# Patient Record
Sex: FEMALE | Race: WHITE | Employment: UNEMPLOYED | URBAN - METROPOLITAN AREA
[De-identification: names, ages, dates, MRNs, and addresses within clinical notes are randomized per-mention and may not be internally consistent; named-entity substitution may affect disease eponyms.]

---

## 2017-01-02 ENCOUNTER — OFFICE VISIT (OUTPATIENT)
Dept: INTERNAL MEDICINE | Facility: CLINIC | Age: 62
End: 2017-01-02

## 2017-01-02 VITALS
BODY MASS INDEX: 27.86 KG/M2 | HEART RATE: 81 BPM | DIASTOLIC BLOOD PRESSURE: 77 MMHG | SYSTOLIC BLOOD PRESSURE: 115 MMHG | WEIGHT: 167.4 LBS

## 2017-01-02 DIAGNOSIS — J42 CHRONIC BRONCHITIS, UNSPECIFIED CHRONIC BRONCHITIS TYPE (H): Primary | ICD-10-CM

## 2017-01-02 ASSESSMENT — PAIN SCALES - GENERAL: PAINLEVEL: MILD PAIN (3)

## 2017-01-02 NOTE — NURSING NOTE
Chief Complaint   Patient presents with     Hypertension     pt here for check on high blood pressure     Results     pt here for test results     Medication Request     pt would like to discuss medications     Gisella Nugent CMA at 10:08 AM on 1/2/2017.
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
## 2017-01-02 NOTE — PATIENT INSTRUCTIONS
[facility] Medication Refill Request Information:  * Please contact your pharmacy regarding ANY request for medication refills.  ** [facility] Prescription Fax = [phone]  * Please allow 3 business days for routine medication refills.  * Please allow 5 business days for controlled substance medication refills.     [facility] Test Result notification information:  *You will be notified with in 7-10 days of your appointment day regarding the results of your test.  If you are on MyChart you will be notified as soon as the provider has reviewed the results and signed off on them.      [facility] [phone] (4th Floor [facility])

## 2017-01-02 NOTE — MR AVS SNAPSHOT
After Visit Summary   1/2/2017    Sarah Sanford    MRN: [id number]           Patient Information     Date Of Birth          1955        Visit Information        Provider Department      1/2/2017 10:20 AM Vj Centeno MD [facility]        Today's Diagnoses     Chronic bronchitis, unspecified chronic bronchitis type (H)    -  1       Care Instructions    [facility] Medication Refill Request Information:  * Please contact your pharmacy regarding ANY request for medication refills.  ** [facility] Prescription Fax = [phone]  * Please allow 3 business days for routine medication refills.  * Please allow 5 business days for controlled substance medication refills.     [facility] Test Result notification information:  *You will be notified with in 7-10 days of your appointment day regarding the results of your test.  If you are on MyChart you will be notified as soon as the provider has reviewed the results and signed off on them.      [facility] [phone] (4th Floor [facility])           Follow-ups after your visit        Follow-up notes from your care team     Return in about 2 months (around 3/2/2017).      Your next 10 appointments already scheduled     Jan 12, 2017 12:45 PM   RETURN RETINA with Solange Currie MD   Eye Clinic ([facility])    [address]  [address]  [address] [address]  [place] [address] [phone]            Jan 31, 2017  6:00 PM   (Arrive by 5:45 PM)   Procedure with Julio Cesar Yeager MD   [facility] ([facility] [place] [facility])    [address]  [address] [place] [address]   [phone]            Mar 06, 2017  3:00 PM   (Arrive by 2:45 PM)   Return Visit with Vj Centeno MD   [facility] ([facility] [place])    [address]  [address] [place] [id number] [phone]               Who to contact     Please call your clinic at [phone] to:    Ask questions about your health    Make or cancel appointments    Discuss your medicines    Learn about your test results    Speak to your doctor   If you have compliments or concerns about an experience at your clinic, or if you wish to file a complaint, please contact [facility] Patient Relations at [phone] or email us at [email or web address]         Additional Information About Your Visit        [employer] Information     [employer] gives you secure access to your electronic health record. If you see a primary care provider, you can also send messages to your care team and make appointments. If you have questions, please call your primary care clinic.  If you do not have a primary care provider, please call [phone] and they will assist you.      [employer] is an electronic gateway that provides easy, online access to your medical records. With [employer], you can request a clinic appointment, read your test results, renew a prescription or communicate with your care team.     To access your existing account, please contact your [facility] Clinic or call [phone] for assistance.        Your Vitals Were     Pulse                   81            Blood Pressure from Last 3 Encounters:   01/02/17 115/77   12/05/16 136/91   11/28/16 134/89    Weight from Last 3 Encounters:   01/02/17 75.932 kg (167 lb 6.4 oz)   12/21/16 78.291 kg (172 lb 9.6 oz)   12/05/16 78.336 kg (172 lb 11.2 oz)              Today, you had the following     No orders found for display         Today's Medication Changes          These changes are accurate as of: 1/2/17 10:45 AM.  If you have any questions, ask your nurse or doctor.               Start taking these medicines.        Dose/Directions    order for DME   Used for:  Chronic bronchitis, unspecified chronic bronchitis type (H)   Started by:  Vj Centeno  MD Tin        Dose:  1 Device   1 Device by Device route daily as needed Air filtration system   Quantity:  1 Device   Refills:  0         Stop taking these medicines if you haven't already. Please contact your care team if you have questions.     montelukast 10 MG tablet   Commonly known as:  SINGULAIR   Stopped by:  Vj Centeno MD                Where to get your medicines      Some of these will need a paper prescription and others can be bought over the counter.  Ask your nurse if you have questions.     Bring a paper prescription for each of these medications    - order for DME             Primary Care Provider Office Phone # Fax #    Vj Centeno [phone]        [facility] [address]  [facility] [address]        Thank you!     Thank you for choosing [facility]  for your care. Our goal is always to provide you with excellent care. Hearing back from our patients is one way we can continue to improve our services. Please take a few minutes to complete the written survey that you may receive in the mail after your visit with us. Thank you!             Your Updated Medication List - Protect others around you: Learn how to safely use, store and throw away your medicines at www.disposemymeds.org.          This list is accurate as of: 1/2/17 10:45 AM.  Always use your most recent med list.                   Brand Name Dispense Instructions for use    ATIVAN 2 MG tablet   Generic drug:  LORazepam      Take 2 mg by mouth At Bedtime 2 tablets at night       cetirizine 10 MG tablet    zyrTEC     Take 10 mg by mouth daily.       fluticasone-salmeterol 100-50 MCG/DOSE diskus inhaler    ADVAIR    60 Inhaler    Inhale 1 puff into the lungs every 12 hours       Gel Heel Cushions Womens Pads     1 Device    1 Device daily       hydrochlorothiazide 25 MG tablet    HYDRODIURIL    100 tablet    Take 1 tablet (25 mg) by mouth daily       lisinopril  30 MG tablet    PRINIVIL,ZESTRIL    100 tablet    Take 1 tablet (30 mg) by mouth At Bedtime       Lysine 1000 MG Tabs          order for DME     1 Device    1 Device by Device route daily as needed Air filtration system       PREMARIN VA      Place 2 g vaginally daily       traMADol 50 MG tablet    ULTRAM    360 tablet    Take 1-2 tablets ( mg) by mouth every 6 hours as needed       traZODone 50 MG tablet    DESYREL     Take 1 mg by mouth At Bedtime

## 2017-01-07 ENCOUNTER — TELEPHONE (OUTPATIENT)
Dept: NURSING | Facility: CLINIC | Age: 62
End: 2017-01-07

## 2017-01-08 ENCOUNTER — OFFICE VISIT (OUTPATIENT)
Dept: URGENT CARE | Facility: URGENT CARE | Age: 62
End: 2017-01-08
Payer: MEDICARE

## 2017-01-08 VITALS
OXYGEN SATURATION: 98 % | BODY MASS INDEX: 27.49 KG/M2 | HEIGHT: 65 IN | DIASTOLIC BLOOD PRESSURE: 86 MMHG | SYSTOLIC BLOOD PRESSURE: 126 MMHG | TEMPERATURE: 98.6 F | WEIGHT: 165 LBS | HEART RATE: 64 BPM

## 2017-01-08 DIAGNOSIS — B00.89 HERPES SIMPLEX VIRUS (HSV) INFECTION OF BUTTOCK: Primary | ICD-10-CM

## 2017-01-08 PROCEDURE — 86696 HERPES SIMPLEX TYPE 2 TEST: CPT | Performed by: FAMILY MEDICINE

## 2017-01-08 PROCEDURE — 36415 COLL VENOUS BLD VENIPUNCTURE: CPT | Performed by: FAMILY MEDICINE

## 2017-01-08 PROCEDURE — 99000 SPECIMEN HANDLING OFFICE-LAB: CPT | Performed by: FAMILY MEDICINE

## 2017-01-08 PROCEDURE — 86695 HERPES SIMPLEX TYPE 1 TEST: CPT | Performed by: FAMILY MEDICINE

## 2017-01-08 PROCEDURE — 99213 OFFICE O/P EST LOW 20 MIN: CPT | Performed by: FAMILY MEDICINE

## 2017-01-08 NOTE — TELEPHONE ENCOUNTER
Call Type: Triage Call    Presenting Problem: Patient calling with c/o that she thinks she may  have some herpetic lesions on her buttocks that are mostly dried up,  but she is contemplating a sexual encounter this evening and is  concerned that a condom is adequate protection. I explained that if  the lesions are dry that should be adequate.  Triage Note:  Guideline Title: Genital Herpes, Diagnosed or Suspected Exposure  Recommended Disposition: Provide Home/Self Care  Original Inclination: Wanted to speak with a nurse  Override Disposition:  Intended Action: Follow Selfcare / Homecare  Physician Contacted: No  All other situations ?  YES  Requesting prescription refill AND no current outbreak ? NO  Known or suspected exposure to genital herpes AND immunocompromised ? NO  Diagnosed with genital herpes AND painful intercourse (dyspareunia) ? NO  Known or suspected exposure to genital herpes ? NO  Diagnosed with genital herpes AND possible or known pregnancy ? NO  Evaluated by provider AND symptoms worsening when following recommended treatment  plan ? NO  Pregnant with active first episode of genital herpes lesions AND signs/symptoms of  labor ? NO  Current outbreak AND requesting prescription or refill ? NO  Diagnosed with genital herpes AND requesting information ? NO  Painful genital lesions, blisters or sores ? NO  Current outbreak AND lesions at new site or new swollen lymph nodes in groin ? NO  Diagnosed with genital herpes AND symptoms same as previous occurrence ? NO  Urinary tract symptoms AND any flank or low back pain ? NO  Symptoms of first episode of genital herpes AND immunocompromised ? NO  Unbearable pelvic pain ? NO  Pregnant AND first episode of tingling, burning, or painful blisters on genital  area ? NO  Previously diagnosed with genital herpes AND having frequent, recurrent outbreaks  that have not been evaluated ? NO  No urination for 12 or more hours ? NO  New onset of multiple lesions or  worsening swelling in perineal area with  significant discomfort (difficulty sitting/walking) ? NO  Any new OR worsening signs and symptoms of soft tissue infection ? NO  Physician Instructions:  Care Advice: The virus can be transmitted to the baby during vaginal  delivery.  It is important that any pregnant woman tell her provider that  she has genital herpes, active or inactive.  Call provider if symptoms continue, worsen, or new symptoms develop.  CAUTIONS  HEALTH PROMOTION / MAINTENANCE  Avoid having sex with a partner who has genital lesions in the perineal  area (penis, scrotum, vulva, or anus), or is having unusual discharge from  vagina or penis.  HERPES MAINTENANCE ADVICE:  * Reduce stress and maintain good health to  decrease recurrences (illness and stress are major factors in causing  recurrent outbreaks).   * Support groups are available and may be helpful  in dealing with feelings of anger, guilt, or shame common with this  problem.     * Women should have annual pelvic examinations and Pap smears  because of the association of HSV with cervical cancer.  SEXUAL BEHAVIOR:   *Refrain from intercourse, douching, tampon use or  intravaginal medication until evaluated by provider and/or symptoms are  gone. * Use condoms between outbreaks, even when no symptoms present (the  virus may continue to be shed for years after lesions disappear).

## 2017-01-08 NOTE — PATIENT INSTRUCTIONS
Living with Herpes  To speed healing, take care of open herpes sores. To reduce outbreaks, take care of your health. And to keep from infecting others, learn how to avoid spreading the virus.     To ease symptoms    Start episodic treatment at the first sign of symptoms, such as itching or tingling.    Take ibuprofen or acetaminophen to limit any pain.    Sit in a warm or cool bath or use a moist compress to lessen the itching of sores. For some women, genital outbreaks cause burning during urination. In such cases, urinating in a tub of warm water helps reduce burning.    Wear white cotton underwear and loose clothing during outbreaks. Don t wear nylon underwear or tight clothes. They can prevent sores from healing.     To speed healing    Wash sores with mild soap and water. Pat the sores completely dry.    Always wash your hands after touching a sore.    Don t bandage sores. Air helps them heal.    Avoid using any ointment unless it is prescribed. Applying the wrong jelly or cream may hold in moisture and slow healing.    Don t pick at the sores. This can slow healing, and might cause a sore to become infected.    If you wear contacts, wash your hands well before putting them in.     To reduce outbreaks    Eat a balanced diet. Your health care provider may suggest taking supplements. These help ensure that you get all the nutrients you need.    Get plenty of sleep. This helps your immune system work its best.    Limit stress and tension. Both can weaken the body s defenses.    Limit exposure to sun, wind, and extreme heat or cold. Wear sunscreen and lip balm to help prevent outbreaks.     To protect others    Tell your current sex partner and any future partners that you have herpes. If you don t know what to say, ask your health care provider for help.    Use a latex condom that covers the affected areas each time you have sex. This reduces the risk of passing herpes to your partner.    Avoid kissing when you  have an oral sore.    Do not have intercourse when genital sores are present. Also keep in mind, herpes can be passed during oral sex and with anal contact.    Don t share towels, toothbrushes, lip balm, or lipstick when you have a sore.    Some evidence supports taking daily antiviral medications to help reduce the likelihood of transmission to your partner.    2315-0457 [employer]. [address] [place] [address], [place] [id number]. All rights reserved. This information is not intended as a substitute for professional medical care. Always follow your healthcare professional's instructions.

## 2017-01-08 NOTE — NURSING NOTE
"Chief Complaint   Patient presents with     Urgent Care     Pt in clinic to have eval for rash on right gluteus.     Derm Problem       Initial /86 mmHg  Pulse 64  Temp(Src) 98.6  F (37  C) (Tympanic)  Ht 5' 5\" (1.651 m)  Wt 165 lb (74.844 kg)  BMI 27.46 kg/m2  SpO2 98% Estimated body mass index is 27.46 kg/(m^2) as calculated from the following:    Height as of this encounter: 5' 5\" (1.651 m).    Weight as of this encounter: 165 lb (74.844 kg).  BP completed using cuff size: large  Crystal Rj/ MA    "

## 2017-01-08 NOTE — PROGRESS NOTES
SUBJECTIVE:   Sarah Sanford is a 61 year old female who has left buttock area rash that she has had previously and suspects is secondary to herpes. Has had this rash in the past starting 14 years ago. Gets the rash every 9 months.  No vaginal lesions.  Also, she gets lip cold sores. PMH: generally healthy. She wants to get checked today as she is sexually active with a new partner and does not want to give the herpes to him.     OBJECTIVE:  Vital signs are normal, she appears well. Left sacral region with 3 scabbed over grouped lesions, no vesicles.  Not tender.      ASSESSMENT:  Herpes simplex virus infection left buttock    PLAN:  The nature of herpes explained carefully. The lesions have crusted over so wound HS DNA testing is not indicated.  Will collect herpes antibody evaluation today.  Declines other STD testing today.  Declines antiviral treatment for future outbreaks.  Informed not to be sexually active when has active lesions.  She understands that sometimes, people may have asymptomatic shedding of the virus.  The patient understands these issues, and will call as needed for further care.  Linda Owen MD

## 2017-01-08 NOTE — MR AVS SNAPSHOT
After Visit Summary   1/8/2017    Sarah Sanford    MRN: [id number]           Patient Information     Date Of Birth          1955        Visit Information        Provider Department      1/8/2017 12:45 PM Linda Jang MD [facility]        Today's Diagnoses     Herpes simplex virus (HSV) infection of buttock    -  1       Care Instructions      Living with Herpes  To speed healing, take care of open herpes sores. To reduce outbreaks, take care of your health. And to keep from infecting others, learn how to avoid spreading the virus.     To ease symptoms    Start episodic treatment at the first sign of symptoms, such as itching or tingling.    Take ibuprofen or acetaminophen to limit any pain.    Sit in a warm or cool bath or use a moist compress to lessen the itching of sores. For some women, genital outbreaks cause burning during urination. In such cases, urinating in a tub of warm water helps reduce burning.    Wear white cotton underwear and loose clothing during outbreaks. Don t wear nylon underwear or tight clothes. They can prevent sores from healing.     To speed healing    Wash sores with mild soap and water. Pat the sores completely dry.    Always wash your hands after touching a sore.    Don t bandage sores. Air helps them heal.    Avoid using any ointment unless it is prescribed. Applying the wrong jelly or cream may hold in moisture and slow healing.    Don t pick at the sores. This can slow healing, and might cause a sore to become infected.    If you wear contacts, wash your hands well before putting them in.     To reduce outbreaks    Eat a balanced diet. Your health care provider may suggest taking supplements. These help ensure that you get all the nutrients you need.    Get plenty of sleep. This helps your immune system work its best.    Limit stress and tension. Both can weaken the body s defenses.    Limit exposure to sun, wind, and  extreme heat or cold. Wear sunscreen and lip balm to help prevent outbreaks.     To protect others    Tell your current sex partner and any future partners that you have herpes. If you don t know what to say, ask your health care provider for help.    Use a latex condom that covers the affected areas each time you have sex. This reduces the risk of passing herpes to your partner.    Avoid kissing when you have an oral sore.    Do not have intercourse when genital sores are present. Also keep in mind, herpes can be passed during oral sex and with anal contact.    Don t share towels, toothbrushes, lip balm, or lipstick when you have a sore.    Some evidence supports taking daily antiviral medications to help reduce the likelihood of transmission to your partner.    1467-8489 [employer]. [address] [place] [address]. All rights reserved. This information is not intended as a substitute for professional medical care. Always follow your healthcare professional's instructions.              Follow-ups after your visit        Your next 10 appointments already scheduled     Jan 12, 2017 12:45 PM   RETURN RETINA with Solange Currie MD   Eye Clinic ([facility])    [address]  [address]  [address] [address]  [facility] [address]   [phone]            Jan 31, 2017  6:00 PM   (Arrive by 5:45 PM)   Procedure with Julio Cesar Yeager MD   [facility] ([facility])    [address]  3rd Floor  [place] [address] [phone]            Mar 06, 2017  3:00 PM   (Arrive by 2:45 PM)   Return Visit with Vj Centeno MD   [facility] ([facility])    [address]  [address] [facility] [address] [phone]              Who to contact     If you have questions or need follow up information about today's clinic visit or your schedule please contact  "[facility] directly at [phone].  Normal or non-critical lab and imaging results will be communicated to you by MyChart, letter or phone within 4 business days after the clinic has received the results. If you do not hear from us within 7 days, please contact the clinic through [employer] or phone. If you have a critical or abnormal lab result, we will notify you by phone as soon as possible.  Submit refill requests through [employer] or call your pharmacy and they will forward the refill request to us. Please allow 3 business days for your refill to be completed.          Additional Information About Your Visit        [employer] Information     [employer] gives you secure access to your electronic health record. If you see a primary care provider, you can also send messages to your care team and make appointments. If you have questions, please call your primary care clinic.  If you do not have a primary care provider, please call [phone] and they will assist you.        Care EveryWhere ID     This is your Care EveryWhere ID. This could be used by other organizations to access your [place] medical records  [id number]        Your Vitals Were     Pulse Temperature Height BMI (Body Mass Index) Pulse Oximetry       64 98.6  F (37  C) (Tympanic) 5' 5\" (1.651 m) 27.46 kg/m2 98%        Blood Pressure from Last 3 Encounters:   01/08/17 126/86   01/02/17 115/77   12/05/16 136/91    Weight from Last 3 Encounters:   01/08/17 165 lb (74.844 kg)   01/02/17 167 lb 6.4 oz (75.932 kg)   12/21/16 172 lb 9.6 oz (78.291 kg)              We Performed the Following     Herpes Simplex Virus 1 and 2 IgG        Primary Care Provider Office Phone # Fax #    Vj Centeno [phone]        [facility] [address] [address]        Thank you!     Thank you for choosing [facility]  for your care. Our goal is always to provide you with excellent " care. Hearing back from our patients is one way we can continue to improve our services. Please take a few minutes to complete the written survey that you may receive in the mail after your visit with us. Thank you!             Your Updated Medication List - Protect others around you: Learn how to safely use, store and throw away your medicines at www.disposemymeds.org.          This list is accurate as of: 1/8/17  2:37 PM.  Always use your most recent med list.                   Brand Name Dispense Instructions for use    ATIVAN 2 MG tablet   Generic drug:  LORazepam      Take 2 mg by mouth At Bedtime 2 tablets at night       cetirizine 10 MG tablet    zyrTEC     Take 10 mg by mouth daily.       fluticasone-salmeterol 100-50 MCG/DOSE diskus inhaler    ADVAIR    60 Inhaler    Inhale 1 puff into the lungs every 12 hours       Gel Heel Cushions Womens Pads     1 Device    1 Device daily       hydrochlorothiazide 25 MG tablet    HYDRODIURIL    100 tablet    Take 1 tablet (25 mg) by mouth daily       lisinopril 30 MG tablet    PRINIVIL,ZESTRIL    100 tablet    Take 1 tablet (30 mg) by mouth At Bedtime       Lysine 1000 MG Tabs          order for DME     1 Device    1 Device by Device route daily as needed Air filtration system       PREMARIN VA      Place 2 g vaginally daily       traMADol 50 MG tablet    ULTRAM    360 tablet    Take 1-2 tablets ( mg) by mouth every 6 hours as needed       traZODone 50 MG tablet    DESYREL     Take 1 mg by mouth At Bedtime

## 2017-01-09 ENCOUNTER — MYC MEDICAL ADVICE (OUTPATIENT)
Dept: INTERNAL MEDICINE | Facility: CLINIC | Age: 62
End: 2017-01-09

## 2017-01-10 ENCOUNTER — OFFICE VISIT (OUTPATIENT)
Dept: OBGYN | Facility: CLINIC | Age: 62
End: 2017-01-10
Attending: NURSE PRACTITIONER
Payer: MEDICARE

## 2017-01-10 VITALS
DIASTOLIC BLOOD PRESSURE: 76 MMHG | SYSTOLIC BLOOD PRESSURE: 139 MMHG | WEIGHT: 170.2 LBS | HEART RATE: 71 BPM | BODY MASS INDEX: 28.32 KG/M2

## 2017-01-10 DIAGNOSIS — B37.31 VAGINAL CANDIDA: ICD-10-CM

## 2017-01-10 DIAGNOSIS — B00.9 HSV (HERPES SIMPLEX VIRUS) INFECTION: ICD-10-CM

## 2017-01-10 DIAGNOSIS — R10.2 VAGINAL PAIN: Primary | ICD-10-CM

## 2017-01-10 LAB
CLUE CELLS: NORMAL
HSV1 IGG SERPL QL IA: ABNORMAL AI (ref 0–0.8)
HSV2 IGG SERPL QL IA: ABNORMAL AI (ref 0–0.8)
TRICHOMONAS (WET PREP): NORMAL
YEAST (WET PREP): NORMAL

## 2017-01-10 PROCEDURE — 99211 OFF/OP EST MAY X REQ PHY/QHP: CPT | Mod: ZF

## 2017-01-10 PROCEDURE — 87210 SMEAR WET MOUNT SALINE/INK: CPT | Mod: ZF | Performed by: NURSE PRACTITIONER

## 2017-01-10 RX ORDER — FLUCONAZOLE 150 MG/1
150 TABLET ORAL ONCE
Qty: 1 TABLET | Refills: 0 | Status: SHIPPED | OUTPATIENT
Start: 2017-01-10 | End: 2017-01-10

## 2017-01-10 ASSESSMENT — PAIN SCALES - GENERAL: PAINLEVEL: MODERATE PAIN (4)

## 2017-01-10 NOTE — NURSING NOTE
Chief Complaint   Patient presents with     Consult   every 6 weeks  Symptoms come back  Vaginal pain 0 feels like she is being violated   Sharp stabbing pain   No discharge.  Herpes outbreak on buttock  Every once in a while   On Sunday was in urgent care. Results are in there.

## 2017-01-10 NOTE — PROGRESS NOTES
"SUBJECTIVE:   61 year old female, , who complains of vaginal pain x 2 days.  Describes it as \"stabbing pain,\" like  \"sand paper is rubbing on sand paper\" and sore.  She experiences this vaginal pain approximately every 6 weeks and it typically lasts for 1 week.  The pain starts ~1 inch inside of the introitus and moves up toward the vaginal vault as the week goes on.  Today she also reports vaginal itching x 2 days.  Denies change in vaginal discharge, pelvic pain, abdominal pain, vaginal malodor, vaginal bleeding, and urinary dysuria, urgency, and frequency.  Last used her Premarin vaginal cream 2 nights ago.      HPI: Sarah had a hysterectomy at age 31 for cervical cancer and experienced menopause at age 42.  She has had ongoing vaginal dryness and pain since menopause.  She has been on long-term every other day Premarin cream (2g).  She recalls that she has been using it for 10+ years.  Despite this, Jes reports continued vaginal dryness.  She was evaluated for this same vaginal pain on  and , with no evidence of an underlying infectious etiology for her symptoms.  She had negative gonorrhea & chlamydia testing on 16.  She was last sexually active  and declines STD testing today.  She was seen at urgent care 2 days ago for a herpes outbreak on her buttocks.  She denies ever having vaginal lesions.  At previous office visits Sarah had been instructed to increase her Premarin cream to daily when she experiences the vaginal pain.  She has not consistently done this and is unable to recall if increasing the Premarin cream has improved or worsened her symptoms.        Sarah has a new boyfriend and is desiring to be sexually active with him.  Expressed fear about having pain with intercourse.      Last pap smear: 2016 NL, HPV negative    OBJECTIVE:   She appears well, afebrile.  Abdomen: benign, soft, nontender, no masses.  Vulva: no external lesions, normal hair distribution, no " lymphadenopathy.  No Q-tip tenderness along the hymenal ring  Vagina: well supported, moist, pink, without rugae, scant amount of white discharge, no lesions; Q-tip touched to vaginal walls bilaterally during speculum exam without any tenderness  Cervix: surgically absent  Uterus: surgically absent  Buttocks: three scabbed herpes lesions present, healing     Wet prep exam: positive for hyphae; negative for clue cells and trichomonads; normal epithelial cells; pH= 4.5; negative whiff test    ASSESSMENT:      Vaginal candida  Vaginal pain    PLAN:   Treatment Vaginal Candida: Diflucan 150 mg x 1 dose  Discussed that this vaginal yeast infection may or may not be related to her episodic vaginal pain.  Scant discharge on exam today.  Discussed recommendations for vaginal hygiene.    Discussed alternative vaginal estrogen options.  Patient desires not to change from the Premarin at this time.    Recommended use of lubricant to decrease discomfort with intercourse and condom use for STD prevention.   Sarah expressed concern about the potential of spreading the herpes virus to her new partner.  Discussed the nature of herpes and discussed that viral shedding can occur at anytime, but is most likely when there is an active outbreak.  Offered a prescription for episodic or suppression antiviral therapy, but Sarah declined.        Return to clinic if symptoms do not resolve or worsen.    30 minutes was spent in direct contact with the patient and > 50% of the time in patient education and coordination of care.    Sharona Mcguire, VALENTE, APRN, WHNP

## 2017-01-10 NOTE — Clinical Note
"1/10/2017       RE: Sarah Sanford  [address]  [place] [address]     Dear Colleague,    Thank you for referring your patient, Sarah Sanford, to the [facility] at [facility]. Please see a copy of my visit note below.    SUBJECTIVE:   61 year old female, , who complains of vaginal pain x 2 days.  Describes it as \"stabbing pain,\" like  \"sand paper is rubbing on sand paper\" and sore.  She experiences this vaginal pain approximately every 6 weeks and it typically lasts for 1 week.  The pain starts ~1 inch inside of the introitus and moves up toward the vaginal vault as the week goes on.  Today she also reports vaginal itching x 2 days.  Denies change in vaginal discharge, pelvic pain, abdominal pain, vaginal malodor, vaginal bleeding, and urinary dysuria, urgency, and frequency.  Last used her Premarin vaginal cream 2 nights ago.      HPI: Sarah had a hysterectomy at age 31 for cervical cancer and experienced menopause at age 42.  She has had ongoing vaginal dryness and pain since menopause.  She has been on long-term every other day Premarin cream (2g).  She recalls that she has been using it for 10+ years.  Despite this, Jes reports continued vaginal dryness.  She was evaluated for this same vaginal pain on  and , with no evidence of an underlying infectious etiology for her symptoms.  She had negative gonorrhea & chlamydia testing on 16.  She was last sexually active  and declines STD testing today.  She was seen at urgent care 2 days ago for a herpes outbreak on her buttocks.  She denies ever having vaginal lesions.  At previous office visits Sarah had been instructed to increase her Premarin cream to daily when she experiences the vaginal pain.  She has not consistently done this and is unable to recall if increasing the Premarin cream has improved or worsened her symptoms.        Sarah has a new boyfriend and is desiring " to be sexually active with him.  Expressed fear about having pain with intercourse.      Last pap smear: 7/9/2016 NL, HPV negative    OBJECTIVE:   She appears well, afebrile.  Abdomen: benign, soft, nontender, no masses.  Vulva: no external lesions, normal hair distribution, no lymphadenopathy.  No Q-tip tenderness along the hymenal ring  Vagina: well supported, moist, pink, without rugae, scant amount of white discharge, no lesions; Q-tip touched to vaginal walls bilaterally during speculum exam without any tenderness  Cervix: surgically absent  Uterus: surgically absent  Buttocks: three scabbed herpes lesions present, healing     Wet prep exam: positive for hyphae; negative for clue cells and trichomonads; normal epithelial cells; pH= 4.5; negative whiff test    ASSESSMENT:      Vaginal candida  Vaginal pain    PLAN:   Treatment Vaginal Candida: Diflucan 150 mg x 1 dose  Discussed that this vaginal yeast infection may or may not be related to her episodic vaginal pain.  Scant discharge on exam today.  Discussed recommendations for vaginal hygiene.    Discussed alternative vaginal estrogen options.  Patient desires not to change from the Premarin at this time.    Recommended use of lubricant to decrease discomfort with intercourse and condom use for STD prevention.   Sarah expressed concern about the potential of spreading the herpes virus to her new partner.  Discussed the nature of herpes and discussed that viral shedding can occur at anytime, but is most likely when there is an active outbreak.  Offered a prescription for episodic or suppression antiviral therapy, but Sarah declined.        Return to clinic if symptoms do not resolve or worsen.    30 minutes was spent in direct contact with the patient and > 50% of the time in patient education and coordination of care.    Sharona Mcguire, DNP, APRN, WHNP

## 2017-01-12 ENCOUNTER — OFFICE VISIT (OUTPATIENT)
Dept: OPHTHALMOLOGY | Facility: CLINIC | Age: 62
End: 2017-01-12
Attending: OPHTHALMOLOGY
Payer: MEDICARE

## 2017-01-12 DIAGNOSIS — H43.11 VITREOUS HEMORRHAGE, RIGHT (H): Primary | ICD-10-CM

## 2017-01-12 PROCEDURE — 99213 OFFICE O/P EST LOW 20 MIN: CPT | Mod: ZF

## 2017-01-12 ASSESSMENT — CONF VISUAL FIELD
OD_NORMAL: 1
OS_NORMAL: 1
METHOD: COUNTING FINGERS

## 2017-01-12 ASSESSMENT — REFRACTION_WEARINGRX
SPECS_TYPE: BIFOCAL
OS_CYLINDER: +0.25
OS_AXIS: 085
OS_SPHERE: +0.25
OD_CYLINDER: +0.50
OD_ADD: +2.50
OD_SPHERE: PLANO
OS_ADD: +2.50
OD_AXIS: 133

## 2017-01-12 ASSESSMENT — TONOMETRY
OS_IOP_MMHG: 11
IOP_METHOD: TONOPEN
OD_IOP_MMHG: 11

## 2017-01-12 ASSESSMENT — CUP TO DISC RATIO
OS_RATIO: 0.2
OD_RATIO: 0.2

## 2017-01-12 ASSESSMENT — SLIT LAMP EXAM - LIDS
COMMENTS: NORMAL
COMMENTS: NORMAL

## 2017-01-12 ASSESSMENT — VISUAL ACUITY
OD_CC: 20/40
OS_CC: 20/20-3
METHOD: SNELLEN - LINEAR

## 2017-01-12 ASSESSMENT — EXTERNAL EXAM - RIGHT EYE: OD_EXAM: NORMAL

## 2017-01-12 ASSESSMENT — EXTERNAL EXAM - LEFT EYE: OS_EXAM: NORMAL

## 2017-01-12 NOTE — PROGRESS NOTES
"CC: new Floaters Right Eye     HPI: Sarah Sanford is a 61 year old female here for follow up on recent vitreous hemorrhage in the right eye.  She reports having worsening vision and floaters around [place].  These symptoms improved over a week and then worsened after a fall on New Years [place].  Reports vision as presently being \"messy.\"     Past ocular history:  Previous history of retinal atrophy, floaters, and MA in the right eye     Review of prior Imaging     fluorescein angiography 11.9.15 showed capillary non perfusion and telangiectatic vessels along the inferior temporal vein. Possible old branch retinal vein occlusion. 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 CMT unchanged     Assessment/plan:  1. Vitreous hemorrhage, Right Eye   - now recurrent several times   - not diabetic   - hypertensive retinopathy, history of likely BRVO right eye on 2015 imaging   -on BP medications, reports good control until recently within the past few weeks; 150/91 in clinic today   -vitreous hemorrhage possibly resulting from ruptured MA or from neovascularization of old branch retinal vein occlusion.     - R/B/A to PPV likely endolaser discussed with patient - wishes to proceed after mid-February (after having chest cyst removed on 1/31) - plan for general anesthesia given patient anxiety   - plan for f/u in 1 month with [place] for re-check (1 week prior to surgery)    2. Possible old Branch retinal vein occlusion right eye  based on prior fluorescein angiography   Possible neovascularization elsewhere? No obvious on exam because of  Vitreous hemorrhage     3.  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 Hypertensive retinopathy both eyes   With AV crossing both eyes  Recommend good " intraocular pressure control    5. History of Posterior vitreous detachment (PVD, both eyes  Retinal detachment/retinal tear precautions discussed with patient  Contact clinic immediately for new floaters/flashers or shadows in vision     RTC four weeks with Dr. Abdulaziz Chapin MD  Retina Fellow    Attestation:  I have seen and examined the patient with Dr. Chapin and agree with the findings in this note.     Solange Zimmerman MD PhD.  Professor & Chair

## 2017-01-12 NOTE — NURSING NOTE
Chief Complaints and History of Present Illnesses   Patient presents with     Vitreous Hemorrhage Follow Up     HPI    Affected eye(s):  Right   Symptoms:     Blurred vision   Decreased vision   Floaters   No flashes         Do you have eye pain now?:  No      Comments:  Around Xmas VA seemed to get better then around New Years VA got much worse, more floaters.     Joslyn FRIEDMAN January 12, 2017 12:43 PM

## 2017-01-12 NOTE — MR AVS SNAPSHOT
After Visit Summary   1/12/2017    Sarah Sanford    MRN: [id number]           Patient Information     Date Of Birth          1955        Visit Information        Provider Department      1/12/2017 12:45 PM Solange Currie MD Eye Clinic        Today's Diagnoses     Vitreous hemorrhage, right (H)    -  1        Follow-ups after your visit        Follow-up notes from your care team     Return in about 4 weeks (around 2/9/2017) for VH re-check prior to PPV OD.      Your next 10 appointments already scheduled     Jan 31, 2017  6:00 PM   (Arrive by 5:45 PM)   Procedure with Julio Cesar Yeager MD   [facility] ([facility])    [address]  3rd Floor  [facility] [address] [phone]            Feb 08, 2017  1:45 PM   RETURN RETINA with Steph Cintron MD   Eye Clinic ([facility])    [address]  [address]  [address] [address]  [facility] [address] [phone]            Mar 06, 2017  3:00 PM   (Arrive by 2:45 PM)   Return Visit with Vj Centeno MD   [facility] ([facility] [place])    [address]  4th Floor  [facility] [address] [phone]              Who to contact     Please call your clinic at [phone] to:    Ask questions about your health    Make or cancel appointments    Discuss your medicines    Learn about your test results    Speak to your doctor   If you have compliments or concerns about an experience at your clinic, or if you wish to file a complaint, please contact [facility] Patient Relations at [phone] or email us at [email or web address]         Additional Information About Your Visit        MyChart Information     [employer] gives you secure access to your electronic health record. If you see a primary care provider, you can also send messages to your care team and make  appointments. If you have questions, please call your primary care clinic.  If you do not have a primary care provider, please call [phone] and they will assist you.      [employer] is an electronic gateway that provides easy, online access to your medical records. With [employer], you can request a clinic appointment, read your test results, renew a prescription or communicate with your care team.     To access your existing account, please contact your [facility] or call [phone] for assistance.        Care EveryWhere ID     This is your Care EveryWhere ID. This could be used by other organizations to access your [place] medical records  [id number]         Blood Pressure from Last 3 Encounters:   01/10/17 139/76   01/08/17 126/86   01/02/17 115/77    Weight from Last 3 Encounters:   01/10/17 77.202 kg (170 lb 3.2 oz)   01/08/17 74.844 kg (165 lb)   01/02/17 75.932 kg (167 lb 6.4 oz)              Today, you had the following     No orders found for display       Primary Care Provider Office Phone # Fax #    Vj Centeno [phone]        [facility] [address]  [facility] [address]        Thank you!     Thank you for choosing EYE CLINIC  for your care. Our goal is always to provide you with excellent care. Hearing back from our patients is one way we can continue to improve our services. Please take a few minutes to complete the written survey that you may receive in the mail after your visit with us. Thank you!             Your Updated Medication List - Protect others around you: Learn how to safely use, store and throw away your medicines at www.disposemymeds.org.          This list is accurate as of: 1/12/17  2:05 PM.  Always use your most recent med list.                   Brand Name Dispense Instructions for use    ATIVAN 2 MG tablet   Generic drug:  LORazepam      Take 2 mg by mouth At Bedtime 2 tablets at night       cetirizine  10 MG tablet    zyrTEC     Take 10 mg by mouth daily.       fluticasone-salmeterol 100-50 MCG/DOSE diskus inhaler    ADVAIR    60 Inhaler    Inhale 1 puff into the lungs every 12 hours       Gel Heel Cushions Womens Pads     1 Device    1 Device daily       hydrochlorothiazide 25 MG tablet    HYDRODIURIL    100 tablet    Take 1 tablet (25 mg) by mouth daily       lisinopril 30 MG tablet    PRINIVIL,ZESTRIL    100 tablet    Take 1 tablet (30 mg) by mouth At Bedtime       Lysine 1000 MG Tabs          order for DME     1 Device    1 Device by Device route daily as needed Air filtration system       PREMARIN VA      Place 2 g vaginally daily       traMADol 50 MG tablet    ULTRAM    360 tablet    Take 1-2 tablets ( mg) by mouth every 6 hours as needed       traZODone 50 MG tablet    DESYREL     Take 1 mg by mouth At Bedtime

## 2017-01-27 ENCOUNTER — TELEPHONE (OUTPATIENT)
Dept: DERMATOLOGY | Facility: CLINIC | Age: 62
End: 2017-01-27

## 2017-01-27 NOTE — TELEPHONE ENCOUNTER
"Patient states she is having a procedure to remove a cyst on 1/31/17 and would like to know if Dr Yeager will be prescribing abx at that visit to prevent infection and if so she would like prescription before appt so she can pick it up before appt to be able to \"go home\" right after procedure. If abx are being prescribed patient notes allergies to several but does not specify and abx. Also states she would need to be prescribe diflucan for vaginal yeast infections she gets when taking abx. No mention of need for abx in visit note on 12/21/16.  "

## 2017-01-27 NOTE — TELEPHONE ENCOUNTER
Antibiotics not indicated for simple cutaneous excisions, as in this case. The surgical preparations and technique are our protection against infections.

## 2017-01-28 ENCOUNTER — OFFICE VISIT (OUTPATIENT)
Dept: OPHTHALMOLOGY | Facility: CLINIC | Age: 62
End: 2017-01-28

## 2017-01-28 ENCOUNTER — HOSPITAL ENCOUNTER (OUTPATIENT)
Facility: CLINIC | Age: 62
End: 2017-01-28
Payer: MEDICAID

## 2017-01-28 DIAGNOSIS — H43.11 VITREOUS HEMORRHAGE, RIGHT (H): Primary | ICD-10-CM

## 2017-01-28 PROCEDURE — 76510 OPH US DX B-SCAN&QUAN A-SCAN: CPT | Mod: ZF | Performed by: OPHTHALMOLOGY

## 2017-01-28 ASSESSMENT — TONOMETRY
IOP_METHOD: TONOPEN
OD_IOP_MMHG: 13
OS_IOP_MMHG: 14

## 2017-01-28 ASSESSMENT — SLIT LAMP EXAM - LIDS
COMMENTS: NORMAL
COMMENTS: NORMAL

## 2017-01-28 ASSESSMENT — VISUAL ACUITY
METHOD: SNELLEN - LINEAR
OD_CC: CF 3'
OS_CC: 20/40
OS_CC+: -2

## 2017-01-28 ASSESSMENT — EXTERNAL EXAM - LEFT EYE: OS_EXAM: NORMAL

## 2017-01-28 ASSESSMENT — CONF VISUAL FIELD: OD_NORMAL: 1

## 2017-01-28 ASSESSMENT — EXTERNAL EXAM - RIGHT EYE: OD_EXAM: NORMAL

## 2017-01-28 ASSESSMENT — CUP TO DISC RATIO: OS_RATIO: 0.2

## 2017-01-28 NOTE — PROGRESS NOTES
HPI: Sarah Sanford is a 61 year old female here for recurrent vitreous hemorrhage, right eye. She states that vision was great on Thursday evening. When she woke on Friday morning she had extensive floaters and horrible vision in her right eye. Denies flashes, pain. She called the on call resident who urged the patient to come for an evaluation, but she went to bed. This morning, the on call resident urged her to come to clinic. She states the vision is stable with continued poor vision in her right eye.    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 Vitreous hemorrhage, Right Eye              - now recurrent several times              - not diabetic              - hypertensive retinopathy, history of likely BRVO right eye on 2015 imaging              -on BP medications, reports good control until recently within the past few weeks; 150/91 in clinic today              -vitreous hemorrhage possibly resulting from ruptured MA or from neovascularization of old branch retinal vein occlusion.                 -PPV likely endolaser- wishes to proceed after mid-February (after having chest cyst removed on 1/31) - plan for general anesthesia given patient anxiety              - plan for f/u 2/8/16 following cyst removal surgery   - RD precautions discussed    RTC  [place] 2/8 as scheduled    Avelino Venegas MD  PGY2, Dept of Ophthalmology  Pager [phone]    ATTESTATION    I have confirmed the CC, PMH, HPI, history, ROS, and exam findings by the technician or others, and modified by me where needed. I have confirmed and edited as necessary the relevant ophthalmic history, ROS, eye exam findings, and the neuro exam findings as obtained by others. I have seen and examined this patient. I was present for critical portions of the procedure carried  out by the resident/fellow and immediately available for the entire procedure and I personally viewed the image(s) and I agree with the interpretation as documented by the resident/fellow/or others and edited by me.  Patrick Chapin MD  Retina Fellow

## 2017-01-28 NOTE — TELEPHONE ENCOUNTER
Pt called stating that she had new floaters and decreased vision in her right eye. I repeatedly encouraged the patient to come to the emergency department to be evaluated. She stated that she did not have a ride. I stated that she may need urgent ocular care. Patient stated an understanding sheing but did not confirm whether or not she would be coming in for an evaluation

## 2017-01-31 ENCOUNTER — OFFICE VISIT (OUTPATIENT)
Dept: DERMATOLOGY | Facility: CLINIC | Age: 62
End: 2017-01-31

## 2017-01-31 VITALS — SYSTOLIC BLOOD PRESSURE: 123 MMHG | HEART RATE: 66 BPM | DIASTOLIC BLOOD PRESSURE: 82 MMHG

## 2017-01-31 DIAGNOSIS — L72.0 EIC (EPIDERMAL INCLUSION CYST): Primary | ICD-10-CM

## 2017-01-31 PROCEDURE — 88305 TISSUE EXAM BY PATHOLOGIST: CPT | Performed by: DERMATOLOGY

## 2017-01-31 RX ORDER — LIDOCAINE HYDROCHLORIDE AND EPINEPHRINE 10; 10 MG/ML; UG/ML
3 INJECTION, SOLUTION INFILTRATION; PERINEURAL ONCE
Qty: 3 ML | Refills: 0 | OUTPATIENT
Start: 2017-01-31 | End: 2017-01-31

## 2017-01-31 ASSESSMENT — PAIN SCALES - GENERAL
PAINLEVEL: NO PAIN (0)
PAINLEVEL: NO PAIN (0)

## 2017-01-31 NOTE — Clinical Note
1/31/2017       RE: Sarah Sanford  [address]  [place] [address]     Dear Colleague,    Thank you for referring your patient, Sarah Sanford, to the [facility] DERMATOLOGY at [facility]. Please see a copy of my visit note below.    DERMATOLOGIC SURGERY REPORT    NAME OF PROCEDURE:  EXCISION AND INTERMEDIATE CLOSURE    Surgeon:  Julio Cesar Yeager    PREOPERATIVE DIAGNOSIS: EIC s/p multiple drainage procedures of the R medial chest  POSTOPERATIVE DIAGNOSIS: Same  FINAL EXCISION SIZE: 14mm lesion with 0mm margins  TOTAL EXCISED DIAMETER: 14mm  FINAL REPAIR LENGTH:  28mm    INDICATIONS:  This patient presented with a 14mm x 14mm EIC s/p multiple drainage procedures with superficial scarring on the R medial chest. Excision was indicated due to growth, foul smelling drainage, tenderness. We discussed the principles of treatment and most likely complications including bleeding, infection, wound dehiscence, pain, nerve damage, and scarring. Informed consent was obtained and the patient underwent the procedure as follows.    PROCEDURE:  The patient was taken to the operative suite. The treatment area was anesthetized with 1% lidocaine with 1:015664 epinephrine buffered with bicarbonate. The area was washed with Hibiclens, rinsed with saline and draped with sterile towels. The lesion was delineated and excised down to subcutaneous fat. Hemostasis was obtained by electrocoagulation. With a margin of 0mm, the final excision size was 14mm x 14mm.      REPAIR:  In order to repair this defect while maintaining the normal anatomic relations and function, we elected to utilize an intermediate linear closure. Closure was oriented so that the wound was in the patient's natural skin tension lines. Deeper layers of the subcutaneous tissue were undermined first. Deep dermal and subcutaneous layer closure was performed using 4-0 Vicryl deep, intradermal and subcutaneous sutures. Two redundant  columns were removed by triangulation. Final cutaneous approximation was achieved with 4-0 Prolene simple running sutures.     The final wound length was 28mm.  A total of 6mL of anesthesia was administered for all surgical sites. Estimated blood loss was less than 10mL for all surgical sites. A sterile pressure dressing was applied and wound care instructions, with a written handout, were given. The patient was discharged from the [facility] alert and ambulatory.    Julio Cesar Yeager MD  Dermatology Staff Physician  , Department of Dermatology

## 2017-01-31 NOTE — MR AVS SNAPSHOT
After Visit Summary   1/31/2017    Sarah Sanford    MRN: [id number]           Patient Information     Date Of Birth          1955        Visit Information        Provider Department      1/31/2017 6:00 PM Julio Cesar Yeager MD [facility]        Today's Diagnoses     EIC (epidermal inclusion cyst)    -  1       Care Instructions    Excision Wound Care Instructions  After your surgery, a pressure bandage will be placed over the area that has sutures. This will help prevent bleeding. Please follow these instructions until you come back to clinic for suture removal on 14, as they will help you to prevent complications as your wound heals.  For the First 48 hours After Surgery:  1. Leave the pressure bandage on and keep it dry. If it should come loose, you may retape it, but do not take it off.  2. Relax and take it easy. Do not do any vigorous exercise, heavy lifting, or bending forward. This could cause the wound to bleed.  3. Post-operative pain is usually mild. You may take plain or extra strength Tylenol every 4 hours as needed (do not take more than 4,000mg in one day). Do not take any medicine that contains aspirin, ibuprofen or motrin unless you have been recommended these by a doctor.  Avoid alcohol and vitamin E as these may increase your tendency to bleed.  4. You may put an ice pack around the bandaged area for 20 minutes every 2-3 hours. This may help reduce swelling, bruising, and pain. Make sure the ice pack is waterproof so that the pressure bandage does not get wet.   5. You may see a small amount of drainage or blood on your pressure bandage. This is normal. However, if drainage or bleeding continues or saturates the bandage, you will need to apply firm pressure over the bandage with a washcloth for 15 minutes. If bleeding continues after applying pressure for 15 minutes then go to the nearest emergency room.  48 Hours After Surgery  Carefully remove the bandage and start  daily wound care and dressing changes. You may also now shower and get the wound wet. Wash wound with a mild soap and water.  Use caution when washing the wound. Be gentle and do not let the forceful shower stream hit the wound directly.  PAT dry.  Daily Wound Care:  1. Wash wound with a mild soap and water.  Use caution when washing the wound, be gentle and do not let the forceful shower stream hit the wound directly.  2. PAT DRY.  3. Apply Vaseline (from a new container or tube) over the suture line with a Q-tip. It is very important to keep the wound continuously moist, as wounds heal best in a moist environment.  4.  Keep the site covered until sutures are removed, you can cover it with a Telfa (non-stick) dressing and tape or a band-aid.    5. If you are unable to keep wound covered, you must apply Vaseline every 2 - 3 hours (while awake) to ensure it is being kept moist for optimal healing. A dressing overnight is recommended to keep the area moist.   Call Us If:  1. You have pain that is not controlled with Tylenol.  2. You have signs or symptoms of an infection, such as: fever over 100 degrees F, redness, warmth, or foul-smelling or yellow/creamy drainage from the wound.  Who should I call with questions?    [facility]: [phone]     [facility]: [phone]    For urgent needs outside of business hours call the [facility] at [phone] and ask for the dermatology resident on call          Follow-ups after your visit        Your next 10 appointments already scheduled     Feb 08, 2017  1:45 PM   RETURN RETINA with Steph Cintron MD   Eye Clinic ([facility])    [address]  [address]  [address] [address]  [facility] [address]   [phone]            Feb 13, 2017  1:00 PM   Nurse Visit with  Dermatology Nurse   [facility] ([facility])    [address]  [place] [address] [phone]            Mar 06, 2017  3:00 PM   (Arrive by 2:45 PM)   Return Visit with Vj Centeno MD   [facility] ([facility] [place])    [address] [address] [facility]  [address] [place] [address] [phone]              Who to contact     Please call your clinic at [phone] to:    Ask questions about your health    Make or cancel appointments    Discuss your medicines    Learn about your test results    Speak to your doctor   If you have compliments or concerns about an experience at your clinic, or if you wish to file a complaint, please contact [facility] Patient Relations at [phone] or email us at [email or web address]         Additional Information About Your Visit        MyChart Information     [employer] gives you secure access to your electronic health record. If you see a primary care provider, you can also send messages to your care team and make appointments. If you have questions, please call your primary care clinic.  If you do not have a primary care provider, please call [phone] and they will assist you.      [employer] is an electronic gateway that provides easy, online access to your medical records. With [employer], you can request a clinic appointment, read your test results, renew a prescription or communicate with your care team.     To access your existing account, please contact your [facility] or call [phone] for assistance.        Care EveryWhere ID     This is your Care EveryWhere ID. This could be used by other organizations to access your [facility] medical records  [id number]        Your Vitals Were     Pulse                   66            Blood Pressure from Last 3 Encounters:   01/31/17 123/82   01/10/17 139/76   01/08/17 126/86    Weight from Last 3 Encounters:   01/10/17 77.202 kg (170 lb 3.2 oz)   01/08/17 74.844 kg  (165 lb)   01/02/17 75.932 kg (167 lb 6.4 oz)              We Performed the Following     EXC BENIGN SKIN LESION TRUNK/ARM/LEG 1.1-2.0 CM     Surgical pathology exam          Today's Medication Changes          These changes are accurate as of: 1/31/17  7:05 PM.  If you have any questions, ask your nurse or doctor.               Start taking these medicines.        Dose/Directions    lidocaine 1% with EPINEPHrine 1:100,000 1 %-1:992644 injection   Used for:  EIC (epidermal inclusion cyst)   Started by:  Julio Cesar Yeager MD        Dose:  3 mL   Inject 3 mLs into the skin once for 1 dose   Quantity:  3 mL   Refills:  0         Stop taking these medicines if you haven't already. Please contact your care team if you have questions.     fluticasone-salmeterol 100-50 MCG/DOSE diskus inhaler   Commonly known as:  ADVAIR   Stopped by:  Julio Cesar Yeager MD                Where to get your medicines      Some of these will need a paper prescription and others can be bought over the counter.  Ask your nurse if you have questions.     You don't need a prescription for these medications    - lidocaine 1% with EPINEPHrine 1:100,000 1 %-1:797453 injection             Primary Care Provider Office Phone # Fax #    Vj Centeno [phone]        [facility] [address] [facility] [address]  [facility] [address]        Thank you!     Thank you for choosing [facility] DERMATOLOGY  for your care. Our goal is always to provide you with excellent care. Hearing back from our patients is one way we can continue to improve our services. Please take a few minutes to complete the written survey that you may receive in the mail after your visit with us. Thank you!             Your Updated Medication List - Protect others around you: Learn how to safely use, store and throw away your medicines at www.disposemymeds.org.          This list is accurate as of: 1/31/17  7:05 PM.  Always use your most recent med list.                    Brand Name Dispense Instructions for use    ATIVAN 2 MG tablet   Generic drug:  LORazepam      Take 2 mg by mouth At Bedtime 2 tablets at night       cetirizine 10 MG tablet    zyrTEC     Take 10 mg by mouth daily.       Gel Heel Cushions Womens Pads     1 Device    1 Device daily       hydrochlorothiazide 25 MG tablet    HYDRODIURIL    100 tablet    Take 1 tablet (25 mg) by mouth daily       lidocaine 1% with EPINEPHrine 1:100,000 1 %-1:628220 injection     3 mL    Inject 3 mLs into the skin once for 1 dose       lisinopril 30 MG tablet    PRINIVIL,ZESTRIL    100 tablet    Take 1 tablet (30 mg) by mouth At Bedtime       Lysine 1000 MG Tabs          mometasone-formoterol 100-5 MCG/ACT oral inhaler    DULERA     Inhale 2 puffs into the lungs 2 times daily       order for DME     1 Device    1 Device by Device route daily as needed Air filtration system       PREMARIN VA      Place 2 g vaginally daily       traMADol 50 MG tablet    ULTRAM    360 tablet    Take 1-2 tablets ( mg) by mouth every 6 hours as needed       traZODone 50 MG tablet    DESYREL     Take 1 mg by mouth At Bedtime

## 2017-02-01 DIAGNOSIS — H43.11 VITREOUS HEMORRHAGE, RIGHT (H): Primary | ICD-10-CM

## 2017-02-01 NOTE — PATIENT INSTRUCTIONS
Excision Wound Care Instructions  After your surgery, a pressure bandage will be placed over the area that has sutures. This will help prevent bleeding. Please follow these instructions until you come back to clinic for suture removal on 14, as they will help you to prevent complications as your wound heals.  For the First 48 hours After Surgery:  1. Leave the pressure bandage on and keep it dry. If it should come loose, you may retape it, but do not take it off.  2. Relax and take it easy. Do not do any vigorous exercise, heavy lifting, or bending forward. This could cause the wound to bleed.  3. Post-operative pain is usually mild. You may take plain or extra strength Tylenol every 4 hours as needed (do not take more than 4,000mg in one day). Do not take any medicine that contains aspirin, ibuprofen or motrin unless you have been recommended these by a doctor.  Avoid alcohol and vitamin E as these may increase your tendency to bleed.  4. You may put an ice pack around the bandaged area for 20 minutes every 2-3 hours. This may help reduce swelling, bruising, and pain. Make sure the ice pack is waterproof so that the pressure bandage does not get wet.   5. You may see a small amount of drainage or blood on your pressure bandage. This is normal. However, if drainage or bleeding continues or saturates the bandage, you will need to apply firm pressure over the bandage with a washcloth for 15 minutes. If bleeding continues after applying pressure for 15 minutes then go to the nearest emergency room.  48 Hours After Surgery  Carefully remove the bandage and start daily wound care and dressing changes. You may also now shower and get the wound wet. Wash wound with a mild soap and water.  Use caution when washing the wound. Be gentle and do not let the forceful shower stream hit the wound directly.  PAT dry.  Daily Wound Care:  1. Wash wound with a mild soap and water.  Use caution when washing the wound, be gentle and do  not let the forceful shower stream hit the wound directly.  2. PAT DRY.  3. Apply Vaseline (from a new container or tube) over the suture line with a Q-tip. It is very important to keep the wound continuously moist, as wounds heal best in a moist environment.  4.  Keep the site covered until sutures are removed, you can cover it with a Telfa (non-stick) dressing and tape or a band-aid.    5. If you are unable to keep wound covered, you must apply Vaseline every 2 - 3 hours (while awake) to ensure it is being kept moist for optimal healing. A dressing overnight is recommended to keep the area moist.   Call Us If:  1. You have pain that is not controlled with Tylenol.  2. You have signs or symptoms of an infection, such as: fever over 100 degrees F, redness, warmth, or foul-smelling or yellow/creamy drainage from the wound.  Who should I call with questions?    [facility]: [phone]     [facility]: [phone]    For urgent needs outside of business hours call the [facility] at [phone] and ask for the dermatology resident on call

## 2017-02-01 NOTE — PROGRESS NOTES
DERMATOLOGIC SURGERY REPORT    NAME OF PROCEDURE:  EXCISION AND INTERMEDIATE CLOSURE    Surgeon:  Julio Cesar Yeager    PREOPERATIVE DIAGNOSIS: EIC s/p multiple drainage procedures of the R medial chest  POSTOPERATIVE DIAGNOSIS: Same  FINAL EXCISION SIZE: 14mm lesion with 0mm margins  TOTAL EXCISED DIAMETER: 14mm  FINAL REPAIR LENGTH:  28mm    INDICATIONS:  This patient presented with a 14mm x 14mm EIC s/p multiple drainage procedures with superficial scarring on the R medial chest. Excision was indicated due to growth, foul smelling drainage, tenderness. We discussed the principles of treatment and most likely complications including bleeding, infection, wound dehiscence, pain, nerve damage, and scarring. Informed consent was obtained and the patient underwent the procedure as follows.    PROCEDURE:  The patient was taken to the operative suite. The treatment area was anesthetized with 1% lidocaine with 1:565842 epinephrine buffered with bicarbonate. The area was washed with Hibiclens, rinsed with saline and draped with sterile towels. The lesion was delineated and excised down to subcutaneous fat. Hemostasis was obtained by electrocoagulation. With a margin of 0mm, the final excision size was 14mm x 14mm.      REPAIR:  In order to repair this defect while maintaining the normal anatomic relations and function, we elected to utilize an intermediate linear closure. Closure was oriented so that the wound was in the patient's natural skin tension lines. Deeper layers of the subcutaneous tissue were undermined first. Deep dermal and subcutaneous layer closure was performed using 4-0 Vicryl deep, intradermal and subcutaneous sutures. Two redundant columns were removed by triangulation. Final cutaneous approximation was achieved with 4-0 Prolene simple running sutures.     The final wound length was 28mm.  A total of 6mL of anesthesia was administered for all surgical sites. Estimated blood loss was less than 10mL for all  surgical sites. A sterile pressure dressing was applied and wound care instructions, with a written handout, were given. The patient was discharged from the [facility] alert and ambulatory.    Julio Cesar Yeager MD  Dermatology Staff Physician  , Department of Dermatology

## 2017-02-06 ENCOUNTER — OFFICE VISIT (OUTPATIENT)
Dept: INTERNAL MEDICINE | Facility: CLINIC | Age: 62
End: 2017-02-06

## 2017-02-06 VITALS
HEART RATE: 59 BPM | DIASTOLIC BLOOD PRESSURE: 84 MMHG | BODY MASS INDEX: 28.19 KG/M2 | SYSTOLIC BLOOD PRESSURE: 135 MMHG | WEIGHT: 169.4 LBS

## 2017-02-06 DIAGNOSIS — H43.812 PVD (POSTERIOR VITREOUS DETACHMENT), LEFT EYE: ICD-10-CM

## 2017-02-06 DIAGNOSIS — H35.00 VALSALVA RETINOPATHY: ICD-10-CM

## 2017-02-06 DIAGNOSIS — H43.811 PVD (POSTERIOR VITREOUS DETACHMENT), RIGHT: ICD-10-CM

## 2017-02-06 DIAGNOSIS — Z01.818 PREOP GENERAL PHYSICAL EXAM: ICD-10-CM

## 2017-02-06 DIAGNOSIS — Z01.818 PREOP GENERAL PHYSICAL EXAM: Primary | ICD-10-CM

## 2017-02-06 LAB
ANION GAP SERPL CALCULATED.3IONS-SCNC: 5 MMOL/L (ref 3–14)
BUN SERPL-MCNC: 10 MG/DL (ref 7–30)
CALCIUM SERPL-MCNC: 8.8 MG/DL (ref 8.5–10.1)
CHLORIDE SERPL-SCNC: 103 MMOL/L (ref 94–109)
CO2 SERPL-SCNC: 33 MMOL/L (ref 20–32)
COPATH REPORT: NORMAL
CREAT SERPL-MCNC: 0.6 MG/DL (ref 0.52–1.04)
GFR SERPL CREATININE-BSD FRML MDRD: ABNORMAL ML/MIN/1.7M2
GLUCOSE SERPL-MCNC: 93 MG/DL (ref 70–99)
HGB BLD-MCNC: 14.4 G/DL (ref 11.7–15.7)
INTERPRETATION ECG - MUSE: NORMAL
POTASSIUM SERPL-SCNC: 4 MMOL/L (ref 3.4–5.3)
SODIUM SERPL-SCNC: 141 MMOL/L (ref 133–144)

## 2017-02-06 NOTE — PROGRESS NOTES
Surgeon (please enter first and last name):  Dr. Cintron/ Dr. Chapin  Fax number for Preop Evaluation:  [phone]  Location of Surgery: [facility]  Date of Surgery:  2/14/17  Procedure:  Eye Surgery  History of reaction to anesthesia?  No

## 2017-02-06 NOTE — NURSING NOTE
Chief Complaint   Patient presents with     Pre Op Exam     Patient here for pre op exam     Marisol Garcia LPN at 12:05 PM on 2/6/2017.

## 2017-02-06 NOTE — MR AVS SNAPSHOT
After Visit Summary   2/6/2017    Sarah Sanford    MRN: [id number]           Patient Information     Date Of Birth          1955        Visit Information        Provider Department      2/6/2017 12:30 PM Dori Oviedo APRN [facility]        Today's Diagnoses     Preop general physical exam    -  1       Care Instructions    Primary Care Center Medication Refill Request Information:  * Please contact your pharmacy regarding ANY request for medication refills.  ** [facility] Prescription Fax = [phone]  * Please allow 3 business days for routine medication refills.  * Please allow 5 business days for controlled substance medication refills.     Primary Care Center Test Result notification information:  *You will be notified with in 7-10 days of your appointment day regarding the results of your test.  If you are on MyChart you will be notified as soon as the provider has reviewed the results and signed off on them.    Hold your lisinopril 24 hours before surgery.    Do not take aspirin or NSAIDs (Advil/Ibuprofen) for 10 days before surgery.            Follow-ups after your visit        Your next 10 appointments already scheduled     Feb 06, 2017  1:15 PM   LAB with [facility] ([facility])    [address] [place] [id number] [phone]           Patient must bring picture ID.  Patient should be prepared to give a urine specimen  Please do not eat 10-12 hours before your appointment if you are coming in fasting for labs on lipids, cholesterol, or glucose (sugar).  Pregnant women should follow their Care Team instructions. Water with medications is okay. Do not drink coffee or other fluids.   If you have concerns about taking  your medications, please ask at office or if scheduling via [employer], send a message by clicking on Secure Messaging, Message Your Care Team.            Feb 08, 2017  1:45 PM   RETURN  RETINA with Steph Cintron MD   Eye Clinic ([facility])    [address]  [address]  [address]  [facility] [address]   [phone]            Feb 13, 2017  1:00 PM   Nurse Visit with  Dermatology Nurse   [facility] ([facility])    [address]  3rd Floor  [facility] [facility] [id number] [phone]            Mar 06, 2017  3:00 PM   (Arrive by 2:45 PM)   Return Visit with Vj Centeno MD   [facility] ([facility])    [address]  4th Floor  [facility] [facility] [id number] [phone]              Future tests that were ordered for you today     Open Future Orders        Priority Expected Expires Ordered    Basic metabolic panel Routine  2/6/2018 2/6/2017    Hemoglobin Routine  2/6/2018 2/6/2017            Who to contact     Please call your clinic at [phone] to:    Ask questions about your health    Make or cancel appointments    Discuss your medicines    Learn about your test results    Speak to your doctor   If you have compliments or concerns about an experience at your clinic, or if you wish to file a complaint, please contact [facility] Patient Relations at [phone] or email us at [email or web address]         Additional Information About Your Visit        MyChart Information     [employer] gives you secure access to your electronic health record. If you see a primary care provider, you can also send messages to your care team and make appointments. If you have questions, please call your primary care clinic.  If you do not have a primary care provider, please call [phone] and they will assist you.      [employer] is an electronic gateway that provides easy, online access to your medical records. With [employer], you can request a clinic appointment, read your test results, renew a prescription or communicate with your care  team.     To access your existing account, please contact your [facility] or call [phone] for assistance.        Care EveryWhere ID     This is your Care EveryWhere ID. This could be used by other organizations to access your [facility] medical records  [id number]        Your Vitals Were     Pulse Breastfeeding?                59 No           Blood Pressure from Last 3 Encounters:   02/06/17 135/84   01/31/17 123/82   01/10/17 139/76    Weight from Last 3 Encounters:   02/06/17 76.839 kg (169 lb 6.4 oz)   01/10/17 77.202 kg (170 lb 3.2 oz)   01/08/17 74.844 kg (165 lb)              We Performed the Following     EKG 12-lead, tracing only        Primary Care Provider Office Phone # Fax #    Vj Centeno [phone]        [facility] [address]  [facility] [address]        Thank you!     Thank you for choosing [facility]  for your care. Our goal is always to provide you with excellent care. Hearing back from our patients is one way we can continue to improve our services. Please take a few minutes to complete the written survey that you may receive in the mail after your visit with us. Thank you!             Your Updated Medication List - Protect others around you: Learn how to safely use, store and throw away your medicines at www.disposemymeds.org.          This list is accurate as of: 2/6/17 12:56 PM.  Always use your most recent med list.                   Brand Name Dispense Instructions for use    ATIVAN 2 MG tablet   Generic drug:  LORazepam      Take 2 mg by mouth At Bedtime 2 tablets at night       cetirizine 10 MG tablet    zyrTEC     Take 10 mg by mouth daily.       Gel Heel Cushions Womens Pads     1 Device    1 Device daily       hydrochlorothiazide 25 MG tablet    HYDRODIURIL    100 tablet    Take 1 tablet (25 mg) by mouth daily       lisinopril 30 MG tablet    PRINIVIL,ZESTRIL    100 tablet    Take 1 tablet  (30 mg) by mouth At Bedtime       Lysine 1000 MG Tabs          mometasone-formoterol 100-5 MCG/ACT oral inhaler    DULERA     Inhale 2 puffs into the lungs 2 times daily       order for DME     1 Device    1 Device by Device route daily as needed Air filtration system       PREMARIN VA      Place 2 g vaginally daily       traMADol 50 MG tablet    ULTRAM    360 tablet    Take 1-2 tablets ( mg) by mouth every 6 hours as needed       traZODone 50 MG tablet    DESYREL     Take 1 mg by mouth At Bedtime

## 2017-02-06 NOTE — PROGRESS NOTES
Ms. Sanford is a 61 year old female here  at the request of Dr. Cintron and Dr. Chapin for cardiovascular, pulmonary, and perioperative risk assessment prior to surgery.The intended surgical procedure is R vitreal hemorrhage repair--R/B/A to pars plana vitrectomy, likely endolaser, at [facility] on 2/14/17. A copy of this note will be sent to the surgeon.    History of Present Illness:    Reason for surgery: (must document 4+HPI factors for completion): eye hemorrhages since Fall 2016, started going to eye clinic; thought it was healing but then learned that she needs surgery. Not able to see out of R eye at all x2 weeks--seen in ER, Dr. Chapin recommended surgery for this. Has consult scheduled on 2/8/17, tentatively scheduled for surgery 2/14/17.     Cardiovascular Risk:  This patient does not have chest pain with ambulation or walking up a flight of stairs.  There is not any chest pain with exercise.  There is not a history of heart disease or valve problems.     Pulmonary Risk:  The patient does not have a history of lung problems.     Perioperative Complications:  The patient does not have a history of bleeding or clotting problems in the past. The patient has not had complications from past surgeries.  The patient does not have a family history of any anesthesia or surgical complications.    1. Do you have a history of heart attack, stroke, stent, bypass or surgery on an artery in the head, neck, heart or legs? NO  2. Do you ever have any pain or discomfort in your chest? NO  3. Have you ever had a severe pain across the front of your chest lasting for half an hour or more? NO  4. Do you have a history of Congestive Heart Failure? NO  5. Are you troubled by shortness of breath when: walking on the level/ up a slight hill/ at night? NO  6. Does your chest ever sound wheezy or whistling? NO  7. Do you currently have a cold, bronchitis or other respiratory infection? NO  8. Have you had a cold, bronchitis  or other respiratory infection within the last 2 weeks? NO  9. Do you usually have a cough? NO  10. Do you sometimes get pains in the calves of your legs when you walk? NO  11. Do you or anyone in your family have previous history of blood clots? NO  12. Do you or does anyone in your family have a serious bleeding problem such as prolonged bleeding following surgeries or cuts? NO  13. Have you ever had problems with anemia or been told to take iron pills? NO  14. Have you had any abnormal blood loss such as black, tarry or bloody stools, or abnormal vaginal bleeding? NO  15. Have you ever had a blood transfusion? NO  16. Have you or any of your relatives ever had problems with anesthesia? NO  17. Do you have sleep apnea, excessive snoring or daytime drowsiness? NO  18. Do you have any prosthetic heart valves? NO  19. Do you have prosthetic joints? NO  20. Is there any chance that you may be pregnant? NO    ROS:  Constitutional: no fevers, night sweats or unintentional weight change   Eyes: see HPI  Ears, Nose, Mouth, Throat: no tinnitus or hearing change, no epistaxis or nasal discharge, no oral lesions, throat clear   Cardiovascular: no chest pain, palpitations, or pain with walking, no orthopnea or PND   Respiratory: no dyspnea, cough, shortness of breath or wheezing   GI: no nausea, vomiting, diarrhea or constipation, no abdominal pain   : no change in urine, no dysuria or hematuria  Musculoskeletal: no joint or muscle pain or swelling   Integumentary: no concerning moles; recurrent cyst on R medial breast, recently excised, drained and closed by Dr. Yeager (see note 1/31/17)  Neuro: no loss of strength or sensation, no numbness or tingling, no tremor, no dizziness, no headache   Endo: no polyuria or polydipsia, no temperature intolerance   Heme/Lymph: no concerning bumps, no bleeding problems   Allergy: no environmental allergies   Psych: being treated at [facility] ([facility] Gaut[facility]) and [facility], [facility]  (psych--prescribes Trazodone and Lorazapam) for depression with PTSD, anxiety, borderline personality       Past Medical History:  Past Medical History   Diagnosis Date     Arthritis      L knee     Hypertension      treated nonpharmacologiacally     Hepatitis C      genotype 1, treatment naive, with Stage 1 fibrosis, acquired via IVDU     Cervical cancer (H)      Depression      Anxiety      Borderline personality disorder      Nephrolithiasis      Obesity      Chronic pain      related to hepatitis C     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       Past Surgical History:  Past Surgical History   Procedure Laterality Date     Cholecystectomy       Hysterectomy       age 31 with cervical removal     Extracorporeal shock wave lithotripsy (eswl)       Biopsy of skin lesion         Active Meds:  Current Outpatient Prescriptions   Medication     mometasone-formoterol (DULERA) 100-5 MCG/ACT oral inhaler     order for DME     Estrogens, Conjugated (PREMARIN VA)     hydrochlorothiazide (HYDRODIURIL) 25 MG tablet     traMADol (ULTRAM) 50 MG tablet     Lysine 1000 MG TABS     lisinopril (PRINIVIL,ZESTRIL) 30 MG tablet     Foot Care Products (GEL HEEL CUSHIONS WOMENS) PADS     LORazepam (ATIVAN) 2 MG tablet     traZODone (DESYREL) 50 MG tablet     cetirizine (ZYRTEC) 10 MG tablet     No current facility-administered medications for this visit.        Allergies:  No clinical screening - see comments; Erythromycin; Keflex; Penicillins; Sulfa drugs; and Tetracycline    Immunizations:  Immunization History   Administered Date(s) Administered     Influenza (IIV3) 10/08/2013, 09/23/2014, 09/22/2015, 09/27/2016     TD (ADULT, 7+) 01/01/2007     TDAP (BOOSTRIX AGES 10-64) 03/17/2016       Family History:  family history includes Alcohol/Drug in her maternal grandfather and mother; Asthma in her daughter; CANCER in her maternal grandmother; Hypertension in her mother; Lipids in her mother; Psychotic Disorder in her  mother. There is no history of Glaucoma or Macular Degeneration.    Social History:  Social History   Substance Use Topics     Smoking status: Never Smoker      Smokeless tobacco: Never Used     Alcohol Use: No      Comment: previous EtOH use 5 yrs ago       Physical Exam:  Vitals: /84 mmHg  Pulse 59  Wt 76.839 kg (169 lb 6.4 oz)  Breastfeeding? No   Wt Readings from Last 1 Encounters:   02/06/17 76.839 kg (169 lb 6.4 oz)     Constitutional: Alert, oriented, pleasant, no acute distress  Head: Normocephalic, atraumatic  Eyes: Extra-ocular movements intact, no scleral icterus  ENT: Oropharynx clear, moist mucus membranes, good dentition  Neck: Supple, no lymphadenopathy, no thyromegaly, no JVD  Cardiovascular: PMI nondisplaced, regular rate and rhythm, no murmurs, rubs or gallops, peripheral pulses full/symmetric  Respiratory: Good air movement bilaterally, lungs clear, no wheezes/rales/rhonchi  GI: Abdomen soft, bowel sounds present, nondistended, nontender, no organomegaly or masses, no rebound/guarding  Musculoskeletal: No edema, normal muscle tone, normal gait  Neurologic: Alert and oriented, cranial nerves 2-12 intact, strength 5/5 throughout, sensation to light touch intact  Skin: No rashes, ecchymosis over R medial breast, dressing c/d/i over cyst incision site--sutures intact, mild erythema over well-approximated incision; site cleansed and re-dressed  Psychiatric: normal mentation, affect and mood    Recent Labs:  NA      138   12/21/2016   POTASSIUM      4.5   12/21/2016  CHLORIDE      101   12/21/2016  DARIEL      9.4   12/21/2016  CO2       31   12/21/2016  BUN        7   12/21/2016  CR     0.66   12/21/2016  GLC       95   12/21/2016  AST       15   11/9/2016  ALT       17   11/9/2016  BILICONJ      0.0   6/14/2013   BILITOTAL      0.6   11/9/2016  ALBUMIN      4.0   11/9/2016  PROTTOTAL      7.7   11/9/2016   ALKPHOS       69   11/9/2016    WBC      5.1   11/9/2016  HGB     15.4   11/9/2016  HCT      48.3   11/9/2016  MCV       92   11/9/2016  PLT      180   11/9/2016    EKG:  EKG was indicated based on risk assessment.  no acute changes and normal sinus rhythm    Assessment and Plan:    61 year old year old female with a history of HTN, bilateral posterior vitreous detachment, mixed cryoglobulinemia, borderline personality, anxiety, depression, and valsalva retinopathy    presents prior to surgery for assessment of perioperative risk. The patient is at LOW risk for cardiovascular complications and at LOW risk for pulmonary complications of this LOW risk surgery.      Laboratory studies:  BMP and Hb    --Approval given to proceed with proposed procedure, without further diagnostic evaluation  --Patient is taking an Ace inhibitor or Angiotensin Receptor Blocker (ARB) and should HOLD this medication for the 24 hours prior to surgery.    The patient has been told to not take any aspirin or NSAIDs for 10 days prior to surgery.  The patient has been instructed as to what to do with medications prior to surgery.    --The patient has been instructed to notify Dr. Yeager' clinic if new drainage, worsening pain or redness from the incision site prior to her eye surgery. No signs of infection today. F/u scheduled for 2/13/17.    Please contact our office if there are any further questions or information required about this patient.      MEDINA Le CNP

## 2017-02-06 NOTE — PATIENT INSTRUCTIONS
Primary Care Center Medication Refill Request Information:  * Please contact your pharmacy regarding ANY request for medication refills.  ** [facility] Prescription Fax = [phone]  * Please allow 3 business days for routine medication refills.  * Please allow 5 business days for controlled substance medication refills.     Primary Care Center Test Result notification information:  *You will be notified with in 7-10 days of your appointment day regarding the results of your test.  If you are on MyChart you will be notified as soon as the provider has reviewed the results and signed off on them.    Hold your lisinopril 24 hours before surgery.    Do not take aspirin or NSAIDs (Advil/Ibuprofen) for 10 days before surgery.

## 2017-02-08 ENCOUNTER — OFFICE VISIT (OUTPATIENT)
Dept: OPHTHALMOLOGY | Facility: CLINIC | Age: 62
End: 2017-02-08
Attending: OPHTHALMOLOGY
Payer: MEDICARE

## 2017-02-08 DIAGNOSIS — H43.11 VITREOUS HEMORRHAGE, RIGHT (H): Primary | ICD-10-CM

## 2017-02-08 PROCEDURE — 99213 OFFICE O/P EST LOW 20 MIN: CPT | Mod: ZF

## 2017-02-08 ASSESSMENT — REFRACTION_WEARINGRX
OD_SPHERE: PLANO
OS_SPHERE: +0.25
OD_AXIS: 133
OS_CYLINDER: +0.25
OD_ADD: +2.50
SPECS_TYPE: BIFOCAL
OS_AXIS: 085
OS_ADD: +2.50
OD_CYLINDER: +0.50

## 2017-02-08 ASSESSMENT — EXTERNAL EXAM - RIGHT EYE: OD_EXAM: NORMAL

## 2017-02-08 ASSESSMENT — VISUAL ACUITY
OS_CC+: -3
OS_CC: 20/20
METHOD: SNELLEN - LINEAR
OD_CC+: -1
OD_CC: 20/60

## 2017-02-08 ASSESSMENT — CONF VISUAL FIELD
OD_SUPERIOR_NASAL_RESTRICTION: 3
OD_INFERIOR_NASAL_RESTRICTION: 3
OS_NORMAL: 1

## 2017-02-08 ASSESSMENT — TONOMETRY
OD_IOP_MMHG: 14
OS_IOP_MMHG: 14
IOP_METHOD: TONOPEN

## 2017-02-08 ASSESSMENT — SLIT LAMP EXAM - LIDS
COMMENTS: NORMAL
COMMENTS: NORMAL

## 2017-02-08 ASSESSMENT — CUP TO DISC RATIO: OS_RATIO: 0.2

## 2017-02-08 ASSESSMENT — EXTERNAL EXAM - LEFT EYE: OS_EXAM: NORMAL

## 2017-02-08 NOTE — PROGRESS NOTES
HPI: Sarah Sanford is a 61 year old female here for recurrent vitreous hemorrhage, right eye. She states that vision was great on Thursday evening. When she woke on Friday morning she had extensive floaters and horrible vision in her right eye. Denies flashes, pain. She called the on call resident who urged the patient to come for an evaluation, but she went to bed. This morning, the on call resident urged her to come to clinic. She states the vision is stable with continued poor vision in her right eye.    Past ocular history:  Previous history of retinal atrophy, floaters, and MA in the right eye     Assessment/plan:  1. Vitreous hemorrhage, Right Eye              - now recurrent several times              - not diabetic              - hypertensive retinopathy, history of likely BRVO right eye on 2015 imaging              - on BP medications, reports good control until recently within the past few weeks; 150/91 in clinic today              - vitreous hemorrhage possibly resulting from ruptured MA or from neovascularization of old branch retinal vein occlusion.                 -PPV likely endolaser- wishes to proceed with surgery on 2/14 - given multiple recent recurrences and difficulty with vision/depth perception    - R/B/A to surgery d/w patient at length including risk of bleed/infection/gas bubble/positioning/detachment   General anesthesia              - RD precautions discussed    RTC: post-op    Patrick Chapin MD  Retina Fellow      ~~~~~~~~~~~~~~~~~~~~~~~~~~~~~~~~~~~~~~~~~~~~~~~~~~~~~~~~~~~~~~~~~~~~~~~~~~~~~~~~~~  Attending Physician Attestation:  Complete documentation of historical and exam elements from today's encounter can be found in the full encounter summary report (not reduplicated in this progress note).  I personally obtained the chief complaint(s) and history of present illness.  I confirmed and edited as necessary the review of systems, past medical/surgical history, family history, social  history, and examination findings as documented by others; and I examined the patient myself.  I personally reviewed the relevant tests, images, and reports as documented above and I agree with the interpretation as documented by the resident/fellow and edited by me.  I formulated and edited as necessary the assessment and plan and discussed the findings and management plan with the patient and family . In addition, when a procedure was performed, I was present for critical portions of the procedure and was immediately available for the entire procedure. - Steph Cintron M.D.

## 2017-02-08 NOTE — NURSING NOTE
Chief Complaints and History of Present Illnesses   Patient presents with     Vitreous Hemorrhage Follow Up     Patient want to have surgery as soon as possible. PPV likely endolaser.     HPI    Affected eye(s):  Right   Symptoms:     Blurred vision (Comment: Can't see RE, LE is getting tired because it is working to hard.)   Floaters (Comment: Has blood floating RE)   No flashes   No redness   No tearing   No itching         Do you have eye pain now?:  No      Comments:  Patient want to have surgery as soon as possible. PPV likely endolaser.    Willard FRIEDMAN  1:37 PM02/08/2017

## 2017-02-08 NOTE — MR AVS SNAPSHOT
After Visit Summary   2/8/2017    Sarah Sanford    MRN: [id number]           Patient Information     Date Of Birth          1955        Visit Information        Provider Department      2/8/2017 1:45 PM Steph Cintron MD [facility]         Follow-ups after your visit        Your next 10 appointments already scheduled     Feb 13, 2017  1:00 PM   Nurse Visit with  Dermatology Nurse   [facility] Dermatology ([facility])    [address]  3rd Floor  [facility] [id number] [phone]            Feb 15, 2017  3:00 PM   Post-Op with Steph Cintron MD   [facility] Clinic ([facility])    [address]   [address]  [facility] [address]   [phone]            Feb 22, 2017  1:15 PM   Post-Op with Steph Cintron MD   Eye Clinic ([facility])    [address]  [address]   [address]a  [facility] [address]   [phone]            Mar 06, 2017  3:00 PM   (Arrive by 2:45 PM)   Return Visit with Vj Centeno MD   [facility] Primary Care Clinic ([facility])    [address]  4th Floor  [facility] [address] [phone]              Who to contact     Please call your clinic at [phone] to:    Ask questions about your health    Make or cancel appointments    Discuss your medicines    Learn about your test results    Speak to your doctor   If you have compliments or concerns about an experience at your clinic, or if you wish to file a complaint, please contact [facility] Patient Relations at [phone] or email us at [email or web address]         Additional Information About Your Visit        MyChart Information     MyChart gives you secure access to your electronic health record. If you see a primary care provider, you can also send messages to your care team and make appointments. If you  have questions, please call your primary care clinic.  If you do not have a primary care provider, please call [phone] and they will assist you.      [employer] is an electronic gateway that provides easy, online access to your medical records. With [employer], you can request a clinic appointment, read your test results, renew a prescription or communicate with your care team.     To access your existing account, please contact your [facility] or call [phone] for assistance.        Care EveryWhere ID     This is your Care EveryWhere ID. This could be used by other organizations to access your [place] medical records  [id number]         Blood Pressure from Last 3 Encounters:   02/06/17 135/84   01/31/17 123/82   01/10/17 139/76    Weight from Last 3 Encounters:   02/06/17 76.839 kg (169 lb 6.4 oz)   01/10/17 77.202 kg (170 lb 3.2 oz)   01/08/17 74.844 kg (165 lb)              Today, you had the following     No orders found for display       Primary Care Provider Office Phone # Fax #    Vj Centeno [phone]        PH[facility] [address]  [facility] [address]        Thank you!     Thank you for choosing [facility]  for your care. Our goal is always to provide you with excellent care. Hearing back from our patients is one way we can continue to improve our services. Please take a few minutes to complete the written survey that you may receive in the mail after your visit with us. Thank you!             Your Updated Medication List - Protect others around you: Learn how to safely use, store and throw away your medicines at www.disposemymeds.org.          This list is accurate as of: 2/8/17  3:36 PM.  Always use your most recent med list.                   Brand Name Dispense Instructions for use    ATIVAN 2 MG tablet   Generic drug:  LORazepam      Take 2 mg by mouth At Bedtime 2 tablets at night       cetirizine 10 MG tablet    zyrTEC      Take 10 mg by mouth daily.       Gel Heel Cushions Womens Pads     1 Device    1 Device daily       hydrochlorothiazide 25 MG tablet    HYDRODIURIL    100 tablet    Take 1 tablet (25 mg) by mouth daily       lisinopril 30 MG tablet    PRINIVIL,ZESTRIL    100 tablet    Take 1 tablet (30 mg) by mouth At Bedtime       Lysine 1000 MG Tabs          mometasone-formoterol 100-5 MCG/ACT oral inhaler    DULERA     Inhale 2 puffs into the lungs 2 times daily       order for DME     1 Device    1 Device by Device route daily as needed Air filtration system       PREMARIN VA      Place 2 g vaginally daily       traMADol 50 MG tablet    ULTRAM    360 tablet    Take 1-2 tablets ( mg) by mouth every 6 hours as needed       traZODone 50 MG tablet    DESYREL     Take 1 mg by mouth At Bedtime

## 2017-02-13 ENCOUNTER — ALLIED HEALTH/NURSE VISIT (OUTPATIENT)
Dept: DERMATOLOGY | Facility: CLINIC | Age: 62
End: 2017-02-13

## 2017-02-13 DIAGNOSIS — Z48.02 VISIT FOR SUTURE REMOVAL: Primary | ICD-10-CM

## 2017-02-13 NOTE — NURSING NOTE
Removed  sutures, from chest without complication.  Patient tolerated procedure well, and understood all followup instructions.    KOKO Barnhart

## 2017-02-13 NOTE — PATIENT INSTRUCTIONS
"  Suture or Staple Removal  You were seen today for a suture or staple removal. Your wound is healing as expected. The wound has healed well enough that the sutures or staples can be removed. The wound will continue to heal for the next few months.  At this time there is no sign of infection.   Home care    If you have pain, take pain medicine as advised by your healthcare provider.     Keep your wound clean and protected by covering it with a bandage for the next week or so.     Wash your hands with soap and warm water before and after caring for your wound. This helps prevent infection.    Clean the wound gently with soap and warm water daily or as directed by your child s health care provider. Do not use iodine, alcohol, or other cleansers on the wound.  Gently pat it dry. Put on a new bandage, if needed. Do not reuse bandages.    If the area gets wet, gently pat it dry with a clean cloth. Replace the wet bandage with a dry one.    Check the wound daily for signs of infection. (These are listed under \"When to seek medical advice\" below.)    You may shower and bathe as usual. Swimming is now permitted.  Follow-up care  Follow up with your healthcare provider as advised.  When to seek medical advice   Call your healthcare provider if any of the following occur:    Wound reopens or bleeds    Signs of an infection, such as:    Increasing redness or swelling around the wound    Increased warmth from the wound    Worsening pain    Red streaking lines away from the wound    Fluid draining from the wound    Fever of 100.4 F (38 C) or higher, or as directed by your child's healthcare provider    6427-4524 [employer]. [address] [place] [address], [place] [address]. All rights reserved. This information is not intended as a substitute for professional medical care. Always follow your healthcare professional's instructions.        "

## 2017-02-13 NOTE — MR AVS SNAPSHOT
"              After Visit Summary   2/13/2017    Sarah Sanford    MRN: [id number]           Patient Information     Date Of Birth          1955        Visit Information        Provider Department      2/13/2017 1:00 PM Nurse,  Dermatology [facility] Dermatology        Today's Diagnoses     Visit for suture removal    -  1      Care Instructions      Suture or Staple Removal  You were seen today for a suture or staple removal. Your wound is healing as expected. The wound has healed well enough that the sutures or staples can be removed. The wound will continue to heal for the next few months.  At this time there is no sign of infection.   Home care    If you have pain, take pain medicine as advised by your healthcare provider.     Keep your wound clean and protected by covering it with a bandage for the next week or so.     Wash your hands with soap and warm water before and after caring for your wound. This helps prevent infection.    Clean the wound gently with soap and warm water daily or as directed by your child s health care provider. Do not use iodine, alcohol, or other cleansers on the wound.  Gently pat it dry. Put on a new bandage, if needed. Do not reuse bandages.    If the area gets wet, gently pat it dry with a clean cloth. Replace the wet bandage with a dry one.    Check the wound daily for signs of infection. (These are listed under \"When to seek medical advice\" below.)    You may shower and bathe as usual. Swimming is now permitted.  Follow-up care  Follow up with your healthcare provider as advised.  When to seek medical advice   Call your healthcare provider if any of the following occur:    Wound reopens or bleeds    Signs of an infection, such as:    Increasing redness or swelling around the wound    Increased warmth from the wound    Worsening pain    Red streaking lines away from the wound    Fluid draining from the wound    Fever of 100.4 F (38 C) or higher, or as directed by your child's " healthcare provider    8578-8791 [employer]. [address] [place] [address], [place] [address]. All rights reserved. This information is not intended as a substitute for professional medical care. Always follow your healthcare professional's instructions.              Follow-ups after your visit        Your next 10 appointments already scheduled     Feb 13, 2017  1:00 PM CST   Nurse Visit with  Dermatology Nurse   [facility] Dermatology ([facility])    [address]  3rd Floor  [facility] [place] [id number] [phone]            Feb 14, 2017   Procedure with Steph Cintron MD   [facility] PeriOP Services (--)    [address]  [facility] [address] [phone]            Feb 15, 2017  3:00 PM CST   Post-Op with Steph Cintron MD   Eye Clinic ([facility])    [address]  [address]  [address]  [facility] [place] [address]   [phone]            Feb 22, 2017  1:15 PM CST   Post-Op with Steph Cintron MD   [facility] Clinic ([facility])    [address]  [address]  [address] [address] [facility] [address]  [facility] [place] [address]   [phone]            Mar 06, 2017  3:00 PM CST   (Arrive by 2:45 PM)   Return Visit with Vj Centeno MD   [facility] Primary Care Clinic ([facility])    [address]  4th Floor  [facility] [place] [id number] [phone]              Who to contact     Please call your clinic at [phone] to:    Ask questions about your health    Make or cancel appointments    Discuss your medicines    Learn about your test results    Speak to your doctor   If you have compliments or concerns about an experience at your clinic, or if you wish to file a complaint, please contact [facility] Patient Relations at [phone] or email us at [email or web address]         Additional Information About Your Visit         [employer] Information     [employer] gives you secure access to your electronic health record. If you see a primary care provider, you can also send messages to your care team and make appointments. If you have questions, please call your primary care clinic.  If you do not have a primary care provider, please call [phone] and they will assist you.      [employer] is an electronic gateway that provides easy, online access to your medical records. With [employer], you can request a clinic appointment, read your test results, renew a prescription or communicate with your care team.     To access your existing account, please contact your [facility] or call [phone] for assistance.        Care EveryWhere ID     This is your Care EveryWhere ID. This could be used by other organizations to access your [place] medical records  [id number]         Blood Pressure from Last 3 Encounters:   02/06/17 135/84   01/31/17 123/82   01/10/17 139/76    Weight from Last 3 Encounters:   02/06/17 76.8 kg (169 lb 6.4 oz)   01/10/17 77.2 kg (170 lb 3.2 oz)   01/08/17 74.8 kg (165 lb)              We Performed the Following     Sample procedure completed        Primary Care Provider Office Phone # Fax #    Vj Centeno [phone]        PH[facility]ICIANS [address]  [facility] [address]        Thank you!     Thank you for choosing [facility]  for your care. Our goal is always to provide you with excellent care. Hearing back from our patients is one way we can continue to improve our services. Please take a few minutes to complete the written survey that you may receive in the mail after your visit with us. Thank you!             Your Updated Medication List - Protect others around you: Learn how to safely use, store and throw away your medicines at www.disposemymeds.org.          This list is accurate as of: 2/13/17 12:42 PM.  Always use your most recent med  list.                   Brand Name Dispense Instructions for use    ATIVAN 2 MG tablet   Generic drug:  LORazepam      Take 2 mg by mouth At Bedtime 2 tablets at night       cetirizine 10 MG tablet    zyrTEC     Take 10 mg by mouth daily.       Gel Heel Cushions Womens Pads     1 Device    1 Device daily       hydrochlorothiazide 25 MG tablet    HYDRODIURIL    100 tablet    Take 1 tablet (25 mg) by mouth daily       lisinopril 30 MG tablet    PRINIVIL,ZESTRIL    100 tablet    Take 1 tablet (30 mg) by mouth At Bedtime       Lysine 1000 MG Tabs          mometasone-formoterol 100-5 MCG/ACT oral inhaler    DULERA     Inhale 2 puffs into the lungs 2 times daily       order for DME     1 Device    1 Device by Device route daily as needed Air filtration system       PREMARIN VA      Place 2 g vaginally daily       traMADol 50 MG tablet    ULTRAM    360 tablet    Take 1-2 tablets ( mg) by mouth every 6 hours as needed       traZODone 50 MG tablet    DESYREL     Take 1 mg by mouth At Bedtime

## 2017-02-14 ENCOUNTER — ANESTHESIA EVENT (OUTPATIENT)
Dept: SURGERY | Facility: CLINIC | Age: 62
End: 2017-02-14
Payer: MEDICARE

## 2017-02-14 ENCOUNTER — HOSPITAL ENCOUNTER (OUTPATIENT)
Facility: CLINIC | Age: 62
Discharge: HOME | End: 2017-02-14
Attending: OPHTHALMOLOGY | Admitting: OPHTHALMOLOGY
Payer: MEDICARE

## 2017-02-14 ENCOUNTER — ANESTHESIA (OUTPATIENT)
Dept: SURGERY | Facility: CLINIC | Age: 62
End: 2017-02-14
Payer: MEDICARE

## 2017-02-14 VITALS
OXYGEN SATURATION: 95 % | SYSTOLIC BLOOD PRESSURE: 128 MMHG | HEIGHT: 65 IN | TEMPERATURE: 97.3 F | DIASTOLIC BLOOD PRESSURE: 85 MMHG | RESPIRATION RATE: 16 BRPM

## 2017-02-14 DIAGNOSIS — Z48.810 SURGICAL AFTERCARE, SENSE ORGANS: Primary | ICD-10-CM

## 2017-02-14 PROCEDURE — 40000170 ZZH STATISTIC PRE-PROCEDURE ASSESSMENT II: Performed by: OPHTHALMOLOGY

## 2017-02-14 PROCEDURE — 27210995 ZZH RX 272: Performed by: OPHTHALMOLOGY

## 2017-02-14 PROCEDURE — 25800025 ZZH RX 258: Performed by: OPHTHALMOLOGY

## 2017-02-14 PROCEDURE — 25000566 ZZH SEVOFLURANE, EA 15 MIN: Performed by: OPHTHALMOLOGY

## 2017-02-14 PROCEDURE — 25000125 ZZHC RX 250: Performed by: OPHTHALMOLOGY

## 2017-02-14 PROCEDURE — 71000004 ZZH RECOVERY EYE PHASE 1 LEVEL 1 FIRST HR: Performed by: OPHTHALMOLOGY

## 2017-02-14 PROCEDURE — 25000128 H RX IP 250 OP 636: Performed by: NURSE ANESTHETIST, CERTIFIED REGISTERED

## 2017-02-14 PROCEDURE — 71000028 ZZH EYE RECOVERY PHASE 2 EACH 15 MINS: Performed by: OPHTHALMOLOGY

## 2017-02-14 PROCEDURE — 27210794 ZZH OR GENERAL SUPPLY STERILE: Performed by: OPHTHALMOLOGY

## 2017-02-14 PROCEDURE — 25000128 H RX IP 250 OP 636: Performed by: OPHTHALMOLOGY

## 2017-02-14 PROCEDURE — 37000009 ZZH ANESTHESIA TECHNICAL FEE, EACH ADDTL 15 MIN: Performed by: OPHTHALMOLOGY

## 2017-02-14 PROCEDURE — 36000105 ZZH EYE SURGERY LEVEL 4 EA 15 ADDTL MIN: Performed by: OPHTHALMOLOGY

## 2017-02-14 PROCEDURE — 36000104 ZZH EYE SURGERY LEVEL 4 1ST 30 MIN: Performed by: OPHTHALMOLOGY

## 2017-02-14 PROCEDURE — 25000132 ZZH RX MED GY IP 250 OP 250 PS 637: Mod: GY | Performed by: OPHTHALMOLOGY

## 2017-02-14 PROCEDURE — 37000008 ZZH ANESTHESIA TECHNICAL FEE, 1ST 30 MIN: Performed by: OPHTHALMOLOGY

## 2017-02-14 PROCEDURE — 25000125 ZZHC RX 250: Performed by: NURSE ANESTHETIST, CERTIFIED REGISTERED

## 2017-02-14 RX ORDER — LIDOCAINE 40 MG/G
CREAM TOPICAL
Status: DISCONTINUED | OUTPATIENT
Start: 2017-02-14 | End: 2017-02-14 | Stop reason: HOSPADM

## 2017-02-14 RX ORDER — ONDANSETRON 2 MG/ML
4 INJECTION INTRAMUSCULAR; INTRAVENOUS EVERY 30 MIN PRN
Status: DISCONTINUED | OUTPATIENT
Start: 2017-02-14 | End: 2017-02-14 | Stop reason: HOSPADM

## 2017-02-14 RX ORDER — SODIUM CHLORIDE, SODIUM LACTATE, POTASSIUM CHLORIDE, CALCIUM CHLORIDE 600; 310; 30; 20 MG/100ML; MG/100ML; MG/100ML; MG/100ML
INJECTION, SOLUTION INTRAVENOUS CONTINUOUS
Status: DISCONTINUED | OUTPATIENT
Start: 2017-02-14 | End: 2017-02-14 | Stop reason: HOSPADM

## 2017-02-14 RX ORDER — EPHEDRINE SULFATE 50 MG/ML
INJECTION, SOLUTION INTRAMUSCULAR; INTRAVENOUS; SUBCUTANEOUS PRN
Status: DISCONTINUED | OUTPATIENT
Start: 2017-02-14 | End: 2017-02-14

## 2017-02-14 RX ORDER — CYCLOPENTOLATE HYDROCHLORIDE 10 MG/ML
1 SOLUTION/ DROPS OPHTHALMIC
Status: COMPLETED | OUTPATIENT
Start: 2017-02-14 | End: 2017-02-14

## 2017-02-14 RX ORDER — KETOROLAC TROMETHAMINE 30 MG/ML
INJECTION, SOLUTION INTRAMUSCULAR; INTRAVENOUS PRN
Status: DISCONTINUED | OUTPATIENT
Start: 2017-02-14 | End: 2017-02-14

## 2017-02-14 RX ORDER — TROPICAMIDE 10 MG/ML
1 SOLUTION/ DROPS OPHTHALMIC SEE ADMIN INSTRUCTIONS
Status: COMPLETED | OUTPATIENT
Start: 2017-02-14 | End: 2017-02-14

## 2017-02-14 RX ORDER — BALANCED SALT SOLUTION 6.4; .75; .48; .3; 3.9; 1.7 MG/ML; MG/ML; MG/ML; MG/ML; MG/ML; MG/ML
SOLUTION OPHTHALMIC PRN
Status: DISCONTINUED | OUTPATIENT
Start: 2017-02-14 | End: 2017-02-14 | Stop reason: HOSPADM

## 2017-02-14 RX ORDER — SODIUM CHLORIDE, SODIUM LACTATE, POTASSIUM CHLORIDE, CALCIUM CHLORIDE 600; 310; 30; 20 MG/100ML; MG/100ML; MG/100ML; MG/100ML
1000 INJECTION, SOLUTION INTRAVENOUS CONTINUOUS
Status: DISCONTINUED | OUTPATIENT
Start: 2017-02-14 | End: 2017-02-14 | Stop reason: HOSPADM

## 2017-02-14 RX ORDER — ATROPINE SULFATE 10 MG/ML
SOLUTION/ DROPS OPHTHALMIC PRN
Status: DISCONTINUED | OUTPATIENT
Start: 2017-02-14 | End: 2017-02-14 | Stop reason: HOSPADM

## 2017-02-14 RX ORDER — NALOXONE HYDROCHLORIDE 0.4 MG/ML
.1-.4 INJECTION, SOLUTION INTRAMUSCULAR; INTRAVENOUS; SUBCUTANEOUS
Status: DISCONTINUED | OUTPATIENT
Start: 2017-02-14 | End: 2017-02-14 | Stop reason: HOSPADM

## 2017-02-14 RX ORDER — NEOMYCIN SULFATE, POLYMYXIN B SULFATE, AND DEXAMETHASONE 3.5; 10000; 1 MG/G; [USP'U]/G; MG/G
OINTMENT OPHTHALMIC PRN
Status: DISCONTINUED | OUTPATIENT
Start: 2017-02-14 | End: 2017-02-14 | Stop reason: HOSPADM

## 2017-02-14 RX ORDER — FENTANYL CITRATE 50 UG/ML
25-50 INJECTION, SOLUTION INTRAMUSCULAR; INTRAVENOUS
Status: DISCONTINUED | OUTPATIENT
Start: 2017-02-14 | End: 2017-02-14 | Stop reason: HOSPADM

## 2017-02-14 RX ORDER — PROPOFOL 10 MG/ML
INJECTION, EMULSION INTRAVENOUS PRN
Status: DISCONTINUED | OUTPATIENT
Start: 2017-02-14 | End: 2017-02-14

## 2017-02-14 RX ORDER — MEPERIDINE HYDROCHLORIDE 25 MG/ML
12.5 INJECTION INTRAMUSCULAR; INTRAVENOUS; SUBCUTANEOUS
Status: DISCONTINUED | OUTPATIENT
Start: 2017-02-14 | End: 2017-02-14 | Stop reason: HOSPADM

## 2017-02-14 RX ORDER — ONDANSETRON 2 MG/ML
INJECTION INTRAMUSCULAR; INTRAVENOUS PRN
Status: DISCONTINUED | OUTPATIENT
Start: 2017-02-14 | End: 2017-02-14

## 2017-02-14 RX ORDER — PHENYLEPHRINE HYDROCHLORIDE 25 MG/ML
1 SOLUTION/ DROPS OPHTHALMIC
Status: COMPLETED | OUTPATIENT
Start: 2017-02-14 | End: 2017-02-14

## 2017-02-14 RX ORDER — LIDOCAINE HYDROCHLORIDE 20 MG/ML
INJECTION, SOLUTION INFILTRATION; PERINEURAL PRN
Status: DISCONTINUED | OUTPATIENT
Start: 2017-02-14 | End: 2017-02-14

## 2017-02-14 RX ORDER — DEXAMETHASONE SODIUM PHOSPHATE 4 MG/ML
INJECTION, SOLUTION INTRA-ARTICULAR; INTRALESIONAL; INTRAMUSCULAR; INTRAVENOUS; SOFT TISSUE PRN
Status: DISCONTINUED | OUTPATIENT
Start: 2017-02-14 | End: 2017-02-14

## 2017-02-14 RX ORDER — ONDANSETRON 4 MG/1
4 TABLET, ORALLY DISINTEGRATING ORAL EVERY 30 MIN PRN
Status: DISCONTINUED | OUTPATIENT
Start: 2017-02-14 | End: 2017-02-14 | Stop reason: HOSPADM

## 2017-02-14 RX ORDER — FENTANYL CITRATE 50 UG/ML
INJECTION, SOLUTION INTRAMUSCULAR; INTRAVENOUS PRN
Status: DISCONTINUED | OUTPATIENT
Start: 2017-02-14 | End: 2017-02-14

## 2017-02-14 RX ORDER — DEXAMETHASONE SODIUM PHOSPHATE 4 MG/ML
INJECTION, SOLUTION INTRA-ARTICULAR; INTRALESIONAL; INTRAMUSCULAR; INTRAVENOUS; SOFT TISSUE PRN
Status: DISCONTINUED | OUTPATIENT
Start: 2017-02-14 | End: 2017-02-14 | Stop reason: HOSPADM

## 2017-02-14 RX ORDER — NEOMYCIN POLYMYXIN B SULFATES AND DEXAMETHASONE 3.5; 10000; 1 MG/ML; [USP'U]/ML; MG/ML
1 SUSPENSION/ DROPS OPHTHALMIC 4 TIMES DAILY
Qty: 5 ML | Refills: 0 | Status: SHIPPED | OUTPATIENT
Start: 2017-02-14 | End: 2017-02-22

## 2017-02-14 RX ORDER — PROPARACAINE HYDROCHLORIDE 5 MG/ML
1 SOLUTION/ DROPS OPHTHALMIC ONCE
Status: COMPLETED | OUTPATIENT
Start: 2017-02-14 | End: 2017-02-14

## 2017-02-14 RX ADMIN — PROPARACAINE HYDROCHLORIDE 1 DROP: 5 SOLUTION/ DROPS OPHTHALMIC at 06:46

## 2017-02-14 RX ADMIN — Medication 5 MG: at 08:43

## 2017-02-14 RX ADMIN — Medication 5 MG: at 08:37

## 2017-02-14 RX ADMIN — TROPICAMIDE 1 DROP: 10 SOLUTION/ DROPS OPHTHALMIC at 07:08

## 2017-02-14 RX ADMIN — PHENYLEPHRINE HYDROCHLORIDE 1 DROP: 2.5 SOLUTION/ DROPS OPHTHALMIC at 06:56

## 2017-02-14 RX ADMIN — CYCLOPENTOLATE HYDROCHLORIDE 1 DROP: 10 SOLUTION/ DROPS OPHTHALMIC at 07:08

## 2017-02-14 RX ADMIN — DEXAMETHASONE SODIUM PHOSPHATE 4 MG: 4 INJECTION, SOLUTION INTRAMUSCULAR; INTRAVENOUS at 08:20

## 2017-02-14 RX ADMIN — Medication 5 MG: at 08:46

## 2017-02-14 RX ADMIN — TROPICAMIDE 1 DROP: 10 SOLUTION/ DROPS OPHTHALMIC at 06:46

## 2017-02-14 RX ADMIN — MIDAZOLAM HYDROCHLORIDE 2 MG: 1 INJECTION, SOLUTION INTRAMUSCULAR; INTRAVENOUS at 08:16

## 2017-02-14 RX ADMIN — PROPOFOL 200 MG: 10 INJECTION, EMULSION INTRAVENOUS at 08:16

## 2017-02-14 RX ADMIN — SODIUM CHLORIDE, POTASSIUM CHLORIDE, SODIUM LACTATE AND CALCIUM CHLORIDE 1000 ML: 600; 310; 30; 20 INJECTION, SOLUTION INTRAVENOUS at 07:22

## 2017-02-14 RX ADMIN — FENTANYL CITRATE 50 MCG: 50 INJECTION, SOLUTION INTRAMUSCULAR; INTRAVENOUS at 08:16

## 2017-02-14 RX ADMIN — PHENYLEPHRINE HYDROCHLORIDE 1 DROP: 2.5 SOLUTION/ DROPS OPHTHALMIC at 07:08

## 2017-02-14 RX ADMIN — ONDANSETRON 4 MG: 2 INJECTION INTRAMUSCULAR; INTRAVENOUS at 08:20

## 2017-02-14 RX ADMIN — CYCLOPENTOLATE HYDROCHLORIDE 1 DROP: 10 SOLUTION/ DROPS OPHTHALMIC at 06:46

## 2017-02-14 RX ADMIN — PHENYLEPHRINE HYDROCHLORIDE 1 DROP: 2.5 SOLUTION/ DROPS OPHTHALMIC at 06:46

## 2017-02-14 RX ADMIN — TROPICAMIDE 1 DROP: 10 SOLUTION/ DROPS OPHTHALMIC at 06:56

## 2017-02-14 RX ADMIN — CYCLOPENTOLATE HYDROCHLORIDE 1 DROP: 10 SOLUTION/ DROPS OPHTHALMIC at 06:56

## 2017-02-14 RX ADMIN — LIDOCAINE HYDROCHLORIDE 100 MG: 20 INJECTION, SOLUTION INFILTRATION; PERINEURAL at 08:16

## 2017-02-14 RX ADMIN — KETOROLAC TROMETHAMINE 15 MG: 30 INJECTION, SOLUTION INTRAMUSCULAR at 08:51

## 2017-02-14 ASSESSMENT — LIFESTYLE VARIABLES: TOBACCO_USE: 0

## 2017-02-14 NOTE — ANESTHESIA POSTPROCEDURE EVALUATION
Patient: Sarah Sanford    Procedure(s):  RIGHT EYE 25 GAUGE PARSPLANA VITRECTOMY, ENDOLASER,  AIR-FLUID EXCHANGE - Wound Class: I-Clean    Diagnosis:RIGHT EYE VITREOUS HEMORRHAGE  Diagnosis Additional Information: No value filed.    Anesthesia Type:  General, LMA    Note:  Anesthesia Post Evaluation    Patient location during evaluation: PACU  Patient participation: Able to fully participate in evaluation  Level of consciousness: awake  Pain management: adequate  Airway patency: patent  Cardiovascular status: acceptable  Respiratory status: acceptable  Hydration status: acceptable  PONV: none     Anesthetic complications: None          Last vitals:  Vitals:    02/14/17 0930 02/14/17 0945 02/14/17 0959   BP:   128/85   Resp: 18  16   Temp:      SpO2: 93% 94% 95%         Electronically Signed By: Link Carlton MD  February 14, 2017  1:32 PM

## 2017-02-14 NOTE — ANESTHESIA PREPROCEDURE EVALUATION
Anesthesia Evaluation     . Pt has had prior anesthetic. Type: General    No history of anesthetic complications     ROS/MED HX    ENT/Pulmonary:     (+)Intermittent asthma , . .   (-) tobacco use and sleep apnea   Neurologic:       Cardiovascular:     (+) hypertension----. : . . . :. .      (-) CAD and dyslipidemia   METS/Exercise Tolerance:  >4 METS   Hematologic:         Musculoskeletal:         GI/Hepatic: Comment: Hep C in complete remission    (+) hepatitis type C,      (-) GERD and liver disease   Renal/Genitourinary:     (+) chronic renal disease, type: CRI,       Endo:     (+) Obesity, .      Psychiatric:     (+) psychiatric history other (comment) (borderline personality)      Infectious Disease:         Malignancy:         Other:    (+) H/O Chronic Pain,             Physical Exam  Normal systems: dental    Airway   Mallampati: II  TM distance: >3 FB  Neck ROM: full    Dental     Cardiovascular   Rhythm and rate: regular and normal      Pulmonary    breath sounds clear to auscultation                    Anesthesia Plan      History & Physical Review  History and physical reviewed and following examination; no interval change.    ASA Status:  2 .    NPO Status:  > 8 hours    Plan for General and LMA with Intravenous and Propofol induction. Maintenance will be Balanced.    PONV prophylaxis:  Ondansetron (or other 5HT-3), Dexamethasone or Solumedrol and Other (See comment) (propofol infusion)       Postoperative Care  Postoperative pain management:  IV analgesics and Oral pain medications.      Consents  Anesthetic plan, risks, benefits and alternatives discussed with:  Patient..                          .

## 2017-02-14 NOTE — IP AVS SNAPSHOT
[facility]    [address]    [place] [address]    Phone:  [phone]    Fax:  [phone]                                       After Visit Summary   2/14/2017    Sarah Sanford    MRN: [id number]           After Visit Summary Signature Page     I have received my discharge instructions, and my questions have been answered. I have discussed any challenges I see with this plan with the nurse or doctor.    ..........................................................................................................................................  Patient/Patient Representative Signature      ..........................................................................................................................................  Patient Representative Print Name and Relationship to Patient    ..................................................               ................................................  Date                                            Time    ..........................................................................................................................................  Reviewed by Signature/Title    ...................................................              ..............................................  Date                                                            Time

## 2017-02-14 NOTE — DISCHARGE INSTRUCTIONS
[facility]esthesia [facility] Discharge  Instructions  Anesthesia ([facility])   Adult Discharge Instructions (General)    For 24 hours after surgery    1. Get plenty of rest.  Make arrangements to have a responsible adult stay with you for at least 24 hours.  2. Do not drive or use heavy equipment for 24 hours.    3. Do not drink alcohol for 24 hours.  4. Do not sign legal documents or make important decisions for 24 hours.  5. Avoid strenuous or risky activities. You may feel lightheaded.  If so, sit for a few minutes before standing.  Have someone help you get up.   6. General anesthesia patients should drink only clear liquids (apple juice, ginger ale, broth or 7-Up). Be sure to drink enough fluids.  Move to a regular diet as you feel able.  7. Any questions of medical nature, call your physician.    [facility]  Discharge Instruction    EyeSurgery [facility]    MD Steph Perknis MD Joseph Terry, MD  Will I have pain?  Some discomfort is normal and expected following surgery. The first few days after surgery you may need to use prescription pain pills. Taking Tylenol (acetaminophen) regularly may help prevent pain.  Discomfort should gradually decrease and Tylenol should be sufficient to relieve pain. A foreign body sensation in the cornea of the eye is very common and caused by sutures placed at surgery. These sutures will go away in one to two weeks. If the pain worsens, you should call the doctor.  Do I need to wear an eye patch?  You do not need to wear an eye patch at home after the doctor has removed the patch on your first day after surgery. However, you may be more comfortable wearing a patch outside in the sun, when sleeping or napping, or in a murali, windy environment.  How much drainage should I have?  You may expect a moderate amount of drainage for a week. Gradually the drainage should decrease. The lids can be cleaned with a  clean washcloth and gentle soap or diluted baby shampoo. Wipe the eyelids gently from the nose outward. Some blood in the tears is a normal finding.  Will there be swelling?  Some swelling is normal for about a week or two after which it will gradually decrease. Applying a cool compress, using a clean washcloth for 5 - 10 minutes several times a day may reduce the swelling and make you more comfortable. People may have some swelling of both eyes, especially if face down positioning is required. The white part of the eye may appear very red or bloody for a week or two. This may get worse a few days after surgery. Though the bright red appearance can look frightening, it is a normal finding early after surgery and will resolve in a few weeks.  Will I need to use eye drops?  You will be using several different kinds of eye drops or ointment (salve) when you leave the hospital. The directions will be on each bottle or tube. The medication with the red top will keep your eye dilated and may make your eye more sensitive to light. Wearing sunglasses may help. The other medication is a combination antibiotic/steroid to prevent infection and promote healing.  Occasionally a third drop is used to control the pressure in your eye. A new bottle of artificial tears or lubricant ointment may be used along with your prescription eye drops after surgery. You will be using drops from four to eight weeks. Bring all eye medications (drops. ointments, or pills) with you  to each visit.   Always wash your hands before putting in the eye drops. You may wish to have someone else help you. Pull down on the lower lid and squeeze one drop from the bottle being careful not to touch the dropper to your eye or eyelid. One drop is sufficient, but another may be used if the first did not go into the eye. It is often easier to put in the drops if you are reclining or lying down. Wait five (5) minutes after the first drop before using the second  drop to allow the medications to absorb into the eye.  How long will it take for my vision to improve?  Your vision should gradually improve, but it may take up to six months to regain your best vision. Frequently, air or gas bubbles are injected into the eye at the time of surgery. This will blur your vision significantly at first. As the bubble becomes smaller it will cause a black line in your vision that moves as you move your head. As the bubble becomes smaller you may notice that it looks more like a bubble or that it will break up into several smaller bubbles. It will take from a few days to a few weeks for the bubble to dissolve and be replaced by clear fluid.  You may notice floaters or double vision after your surgery. These symptoms usually will decrease with time. If the double vision is bothersome patching the eye may help.  If you notice a sudden worsening in your vision call your doctor.  Are there any physical restrictions after surgery?  If an air or gas bubble was placed in the eye during surgery, you will be asked to spend most of your time (both awake and during the night) with your head in a specific position, frequently face down. As the eye heals and the bubble dissolves there will be less of a need for you to stay in that specific position. You should avoid sleeping on your back until the bubble has totally dissolved and you have been given permission from your surgeon. You should not fly in an airplane or go to high altitudes in the mountains while there is a bubble in your eye. If you should require any other surgery, under general anesthesia, while you still have an air bubble in your eye, have your surgeon or anesthetist contact us prior to your surgery. Some anesthetic agents can make the bubble expand and seriously damage your eye.  Patients that have a gas/air bubble placed in their eye will have a green medical alert band on their wrist.  This band should not be removed until the  doctor removes it for you or gives you permission to remove it.     Heavy lifting (greater than 50 pounds), swimming and contact sports should be avoided for about 3 to 4 weeks after surgery.  You may resume your usual sexual activities about one week after surgery.  When may I return to work or my normal activities?  Depending on the type or work, you may return to work within a few days. If your work involves physical activity or driving, you will need to restrict your activities and remain home longer.  You may watch television, look at magazines, or work puzzles. Reading may be uncomfortable for several days, but using the eyes will not cause any damage.  You may go outside as usual. If conditions are windy or murali, wear an eye pad to avoid getting dust or dirt in the eye.  Can I travel?  You cannot fly in an airplane or drive into the mountains as long as the air or gas bubble remains in your eye.    Are there any driving restrictions?  Someone will need to drive you home from the hospital. Generally driving can be resumed in several days if you have good vision in your other eye. If you do not feel comfortable driving, do not drive! Your depth perception will be decreased so you will want to try driving during the day in light traffic until you feel comfortable driving. You should restrict your driving while you are taking prescription pain pills as they also can affect your judgment.  When can I shower and wash my hair?  You may shower or bathe when you get home, but avoid getting water in your eye. You may want someone to help you shampoo your hair at first.  You may shave, brush your teeth, or comb your hair. Do not use make-up, mascara, or creams/lotions around your eye for several weeks.  When will I see the doctor again?  Generally, you will be seen the first day after surgery and again 1-2 weeks later. If you have not received a return appointment before leaving the hospital, you should call our office  during the business hours to arrange an appointment. If you will be seeing your local doctor instead of us, you will need to call that office to set up an appointment.  How do I reach a doctor if I have concerns?  One of our doctors is available by calling [facility] [phone]. Please try to call for routine questions and prescription refills during business hours.  You should call your doctor if:  You notice a sudden decrease in your vision.  Have severe pain or pain increases rather than subsiding.    You notice a new black curtain over your eye that is not the gas bubble. If you have any of these symptoms, you may need to be examined.        2/14/14

## 2017-02-14 NOTE — BRIEF OP NOTE
[facility] Brief Operative Note    Pre-operative diagnosis: RIGHT EYE VITREOUS HEMORRHAGE   Post-operative diagnosis Same, branch retinal vein occlusion right eye   Procedure: Procedure(s):  RIGHT EYE 25 GAUGE PARSPLANA VITRECTOMY, ENDOLASER,  AIR-FLUID EXCHANGE - Wound Class: I-Clean   Surgeon: Steph Cintron MD   Assistants(s): Patrick Chapin MD   Estimated blood loss: Minimal    Specimens: None   Findings: Retina attached, good laser to inferotemporal area of branch vein occlusion, sutured wounds, physiologic intraocular pressure.

## 2017-02-14 NOTE — OP NOTE
SURGEON: LONG AVELAR MD  Assistant: Partick Chapin MD  PREOPERATIVE DIAGNOSIS:  Vitreous hemorrhage, right eye.   POSTOPERATIVE DIAGNOSIS: Vitreous hemorrhage, branch retinal vein occlusion, right eye   SURGERY    25 gauge pars plana vitectomy, endolaserl, air-fluid exchange   Complications:    none   Anesthesia:    General Anesthesia and retrobulbar block  Blood loss:    Scant  INDICATIONS;   Sarah Sanford is a 61 year old patient with a diagnosis of a recurrent vitreous hemorrhage complaining of poor vision.  She is here for surgical repair.  DESCRIPTION OF THE PROCEDURE:  The patient was taken to the operating room where General Anesthesia was administered by the anesthesia department and a retrobulbar block consisting of a 1:1 mixture of 2%lidocaine and 0.75% marcaine with epinephrine and wydase, was administered to the operative eye with adequate anesthesia and akinesia.      The eye was prepped and draped in the usual sterile surgical fashion for ophthalmic surgery, including the installation of one drop of 5% Povidone Iodine.  A sterile drape was placed over the face and body and a lid speculum was inserted.      A 25 gauge infusion cannula was placed 3.5 mm posterior to the limbus inferotemporally. The infusion cannula was connected and its intravitreal location was verified by direct visualization. The infusion was turned on.  Two other 25 gauge trocars were placed 3.5 mm posterior to the limbus at 10:00 o'clock and 2:00 o'clock position. The vitrectomy handpiece and endoilluminator were placed in the eye.  Upon entering the eye, it was noted the pt had diffuse vitreous hemorrhage (a mix of old and new blood) with ghost vessels inferotemporally consistent with a branch retinal vein occlusion.. A vitrectomy was performed. Careful peripheral vitrectomy was performed with scleral depression 360 degrees. Next endolaser was applied sectorally to the inferotemporal quadrant in the area corresponding to the  ghost vessels.    Next, an air fluid exchange was performed. Next, the trocars and the infusion line were removed. The sclerotomies and conjunctiva were closed with 6-0 plain gut sutures. Subconjunctival injections of vancomybin and dexamethasone were administed The lid speculum was removed. The IOP was low teens at the end of the case. The eye was cleaned with wet and dry gauze. Maxitrol ointment and Atropine drops were placed on the eye.  A patch and Thornton shield were placed over the eye.   The patient tolerated well the procedure and was transferred to the PACU in stable condition.    Dr. Chapin was the surgical assistant as the complexity of the case required a high skill assistant surgeon and/or there was not resident available

## 2017-02-14 NOTE — OR NURSING
Report called to Alicja TAYLOR in the [facility].  Patient remains on side.  Transferred bck to Eye Center by cart.  Patient instructed to take pt's B/P med as usual today.

## 2017-02-14 NOTE — ANESTHESIA CARE TRANSFER NOTE
Patient: Sarah Sanford    Procedure(s):  RIGHT EYE 25 GAUGE PARSPLANA VITRECTOMY, ENDOLASER,  AIR-FLUID EXCHANGE - Wound Class: I-Clean    Diagnosis: RIGHT EYE VITREOUS HEMORRHAGE  Diagnosis Additional Information: No value filed.    Anesthesia Type:   General, LMA     Note:  Airway :Face Mask  Patient transferred to:PACU  Comments: Uneventful transport to PACU   Report to RN Delfina Lopez  Exchanging well  Pt responds appropriately to command  IV patent  Lips/teeth/dentition as preop status  Questions answered  /77  HR 73 nsr  TAT 97.3  RR 16  Sat 98%      Vitals: (Last set prior to Anesthesia Care Transfer)    CRNA VITALS  2/14/2017 0840 - 2/14/2017 0910      2/14/2017             Resp Rate (set): 10                Electronically Signed By: MEDINA BUTLER CRNA  February 14, 2017  9:10 AM

## 2017-02-14 NOTE — IP AVS SNAPSHOT
MRN:[id number]                      After Visit Summary   2/14/2017    Sarah Sanford    MRN: [id number]           Thank you!     Thank you for choosing [facility] for your care. Our goal is always to provide you with excellent care. Hearing back from our patients is one way we can continue to improve our services. Please take a few minutes to complete the written survey that you may receive in the mail after you visit with us. Thank you!        Patient Information     Date Of Birth          1955        About your hospital stay     You were admitted on:  February 14, 2017 You last received care in the:  [facility]    You were discharged on:  February 14, 2017       Who to Call     For medical emergencies, please call 911.  For non-urgent questions about your medical care, please call your primary care provider or clinic, [phone]  For questions related to your surgery, please call your surgery clinic        Attending Provider     Provider Specialty    Steph Cintron MD Ophthalmology       Primary Care Provider Office Phone # Fax #    Vj Centeno [phone]       [address]  [facility] [address]        After Care Instructions     Activity       Avoid strenuous activities the next several days.            Discharge Instructions - Wear eye patch and eye shield        Will be removed by technician at post-op visit on 2/15.            Position       NOT ON BACK.                  Follow-up Appointments     Follow Up       To see surgeon tomorrow.  Bring all eye medications to follow up visit.  Appointment at [facility]                  Your next 10 appointments already scheduled     Feb 15, 2017  3:00 PM CST   Post-Op with Steph Cintron MD   Eye Clinic ([facility])    [address]  [address]  [address] [address]  [facility] [address]   [phone]             Feb 22, 2017  1:15 PM CST   Post-Op with Steph Cintron MD   Eye Clinic ([facility])    [address]  [address]  [address]  [facility] [address] [phone]            Mar 06, 2017  3:00 PM CST   (Arrive by 2:45 PM)   Return Visit with Vj Centeno MD   [facility] ([facility])    [address]  4th Floor  [facility] [id number] [phone]              Further instructions from your care team       [facility] Discharge  Instructions  Anesthesia (Eye Care Center)   Adult Discharge Instructions (General)    For 24 hours after surgery    1. Get plenty of rest.  Make arrangements to have a responsible adult stay with you for at least 24 hours.  2. Do not drive or use heavy equipment for 24 hours.    3. Do not drink alcohol for 24 hours.  4. Do not sign legal documents or make important decisions for 24 hours.  5. Avoid strenuous or risky activities. You may feel lightheaded.  If so, sit for a few minutes before standing.  Have someone help you get up.   6. General anesthesia patients should drink only clear liquids (apple juice, ginger ale, broth or 7-Up). Be sure to drink enough fluids.  Move to a regular diet as you feel able.  7. Any questions of medical nature, call your physician.    [facility]  Discharge Instruction    EyeSurgery [facility]    MD Steph Perkins MD Joseph Terry, MD  Will I have pain?  Some discomfort is normal and expected following surgery. The first few days after surgery you may need to use prescription pain pills. Taking Tylenol (acetaminophen) regularly may help prevent pain.  Discomfort should gradually decrease and Tylenol should be sufficient to relieve pain. A foreign body sensation in the cornea of the eye is very common and caused by sutures placed at surgery. These sutures will go  away in one to two weeks. If the pain worsens, you should call the doctor.  Do I need to wear an eye patch?  You do not need to wear an eye patch at home after the doctor has removed the patch on your first day after surgery. However, you may be more comfortable wearing a patch outside in the sun, when sleeping or napping, or in a murali, windy environment.  How much drainage should I have?  You may expect a moderate amount of drainage for a week. Gradually the drainage should decrease. The lids can be cleaned with a clean washcloth and gentle soap or diluted baby shampoo. Wipe the eyelids gently from the nose outward. Some blood in the tears is a normal finding.  Will there be swelling?  Some swelling is normal for about a week or two after which it will gradually decrease. Applying a cool compress, using a clean washcloth for 5 - 10 minutes several times a day may reduce the swelling and make you more comfortable. People may have some swelling of both eyes, especially if face down positioning is required. The white part of the eye may appear very red or bloody for a week or two. This may get worse a few days after surgery. Though the bright red appearance can look frightening, it is a normal finding early after surgery and will resolve in a few weeks.  Will I need to use eye drops?  You will be using several different kinds of eye drops or ointment (salve) when you leave the hospital. The directions will be on each bottle or tube. The medication with the red top will keep your eye dilated and may make your eye more sensitive to light. Wearing sunglasses may help. The other medication is a combination antibiotic/steroid to prevent infection and promote healing.  Occasionally a third drop is used to control the pressure in your eye. A new bottle of artificial tears or lubricant ointment may be used along with your prescription eye drops after surgery. You will be using drops from four to eight weeks. Bring all eye  medications (drops. ointments, or pills) with you  to each visit.   Always wash your hands before putting in the eye drops. You may wish to have someone else help you. Pull down on the lower lid and squeeze one drop from the bottle being careful not to touch the dropper to your eye or eyelid. One drop is sufficient, but another may be used if the first did not go into the eye. It is often easier to put in the drops if you are reclining or lying down. Wait five (5) minutes after the first drop before using the second drop to allow the medications to absorb into the eye.  How long will it take for my vision to improve?  Your vision should gradually improve, but it may take up to six months to regain your best vision. Frequently, air or gas bubbles are injected into the eye at the time of surgery. This will blur your vision significantly at first. As the bubble becomes smaller it will cause a black line in your vision that moves as you move your head. As the bubble becomes smaller you may notice that it looks more like a bubble or that it will break up into several smaller bubbles. It will take from a few days to a few weeks for the bubble to dissolve and be replaced by clear fluid.  You may notice floaters or double vision after your surgery. These symptoms usually will decrease with time. If the double vision is bothersome patching the eye may help.  If you notice a sudden worsening in your vision call your doctor.  Are there any physical restrictions after surgery?  If an air or gas bubble was placed in the eye during surgery, you will be asked to spend most of your time (both awake and during the night) with your head in a specific position, frequently face down. As the eye heals and the bubble dissolves there will be less of a need for you to stay in that specific position. You should avoid sleeping on your back until the bubble has totally dissolved and you have been given permission from your surgeon. You should  not fly in an airplane or go to high altitudes in the mountains while there is a bubble in your eye. If you should require any other surgery, under general anesthesia, while you still have an air bubble in your eye, have your surgeon or anesthetist contact us prior to your surgery. Some anesthetic agents can make the bubble expand and seriously damage your eye.  Patients that have a gas/air bubble placed in their eye will have a green medical alert band on their wrist.  This band should not be removed until the doctor removes it for you or gives you permission to remove it.     Heavy lifting (greater than 50 pounds), swimming and contact sports should be avoided for about 3 to 4 weeks after surgery.  You may resume your usual sexual activities about one week after surgery.  When may I return to work or my normal activities?  Depending on the type or work, you may return to work within a few days. If your work involves physical activity or driving, you will need to restrict your activities and remain home longer.  You may watch television, look at magazines, or work puzzles. Reading may be uncomfortable for several days, but using the eyes will not cause any damage.  You may go outside as usual. If conditions are windy or murali, wear an eye pad to avoid getting dust or dirt in the eye.  Can I travel?  You cannot fly in an airplane or drive into the mountains as long as the air or gas bubble remains in your eye.    Are there any driving restrictions?  Someone will need to drive you home from the hospital. Generally driving can be resumed in several days if you have good vision in your other eye. If you do not feel comfortable driving, do not drive! Your depth perception will be decreased so you will want to try driving during the day in light traffic until you feel comfortable driving. You should restrict your driving while you are taking prescription pain pills as they also can affect your judgment.  When can I  "shower and wash my hair?  You may shower or bathe when you get home, but avoid getting water in your eye. You may want someone to help you shampoo your hair at first.  You may shave, brush your teeth, or comb your hair. Do not use make-up, mascara, or creams/lotions around your eye for several weeks.  When will I see the doctor again?  Generally, you will be seen the first day after surgery and again 1-2 weeks later. If you have not received a return appointment before leaving the hospital, you should call our office during the business hours to arrange an appointment. If you will be seeing your local doctor instead of us, you will need to call that office to set up an appointment.  How do I reach a doctor if I have concerns?  One of our doctors is available by calling [facility] [phone]. Please try to call for routine questions and prescription refills during business hours.  You should call your doctor if:  You notice a sudden decrease in your vision.  Have severe pain or pain increases rather than subsiding.    You notice a new black curtain over your eye that is not the gas bubble. If you have any of these symptoms, you may need to be examined.        2/14/14    Pending Results     No orders found from 2/12/2017 to 2/15/2017.            Admission Information     Date & Time Provider Department Dept. Phone    2/14/2017 Steph Cintron MD [facility] [place] [phone]      Your Vitals Were     Blood Pressure Temperature Respirations Height Pulse Oximetry       130/85 97.3  F (36.3  C) (Temporal) 18 1.651 m (5' 5\") 94%       MyChart Information     [employer] gives you secure access to your electronic health record. If you see a primary care provider, you can also send messages to your care team and make appointments. If you have questions, please call your primary care clinic.  If you do not have a primary care provider, please call [phone] and they will assist " you.        Care EveryWhere ID     This is your Care EveryWhere ID. This could be used by other organizations to access your [place] medical records  [id number]           Review of your medicines      START taking        Dose / Directions    neomycin-polymixin-dexamethasone ophthalmic suspension   Commonly known as:  MAXITROL   Used for:  Surgical aftercare, sense organs        Dose:  1 drop   Apply 1 drop to eye 4 times daily Instill into operative eye(s) per physician instructions.   Quantity:  5 mL   Refills:  0         CONTINUE these medicines which have NOT CHANGED        Dose / Directions    ATIVAN 2 MG tablet   Used for:  Other chronic pain   Generic drug:  LORazepam        Dose:  2 mg   Take 2 mg by mouth At Bedtime 2 tablets at night   Refills:  0       cetirizine 10 MG tablet   Commonly known as:  zyrTEC        Dose:  10 mg   Take 10 mg by mouth daily.   Refills:  0       Gel Heel Cushions Womens Pads   Used for:  Plantar fasciitis        Dose:  1 Device   1 Device daily   Quantity:  1 Device   Refills:  0       hydrochlorothiazide 25 MG tablet   Commonly known as:  HYDRODIURIL   Used for:  Essential hypertension        Dose:  25 mg   Take 1 tablet (25 mg) by mouth daily   Quantity:  100 tablet   Refills:  3       lisinopril 30 MG tablet   Commonly known as:  PRINIVIL,ZESTRIL   Used for:  Secondary hypertension, hypertension with unspecified goal        Dose:  30 mg   Take 1 tablet (30 mg) by mouth At Bedtime   Quantity:  100 tablet   Refills:  3       Lysine 1000 MG Tabs   Used for:  Other chronic pain, Mixed cryoglobulinemia (H), History of hepatitis C, Secondary hypertension, hypertension with unspecified goal        Refills:  0       mometasone-formoterol 100-5 MCG/ACT oral inhaler   Commonly known as:  DULERA   Used for:  EIC (epidermal inclusion cyst)        Dose:  2 puff   Inhale 2 puffs into the lungs 2 times daily   Refills:  0       order for DME   Used for:  Chronic bronchitis, unspecified  chronic bronchitis type (H)        Dose:  1 Device   1 Device by Device route daily as needed Air filtration system   Quantity:  1 Device   Refills:  0       PREMARIN VA   Used for:  Essential hypertension        Dose:  2 g   Place 2 g vaginally daily   Refills:  0       traMADol 50 MG tablet   Commonly known as:  ULTRAM   Used for:  Other chronic pain, Mixed cryoglobulinemia (H), History of hepatitis C        Dose:   mg   Take 1-2 tablets ( mg) by mouth every 6 hours as needed   Quantity:  360 tablet   Refills:  0       traZODone 50 MG tablet   Commonly known as:  DESYREL        Dose:  1 mg   Take 1 mg by mouth At Bedtime   Refills:  0            Where to get your medicines      These medications were sent to [facility] - [address]  [address], [place] [address]     Phone:  [phone]     neomycin-polymixin-dexamethasone ophthalmic suspension                Protect others around you: Learn how to safely use, store and throw away your medicines at www.disposemymeds.org.             Medication List: This is a list of all your medications and when to take them. Check marks below indicate your daily home schedule. Keep this list as a reference.      Medications           Morning Afternoon Evening Bedtime As Needed    ATIVAN 2 MG tablet   Take 2 mg by mouth At Bedtime 2 tablets at night   Generic drug:  LORazepam                                cetirizine 10 MG tablet   Commonly known as:  zyrTEC   Take 10 mg by mouth daily.                                Gel Heel Cushions Womens Pads   1 Device daily                                hydrochlorothiazide 25 MG tablet   Commonly known as:  HYDRODIURIL   Take 1 tablet (25 mg) by mouth daily                                lisinopril 30 MG tablet   Commonly known as:  PRINIVIL,ZESTRIL   Take 1 tablet (30 mg) by mouth At Bedtime                                Lysine 1000 MG Tabs                                 mometasone-formoterol 100-5 MCG/ACT oral inhaler   Commonly known as:  DULERA   Inhale 2 puffs into the lungs 2 times daily                                neomycin-polymixin-dexamethasone ophthalmic suspension   Commonly known as:  MAXITROL   Apply 1 drop to eye 4 times daily Instill into operative eye(s) per physician instructions.                                order for DME   1 Device by Device route daily as needed Air filtration system                                PREMARIN VA   Place 2 g vaginally daily                                traMADol 50 MG tablet   Commonly known as:  ULTRAM   Take 1-2 tablets ( mg) by mouth every 6 hours as needed                                traZODone 50 MG tablet   Commonly known as:  DESYREL   Take 1 mg by mouth At Bedtime

## 2017-02-15 ENCOUNTER — OFFICE VISIT (OUTPATIENT)
Dept: OPHTHALMOLOGY | Facility: CLINIC | Age: 62
End: 2017-02-15
Attending: OPHTHALMOLOGY
Payer: MEDICARE

## 2017-02-15 DIAGNOSIS — Z48.810 SURGICAL AFTERCARE, SENSE ORGANS: Primary | ICD-10-CM

## 2017-02-15 PROCEDURE — 99212 OFFICE O/P EST SF 10 MIN: CPT | Mod: ZF

## 2017-02-15 RX ORDER — OXYCODONE AND ACETAMINOPHEN 5; 325 MG/1; MG/1
1-2 TABLET ORAL EVERY 6 HOURS PRN
Qty: 10 TABLET | Refills: 0 | Status: SHIPPED | OUTPATIENT
Start: 2017-02-15 | End: 2017-03-06

## 2017-02-15 ASSESSMENT — TONOMETRY
OS_IOP_MMHG: X
IOP_METHOD: TONOPEN
OD_IOP_MMHG: 08

## 2017-02-15 ASSESSMENT — EXTERNAL EXAM - RIGHT EYE: OD_EXAM: NORMAL

## 2017-02-15 ASSESSMENT — SLIT LAMP EXAM - LIDS: COMMENTS: NORMAL

## 2017-02-15 ASSESSMENT — VISUAL ACUITY
OS_CC: 20/25
OD_CC: HM
CORRECTION_TYPE: GLASSES
METHOD: SNELLEN - LINEAR

## 2017-02-15 NOTE — MR AVS SNAPSHOT
After Visit Summary   2/15/2017    Sarah Sanford    MRN: [id number]           Patient Information     Date Of Birth          1955        Visit Information        Provider Department      2/15/2017 3:00 PM Steph Cintron MD Eye Clinic        Today's Diagnoses     Surgical aftercare, sense organs    -  1       Follow-ups after your visit        Follow-up notes from your care team     Return in about 1 week (around 2/22/2017).      Your next 10 appointments already scheduled     Feb 22, 2017  1:15 PM CST   Post-Op with Steph Cintron MD   Eye Clinic ([facility])    [address]  [address]  [address] Clin [address]  [facility] [address] [phone]            Mar 06, 2017  3:00 PM CST   (Arrive by 2:45 PM)   Return Visit with Vj Centeno MD   [facility] ([facility])    [address]  4th Floor  [facility] [address] [phone]              Who to contact     Please call your clinic at [phone] to:    Ask questions about your health    Make or cancel appointments    Discuss your medicines    Learn about your test results    Speak to your doctor   If you have compliments or concerns about an experience at your clinic, or if you wish to file a complaint, please contact [facility] Patient Relations at [phone] or email us at [email or web address]         Additional Information About Your Visit        MyChart Information     [employer] gives you secure access to your electronic health record. If you see a primary care provider, you can also send messages to your care team and make appointments. If you have questions, please call your primary care clinic.  If you do not have a primary care provider, please call [phone] and they will assist you.      [employer] is an electronic gateway that provides easy, online access to your medical records. With  [employer], you can request a clinic appointment, read your test results, renew a prescription or communicate with your care team.     To access your existing account, please contact your [facility] or call [phone] for assistance.        Care EveryWhere ID     This is your Care EveryWhere ID. This could be used by other organizations to access your [place] medical records  [id number]         Blood Pressure from Last 3 Encounters:   02/14/17 128/85   02/06/17 135/84   01/31/17 123/82    Weight from Last 3 Encounters:   02/06/17 76.8 kg (169 lb 6.4 oz)   01/10/17 77.2 kg (170 lb 3.2 oz)   01/08/17 74.8 kg (165 lb)              Today, you had the following     No orders found for display         Today's Medication Changes          These changes are accurate as of: 2/15/17 10:35 PM.  If you have any questions, ask your nurse or doctor.               Start taking these medicines.        Dose/Directions    oxyCODONE-acetaminophen 5-325 MG per tablet   Commonly known as:  PERCOCET   Used for:  Surgical aftercare, sense organs   Started by:  Steph Cintron MD        Dose:  1-2 tablet   Take 1-2 tablets by mouth every 6 hours as needed for moderate to severe pain   Quantity:  10 tablet   Refills:  0            Where to get your medicines      Some of these will need a paper prescription and others can be bought over the counter.  Ask your nurse if you have questions.     Bring a paper prescription for each of these medications     oxyCODONE-acetaminophen 5-325 MG per tablet                Primary Care Provider Office Phone # Fax #    Vj Centeno [phone]        [facility] [address]  [facility] [address]        Thank you!     Thank you for choosing EYE CLINIC  for your care. Our goal is always to provide you with excellent care. Hearing back from our patients is one way we can continue to improve our services. Please take a few  minutes to complete the written survey that you may receive in the mail after your visit with us. Thank you!             Your Updated Medication List - Protect others around you: Learn how to safely use, store and throw away your medicines at www.disposemymeds.org.          This list is accurate as of: 2/15/17 10:35 PM.  Always use your most recent med list.                   Brand Name Dispense Instructions for use    ATIVAN 2 MG tablet   Generic drug:  LORazepam      Take 2 mg by mouth At Bedtime 2 tablets at night       cetirizine 10 MG tablet    zyrTEC     Take 10 mg by mouth daily.       Gel Heel Cushions Womens Pads     1 Device    1 Device daily       hydrochlorothiazide 25 MG tablet    HYDRODIURIL    100 tablet    Take 1 tablet (25 mg) by mouth daily       lisinopril 30 MG tablet    PRINIVIL,ZESTRIL    100 tablet    Take 1 tablet (30 mg) by mouth At Bedtime       Lysine 1000 MG Tabs          mometasone-formoterol 100-5 MCG/ACT oral inhaler    DULERA     Inhale 2 puffs into the lungs 2 times daily       neomycin-polymixin-dexamethasone ophthalmic suspension    MAXITROL    5 mL    Apply 1 drop to eye 4 times daily Instill into operative eye(s) per physician instructions.       order for DME     1 Device    1 Device by Device route daily as needed Air filtration system       oxyCODONE-acetaminophen 5-325 MG per tablet    PERCOCET    10 tablet    Take 1-2 tablets by mouth every 6 hours as needed for moderate to severe pain       PREMARIN VA      Place 2 g vaginally daily       traMADol 50 MG tablet    ULTRAM    360 tablet    Take 1-2 tablets ( mg) by mouth every 6 hours as needed       traZODone 50 MG tablet    DESYREL     Take 1 mg by mouth At Bedtime

## 2017-02-15 NOTE — NURSING NOTE
Chief Complaints and History of Present Illnesses   Patient presents with     Post Op (Ophthalmology) Right Eye     RIGHT EYE 25 GAUGE PARSPLANA VITRECTOMY, ENDOLASER,  AIR-FLUID EXCHANGE     HPI    Symptoms:           Do you have eye pain now?:  Yes   Location:  OD   Pain Level:  Severe Pain (7)   Pain Frequency:  Constant   Pain Characteristics:  Aching      Comments:  Pain last night at a 12. Today better bu still painful. Pain meds not helping.     Joslyn FRIEDMAN February 15, 2017 3:11 PM

## 2017-02-15 NOTE — PROGRESS NOTES
Postoperative day 1 status post PPV/EL/FAX OD (2/14/17).    Slept well  Retina attached  Doing well    Plan:  Position: not on back  No aviation  No heavy lifting   Thornton shield at all times  Retina detachment and endophthalmitis precautions were discussed with the patient and was asked to return if any of the those occur    Medications to operative eye  Maxitrol four times a day    Maxitrol oint at bedtime  Atropine once a day   Percocet 5-235 1-2 tabs q6 hours for pain (10 tabs)    Follow up in one week    Patrick Chapin MD  Retina Fellow    ~~~~~~~~~~~~~~~~~~~~~~~~~~~~~~~~~~~~~~~~~~~~~~~~~~~~~~~~~~~~~~~~~~~~~~~~~~~~~~~~~~  I personally viewed the patient's HPI and review of systems entered by techs.  I have discussed the case and the management of this patient's care with the Resident/Fellow, if applicable. I also have reviewed and agree with the assessment and plan as stated above and agree with all of its relevant components. I personally viewed the patient images and I agree with the interpretation as documented by the resident/fellow and edited by me. I was present for critical portions of the procedure and was immediately available for the entire procedure.  Steph Cintron M.D.

## 2017-02-22 ENCOUNTER — OFFICE VISIT (OUTPATIENT)
Dept: OPHTHALMOLOGY | Facility: CLINIC | Age: 62
End: 2017-02-22
Attending: OPHTHALMOLOGY
Payer: MEDICARE

## 2017-02-22 DIAGNOSIS — Z48.810 SURGICAL AFTERCARE, SENSE ORGANS: ICD-10-CM

## 2017-02-22 DIAGNOSIS — Z48.810 AFTERCARE FOLLOWING SURGERY OF A SENSORY ORGAN: Primary | ICD-10-CM

## 2017-02-22 PROCEDURE — 99213 OFFICE O/P EST LOW 20 MIN: CPT | Mod: ZF

## 2017-02-22 RX ORDER — NEOMYCIN SULFATE, POLYMYXIN B SULFATE, AND DEXAMETHASONE 3.5; 10000; 1 MG/G; [USP'U]/G; MG/G
1 OINTMENT OPHTHALMIC AT BEDTIME
COMMUNITY
End: 2017-05-01

## 2017-02-22 RX ORDER — ATROPINE SULFATE 10 MG/G
OINTMENT OPHTHALMIC DAILY
COMMUNITY
End: 2017-05-01

## 2017-02-22 RX ORDER — NEOMYCIN POLYMYXIN B SULFATES AND DEXAMETHASONE 3.5; 10000; 1 MG/ML; [USP'U]/ML; MG/ML
1 SUSPENSION/ DROPS OPHTHALMIC 3 TIMES DAILY
Qty: 5 ML | Refills: 0 | Status: SHIPPED | OUTPATIENT
Start: 2017-02-22 | End: 2017-05-01

## 2017-02-22 ASSESSMENT — REFRACTION_WEARINGRX
OD_ADD: +2.50
OD_SPHERE: PLANO
OS_ADD: +2.50
OD_AXIS: 133
OS_SPHERE: +0.25
SPECS_TYPE: BIFOCAL
OS_CYLINDER: +0.25
OS_AXIS: 085
OD_CYLINDER: +0.50

## 2017-02-22 ASSESSMENT — TONOMETRY
OD_IOP_MMHG: 09
IOP_METHOD: ICARE
OS_IOP_MMHG: 09

## 2017-02-22 ASSESSMENT — VISUAL ACUITY
OD_CC+: -2
OD_CC: 20/30
OS_CC+: -1
METHOD: SNELLEN - LINEAR
OS_CC: 20/25
CORRECTION_TYPE: GLASSES

## 2017-02-22 ASSESSMENT — CONF VISUAL FIELD
OS_NORMAL: 1
METHOD: COUNTING FINGERS
OD_NORMAL: 1

## 2017-02-22 ASSESSMENT — CUP TO DISC RATIO
OD_RATIO: 0.2
OS_RATIO: 0.2

## 2017-02-22 ASSESSMENT — SLIT LAMP EXAM - LIDS
COMMENTS: NORMAL
COMMENTS: NORMAL

## 2017-02-22 ASSESSMENT — EXTERNAL EXAM - RIGHT EYE: OD_EXAM: NORMAL

## 2017-02-22 NOTE — NURSING NOTE
Chief Complaints and History of Present Illnesses   Patient presents with     Post Op (Ophthalmology) Right Eye     8 day post op PPV/EL/FAX OD (2/14/17).     HPI    Affected eye(s):  Right   Symptoms:     (Comment: Vision is improved in RE, Fine LE.)   Floaters (Comment: Floaters in RE, Bubble seems to be breaking up going down from top.)   No flashes   Redness (Comment: Little RE)   Dryness (Comment: RE)   Itching (Comment: Itching like Crazy RE.)         Do you have eye pain now?:  Yes   Location:  OD   Pain Level:  Mild Pain (2)   Pain Duration:  8 days   Pain Frequency:  Constant   Other:  Sore      Comments:  8 day post op PPV/EL/FAX OD (2/14/17).    Willard Waite COA  1:29 PM02/22/2017
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
## 2017-03-06 ENCOUNTER — OFFICE VISIT (OUTPATIENT)
Dept: INTERNAL MEDICINE | Facility: CLINIC | Age: 62
End: 2017-03-06

## 2017-03-06 VITALS
BODY MASS INDEX: 27.64 KG/M2 | WEIGHT: 166.1 LBS | HEART RATE: 71 BPM | RESPIRATION RATE: 16 BRPM | SYSTOLIC BLOOD PRESSURE: 127 MMHG | DIASTOLIC BLOOD PRESSURE: 85 MMHG

## 2017-03-06 DIAGNOSIS — G89.29 OTHER CHRONIC PAIN: ICD-10-CM

## 2017-03-06 DIAGNOSIS — Z86.19 HISTORY OF HEPATITIS C: ICD-10-CM

## 2017-03-06 DIAGNOSIS — D89.1 MIXED CRYOGLOBULINEMIA (H): ICD-10-CM

## 2017-03-06 RX ORDER — TRAMADOL HYDROCHLORIDE 50 MG/1
50-100 TABLET ORAL EVERY 6 HOURS PRN
Qty: 360 TABLET | Refills: 0 | Status: SHIPPED | OUTPATIENT
Start: 2017-03-06 | End: 2017-06-07

## 2017-03-06 ASSESSMENT — PAIN SCALES - GENERAL: PAINLEVEL: NO PAIN (0)
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
## 2017-03-06 NOTE — NURSING NOTE
Chief Complaint   Patient presents with     Medication Request     patient states she needs a refill of her Tramadol     JN NATION at 3:12 PM on 3/6/2017.

## 2017-03-06 NOTE — PATIENT INSTRUCTIONS
[facility] Medication Refill Request Information:  * Please contact your pharmacy regarding ANY request for medication refills.  ** [facility] Prescription Fax = [phone]  * Please allow 3 business days for routine medication refills.  * Please allow 5 business days for controlled substance medication refills.     [facility] Test Result notification information:  *You will be notified with in 7-10 days of your appointment day regarding the results of your test.  If you are on MyChart you will be notified as soon as the provider has reviewed the results and signed off on them.

## 2017-03-06 NOTE — MR AVS SNAPSHOT
After Visit Summary   3/6/2017    Sarah Sanford    MRN: [id number]           Patient Information     Date Of Birth          1955        Visit Information        Provider Department      3/6/2017 3:00 PM Vj Centeno MD [facility]        Today's Diagnoses     Other chronic pain        Mixed cryoglobulinemia (H)        History of hepatitis C          Care Instructions    [facility] Medication Refill Request Information:  * Please contact your pharmacy regarding ANY request for medication refills.  ** [facility] Prescription Fax = [phone]  * Please allow 3 business days for routine medication refills.  * Please allow 5 business days for controlled substance medication refills.     [facility] Test Result notification information:  *You will be notified with in 7-10 days of your appointment day regarding the results of your test.  If you are on MyChart you will be notified as soon as the provider has reviewed the results and signed off on them.          Follow-ups after your visit        Follow-up notes from your care team     Return in about 3 months (around 6/6/2017).      Your next 10 appointments already scheduled     Mar 15, 2017  1:00 PM CDT   Post-Op with Steph Cintron MD   Eye Clinic ([facility])    [address]  [address]  [address] [address] [address]  [place] [id number] [phone]            Jun 07, 2017  3:45 PM CDT   (Arrive by 3:30 PM)   Return Visit with Vj Centeno MD   [facility] ([facility])    [address]  4th Floor  [place] [id number] [phone]              Who to contact     Please call your clinic at [phone] to:    Ask questions about your health    Make or cancel appointments    Discuss your medicines    Learn about your test results    Speak to your doctor   If you have compliments or concerns about an experience at your  clinic, or if you wish to file a complaint, please contact [facility] Patient Relations at [phone] or email us at [email or web address]         Additional Information About Your Visit        Unb[employer]hart Information     [employer] gives you secure access to your electronic health record. If you see a primary care provider, you can also send messages to your care team and make appointments. If you have questions, please call your primary care clinic.  If you do not have a primary care provider, please call [phone] and they will assist you.      [email or web address] is an electronic gateway that provides easy, online access to your medical records. With [email or web address], you can request a clinic appointment, read your test results, renew a prescription or communicate with your care team.     To access your existing account, please contact your [facility] or call [phone] for assistance.        Care EveryWhere ID     This is your Care EveryWhere ID. This could be used by other organizations to access your [place] medical records  [id number]        Your Vitals Were     Pulse Respirations BMI (Body Mass Index)             71 16 27.64 kg/m2          Blood Pressure from Last 3 Encounters:   03/06/17 127/85   02/14/17 128/85   02/06/17 135/84    Weight from Last 3 Encounters:   03/06/17 75.3 kg (166 lb 1.6 oz)   02/06/17 76.8 kg (169 lb 6.4 oz)   01/10/17 77.2 kg (170 lb 3.2 oz)              Today, you had the following     No orders found for display         Today's Medication Changes          These changes are accurate as of: 3/6/17  3:40 PM.  If you have any questions, ask your nurse or doctor.               Stop taking these medicines if you haven't already. Please contact your care team if you have questions.     oxyCODONE-acetaminophen 5-325 MG per tablet   Commonly known as:  PERCOCET   Stopped by:  Vj Centeno MD                Where to get your  medicines      Some of these will need a paper prescription and others can be bought over the counter.  Ask your nurse if you have questions.     Bring a paper prescription for each of these medications     traMADol 50 MG tablet                Primary Care Provider Office Phone # Fax #    Vj Centeno [phone]        [facility] [address]  [place] [address]        Thank you!     Thank you for choosing [facility]  for your care. Our goal is always to provide you with excellent care. Hearing back from our patients is one way we can continue to improve our services. Please take a few minutes to complete the written survey that you may receive in the mail after your visit with us. Thank you!             Your Updated Medication List - Protect others around you: Learn how to safely use, store and throw away your medicines at www.disposemymeds.org.          This list is accurate as of: 3/6/17  3:40 PM.  Always use your most recent med list.                   Brand Name Dispense Instructions for use    ATIVAN 2 MG tablet   Generic drug:  LORazepam      Take 2 mg by mouth At Bedtime 2 tablets at night       atropine 1 % ophthalmic ointment      Place into the right eye daily       cetirizine 10 MG tablet    zyrTEC     Take 10 mg by mouth daily.       Gel Heel Cushions Womens Pads     1 Device    1 Device daily       hydrochlorothiazide 25 MG tablet    HYDRODIURIL    100 tablet    Take 1 tablet (25 mg) by mouth daily       lisinopril 30 MG tablet    PRINIVIL,ZESTRIL    100 tablet    Take 1 tablet (30 mg) by mouth At Bedtime       Lysine 1000 MG Tabs          mometasone-formoterol 100-5 MCG/ACT oral inhaler    DULERA     Inhale 2 puffs into the lungs 2 times daily       * neomycin-polymyxin-dexamethasone 3.5-62097-7.1 Oint ophthalmic ointment    MAXITROL     Place 1 Application into the right eye At Bedtime       * neomycin-polymixin-dexamethasone ophthalmic  suspension    MAXITROL    5 mL    Apply 1 drop to eye 3 times daily Instill into operative eye(s) per physician instructions.       order for DME     1 Device    1 Device by Device route daily as needed Air filtration system       PREMARIN VA      Place 2 g vaginally daily       traMADol 50 MG tablet    ULTRAM    360 tablet    Take 1-2 tablets ( mg) by mouth every 6 hours as needed       traZODone 50 MG tablet    DESYREL     Take 1 mg by mouth At Bedtime       * Notice:  This list has 2 medication(s) that are the same as other medications prescribed for you. Read the directions carefully, and ask your doctor or other care provider to review them with you.

## 2017-03-07 ENCOUNTER — TELEPHONE (OUTPATIENT)
Dept: OPHTHALMOLOGY | Facility: CLINIC | Age: 62
End: 2017-03-07

## 2017-03-07 DIAGNOSIS — Z48.810 AFTERCARE FOLLOWING SURGERY OF A SENSE ORGAN: Primary | ICD-10-CM

## 2017-03-07 NOTE — TELEPHONE ENCOUNTER
S/p vitrectomy 2-14-17  Starting Sunday new redness on lower lid (inside) and redness in corners on white part of eye  Foreign body sensation/hurts   Using some artificial tears  Offered appt tomorrow with dr. de la rosa vs resident today  Pt pleased with appt tomorrow   No vision changes   Recommended use of artificial tears waiting 5 minutes between drops  If symptoms worsen today to call for acute appt with resident on call  Pt verbally demonstrated understanding    Note to retina resident for review and amendment if applicable  Bo Good RN 11:43 AM 03/07/17

## 2017-03-07 NOTE — TELEPHONE ENCOUNTER
----- Message from Camillaradha Lopes sent at 3/7/2017 10:53 AM CST -----  Regarding: symptomatic pt- [place]  Contact: [phone]  Pt stated she thinks she has a possible infection in her eye. Pt had surgery on 2/14/2017. Pt is experiencing redness and pain since Sunday.     Pt can be reached at number above.    Thanks!- lel    Please DO NOT send this message and/or reply back to sender.  Call Center Representatives DO NOT respond to messages.

## 2017-03-08 ENCOUNTER — OFFICE VISIT (OUTPATIENT)
Dept: OPHTHALMOLOGY | Facility: CLINIC | Age: 62
End: 2017-03-08
Attending: OPHTHALMOLOGY
Payer: MEDICARE

## 2017-03-08 DIAGNOSIS — H04.123 DRY EYE SYNDROME, BILATERAL: Primary | ICD-10-CM

## 2017-03-08 DIAGNOSIS — Z48.810 AFTERCARE FOLLOWING SURGERY OF A SENSE ORGAN: ICD-10-CM

## 2017-03-08 DIAGNOSIS — Z48.810 AFTERCARE FOLLOWING SURGERY OF A SENSORY ORGAN: ICD-10-CM

## 2017-03-08 PROCEDURE — 99212 OFFICE O/P EST SF 10 MIN: CPT | Mod: ZF

## 2017-03-08 ASSESSMENT — REFRACTION_WEARINGRX
OD_CYLINDER: +0.50
SPECS_TYPE: BIFOCAL
OS_SPHERE: +0.25
OD_ADD: +2.50
OD_AXIS: 133
OS_ADD: +2.50
OS_AXIS: 085
OD_SPHERE: PLANO
OS_CYLINDER: +0.25

## 2017-03-08 ASSESSMENT — CONF VISUAL FIELD
OD_NORMAL: 1
OS_NORMAL: 1
METHOD: COUNTING FINGERS

## 2017-03-08 ASSESSMENT — TONOMETRY
IOP_METHOD: TONOPEN
OD_IOP_MMHG: 18
OS_IOP_MMHG: 18

## 2017-03-08 ASSESSMENT — EXTERNAL EXAM - RIGHT EYE: OD_EXAM: NORMAL

## 2017-03-08 ASSESSMENT — VISUAL ACUITY
OD_CC: 20/30
METHOD: SNELLEN - LINEAR
CORRECTION_TYPE: GLASSES
OS_CC: 20/25

## 2017-03-08 ASSESSMENT — SLIT LAMP EXAM - LIDS
COMMENTS: NORMAL
COMMENTS: NORMAL

## 2017-03-08 ASSESSMENT — CUP TO DISC RATIO: OD_RATIO: 0.2

## 2017-03-08 NOTE — PROGRESS NOTES
"Postoperative week 1 status post PPV/EL/FAX OD (2/14/17) for vitreous hemorrhage possibly secondary to microaneurysm after historic BRVO.    Patient noted redness and irritation starting Sunday in her operative eye.  She had increasing discomfort Monday. Starting Tuesday she increased her maxitrol drops from 2 to 3x daily, which provided some relief.  She notes that her liquid tears have been causing a \"burning sensation\" in the right eye, but not the left.  Vision stable/better today.    OCT (3/8/17)  RE: inferotemporal thinning (stable), otherwise normal contour  LE: normal    Plan:  Position: as desired  No heavy lifting   Thornton shield at bedtime   Retina detachment and endophthalmitis precautions were discussed with the patient and was asked to return if any of the those occur    Medications to operative eye  Maxitrol: finish taper - 1x daily for one week then stop  maxitrol ointment: at night for 1 week, then stop  Artificial tears: preservative free to right eye 4x daily    Follow up in 1 month     Patrick Chapin MD  Retina Fellow    ~~~~~~~~~~~~~~~~~~~~~~~~~~~~~~~~~~  Complete documentation of historical and exam elements from today's encounter can be found in the full encounter summary report (not redupilcated in this progress note).  I personally obtained the chief complaint(s) and hisotry of present illness., I have confirmed and edited as necessary the Past medical history/Past Surgical History, Social history, Family medical history,  Review of systems, and exam/neuro findings as obtained by the technician or others. I have examined this patient myself. , I personally viewed the relevant tests, image(s), reports, and studies listed above and the documentation reflects my findings and interpretation. and I formulated and edited as necessary the assessment and plan and discussed the findings and management plan with the patient and family.    Steph Cintron MD  .  Retina Service "   Department of Ophthalmology and Visual Neurosciences   [facility]  Phone: [phone]   Fax: [phone]

## 2017-03-08 NOTE — MR AVS SNAPSHOT
After Visit Summary   3/8/2017    Sarah Sanford    MRN: [id number]           Patient Information     Date Of Birth          1955        Visit Information        Provider Department      3/8/2017 3:00 PM Steph Cintron MD Eye Clinic        Today's Diagnoses     Dry eye syndrome, bilateral    -  1    Aftercare following surgery of a sense organ           Follow-ups after your visit        Follow-up notes from your care team     Return in about 4 weeks (around 4/5/2017) for Re-check with MRx OU, DFE/OCT OD only.      Your next 10 appointments already scheduled     Apr 05, 2017  1:00 PM CDT   RETURN RETINA with Steph Cintron MD   Eye Clinic ([facility])    [address]  [address] [facility]  [address] [address] [phone]            Jun 07, 2017  3:45 PM CDT   (Arrive by 3:30 PM)   Return Visit with Vj Centeno MD   [facility] ([facility])    [address] [facility]  4th [facility] [address] [phone]              Who to contact     Please call your clinic at [phone] to:    Ask questions about your health    Make or cancel appointments    Discuss your medicines    Learn about your test results    Speak to your doctor   If you have compliments or concerns about an experience at your clinic, or if you wish to file a complaint, please contact [facility] Patient Relations at [phone] or email us at [email or web address]         Additional Information About Your Visit        MyChart Information     Robint gives you secure access to your electronic health record. If you see a primary care provider, you can also send messages to your care team and make appointments. If you have questions, please call your primary care clinic.  If you do not have a primary care provider, please call [phone] and they will assist you.      Nimcohart  is an electronic gateway that provides easy, online access to your medical records. With Beyond the Box, you can request a clinic appointment, read your test results, renew a prescription or communicate with your care team.     To access your existing account, please contact your [facility] or call [phone] for assistance.        Care EveryWhere ID     This is your Care EveryWhere ID. This could be used by other organizations to access your [place] medical records  [id number]         Blood Pressure from Last 3 Encounters:   03/06/17 127/85   02/14/17 128/85   02/06/17 135/84    Weight from Last 3 Encounters:   03/06/17 75.3 kg (166 lb 1.6 oz)   02/06/17 76.8 kg (169 lb 6.4 oz)   01/10/17 77.2 kg (170 lb 3.2 oz)              We Performed the Following     OCT Retina Spectralis OU (both eyes)        Primary Care Provider Office Phone # Fax #    Vj Centeno [phone]        PHYSICIANS [address] [place] [address] [address]  [facility] [address]        Thank you!     Thank you for choosing EYE CLINIC  for your care. Our goal is always to provide you with excellent care. Hearing back from our patients is one way we can continue to improve our services. Please take a few minutes to complete the written survey that you may receive in the mail after your visit with us. Thank you!             Your Updated Medication List - Protect others around you: Learn how to safely use, store and throw away your medicines at www.disposemymeds.org.          This list is accurate as of: 3/8/17  4:01 PM.  Always use your most recent med list.                   Brand Name Dispense Instructions for use    ATIVAN 2 MG tablet   Generic drug:  LORazepam      Take 2 mg by mouth At Bedtime 2 tablets at night       atropine 1 % ophthalmic ointment      Place into the right eye daily       cetirizine 10 MG tablet    zyrTEC     Take 10 mg by mouth daily.       Gel Heel Cushions Womens Pads     1  Device    1 Device daily       hydrochlorothiazide 25 MG tablet    HYDRODIURIL    100 tablet    Take 1 tablet (25 mg) by mouth daily       lisinopril 30 MG tablet    PRINIVIL,ZESTRIL    100 tablet    Take 1 tablet (30 mg) by mouth At Bedtime       Lysine 1000 MG Tabs          mometasone-formoterol 100-5 MCG/ACT oral inhaler    DULERA     Inhale 2 puffs into the lungs 2 times daily       * neomycin-polymyxin-dexamethasone 3.5-62654-6.1 Oint ophthalmic ointment    MAXITROL     Place 1 Application into the right eye At Bedtime       * neomycin-polymixin-dexamethasone ophthalmic suspension    MAXITROL    5 mL    Apply 1 drop to eye 3 times daily Instill into operative eye(s) per physician instructions.       order for DME     1 Device    1 Device by Device route daily as needed Air filtration system       PREMARIN VA      Place 2 g vaginally daily       traMADol 50 MG tablet    ULTRAM    360 tablet    Take 1-2 tablets ( mg) by mouth every 6 hours as needed       traZODone 50 MG tablet    DESYREL     Take 1 mg by mouth At Bedtime       * Notice:  This list has 2 medication(s) that are the same as other medications prescribed for you. Read the directions carefully, and ask your doctor or other care provider to review them with you.

## 2017-03-08 NOTE — NURSING NOTE
Chief Complaints and History of Present Illnesses   Patient presents with     Follow Up For     new redness     HPI    Affected eye(s):  Both   Symptoms:        Frequency:  Constant       Do you have eye pain now?:  No      Comments:  States that the on Sunday and Monday had a FBS with redness but it has gotten better  She increased the antibiotics   States va is better  No F&F  Danielle Tolentino COT 3:08 PM March 8, 2017

## 2017-04-05 ENCOUNTER — OFFICE VISIT (OUTPATIENT)
Dept: OPHTHALMOLOGY | Facility: CLINIC | Age: 62
End: 2017-04-05
Attending: OPHTHALMOLOGY
Payer: MEDICARE

## 2017-04-05 DIAGNOSIS — Z48.810 AFTERCARE FOLLOWING SURGERY OF A SENSE ORGAN: Primary | ICD-10-CM

## 2017-04-05 PROCEDURE — 92134 CPTRZ OPH DX IMG PST SGM RTA: CPT | Mod: ZF | Performed by: OPHTHALMOLOGY

## 2017-04-05 PROCEDURE — 99212 OFFICE O/P EST SF 10 MIN: CPT | Mod: ZF

## 2017-04-05 ASSESSMENT — VISUAL ACUITY
METHOD: SNELLEN - LINEAR
OD_SC: 20/50
OS_CC: 20/25
OD_PH_SC: 20/30
CORRECTION_TYPE: GLASSES

## 2017-04-05 ASSESSMENT — REFRACTION_MANIFEST
OD_ADD: +2.50
OD_CYLINDER: SPHERE
OD_SPHERE: -0.25

## 2017-04-05 ASSESSMENT — TONOMETRY
OS_IOP_MMHG: 12
OD_IOP_MMHG: 10
IOP_METHOD: TONOPEN

## 2017-04-05 ASSESSMENT — REFRACTION_WEARINGRX
SPECS_TYPE: BIFOCAL
OD_SPHERE: PLANO
OD_CYLINDER: +0.50
OS_AXIS: 085
OS_ADD: +2.50
OD_AXIS: 133
OS_CYLINDER: +0.25
OD_ADD: +2.50
OS_SPHERE: +0.25

## 2017-04-05 ASSESSMENT — SLIT LAMP EXAM - LIDS
COMMENTS: NORMAL
COMMENTS: NORMAL

## 2017-04-05 ASSESSMENT — CONF VISUAL FIELD
OD_NORMAL: 1
OS_NORMAL: 1
METHOD: COUNTING FINGERS

## 2017-04-05 ASSESSMENT — EXTERNAL EXAM - RIGHT EYE: OD_EXAM: NORMAL

## 2017-04-05 ASSESSMENT — CUP TO DISC RATIO: OD_RATIO: 0.2

## 2017-04-05 NOTE — MR AVS SNAPSHOT
After Visit Summary   4/5/2017    Sarah Sanford    MRN: [id number]           Patient Information     Date Of Birth          1955        Visit Information        Provider Department      4/5/2017 1:00 PM Steph Cintron MD Eye Clinic        Today's Diagnoses     Aftercare following surgery of a sense organ    -  1       Follow-ups after your visit        Your next 10 appointments already scheduled     May 05, 2017  3:30 PM CDT   (Arrive by 3:15 PM)   Return Visit with Julio Cesar Yeager MD   [facility] ([facility])    [address] [place] [address]  3rd Floor  [facility] [address] [phone]            Jun 07, 2017  3:45 PM CDT   (Arrive by 3:30 PM)   Return Visit with Vj Centeno MD   [facility] ([facility])    [address] [place] [address]  4th [facility] [address] [phone]            Aug 02, 2017  1:00 PM CDT   RETURN RETINA with Steph Cintron MD   Eye Clinic ([facility])    [address]  [address] [facility]  [address] 9a  [facility] [address] [phone]              Who to contact     Please call your clinic at [phone] to:    Ask questions about your health    Make or cancel appointments    Discuss your medicines    Learn about your test results    Speak to your doctor   If you have compliments or concerns about an experience at your clinic, or if you wish to file a complaint, please contact [facility] Patient Relations at [phone] or email us at [email or web address]         Additional Information About Your Visit        MyChart Information     Code42hart gives you secure access to your electronic health record. If you see a primary care provider, you can also send messages to your care team and make appointments. If you have questions, please call your primary care clinic.  If you do not have a primary care  provider, please call [phone] and they will assist you.      shopa is an electronic gateway that provides easy, online access to your medical records. With shopa, you can request a clinic appointment, read your test results, renew a prescription or communicate with your care team.     To access your existing account, please contact your [facility] or call [phone] for assistance.        Care EveryWhere ID     This is your Care EveryWhere ID. This could be used by other organizations to access your [place] medical records  [id number]         Blood Pressure from Last 3 Encounters:   03/06/17 127/85   02/14/17 128/85   02/06/17 135/84    Weight from Last 3 Encounters:   03/06/17 75.3 kg (166 lb 1.6 oz)   02/06/17 76.8 kg (169 lb 6.4 oz)   01/10/17 77.2 kg (170 lb 3.2 oz)              We Performed the Following     OCT Retina Spectralis OU (both eyes)        Primary Care Provider Office Phone # Fax #    Vj Centeno [phone]        PHYSICIANS [address] [facility] [address]  [facility] [address]        Thank you!     Thank you for choosing EYE CLINIC  for your care. Our goal is always to provide you with excellent care. Hearing back from our patients is one way we can continue to improve our services. Please take a few minutes to complete the written survey that you may receive in the mail after your visit with us. Thank you!             Your Updated Medication List - Protect others around you: Learn how to safely use, store and throw away your medicines at www.disposemymeds.org.          This list is accurate as of: 4/5/17  3:09 PM.  Always use your most recent med list.                   Brand Name Dispense Instructions for use    ATIVAN 2 MG tablet   Generic drug:  LORazepam      Take 2 mg by mouth At Bedtime 2 tablets at night       atropine 1 % ophthalmic ointment      Place into the right eye daily       cetirizine 10 MG tablet    zyrTEC      Take 10 mg by mouth daily.       Gel Heel Cushions Womens Pads     1 Device    1 Device daily       hydrochlorothiazide 25 MG tablet    HYDRODIURIL    100 tablet    Take 1 tablet (25 mg) by mouth daily       lisinopril 30 MG tablet    PRINIVIL,ZESTRIL    100 tablet    Take 1 tablet (30 mg) by mouth At Bedtime       Lysine 1000 MG Tabs          mometasone-formoterol 100-5 MCG/ACT oral inhaler    DULERA     Inhale 2 puffs into the lungs 2 times daily       * neomycin-polymyxin-dexamethasone 3.5-24636-1.1 Oint ophthalmic ointment    MAXITROL     Place 1 Application into the right eye At Bedtime       * neomycin-polymixin-dexamethasone ophthalmic suspension    MAXITROL    5 mL    Apply 1 drop to eye 3 times daily Instill into operative eye(s) per physician instructions.       order for DME     1 Device    1 Device by Device route daily as needed Air filtration system       PREMARIN VA      Place 2 g vaginally daily       traMADol 50 MG tablet    ULTRAM    360 tablet    Take 1-2 tablets ( mg) by mouth every 6 hours as needed       traZODone 50 MG tablet    DESYREL     Take 1 mg by mouth At Bedtime       * Notice:  This list has 2 medication(s) that are the same as other medications prescribed for you. Read the directions carefully, and ask your doctor or other care provider to review them with you.

## 2017-04-05 NOTE — NURSING NOTE
Chief Complaints and History of Present Illnesses   Patient presents with     Post Op (Ophthalmology) Right Eye     PPV/EL/FAX OD (2/14/17)      HPI    Affected eye(s):  Right   Symptoms:        Frequency:  Constant       Do you have eye pain now?:  No      Comments:  States that the RE gets red and dry but forgets to put gtts in  States va is the same since last visit  Danielle Tolentino COT 1:16 PM April 5, 2017

## 2017-04-05 NOTE — PROGRESS NOTES
CC: status post PPV/EL/FAX OD (2/14/17) for vitreous hemorrhage possibly secondary to microaneurysm after BRVO.    Karin  States VA is improving, no eye pain , no flashes and floaters     OCT 5-5-17  RE: inferotemporal thinning (stable), otherwise normal contour  LE: normal    Assessment and plan:  1. status post PPV/EL/FAX OD (2/14/17) for vitreous hemorrhage possibly secondary to history of  BRVO.  - retina attached, patient doing well  2. Cataract right eye   Becoming visually significant   Refracts to 20/40 right eye  Will give prescription and will consider cataract evaluation in the future    Plan:  - Retina detachment and endophthalmitis precautions were discussed with the patient and was asked to return if any of the those occur  - Artificial tears: preservative free to right eye 4x daily  - Would like two different RXs one for distance and one for new. Will write a letter of medical necessity given failure to adjust to bifocals.     Follow up in 4 months, sooner as needed     Mark Ely MD   PGY-3    ~~~~~~~~~~~~~~~~~~~~~~~~~~~~~~~~~~  Complete documentation of historical and exam elements from today's encounter can be found in the full encounter summary report (not redupilcated in this progress note).  I personally obtained the chief complaint(s) and hisotry of present illness., I have confirmed and edited as necessary the Past medical history/Past Surgical History, Social history, Family medical history,  Review of systems, and exam/neuro findings as obtained by the technician or others. I have examined this patient myself. , I personally viewed the relevant tests, image(s), reports, and studies listed above and the documentation reflects my findings and interpretation. and I formulated and edited as necessary the assessment and plan and discussed the findings and management plan with the patient and family.    Steph Cintron MD  .  Retina Service   Department of Ophthalmology  and Visual Neurosciences   [facility]  Phone: [phone]   Fax: [phone]

## 2017-04-21 ENCOUNTER — OFFICE VISIT (OUTPATIENT)
Dept: INTERNAL MEDICINE | Facility: CLINIC | Age: 62
End: 2017-04-21

## 2017-04-21 VITALS
SYSTOLIC BLOOD PRESSURE: 124 MMHG | RESPIRATION RATE: 18 BRPM | BODY MASS INDEX: 27.72 KG/M2 | WEIGHT: 166.6 LBS | TEMPERATURE: 98.3 F | OXYGEN SATURATION: 98 % | DIASTOLIC BLOOD PRESSURE: 81 MMHG | HEART RATE: 66 BPM

## 2017-04-21 DIAGNOSIS — J06.9 VIRAL UPPER RESPIRATORY TRACT INFECTION: ICD-10-CM

## 2017-04-21 DIAGNOSIS — J20.9 ACUTE BRONCHITIS, UNSPECIFIED ORGANISM: Primary | ICD-10-CM

## 2017-04-21 ASSESSMENT — PAIN SCALES - GENERAL: PAINLEVEL: NO PAIN (0)

## 2017-04-21 NOTE — NURSING NOTE
Chief Complaint   Patient presents with     URI     Here for cold 11 days now     Social Work Services     Also here for  medication uncovered     Joseph Shen CMA at 11:59 AM on 4/21/2017

## 2017-04-21 NOTE — Clinical Note
"Rochelle Lin Junie would like an air purifier for her apartment but this was denied by her insurance. She was told to follow-up with you regarding a \"community sharing program\". Can you call her when you get a chance? Thanks! -Dori"

## 2017-04-21 NOTE — PATIENT INSTRUCTIONS
"[facility] Medication Refill Request Information:  * Please contact your pharmacy regarding ANY request for medication refills.  ** [facility] Prescription Fax = [phone]  * Please allow 3 business days for routine medication refills.  * Please allow 5 business days for controlled substance medication refills.     [facility] Test Result notification information:  *You will be notified with in 7-10 days of your appointment day regarding the results of your test.  If you are on MyChart you will be notified as soon as the provider has reviewed the results and signed off on them.    [facility] [phone]     Take Guaifenesin (\"Mucus Relief\") 1 tablet every 6-8 hours. Start using Dulera inhaler twice a day.    Drink lots of fluid. Continue with the daily Zyrtec.     Rochelle Bronson, our clinic  will call you regarding the community sharing program to see if we can get access to an air purifier for you.      "

## 2017-04-21 NOTE — MR AVS SNAPSHOT
"              After Visit Summary   4/21/2017    Sarah Sanford    MRN: [id number]           Patient Information     Date Of Birth          1955        Visit Information        Provider Department      4/21/2017 12:00 PM Dori Oviedo APRN [facility]        Care Instructions    [facility] Medication Refill Request Information:  * Please contact your pharmacy regarding ANY request for medication refills.  ** [facility] Prescription Fax = [phone]  * Please allow 3 business days for routine medication refills.  * Please allow 5 business days for controlled substance medication refills.     [facility] Test Result notification information:  *You will be notified with in 7-10 days of your appointment day regarding the results of your test.  If you are on MyChart you will be notified as soon as the provider has reviewed the results and signed off on them.    Primary Care Center [phone]     Take Guaifenesin (\"Mucus Relief\") 1 tablet every 6-8 hours. Start using Dulera inhaler twice a day.    Drink lots of fluid. Continue with the daily Zyrtec.     Rochelle Bronson, our clinic  will call you regarding the community sharing program to see if we can get access to an air purifier for you.            Follow-ups after your visit        Your next 10 appointments already scheduled     May 05, 2017  3:30 PM CDT   (Arrive by 3:15 PM)   Return Visit with Julio Cesar Yeager MD   [facility] ([facility])    [address] [place] [address]  3rd [facility] [address] [phone]            Jun 07, 2017  3:45 PM CDT   (Arrive by 3:30 PM)   Return Visit with Vj Centeno MD   [facility] ([facility])    [address] [place] [address]  4th [facility] [address] [phone]            Aug 02, 2017  1:00 PM CDT   RETURN RETINA with Steph Cintron MD   Eye Clinic ([facility])    [address] [facility]  [address] [facility]  9th Fl Clin 9a  [facility] [address] [phone]              Who to contact     Please call your clinic at [phone] to:    Ask questions about your health    Make or cancel appointments    Discuss your medicines    Learn about your test results    Speak to your doctor   If you have compliments or concerns about an experience at your clinic, or if you wish to file a complaint, please contact [facility] Patient Relations at [phone] or email us at [email or web address]         Additional Information About Your Visit        Desk Information     Desk gives you secure access to your electronic health record. If you see a primary care provider, you can also send messages to your care team and make appointments. If you have questions, please call your primary care clinic.  If you do not have a primary care provider, please call [phone] and they will assist you.      Desk is an electronic gateway that provides easy, online access to your medical records. With Desk, you can request a clinic appointment, read your test results, renew a prescription or communicate with your care team.     To access your existing account, please contact your [facility] or call [phone] for assistance.        Care EveryWhere ID     This is your Care EveryWhere ID. This could be used by other organizations to access your [place] medical records  [id number]        Your Vitals Were     Pulse Temperature Respirations Pulse Oximetry Breastfeeding? BMI (Body Mass Index)    66 98.3  F (36.8  C) (Oral) 18 98% No 27.72 kg/m2       Blood Pressure from Last 3 Encounters:   04/21/17 124/81   03/06/17 127/85   02/14/17 128/85    Weight from Last 3 Encounters:   04/21/17 75.6 kg (166 lb 9.6 oz)   03/06/17 75.3 kg (166 lb 1.6 oz)   02/06/17 76.8 kg (169 lb 6.4 oz)              Today, you had the  following     No orders found for display       Primary Care Provider Office Phone # Fax #    Vj Centeno [phone]        PHYSICIANS [address] [facility] [address]  [facility] [address]        Thank you!     Thank you for choosing [facility]IMARY [facility]ARE CLINIC  for your care. Our goal is always to provide you with excellent care. Hearing back from our patients is one way we can continue to improve our services. Please take a few minutes to complete the written survey that you may receive in the mail after your visit with us. Thank you!             Your Updated Medication List - Protect others around you: Learn how to safely use, store and throw away your medicines at www.disposemymeds.org.          This list is accurate as of: 4/21/17 12:27 PM.  Always use your most recent med list.                   Brand Name Dispense Instructions for use    ATIVAN 2 MG tablet   Generic drug:  LORazepam      Take 2 mg by mouth At Bedtime 2 tablets at night       atropine 1 % ophthalmic ointment      Place into the right eye daily       cetirizine 10 MG tablet    zyrTEC     Take 10 mg by mouth daily.       Gel Heel Cushions Womens Pads     1 Device    1 Device daily       hydrochlorothiazide 25 MG tablet    HYDRODIURIL    100 tablet    Take 1 tablet (25 mg) by mouth daily       lisinopril 30 MG tablet    PRINIVIL,ZESTRIL    100 tablet    Take 1 tablet (30 mg) by mouth At Bedtime       Lysine 1000 MG Tabs          mometasone-formoterol 100-5 MCG/ACT oral inhaler    DULERA     Inhale 2 puffs into the lungs 2 times daily       * neomycin-polymyxin-dexamethasone 3.5-50005-5.1 Oint ophthalmic ointment    MAXITROL     Place 1 Application into the right eye At Bedtime       * neomycin-polymixin-dexamethasone ophthalmic suspension    MAXITROL    5 mL    Apply 1 drop to eye 3 times daily Instill into operative eye(s) per physician instructions.       order for DME     1 Device    1 Device by Device route daily as  needed Air filtration system       PREMARIN VA      Place 2 g vaginally daily       traMADol 50 MG tablet    ULTRAM    360 tablet    Take 1-2 tablets ( mg) by mouth every 6 hours as needed       traZODone 50 MG tablet    DESYREL     Take 1 mg by mouth At Bedtime       * Notice:  This list has 2 medication(s) that are the same as other medications prescribed for you. Read the directions carefully, and ask your doctor or other care provider to review them with you.
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
## 2017-04-25 ENCOUNTER — TELEPHONE (OUTPATIENT)
Dept: INTERNAL MEDICINE | Facility: CLINIC | Age: 62
End: 2017-04-25

## 2017-04-27 ENCOUNTER — TELEPHONE (OUTPATIENT)
Dept: INTERNAL MEDICINE | Facility: CLINIC | Age: 62
End: 2017-04-27

## 2017-04-27 NOTE — TELEPHONE ENCOUNTER
Contacted pt to provide resources for assistance with paying for medical equipment not covered by insurance.  Pt had received a prescription for an air filtration system in January, but insurance coverage was denied.  Pt unable to access resources for paying for equipment.    Provided pt with the following information for financial assistance to apply for a debbie to cover cost of equipment.      [employer] [phone]  Can provide financial assistance and mental health case management, behavioral health home program    [facility], [phone]  Emergency financial assistance    Pt appreciative of resources; reported plan to contact agencies for support.  No other needs identified.

## 2017-05-01 ENCOUNTER — OFFICE VISIT (OUTPATIENT)
Dept: INTERNAL MEDICINE | Facility: CLINIC | Age: 62
End: 2017-05-01

## 2017-05-01 VITALS
DIASTOLIC BLOOD PRESSURE: 83 MMHG | WEIGHT: 161 LBS | BODY MASS INDEX: 26.79 KG/M2 | SYSTOLIC BLOOD PRESSURE: 157 MMHG | OXYGEN SATURATION: 97 % | HEART RATE: 70 BPM

## 2017-05-01 DIAGNOSIS — J45.30 REACTIVE AIRWAY DISEASE, MILD PERSISTENT, UNCOMPLICATED: Primary | ICD-10-CM

## 2017-05-01 RX ORDER — GUAIFENESIN 400 MG/1
200 TABLET ORAL 2 TIMES DAILY
COMMUNITY
End: 2017-05-22

## 2017-05-01 RX ORDER — MONTELUKAST SODIUM 10 MG/1
10 TABLET ORAL AT BEDTIME
COMMUNITY
End: 2017-05-01

## 2017-05-01 RX ORDER — ALBUTEROL SULFATE 90 UG/1
2 AEROSOL, METERED RESPIRATORY (INHALATION) EVERY 6 HOURS
Qty: 1 INHALER | Refills: 1 | Status: SHIPPED | OUTPATIENT
Start: 2017-05-01 | End: 2018-08-22

## 2017-05-01 RX ORDER — BUDESONIDE AND FORMOTEROL FUMARATE DIHYDRATE 80; 4.5 UG/1; UG/1
2 AEROSOL RESPIRATORY (INHALATION) 2 TIMES DAILY
Qty: 1 INHALER | Refills: 1 | Status: SHIPPED | OUTPATIENT
Start: 2017-05-01 | End: 2018-08-22

## 2017-05-01 RX ORDER — MONTELUKAST SODIUM 10 MG/1
10 TABLET ORAL AT BEDTIME
Qty: 90 TABLET | Refills: 3 | Status: SHIPPED | OUTPATIENT
Start: 2017-05-01 | End: 2017-05-22

## 2017-05-01 ASSESSMENT — PAIN SCALES - GENERAL: PAINLEVEL: MILD PAIN (2)

## 2017-05-01 NOTE — PATIENT INSTRUCTIONS
"[facility] Medication Refill Request Information:  * Please contact your pharmacy regarding ANY request for medication refills.  ** [facility] Prescription Fax = [phone]  * Please allow 3 business days for routine medication refills.  * Please allow 5 business days for controlled substance medication refills.     [facility] Test Result notification information:  *You will be notified with in 7-10 days of your appointment day regarding the results of your test.  If you are on MyChart you will be notified as soon as the provider has reviewed the results and signed off on them.    [facility] [phone]     Use the albuterol inhaler for a \"rescue inhaler\" for coughing and shortness of breath.         "

## 2017-05-01 NOTE — NURSING NOTE
Chief Complaint   Patient presents with     Cough     pt here for cough     Gisella Nugent CMA at 10:13 AM on 5/1/2017.

## 2017-05-01 NOTE — MR AVS SNAPSHOT
"              After Visit Summary   5/1/2017    Sarah Sanford    MRN: [id number]           Patient Information     Date Of Birth          1955        Visit Information        Provider Department      5/1/2017 10:00 AM Dori Oviedo APRN CNP [facility]        Today's Diagnoses     Reactive airway disease, mild persistent, uncomplicated    -  1      Care Instructions    [facility] Medication Refill Request Information:  * Please contact your pharmacy regarding ANY request for medication refills.  ** [facility] Prescription Fax = [phone]  * Please allow 3 business days for routine medication refills.  * Please allow 5 business days for controlled substance medication refills.     [facility] Test Result notification information:  *You will be notified with in 7-10 days of your appointment day regarding the results of your test.  If you are on MyChart you will be notified as soon as the provider has reviewed the results and signed off on them.    [facility] [phone]     Use the albuterol inhaler for a \"rescue inhaler\" for coughing and shortness of breath.               Follow-ups after your visit        Your next 10 appointments already scheduled     May 05, 2017  3:30 PM CDT   (Arrive by 3:15 PM)   Return Visit with Julio Cesar Yeager MD   [facility] ([facility])    [address] [place] [id number] [phone]            May 22, 2017  6:40 PM CDT   (Arrive by 6:25 PM)   ACUTE/CHRONIC SINGLE CONDITION with Vj Centeno MD   [facility] ([facility])    [address] [place] [id number] [phone]            Jun 07, 2017  3:45 PM CDT   (Arrive by 3:30 PM)   Return Visit with Vj Centeno MD   [facility] ([facility])    [address] [place] [address] " [place] [address]   [phone]            Aug 02, 2017  1:00 PM CDT   RETURN RETINA with Steph Cintron MD   [facility] Clinic ([facility])    [address]  [address] [facility]  [address]  [facility] [address]   [phone]              Who to contact     Please call your clinic at [phone] to:    Ask questions about your health    Make or cancel appointments    Discuss your medicines    Learn about your test results    Speak to your doctor   If you have compliments or concerns about an experience at your clinic, or if you wish to file a complaint, please contact [facility] Patient Relations at [phone] or email us at [email or web address]         Additional Information About Your Visit        [employer] Information     [employer] gives you secure access to your electronic health record. If you see a primary care provider, you can also send messages to your care team and make appointments. If you have questions, please call your primary care clinic.  If you do not have a primary care provider, please call [phone] and they will assist you.      [employer] is an electronic gateway that provides easy, online access to your medical records. With [employer], you can request a clinic appointment, read your test results, renew a prescription or communicate with your care team.     To access your existing account, please contact your [facility] Clinic or call [phone] for assistance.        Care EveryWhere ID     This is your Care EveryWhere ID. This could be used by other organizations to access your [facility] medical records  [id number]        Your Vitals Were     Pulse Pulse Oximetry BMI (Body Mass Index)             70 97% 26.79 kg/m2          Blood Pressure from Last 3 Encounters:   05/01/17 157/83   04/21/17 124/81   03/06/17 127/85    Weight from Last 3 Encounters:   05/01/17 73 kg (161 lb)   04/21/17 75.6 kg (166 lb 9.6 oz)    03/06/17 75.3 kg (166 lb 1.6 oz)              Today, you had the following     No orders found for display         Today's Medication Changes          These changes are accurate as of: 5/1/17 10:59 AM.  If you have any questions, ask your nurse or doctor.               Start taking these medicines.        Dose/Directions    albuterol 108 (90 BASE) MCG/ACT Inhaler   Commonly known as:  PROAIR HFA/PROVENTIL HFA/VENTOLIN HFA   Used for:  Reactive airway disease, mild persistent, uncomplicated        Dose:  2 puff   Inhale 2 puffs into the lungs every 6 hours   Quantity:  1 Inhaler   Refills:  1       budesonide-formoterol 80-4.5 MCG/ACT Inhaler   Commonly known as:  SYMBICORT   Used for:  Reactive airway disease, mild persistent, uncomplicated        Dose:  2 puff   Inhale 2 puffs into the lungs 2 times daily   Quantity:  1 Inhaler   Refills:  1         Stop taking these medicines if you haven't already. Please contact your care team if you have questions.     atropine 1 % ophthalmic ointment           mometasone-formoterol 100-5 MCG/ACT oral inhaler   Commonly known as:  DULERA           neomycin-polymixin-dexamethasone ophthalmic suspension   Commonly known as:  MAXITROL           neomycin-polymyxin-dexamethasone 3.5-48940-5.1 Oint ophthalmic ointment   Commonly known as:  MAXITROL                Where to get your medicines      These medications were sent to [place], [place] - [address] [facility] [address]  [address] [address]    Hours:  TRANSPLANT PHONE NUMBER [phone] Phone:  [phone]     albuterol 108 (90 BASE) MCG/ACT Inhaler    budesonide-formoterol 80-4.5 MCG/ACT Inhaler    montelukast 10 MG tablet                Primary Care Provider Office Phone # Fax #    Vj Centeno [phone]        [facility] [address] [facility] [address]  [facility] [address]        Thank you!     Thank you for choosing [facility]IC  for your care. Our goal is always to provide you with excellent care. Hearing back from our patients is one way we can continue to improve our services. Please take a few minutes to complete the written survey that you may receive in the mail after your visit with us. Thank you!             Your Updated Medication List - Protect others around you: Learn how to safely use, store and throw away your medicines at www.disposemymeds.org.          This list is accurate as of: 5/1/17 10:59 AM.  Always use your most recent med list.                   Brand Name Dispense Instructions for use    albuterol 108 (90 BASE) MCG/ACT Inhaler    PROAIR HFA/PROVENTIL HFA/VENTOLIN HFA    1 Inhaler    Inhale 2 puffs into the lungs every 6 hours       ATIVAN 2 MG tablet   Generic drug:  LORazepam      Take 2 mg by mouth At Bedtime 2 tablets at night       budesonide-formoterol 80-4.5 MCG/ACT Inhaler    SYMBICORT    1 Inhaler    Inhale 2 puffs into the lungs 2 times daily       cetirizine 10 MG tablet    zyrTEC     Take 10 mg by mouth daily.       Gel Heel Cushions Womens Pads     1 Device    1 Device daily       guaiFENesin 400 MG Tabs      Take 200 mg by mouth 2 times daily       hydrochlorothiazide 25 MG tablet    HYDRODIURIL    100 tablet    Take 1 tablet (25 mg) by mouth daily       lisinopril 30 MG tablet    PRINIVIL,ZESTRIL    100 tablet    Take 1 tablet (30 mg) by mouth At Bedtime       Lysine 1000 MG Tabs          montelukast 10 MG tablet    SINGULAIR    90 tablet    Take 1 tablet (10 mg) by mouth At Bedtime       order for DME     1 Device    1 Device by Device route daily as needed Air filtration system       PREMARIN VA      Place 2 g vaginally daily       traMADol 50 MG tablet    ULTRAM    360 tablet    Take 1-2 tablets ( mg) by mouth every 6 hours as needed       traZODone 50 MG tablet    DESYREL     Take 1 mg by mouth At Bedtime
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
## 2017-05-05 ENCOUNTER — OFFICE VISIT (OUTPATIENT)
Dept: DERMATOLOGY | Facility: CLINIC | Age: 62
End: 2017-05-05

## 2017-05-05 ENCOUNTER — OFFICE VISIT (OUTPATIENT)
Dept: ORTHOPEDICS | Facility: CLINIC | Age: 62
End: 2017-05-05

## 2017-05-05 ENCOUNTER — OFFICE VISIT (OUTPATIENT)
Dept: INTERNAL MEDICINE | Facility: CLINIC | Age: 62
End: 2017-05-05

## 2017-05-05 VITALS
SYSTOLIC BLOOD PRESSURE: 140 MMHG | OXYGEN SATURATION: 98 % | HEART RATE: 62 BPM | RESPIRATION RATE: 16 BRPM | DIASTOLIC BLOOD PRESSURE: 86 MMHG

## 2017-05-05 VITALS — HEIGHT: 65 IN | WEIGHT: 161 LBS | BODY MASS INDEX: 26.82 KG/M2

## 2017-05-05 DIAGNOSIS — L82.0 INFLAMED SEBORRHEIC KERATOSIS: Primary | ICD-10-CM

## 2017-05-05 DIAGNOSIS — J20.9 ACUTE BRONCHITIS, UNSPECIFIED ORGANISM: Primary | ICD-10-CM

## 2017-05-05 DIAGNOSIS — M23.92 INTERNAL DERANGEMENT OF LEFT KNEE: Primary | ICD-10-CM

## 2017-05-05 ASSESSMENT — PAIN SCALES - GENERAL
PAINLEVEL: NO PAIN (0)
PAINLEVEL: NO PAIN (0)
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
## 2017-05-05 NOTE — PROGRESS NOTES
"[facility] Dermatology Note      Dermatology Problem List:  1. Cherry angioma - right medial calf  -s/p Electrocautery 9/21/16  2. EIC - right breast  -s/p excision 1/31/17  3. Seborrheic keratosis  -s/p cryotherapy 5/5/17      Encounter Date: May 5, 2017    CC:  Chief Complaint   Patient presents with     Derm Problem     Sarah is here today for concerns with a growth on her left shoulder. States it looked like it was growing, but accidently scratched part of it off Wednesday night, and the rest of it came off when she was cleaning it. States It looked \"like a mushroom cloud.\"           History of Present Illness:  Ms. Sarah Sanford is a 61 year old female who presents for evaluation of a concerning lesions on her left shoulder. She noticed a couple of weeks ago that a flat brown macule on her shouler slowly developed an oily, brain-like outgrowth at which point she called for an appointment. Patient states she accidentally scratched off a piece of it two days ago and then washed it right off while cleaning off some blood. The spot has not bled since and does not cause her any other discomfort. Otherwise, the patient reports no painful, bleeding, nonhealing, or pruritic lesions, and denies new or changing moles.      Past Medical History:   Patient Active Problem List   Diagnosis     Other chronic pain     Borderline personality disorder     Meralgia paresthetica of left side     Internal derangement of left knee     High blood pressure     Mixed cryoglobulinemia (H)     Anxiety     Depression     History of hepatitis C     Valsalva retinopathy - Right Eye     PVD (posterior vitreous detachment), right     PVD (posterior vitreous detachment), left eye     Senile nuclear sclerosis, bilateral     HSV (herpes simplex virus) infection     Past Medical History:   Diagnosis Date     Anemia     as a cjhild     Anxiety      Arthritis     L knee     Borderline personality disorder      Cervical cancer " (H)      Chronic pain     related to hepatitis C     Depression      Hepatitis C     genotype 1, treatment naive, with Stage 1 fibrosis, acquired via IVDU     Hypertension     treated nonpharmacologiacally     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     Nephrolithiasis     Hx of stones     Obesity      Uncomplicated asthma     from second smoke in building     Past Surgical History:   Procedure Laterality Date     BIOPSY OF SKIN LESION      liver biopsy in 2011     CHOLECYSTECTOMY       EXTRACORPOREAL SHOCK WAVE LITHOTRIPSY (ESWL)       GENITOURINARY SURGERY      litho     GYN SURGERY      hyst     HYSTERECTOMY      age 31 with cervical removal     VITRECTOMY PARSPLANA WITH 25 GAUGE SYSTEM Right 2/14/2017    Procedure: VITRECTOMY PARSPLANA WITH 25 GAUGE SYSTEM;  Surgeon: Steph Cintron MD;  Location: [facility]       Social History:  The patient is disabled.    Family History:  There is no family history of skin cancer.    Medications:  Current Outpatient Prescriptions   Medication Sig Dispense Refill     montelukast (SINGULAIR) 10 MG tablet Take 1 tablet (10 mg) by mouth At Bedtime 90 tablet 3     albuterol (PROAIR HFA/PROVENTIL HFA/VENTOLIN HFA) 108 (90 BASE) MCG/ACT Inhaler Inhale 2 puffs into the lungs every 6 hours 1 Inhaler 1     budesonide-formoterol (SYMBICORT) 80-4.5 MCG/ACT Inhaler Inhale 2 puffs into the lungs 2 times daily 1 Inhaler 1     traMADol (ULTRAM) 50 MG tablet Take 1-2 tablets ( mg) by mouth every 6 hours as needed 360 tablet 0     order for DME 1 Device by Device route daily as needed Air filtration system 1 Device 0     Estrogens, Conjugated (PREMARIN VA) Place 2 g vaginally daily       hydrochlorothiazide (HYDRODIURIL) 25 MG tablet Take 1 tablet (25 mg) by mouth daily 100 tablet 3     Lysine 1000 MG TABS        lisinopril (PRINIVIL,ZESTRIL) 30 MG tablet Take 1 tablet (30 mg) by mouth At Bedtime 100 tablet 3     Foot Care Products (GEL HEEL CUSHIONS WOMENS) PADS 1  Device daily 1 Device 0     LORazepam (ATIVAN) 2 MG tablet Take 2 mg by mouth At Bedtime 2 tablets at night       traZODone (DESYREL) 50 MG tablet Take 1 mg by mouth At Bedtime        cetirizine (ZYRTEC) 10 MG tablet Take 10 mg by mouth daily.       guaiFENesin 400 MG TABS Take 200 mg by mouth 2 times daily Reported on 5/5/2017       Allergies   Allergen Reactions     No Clinical Screening - See Comments      Pt states she is allergic to all antibiotics which cause a raised red rash that is hot to touch, Pt states can take Levaquin and cefaclor.      Erythromycin Rash     Keflex [Cephalexin Hcl] Rash     Penicillins Rash     Sulfa Drugs Rash     Tetracycline Rash         Review of Systems:  -Constitutional: The patient denies fatigue, fevers, chills, unintended weight loss, and night sweats.  -Skin: As above in HPI. No additional skin concerns.    Physical exam:  Vitals: There were no vitals taken for this visit.  GEN: This is a well developed, well-nourished female in no acute distress, in a pleasant mood.    SKIN: Focused examination of the left shoulder and upper extremity was performed.  - lateral left shoulder with stuck on appearing papule with overlying scale and punctate hemorrhagic crust  - anterior left shoulder with waxy brown stuck-on papule  - no other lesions of concern on areas examined.     Impression/Plan:  1. Seborrheic keratosis, inflammed: discussed benign finding of disease; given lesion is symptomatic, will plan for cryotherapy.    Cryotherapy procedure note (performed by faculty): After verbal consent and discussion of risks and benefits including but no limited to dyspigmentation/scar, blister, infection, recurrence, lesion on left lateral shoulder was(were) treated with 1-2mm freeze border for 2 cycles with liquid nitrogen. Post cryotherapy instructions were provided.     Follow-up PRN for new or changing lesions.     Staff Involved:  Scribed by Kavon Velasquez, MS3 for Dr. Yeager.      Staff  attestation:  The documentation recorded by the scribe accurately reflects the services I personally performed and the decisions I personally made.    Julio Cesar Yeager MD  Staff Dermatologist    Department of Dermatology

## 2017-05-05 NOTE — MR AVS SNAPSHOT
After Visit Summary   5/5/2017    Sarah Sanford    MRN: [id number]           Patient Information     Date Of Birth          1955        Visit Information        Provider Department      5/5/2017 5:00 PM Vj Centeno MD [facility]        Today's Diagnoses     Acute bronchitis, unspecified organism    -  1      Care Instructions    [facility] Medication Refill Request Information:  * Please contact your pharmacy regarding ANY request for medication refills.  ** [facility] Prescription Fax = [phone]  * Please allow 3 business days for routine medication refills.  * Please allow 5 business days for controlled substance medication refills.     [facility] Test Result notification information:  *You will be notified within 7-10 days of your appointment day regarding the results of your test.  If you are on MyChart you will be notified as soon as the provider has reviewed the results and signed off on them.          Follow-ups after your visit        Your next 10 appointments already scheduled     May 22, 2017  6:40 PM CDT   (Arrive by 6:25 PM)   ACUTE/CHRONIC SINGLE CONDITION with Vj Centeno MD   [facility] ([facility])    [address] [place] [id number] [phone]            Jun 07, 2017  3:45 PM CDT   (Arrive by 3:30 PM)   Return Visit with Vj Centeno MD   [facility] ([facility])    [address] [place] [id number] [phone]            Aug 02, 2017  1:00 PM CDT   RETURN RETINA with Steph Cintron MD   [facility] ([facility])    Henri Zamudio [address]  [address] [address]  [facility] [id number] [phone]              Who to contact     Please call your clinic at [phone] to:    Ask questions about your health    Make or cancel  appointments    Discuss your medicines    Learn about your test results    Speak to your doctor   If you have compliments or concerns about an experience at your clinic, or if you wish to file a complaint, please contact [facility] Patient Relations at [phone] or email us at [email or web address]         Additional Information About Your Visit        MyChart Information     [employer] gives you secure access to your electronic health record. If you see a primary care provider, you can also send messages to your care team and make appointments. If you have questions, please call your primary care clinic.  If you do not have a primary care provider, please call [phone] and they will assist you.      [employer] is an electronic gateway that provides easy, online access to your medical records. With [employer], you can request a clinic appointment, read your test results, renew a prescription or communicate with your care team.     To access your existing account, please contact your [facility] or call [phone] for assistance.        Care EveryWhere ID     This is your Care EveryWhere ID. This could be used by other organizations to access your [facility] medical records  [id number]        Your Vitals Were     Pulse Respirations Pulse Oximetry             62 16 98%          Blood Pressure from Last 3 Encounters:   05/05/17 140/86   05/01/17 157/83   04/21/17 124/81    Weight from Last 3 Encounters:   05/05/17 73 kg (161 lb)   05/01/17 73 kg (161 lb)   04/21/17 75.6 kg (166 lb 9.6 oz)              Today, you had the following     No orders found for display       Primary Care Provider Office Phone # Fax #    Vj Centeno [phone]        [facility] [address] [facility] [address]  [facility] [address]        Thank you!     Thank you for choosing [facility]  for your care. Our goal is always to provide you  with excellent care. Hearing back from our patients is one way we can continue to improve our services. Please take a few minutes to complete the written survey that you may receive in the mail after your visit with us. Thank you!             Your Updated Medication List - Protect others around you: Learn how to safely use, store and throw away your medicines at www.disposemymeds.org.          This list is accurate as of: 5/5/17 11:59 PM.  Always use your most recent med list.                   Brand Name Dispense Instructions for use    albuterol 108 (90 BASE) MCG/ACT Inhaler    PROAIR HFA/PROVENTIL HFA/VENTOLIN HFA    1 Inhaler    Inhale 2 puffs into the lungs every 6 hours       ATIVAN 2 MG tablet   Generic drug:  LORazepam      Take 2 mg by mouth At Bedtime 2 tablets at night       budesonide-formoterol 80-4.5 MCG/ACT Inhaler    SYMBICORT    1 Inhaler    Inhale 2 puffs into the lungs 2 times daily       cetirizine 10 MG tablet    zyrTEC     Take 10 mg by mouth daily.       Gel Heel Cushions Womens Pads     1 Device    1 Device daily       guaiFENesin 400 MG Tabs      Take 200 mg by mouth 2 times daily Reported on 5/5/2017       hydrochlorothiazide 25 MG tablet    HYDRODIURIL    100 tablet    Take 1 tablet (25 mg) by mouth daily       lisinopril 30 MG tablet    PRINIVIL,ZESTRIL    100 tablet    Take 1 tablet (30 mg) by mouth At Bedtime       Lysine 1000 MG Tabs          montelukast 10 MG tablet    SINGULAIR    90 tablet    Take 1 tablet (10 mg) by mouth At Bedtime       order for DME     1 Device    1 Device by Device route daily as needed Air filtration system       PREMARIN VA      Place 2 g vaginally daily       traMADol 50 MG tablet    ULTRAM    360 tablet    Take 1-2 tablets ( mg) by mouth every 6 hours as needed       traZODone 50 MG tablet    DESYREL     Take 1 mg by mouth At Bedtime

## 2017-05-05 NOTE — NURSING NOTE
Chief Complaint   Patient presents with     Breathing Problem     Pt states she has been having issues breathing and wanted to see her PCP regarding this.      Candis Nails LPN May 5, 2017 4:41 PM

## 2017-05-05 NOTE — NURSING NOTE
"Dermatology Rooming Note    Sarah Sanford's goals for this visit include:   Chief Complaint   Patient presents with     Derm Problem     Sarha is here today for concerns with a growth on her left shoulder. States it looked like it was growing, but accidently scratched part of it off Wednesday night, and the rest of it came off when she was cleaning it. States It looked \"like a mushroom cloud.\"         Luna Booker, SAKINA  "

## 2017-05-05 NOTE — PROGRESS NOTES
This 61-year-old woman has a history of left knee pain. She has a left knee effusion. She states that she had terrible headaches the day after her last steroid shot. It did seem to help her knee a little bit though.She recently has had a successful I surgery and then also is complaining of coughing in her building which she attributes to environmental contaminants. I spent 10 minutes of time talking with the patient about her condition and treatment. This was more than half of her visit.    This patient is in her usual mood and affect today. There is no erythema in the knee. She is moving it freely. It does appear more swollen than the right side.She is able to ambulate comfortably without assistance.    I reviewed her old x-rays and MRI findings. She does have a small cartilage tear in the Left knee.    This patient has a very mild arthritis in the left knee. She has gotten some benefit from steroid shots but has encountered some side effects  And does not wish to continue them. I think she would be a good candidate for  Visco supplementation given the mild nature of her arthritis.

## 2017-05-05 NOTE — PATIENT INSTRUCTIONS
Primary Care Center Medication Refill Request Information:  * Please contact your pharmacy regarding ANY request for medication refills.  ** [facility] Prescription Fax = [phone]  * Please allow 3 business days for routine medication refills.  * Please allow 5 business days for controlled substance medication refills.     Primary Care Center Test Result notification information:  *You will be notified within 7-10 days of your appointment day regarding the results of your test.  If you are on MyChart you will be notified as soon as the provider has reviewed the results and signed off on them.

## 2017-05-05 NOTE — LETTER
"5/5/2017       RE: Sarah Sanford  [address] [place] [address]     Dear Colleague,    Thank you for referring your patient, Sarah Sanford, to the [facility] DERMATOLOGY at [facility]. Please see a copy of my visit note below.    [facility] Dermatology Note      Dermatology Problem List:  1. Cherry angioma - right medial calf  -s/p Electrocautery 9/21/16  2. EIC - right breast  -s/p excision 1/31/17  3. Seborrheic keratosis  -s/p cryotherapy 5/5/17      Encounter Date: May 5, 2017    CC:  Chief Complaint   Patient presents with     Derm Problem     Sarah is here today for concerns with a growth on her left shoulder. States it looked like it was growing, but accidently scratched part of it off Wednesday night, and the rest of it came off when she was cleaning it. States It looked \"like a mushroom cloud.\"           History of Present Illness:  Ms. Sarah Sanford is a 61 year old female who presents for evaluation of a concerning lesions on her left shoulder. She noticed a couple of weeks ago that a flat brown macule on her shouler slowly developed an oily, brain-like outgrowth at which point she called for an appointment. Patient states she accidentally scratched off a piece of it two days ago and then washed it right off while cleaning off some blood. The spot has not bled since and does not cause her any other discomfort. Otherwise, the patient reports no painful, bleeding, nonhealing, or pruritic lesions, and denies new or changing moles.      Past Medical History:   Patient Active Problem List   Diagnosis     Other chronic pain     Borderline personality disorder     Meralgia paresthetica of left side     Internal derangement of left knee     High blood pressure     Mixed cryoglobulinemia (H)     Anxiety     Depression     History of hepatitis C     Valsalva retinopathy - Right Eye     PVD (posterior vitreous detachment), right     PVD (posterior " vitreous detachment), left eye     Senile nuclear sclerosis, bilateral     HSV (herpes simplex virus) infection     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     Nephrolithiasis     Hx of stones     Obesity      Uncomplicated asthma     from second smoke in building     Past Surgical History:   Procedure Laterality Date     BIOPSY OF SKIN LESION      liver biopsy in 2011     CHOLECYSTECTOMY       EXTRACORPOREAL SHOCK WAVE LITHOTRIPSY (ESWL)       GENITOURINARY SURGERY      litho     GYN SURGERY      hyst     HYSTERECTOMY      age 31 with cervical removal     VITRECTOMY PARSPLANA WITH 25 GAUGE SYSTEM Right 2/14/2017    Procedure: VITRECTOMY PARSPLANA WITH 25 GAUGE SYSTEM;  Surgeon: Steph Cintron MD;  Location: [facility]       Social History:  The patient is disabled.    Family History:  There is no family history of skin cancer.    Medications:  Current Outpatient Prescriptions   Medication Sig Dispense Refill     montelukast (SINGULAIR) 10 MG tablet Take 1 tablet (10 mg) by mouth At Bedtime 90 tablet 3     albuterol (PROAIR HFA/PROVENTIL HFA/VENTOLIN HFA) 108 (90 BASE) MCG/ACT Inhaler Inhale 2 puffs into the lungs every 6 hours 1 Inhaler 1     budesonide-formoterol (SYMBICORT) 80-4.5 MCG/ACT Inhaler Inhale 2 puffs into the lungs 2 times daily 1 Inhaler 1     traMADol (ULTRAM) 50 MG tablet Take 1-2 tablets ( mg) by mouth every 6 hours as needed 360 tablet 0     order for DME 1 Device by Device route daily as needed Air filtration system 1 Device 0     Estrogens, Conjugated (PREMARIN VA) Place 2 g vaginally daily        hydrochlorothiazide (HYDRODIURIL) 25 MG tablet Take 1 tablet (25 mg) by mouth daily 100 tablet 3     Lysine 1000 MG TABS        lisinopril (PRINIVIL,ZESTRIL) 30 MG tablet Take 1 tablet (30 mg) by mouth At Bedtime 100 tablet 3     Foot Care Products (GEL HEEL CUSHIONS WOMENS) PADS 1 Device daily 1 Device 0     LORazepam (ATIVAN) 2 MG tablet Take 2 mg by mouth At Bedtime 2 tablets at night       traZODone (DESYREL) 50 MG tablet Take 1 mg by mouth At Bedtime        cetirizine (ZYRTEC) 10 MG tablet Take 10 mg by mouth daily.       guaiFENesin 400 MG TABS Take 200 mg by mouth 2 times daily Reported on 5/5/2017       Allergies   Allergen Reactions     No Clinical Screening - See Comments      Pt states she is allergic to all antibiotics which cause a raised red rash that is hot to touch, Pt states can take Levaquin and cefaclor.      Erythromycin Rash     Keflex [Cephalexin Hcl] Rash     Penicillins Rash     Sulfa Drugs Rash     Tetracycline Rash         Review of Systems:  -Constitutional: The patient denies fatigue, fevers, chills, unintended weight loss, and night sweats.  -Skin: As above in HPI. No additional skin concerns.    Physical exam:  Vitals: There were no vitals taken for this visit.  GEN: This is a well developed, well-nourished female in no acute distress, in a pleasant mood.    SKIN: Focused examination of the left shoulder and upper extremity was performed.  - lateral left shoulder with stuck on appearing papule with overlying scale and punctate hemorrhagic crust  - anterior left shoulder with waxy brown stuck-on papule  - no other lesions of concern on areas examined.     Impression/Plan:  1. Seborrheic keratosis, inflammed: discussed benign finding of disease; given lesion is symptomatic, will plan for cryotherapy.    Cryotherapy procedure note (performed by faculty): After verbal consent and discussion of risks and benefits including but no limited to dyspigmentation/scar, blister, infection,  recurrence, lesion on left lateral shoulder was(were) treated with 1-2mm freeze border for 2 cycles with liquid nitrogen. Post cryotherapy instructions were provided.     Follow-up PRN for new or changing lesions.     Staff Involved:  Scribed by Kavon Velasquez, MS3 for Dr. Yeager.      Staff attestation:  The documentation recorded by the scribe accurately reflects the services I personally performed and the decisions I personally made.    Julio Cesar Yeager MD  Staff Dermatologist    Department of Dermatology

## 2017-05-05 NOTE — NURSING NOTE
"Reason For Visit:   Chief Complaint   Patient presents with     RECHECK     States she is here because her left knee is hurting. She wants a shot but refuses to have a steroid injection and states she is not ready for surgery yet.        Primary MD: Vj Centeno      Ht 1.651 m (5' 5\")  Wt 73 kg (161 lb)  BMI 26.79 kg/m2    Pain Assessment  Patient Currently in Pain: Denies         Current Outpatient Prescriptions   Medication     guaiFENesin 400 MG TABS     montelukast (SINGULAIR) 10 MG tablet     albuterol (PROAIR HFA/PROVENTIL HFA/VENTOLIN HFA) 108 (90 BASE) MCG/ACT Inhaler     budesonide-formoterol (SYMBICORT) 80-4.5 MCG/ACT Inhaler     traMADol (ULTRAM) 50 MG tablet     order for DME     Estrogens, Conjugated (PREMARIN VA)     hydrochlorothiazide (HYDRODIURIL) 25 MG tablet     Lysine 1000 MG TABS     lisinopril (PRINIVIL,ZESTRIL) 30 MG tablet     Foot Care Products (GEL HEEL CUSHIONS WOMENS) PADS     LORazepam (ATIVAN) 2 MG tablet     traZODone (DESYREL) 50 MG tablet     cetirizine (ZYRTEC) 10 MG tablet     No current facility-administered medications for this visit.           Allergies   Allergen Reactions     No Clinical Screening - See Comments      Pt states she is allergic to all antibiotics which cause a raised red rash that is hot to touch, Pt states can take Levaquin and cefaclor.      Erythromycin Rash     Keflex [Cephalexin Hcl] Rash     Penicillins Rash     Sulfa Drugs Rash     Tetracycline Rash     "

## 2017-05-05 NOTE — MR AVS SNAPSHOT
After Visit Summary   5/5/2017    Sarah Sanford    MRN: [id number]           Patient Information     Date Of Birth          1955        Visit Information        Provider Department      5/5/2017 3:30 PM Julio Cesar Yeager MD [facility] Dermatology        Today's Diagnoses     Inflamed seborrheic keratosis    -  1       Follow-ups after your visit        Your next 10 appointments already scheduled     Jul 17, 2017  1:00 PM CDT   Annual Visit with Apoorva Ayon MD   [facility] ([facility])    [address]  [address]  [facility] [address] [phone]            Aug 02, 2017  1:00 PM CDT   RETURN RETINA with Steph Cintron MD   Eye Clinic ([facility])    [address]  [address]  [address]  [facility] [id number] [phone]            Aug 28, 2017  2:45 PM CDT   LAB with  LAB   [facility] ([facility])    [address]  1st Floor  [facility] [address] [phone]           Patient must bring picture ID.  Patient should be prepared to give a urine specimen  Please do not eat 10-12 hours before your appointment if you are coming in fasting for labs on lipids, cholesterol, or glucose (sugar).  Pregnant women should follow their Care Team instructions. Water with medications is okay. Do not drink coffee or other fluids.   If you have concerns about taking  your medications, please ask at office or if scheduling via [employer], send a message by clicking on Secure Messaging, Message Your Care Team.            Aug 28, 2017  3:40 PM CDT   (Arrive by 3:25 PM)   Return Visit with Vj Centeno MD   [facility] ([email or web address] [place])    [address]  4th Floor  [facility] [address] [phone]              Who to contact     Please call your clinic at [phone] to:    Ask questions  about your health    Make or cancel appointments    Discuss your medicines    Learn about your test results    Speak to your doctor   If you have compliments or concerns about an experience at your clinic, or if you wish to file a complaint, please contact [facility] Patient Relations at [phone] or email us at [email or web address]         Additional Information About Your Visit        MyChart Information     [employer] gives you secure access to your electronic health record. If you see a primary care provider, you can also send messages to your care team and make appointments. If you have questions, please call your primary care clinic.  If you do not have a primary care provider, please call [phone] and they will assist you.      [employer] [facility] [employer] is an electronic gateway that provides easy, online access to your medical records. With [employer], you can request a clinic appointment, read your test results, renew a prescription or communicate with your care team.     To access your existing account, please contact your [facility] or call [phone] for assistance.        Care EveryWhere ID     This is your Care EveryWhere ID. This could be used by other organizations to access your [place] medical records  [id number]         Blood Pressure from Last 3 Encounters:   06/07/17 115/79   05/22/17 128/80   05/05/17 140/86    Weight from Last 3 Encounters:   06/07/17 72.6 kg (160 lb)   05/22/17 75.1 kg (165 lb 8 oz)   05/05/17 73 kg (161 lb)              We Performed the Following     DESTRUCT BENIGN LESION, UP TO 14        Primary Care Provider Office Phone # Fax #    Vj Centeno [phone]        [facility] [address]  [facility] [address]        Thank you!     Thank you for choosing [facility] DERMATOLOGY  for your care. Our goal is always to provide you with excellent care. Hearing back from our patients is  one way we can continue to improve our services. Please take a few minutes to complete the written survey that you may receive in the mail after your visit with us. Thank you!             Your Updated Medication List - Protect others around you: Learn how to safely use, store and throw away your medicines at www.disposemymeds.org.          This list is accurate as of: 5/5/17 11:59 PM.  Always use your most recent med list.                   Brand Name Dispense Instructions for use    albuterol 108 (90 BASE) MCG/ACT Inhaler    PROAIR HFA/PROVENTIL HFA/VENTOLIN HFA    1 Inhaler    Inhale 2 puffs into the lungs every 6 hours       ATIVAN 2 MG tablet   Generic drug:  LORazepam      Take 2 mg by mouth At Bedtime 2 tablets at night       budesonide-formoterol 80-4.5 MCG/ACT Inhaler    SYMBICORT    1 Inhaler    Inhale 2 puffs into the lungs 2 times daily       cetirizine 10 MG tablet    zyrTEC     Take 10 mg by mouth daily.       Gel Heel Cushions Womens Pads     1 Device    1 Device daily       guaiFENesin 400 MG Tabs      Take 200 mg by mouth 2 times daily Reported on 5/5/2017       hydrochlorothiazide 25 MG tablet    HYDRODIURIL    100 tablet    Take 1 tablet (25 mg) by mouth daily       Lysine 1000 MG Tabs          montelukast 10 MG tablet    SINGULAIR    90 tablet    Take 1 tablet (10 mg) by mouth At Bedtime       order for DME     1 Device    1 Device by Device route daily as needed Air filtration system       PREMARIN VA      Place 2 g vaginally daily       traZODone 50 MG tablet    DESYREL     Take 1 mg by mouth At Bedtime

## 2017-05-05 NOTE — MR AVS SNAPSHOT
After Visit Summary   5/5/2017    Sarah Sanford    MRN: [id number]           Patient Information     Date Of Birth          1955        Visit Information        Provider Department      5/5/2017 2:30 PM César Lechuga MD [facility]        Today's Diagnoses     Internal derangement of left knee    -  1       Follow-ups after your visit        Your next 10 appointments already scheduled     May 05, 2017  2:30 PM CDT   (Arrive by 2:15 PM)   RETURN KNEE with César Lechuga MD   [facility] ([facility] [place])    [address]  [address] [facility] [address]   [phone]            May 05, 2017  3:30 PM CDT   (Arrive by 3:15 PM)   Return Visit with Julio Cesar Yeager MD   [facility] Dermat[facility] ([facility])    [address] [place] [address]  [address] [facility] [address]   [phone]            May 05, 2017  5:00 PM CDT   (Arrive by 4:45 PM)   ACUTE/CHRONIC SINGLE CONDITION with Vj Centeno MD   [facility] ([facility])    [address] [place] [address]  [address] [facility] [address]   [phone]            May 22, 2017  6:40 PM CDT   (Arrive by 6:25 PM)   ACUTE/CHRONIC SINGLE CONDITION with Vj Centeno MD   [facility] ([facility])    [address] [place] [address]  [address] [facility] [address]   [phone]            Jun 07, 2017  3:45 PM CDT   (Arrive by 3:30 PM)   Return Visit with Vj Centeno MD   [facility] ([facility])    [address] [place] [address]  [address] [facility] [address]   [phone]            Aug 02, 2017  1:00 PM CDT   RETURN RETINA with Steph Cintron MD   Eye Clinic ([facility])    [address]  [address]  [address]a  [facility] [address]   [phone]             "  Who to contact     Please call your clinic at [phone] to:    Ask questions about your health    Make or cancel appointments    Discuss your medicines    Learn about your test results    Speak to your doctor   If you have compliments or concerns about an experience at your clinic, or if you wish to file a complaint, please contact [facility] Patient Relations at [phone] or email us at [email or web address]         Additional Information About Your Visit        [employer] Information     [employer] gives you secure access to your electronic health record. If you see a primary care provider, you can also send messages to your care team and make appointments. If you have questions, please call your primary care clinic.  If you do not have a primary care provider, please call [phone] and they will assist you.      [employer] is an electronic gateway that provides easy, online access to your medical records. With [employer], you can request a clinic appointment, read your test results, renew a prescription or communicate with your care team.     To access your existing account, please contact your [facility] or call [phone] for assistance.        Care EveryWhere ID     This is your Care EveryWhere ID. This could be used by other organizations to access your [place] medical records  [id number]        Your Vitals Were     Height BMI (Body Mass Index)                1.651 m (5' 5\") 26.79 kg/m2           Blood Pressure from Last 3 Encounters:   05/01/17 157/83   04/21/17 124/81   03/06/17 127/85    Weight from Last 3 Encounters:   05/05/17 73 kg (161 lb)   05/01/17 73 kg (161 lb)   04/21/17 75.6 kg (166 lb 9.6 oz)              Today, you had the following     No orders found for display       Primary Care Provider Office Phone # Fax #    Vj Centeno [phone]        [facility] [address] [place] [address] " [place] [address]        Thank you!     Thank you for choosing [facility] ORTHOP[facility]INIC  for your care. Our goal is always to provide you with excellent care. Hearing back from our patients is one way we can continue to improve our services. Please take a few minutes to complete the written survey that you may receive in the mail after your visit with us. Thank you!             Your Updated Medication List - Protect others around you: Learn how to safely use, store and throw away your medicines at www.disposemymeds.org.          This list is accurate as of: 5/5/17  1:50 PM.  Always use your most recent med list.                   Brand Name Dispense Instructions for use    albuterol 108 (90 BASE) MCG/ACT Inhaler    PROAIR HFA/PROVENTIL HFA/VENTOLIN HFA    1 Inhaler    Inhale 2 puffs into the lungs every 6 hours       ATIVAN 2 MG tablet   Generic drug:  LORazepam      Take 2 mg by mouth At Bedtime 2 tablets at night       budesonide-formoterol 80-4.5 MCG/ACT Inhaler    SYMBICORT    1 Inhaler    Inhale 2 puffs into the lungs 2 times daily       cetirizine 10 MG tablet    zyrTEC     Take 10 mg by mouth daily.       Gel Heel Cushions Womens Pads     1 Device    1 Device daily       guaiFENesin 400 MG Tabs      Take 200 mg by mouth 2 times daily       hydrochlorothiazide 25 MG tablet    HYDRODIURIL    100 tablet    Take 1 tablet (25 mg) by mouth daily       lisinopril 30 MG tablet    PRINIVIL,ZESTRIL    100 tablet    Take 1 tablet (30 mg) by mouth At Bedtime       Lysine 1000 MG Tabs          montelukast 10 MG tablet    SINGULAIR    90 tablet    Take 1 tablet (10 mg) by mouth At Bedtime       order for DME     1 Device    1 Device by Device route daily as needed Air filtration system       PREMARIN VA      Place 2 g vaginally daily       traMADol 50 MG tablet    ULTRAM    360 tablet    Take 1-2 tablets ( mg) by mouth every 6 hours as needed       traZODone 50 MG tablet    DESYREL     Take 1 mg by mouth At Bedtime

## 2017-05-05 NOTE — LETTER
5/5/2017     RE: Sarah Sanford  [address] [place] [address]     Dear Colleague,    Thank you for referring your patient, Sarah Sanford, to the [facility] ORTHOPAEDIC [facility]IC at [facility]. Please see a copy of my visit note below.    This 61-year-old woman has a history of left knee pain. She has a left knee effusion. She states that she had terrible headaches the day after her last steroid shot. It did seem to help her knee a little bit though.She recently has had a successful I surgery and then also is complaining of coughing in her building which she attributes to environmental contaminants. I spent 10 minutes of time talking with the patient about her condition and treatment. This was more than half of her visit.    This patient is in her usual mood and affect today. There is no erythema in the knee. She is moving it freely. It does appear more swollen than the right side.She is able to ambulate comfortably without assistance.    I reviewed her old x-rays and MRI findings. She does have a small cartilage tear in the Left knee.    This patient has a very mild arthritis in the left knee. She has gotten some benefit from steroid shots but has encountered some side effects  And does not wish to continue them. I think she would be a good candidate for  Visco supplementation given the mild nature of her arthritis.    Again, thank you for allowing me to participate in the care of your patient.      Sincerely,    César Lechuga MD

## 2017-05-11 DIAGNOSIS — M17.12 PRIMARY OSTEOARTHRITIS OF LEFT KNEE: Primary | ICD-10-CM

## 2017-05-15 ENCOUNTER — TELEPHONE (OUTPATIENT)
Dept: ORTHOPEDICS | Facility: CLINIC | Age: 62
End: 2017-05-15

## 2017-05-15 NOTE — TELEPHONE ENCOUNTER
RN returning call to Karin.  We have gone thru the process for obtaining approval for the Synvisc injection for her. She will receive an email with amount. She states that she never receives a bill, she knows that she won't pay anything.   She states that she is going to [facility] on Thursday for this.

## 2017-05-18 ENCOUNTER — TRANSFERRED RECORDS (OUTPATIENT)
Dept: HEALTH INFORMATION MANAGEMENT | Facility: CLINIC | Age: 62
End: 2017-05-18

## 2017-05-22 ENCOUNTER — OFFICE VISIT (OUTPATIENT)
Dept: INTERNAL MEDICINE | Facility: CLINIC | Age: 62
End: 2017-05-22

## 2017-05-22 VITALS
DIASTOLIC BLOOD PRESSURE: 80 MMHG | SYSTOLIC BLOOD PRESSURE: 128 MMHG | BODY MASS INDEX: 27.54 KG/M2 | HEART RATE: 75 BPM | WEIGHT: 165.5 LBS

## 2017-05-22 DIAGNOSIS — M89.9 DISORDER OF BONE AND CARTILAGE: ICD-10-CM

## 2017-05-22 DIAGNOSIS — R53.83 FATIGUE, UNSPECIFIED TYPE: ICD-10-CM

## 2017-05-22 DIAGNOSIS — M94.9 DISORDER OF BONE AND CARTILAGE: ICD-10-CM

## 2017-05-22 DIAGNOSIS — B00.1 RECURRENT COLD SORES: Primary | ICD-10-CM

## 2017-05-22 LAB
T4 FREE SERPL-MCNC: 0.7 NG/DL (ref 0.76–1.46)
TSH SERPL DL<=0.005 MIU/L-ACNC: 4.31 MU/L (ref 0.4–4)

## 2017-05-22 PROCEDURE — 82306 VITAMIN D 25 HYDROXY: CPT | Performed by: INTERNAL MEDICINE

## 2017-05-22 RX ORDER — PENCICLOVIR 10 MG/G
CREAM TOPICAL
Qty: 1.5 G | Refills: 3 | Status: SHIPPED | OUTPATIENT
Start: 2017-05-22 | End: 2017-09-20

## 2017-05-22 ASSESSMENT — PAIN SCALES - GENERAL: PAINLEVEL: NO PAIN (0)

## 2017-05-22 NOTE — MR AVS SNAPSHOT
After Visit Summary   5/22/2017    Sarah Sanford    MRN: [id number]           Patient Information     Date Of Birth          1955        Visit Information        Provider Department      5/22/2017 6:40 PM Vj Centeno MD [facility]        Today's Diagnoses     Recurrent cold sores    -  1    Fatigue, unspecified type        Disorder of bone and cartilage          Care Instructions    Primary Care Center Medication Refill Request Information:  * Please contact your pharmacy regarding ANY request for medication refills.  ** [facility] Prescription Fax = [phone]  * Please allow 3 business days for routine medication refills.  * Please allow 5 business days for controlled substance medication refills.     Primary Care Center Test Result notification information:  *You will be notified with in 7-10 days of your appointment day regarding the results of your test.  If you are on MyChart you will be notified as soon as the provider has reviewed the results and signed off on them.    Primary Care Center [phone]           Follow-ups after your visit        Your next 10 appointments already scheduled     May 22, 2017  7:00 PM CDT   LAB with [facility] ([facility])    [address] [place] [address] [phone]           Patient must bring picture ID.  Patient should be prepared to give a urine specimen  Please do not eat 10-12 hours before your appointment if you are coming in fasting for labs on lipids, cholesterol, or glucose (sugar).  Pregnant women should follow their Care Team instructions. Water with medications is okay. Do not drink coffee or other fluids.   If you have concerns about taking  your medications, please ask at office or if scheduling via PointAcross, send a message by clicking on Secure Messaging, Message Your Care Team.            Jun 07, 2017  3:45 PM CDT   (Arrive by 3:30 PM)   Return  Visit with Vj Centeno MD   [facility] ([facility])    [address]  4th Floor  [facility] [id number] [phone]            Jul 17, 2017  1:00 PM CDT   Annual Visit with Apoorva Ayon MD   [facility] ([facility])    [facility] [address]  [address]  [facility] [id number] [phone]            Aug 02, 2017  1:00 PM CDT   RETURN RETINA with Steph Cintron MD   Eye Clinic ([facility])    [address]  [address]  9th [address]a  [facility] [id number] [phone]              Future tests that were ordered for you today     Open Future Orders        Priority Expected Expires Ordered    TSH with free T4 reflex Routine  5/22/2018 5/22/2017    Vitamin D Deficiency Routine  5/22/2018 5/22/2017            Who to contact     Please call your clinic at [phone] to:    Ask questions about your health    Make or cancel appointments    Discuss your medicines    Learn about your test results    Speak to your doctor   If you have compliments or concerns about an experience at your clinic, or if you wish to file a complaint, please contact [facility] Patient Relations at [phone] or email us at [email or web address]         Additional Information About Your Visit        [employer] Network[employer] Information     [employer] gives you secure access to your electronic health record. If you see a primary care provider, you can also send messages to your care team and make appointments. If you have questions, please call your primary care clinic.  If you do not have a primary care provider, please call [phone] and they will assist you.      Itugo is an electronic gateway that provides easy, online access to your medical records. With Itugo, you can request a clinic appointment, read your test results, renew a prescription  or communicate with your care team.     To access your existing account, please contact your [facility] or call [phone] for assistance.        Care EveryWhere ID     This is your Care EveryWhere ID. This could be used by other organizations to access your [place] medical records  [id number]        Your Vitals Were     Pulse BMI (Body Mass Index)                75 27.54 kg/m2           Blood Pressure from Last 3 Encounters:   05/22/17 128/80   05/05/17 140/86   05/01/17 157/83    Weight from Last 3 Encounters:   05/22/17 75.1 kg (165 lb 8 oz)   05/05/17 73 kg (161 lb)   05/01/17 73 kg (161 lb)                 Today's Medication Changes          These changes are accurate as of: 5/22/17  6:57 PM.  If you have any questions, ask your nurse or doctor.               Start taking these medicines.        Dose/Directions    penciclovir 1 % cream   Commonly known as:  DENAVIR   Used for:  Recurrent cold sores   Started by:  Vj Centeno MD        Apply cream at the first sign or symptom of cold sore (eg, tingling, swelling); apply every 2 hours during waking hours for 4 days.   Quantity:  1.5 g   Refills:  3         Stop taking these medicines if you haven't already. Please contact your care team if you have questions.     guaiFENesin 400 MG Tabs   Stopped by:  Vj Centeno MD           montelukast 10 MG tablet   Commonly known as:  SINGULAIR   Stopped by:  Vj Centeno MD                Where to get your medicines      These medications were sent to [facility] [place], [place] - [address]  [address] [address]    Hours:  TRANSPLANT PHONE NUMBER [phone] Phone:  [phone]     penciclovir 1 % cream                Primary Care Provider Office Phone # Fax #    Vj Centeno [phone]        PHYSICIANS [address] [place] [address] SE   [facility]  [address]        Thank you!     Thank you for choosing [facility]  for your care. Our goal is always to provide you with excellent care. Hearing back from our patients is one way we can continue to improve our services. Please take a few minutes to complete the written survey that you may receive in the mail after your visit with us. Thank you!             Your Updated Medication List - Protect others around you: Learn how to safely use, store and throw away your medicines at www.disposemymeds.org.          This list is accurate as of: 5/22/17  6:57 PM.  Always use your most recent med list.                   Brand Name Dispense Instructions for use    albuterol 108 (90 BASE) MCG/ACT Inhaler    PROAIR HFA/PROVENTIL HFA/VENTOLIN HFA    1 Inhaler    Inhale 2 puffs into the lungs every 6 hours       ATIVAN 2 MG tablet   Generic drug:  LORazepam      Take 2 mg by mouth At Bedtime 2 tablets at night       budesonide-formoterol 80-4.5 MCG/ACT Inhaler    SYMBICORT    1 Inhaler    Inhale 2 puffs into the lungs 2 times daily       cetirizine 10 MG tablet    zyrTEC     Take 10 mg by mouth daily.       Gel Heel Cushions Womens Pads     1 Device    1 Device daily       hydrochlorothiazide 25 MG tablet    HYDRODIURIL    100 tablet    Take 1 tablet (25 mg) by mouth daily       lisinopril 30 MG tablet    PRINIVIL,ZESTRIL    100 tablet    Take 1 tablet (30 mg) by mouth At Bedtime       Lysine 1000 MG Tabs          order for DME     1 Device    1 Device by Device route daily as needed Air filtration system       penciclovir 1 % cream    DENAVIR    1.5 g    Apply cream at the first sign or symptom of cold sore (eg, tingling, swelling); apply every 2 hours during waking hours for 4 days.       PREMARIN VA      Place 2 g vaginally daily       traMADol 50 MG tablet    ULTRAM    360 tablet    Take 1-2 tablets ( mg) by mouth every 6 hours as needed       traZODone 50 MG tablet    DESYREL     Take 1 mg by mouth At  Bedtime

## 2017-05-22 NOTE — PATIENT INSTRUCTIONS
[facility] Medication Refill Request Information:  * Please contact your pharmacy regarding ANY request for medication refills.  ** [facility] Prescription Fax = [phone]  * Please allow 3 business days for routine medication refills.  * Please allow 5 business days for controlled substance medication refills.     [facility] Test Result notification information:  *You will be notified with in 7-10 days of your appointment day regarding the results of your test.  If you are on MyChart you will be notified as soon as the provider has reviewed the results and signed off on them.    [facility] [phone]

## 2017-05-23 DIAGNOSIS — E03.9 HYPOTHYROIDISM, UNSPECIFIED TYPE: Primary | ICD-10-CM

## 2017-05-23 LAB — DEPRECATED CALCIDIOL+CALCIFEROL SERPL-MC: 40 UG/L (ref 20–75)

## 2017-05-23 RX ORDER — LEVOTHYROXINE SODIUM 50 UG/1
CAPSULE ORAL
Qty: 100 CAPSULE | Refills: 3 | Status: SHIPPED | OUTPATIENT
Start: 2017-05-23 | End: 2017-09-20
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
## 2017-06-07 ENCOUNTER — OFFICE VISIT (OUTPATIENT)
Dept: INTERNAL MEDICINE | Facility: CLINIC | Age: 62
End: 2017-06-07

## 2017-06-07 VITALS
WEIGHT: 160 LBS | SYSTOLIC BLOOD PRESSURE: 115 MMHG | HEART RATE: 64 BPM | RESPIRATION RATE: 16 BRPM | DIASTOLIC BLOOD PRESSURE: 79 MMHG | BODY MASS INDEX: 26.63 KG/M2

## 2017-06-07 DIAGNOSIS — I10 BENIGN ESSENTIAL HYPERTENSION: Primary | ICD-10-CM

## 2017-06-07 DIAGNOSIS — Z86.19 HISTORY OF HEPATITIS C: ICD-10-CM

## 2017-06-07 DIAGNOSIS — G89.29 OTHER CHRONIC PAIN: ICD-10-CM

## 2017-06-07 DIAGNOSIS — E03.9 HYPOTHYROIDISM, UNSPECIFIED TYPE: ICD-10-CM

## 2017-06-07 DIAGNOSIS — D89.1 MIXED CRYOGLOBULINEMIA (H): ICD-10-CM

## 2017-06-07 DIAGNOSIS — R35.0 URINARY FREQUENCY: ICD-10-CM

## 2017-06-07 LAB
ALBUMIN UR-MCNC: NEGATIVE MG/DL
APPEARANCE UR: CLEAR
BACTERIA #/AREA URNS HPF: ABNORMAL /HPF
BILIRUB UR QL STRIP: NEGATIVE
COLOR UR AUTO: ABNORMAL
GLUCOSE UR STRIP-MCNC: NEGATIVE MG/DL
HGB UR QL STRIP: NEGATIVE
KETONES UR STRIP-MCNC: NEGATIVE MG/DL
LEUKOCYTE ESTERASE UR QL STRIP: NEGATIVE
MUCOUS THREADS #/AREA URNS LPF: PRESENT /LPF
NITRATE UR QL: NEGATIVE
PH UR STRIP: 7 PH (ref 5–7)
RBC #/AREA URNS AUTO: <1 /HPF (ref 0–2)
SP GR UR STRIP: 1 (ref 1–1.03)
SQUAMOUS #/AREA URNS AUTO: <1 /HPF (ref 0–1)
URN SPEC COLLECT METH UR: ABNORMAL
UROBILINOGEN UR STRIP-MCNC: 0 MG/DL (ref 0–2)
WBC #/AREA URNS AUTO: <1 /HPF (ref 0–2)

## 2017-06-07 RX ORDER — LISINOPRIL 30 MG/1
30 TABLET ORAL AT BEDTIME
Qty: 100 TABLET | Refills: 3 | Status: SHIPPED | OUTPATIENT
Start: 2017-06-07 | End: 2018-07-13

## 2017-06-07 RX ORDER — TRAMADOL HYDROCHLORIDE 50 MG/1
50-100 TABLET ORAL EVERY 6 HOURS PRN
Qty: 360 TABLET | Refills: 0 | Status: SHIPPED | OUTPATIENT
Start: 2017-06-07 | End: 2017-08-28

## 2017-06-07 ASSESSMENT — PAIN SCALES - GENERAL: PAINLEVEL: NO PAIN (0)

## 2017-06-07 NOTE — MR AVS SNAPSHOT
After Visit Summary   6/7/2017    Sarah Sanford    MRN: [id number]           Patient Information     Date Of Birth          1955        Visit Information        Provider Department      6/7/2017 3:45 PM Vj Centeno MD [facility]        Today's Diagnoses     Benign essential hypertension    -  1    Other chronic pain        Mixed cryoglobulinemia (H)        History of hepatitis C        Hypothyroidism, unspecified type        Urinary frequency          Care Instructions    Primary Care Center Phone Number [phone]  Primary Care Center Medication Refill Request Information:  * Please contact your pharmacy regarding ANY request for medication refills.  ** [facility] Prescription Fax = [phone]  * Please allow 3 business days for routine medication refills.  * Please allow 5 business days for controlled substance medication refills.     Primary Care Center Test Result notification information:  *You will be notified with in 7-10 days of your appointment day regarding the results of your test.  If you are on MyChart you will be notified as soon as the provider has reviewed the results and signed off on them.                  Follow-ups after your visit        Your next 10 appointments already scheduled     Jul 17, 2017  1:00 PM CDT   Annual Visit with Apoorva Ayon MD   [facility] ([facility])    [facility] [address]  [address]  [address]  [facility] [address] [phone]            Aug 02, 2017  1:00 PM CDT   RETURN RETINA with Steph Cintron MD   Eye Clinic ([facility])    [address]  [address]  9[address] Clin [address]a  [facility] [address] [phone]            Aug 28, 2017  2:45 PM CDT   LAB with  LAB    Health Lab ([facility])    [address]  1st Floor  [facility] [id number] [phone]           Patient must bring picture  ID.  Patient should be prepared to give a urine specimen  Please do not eat 10-12 hours before your appointment if you are coming in fasting for labs on lipids, cholesterol, or glucose (sugar).  Pregnant women should follow their Care Team instructions. Water with medications is okay. Do not drink coffee or other fluids.   If you have concerns about taking  your medications, please ask at office or if scheduling via SailPlay, send a message by clicking on Secure Messaging, Message Your Care Team.            Aug 28, 2017  3:40 PM CDT   (Arrive by 3:25 PM)   Return Visit with Vj Centeno MD   [facility] ([facility])    [address]  4th Floor  [facility] [address] [phone]              Future tests that were ordered for you today     Open Future Orders        Priority Expected Expires Ordered    UA with Microscopic reflex to Culture Routine  6/7/2018 6/7/2017    TSH Routine 8/7/2017 6/7/2018 6/7/2017            Who to contact     Please call your clinic at [phone] to:    Ask questions about your health    Make or cancel appointments    Discuss your medicines    Learn about your test results    Speak to your doctor   If you have compliments or concerns about an experience at your clinic, or if you wish to file a complaint, please contact [facility] Patient Relations at [phone] or email us at [email or web address]         Additional Information About Your Visit        Onithart Information     SailPlay gives you secure access to your electronic health record. If you see a primary care provider, you can also send messages to your care team and make appointments. If you have questions, please call your primary care clinic.  If you do not have a primary care provider, please call [phone] and they will assist you.      SailPlay is an electronic gateway that provides easy, online access to your medical  records. With [employer], you can request a clinic appointment, read your test results, renew a prescription or communicate with your care team.     To access your existing account, please contact your [facility] or call [phone] for assistance.        Care EveryWhere ID     This is your Care EveryWhere ID. This could be used by other organizations to access your [place] medical records  [id number]        Your Vitals Were     Pulse Respirations Breastfeeding? BMI (Body Mass Index)          64 16 No 26.63 kg/m2         Blood Pressure from Last 3 Encounters:   06/07/17 115/79   05/22/17 128/80   05/05/17 140/86    Weight from Last 3 Encounters:   06/07/17 72.6 kg (160 lb)   05/22/17 75.1 kg (165 lb 8 oz)   05/05/17 73 kg (161 lb)                 Where to get your medicines      These medications were sent to [place] [address] [place] [address]  [address] [facility] [address]     Phone:  [phone]     lisinopril 30 MG tablet         Some of these will need a paper prescription and others can be bought over the counter.  Ask your nurse if you have questions.     Bring a paper prescription for each of these medications     traMADol 50 MG tablet          Primary Care Provider Office Phone # Fax #    Vj Centeno [phone]        [facility]ANS [address] [place] [address]  [facility] [address]        Thank you!     Thank you for choosing [facility]  for your care. Our goal is always to provide you with excellent care. Hearing back from our patients is one way we can continue to improve our services. Please take a few minutes to complete the written survey that you may receive in the mail after your visit with us. Thank you!             Your Updated Medication List - Protect others around you: Learn how to safely use, store and throw away your medicines at www.disposemymeds.org.          This list is accurate as of:  6/7/17  4:15 PM.  Always use your most recent med list.                   Brand Name Dispense Instructions for use    albuterol 108 (90 BASE) MCG/ACT Inhaler    PROAIR HFA/PROVENTIL HFA/VENTOLIN HFA    1 Inhaler    Inhale 2 puffs into the lungs every 6 hours       ATIVAN 2 MG tablet   Generic drug:  LORazepam      Take 2 mg by mouth At Bedtime 2 tablets at night       budesonide-formoterol 80-4.5 MCG/ACT Inhaler    SYMBICORT    1 Inhaler    Inhale 2 puffs into the lungs 2 times daily       cetirizine 10 MG tablet    zyrTEC     Take 10 mg by mouth daily.       Gel Heel Cushions Womens Pads     1 Device    1 Device daily       hydrochlorothiazide 25 MG tablet    HYDRODIURIL    100 tablet    Take 1 tablet (25 mg) by mouth daily       Levothyroxine Sodium 50 MCG Caps     100 capsule    1 daily       lisinopril 30 MG tablet    PRINIVIL,ZESTRIL    100 tablet    Take 1 tablet (30 mg) by mouth At Bedtime       Lysine 1000 MG Tabs          order for DME     1 Device    1 Device by Device route daily as needed Air filtration system       penciclovir 1 % cream    DENAVIR    1.5 g    Apply cream at the first sign or symptom of cold sore (eg, tingling, swelling); apply every 2 hours during waking hours for 4 days.       PREMARIN VA      Place 2 g vaginally daily       traMADol 50 MG tablet    ULTRAM    360 tablet    Take 1-2 tablets ( mg) by mouth every 6 hours as needed       traZODone 50 MG tablet    DESYREL     Take 1 mg by mouth At Bedtime

## 2017-06-07 NOTE — NURSING NOTE
Chief Complaint   Patient presents with     Recheck Medication     pt is here for a medication follow up. needs refill of Tramadol sent to [facility], and lisinopirl sent [facility]     UTI     pt is here to discuss possible UTI.        Marcelina Mckeon CMA at 3:23 PM on 6/7/2017

## 2017-06-07 NOTE — PATIENT INSTRUCTIONS
Primary Care Center Phone Number [phone]  Primary Care Center Medication Refill Request Information:  * Please contact your pharmacy regarding ANY request for medication refills.  ** [facility] Prescription Fax = [phone]  * Please allow 3 business days for routine medication refills.  * Please allow 5 business days for controlled substance medication refills.     Primary Care Center Test Result notification information:  *You will be notified with in 7-10 days of your appointment day regarding the results of your test.  If you are on MyChart you will be notified as soon as the provider has reviewed the results and signed off on them.
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
## 2017-07-10 ENCOUNTER — TRANSFERRED RECORDS (OUTPATIENT)
Dept: HEALTH INFORMATION MANAGEMENT | Facility: CLINIC | Age: 62
End: 2017-07-10

## 2017-07-13 ENCOUNTER — TRANSFERRED RECORDS (OUTPATIENT)
Dept: HEALTH INFORMATION MANAGEMENT | Facility: CLINIC | Age: 62
End: 2017-07-13

## 2017-07-17 ENCOUNTER — OFFICE VISIT (OUTPATIENT)
Dept: OBGYN | Facility: CLINIC | Age: 62
End: 2017-07-17
Attending: OBSTETRICS & GYNECOLOGY
Payer: MEDICARE

## 2017-07-17 VITALS — BODY MASS INDEX: 27.39 KG/M2 | WEIGHT: 164.4 LBS | HEIGHT: 65 IN

## 2017-07-17 DIAGNOSIS — Z01.411 ENCOUNTER FOR GYNECOLOGICAL EXAMINATION WITH ABNORMAL FINDING: Primary | ICD-10-CM

## 2017-07-17 DIAGNOSIS — N60.01 BREAST CYST, RIGHT: ICD-10-CM

## 2017-07-17 DIAGNOSIS — I10 ESSENTIAL HYPERTENSION: ICD-10-CM

## 2017-07-17 PROCEDURE — 99212 OFFICE O/P EST SF 10 MIN: CPT | Mod: ZF

## 2017-07-17 NOTE — MR AVS SNAPSHOT
"              After Visit Summary   7/17/2017    Sarah Sanford    MRN: [id number]           Patient Information     Date Of Birth          1955        Visit Information        Provider Department      7/17/2017 1:00 PM Apoorva Ayon MD [facility]        Today's Diagnoses     Encounter for gynecological examination with abnormal finding    -  1    Breast cyst, right        Essential hypertension           Follow-ups after your visit        Follow-up notes from your care team     Return in 1 year (on 7/17/2018) for Preventative Health Visit.      Your next 10 appointments already scheduled     Jul 24, 2017  1:00 PM CDT   (Arrive by 12:45 PM)   MA SCREENING DIGITAL BILATERAL with [id number]   [facility] ([facility])    [address]  2nd Floor  [facility] [address] [phone]           Do not use any powder, lotion or deodorant under your arms or on your breast. If you do, we will ask you to remove it before your exam.  Wear comfortable, two-piece clothing.  If you have any allergies, tell your care team.  Bring any previous mammograms from other facilities or have them mailed to the breast center. Three-dimensional (3D) mammograms are available at [place] locations in [facility], and [place]. [facility] locations include [place] and [facility] in [place]. Benefits of 3D mammograms include: - Improved rate of cancer detection - Decreases your chance of having to go back for more tests, which means fewer: - \"False-positive\" results (This means that there is an abnormal area but it isn't cancer.) - Invasive testing procedures, such as a biopsy or surgery - Can provide clearer images of the breast if you have dense breast tissue. 3D mammography is an optional exam that anyone can have with a 2D mammogram. It doesn't replace or take the place of a 2D mammogram. 2D " mammograms remain an effective screening test for all women.  Not all insurance companies cover the cost of a 3D mammogram. Check with your insurance.            Aug 02, 2017  1:00 PM CDT   RETURN RETINA with Steph Cintron MD   [facility] ([facility])    [address]  [address]  9th [address]  [facility] [address] [phone]            Aug 28, 2017  2:45 PM CDT   LAB with  LAB   [facility] ([facility])    [address]  1st Floor  [facility] [place] [address] [phone]           Patient must bring picture ID.  Patient should be prepared to give a urine specimen  Please do not eat 10-12 hours before your appointment if you are coming in fasting for labs on lipids, cholesterol, or glucose (sugar).  Pregnant women should follow their Care Team instructions. Water with medications is okay. Do not drink coffee or other fluids.   If you have concerns about taking  your medications, please ask at office or if scheduling via groopify, send a message by clicking on Secure Messaging, Message Your Care Team.            Aug 28, 2017  3:40 PM CDT   (Arrive by 3:25 PM)   Return Visit with Vj Centeno MD   [facility] ([facility])    [address]  [address]th [facility] [address] [phone]              Who to contact     Please call your clinic at [phone] to:    Ask questions about your health    Make or cancel appointments    Discuss your medicines    Learn about your test results    Speak to your doctor   If you have compliments or concerns about an experience at your clinic, or if you wish to file a complaint, please contact [facility] Patient Relations at [phone] or email us at [email or web address]         Additional Information About Your Visit        groopify Information     [employer]ify gives you secure access to your electronic  "health record. If you see a primary care provider, you can also send messages to your care team and make appointments. If you have questions, please call your primary care clinic.  If you do not have a primary care provider, please call [phone] and they will assist you.      [employer] is an electronic gateway that provides easy, online access to your medical records. With [employer], you can request a clinic appointment, read your test results, renew a prescription or communicate with your care team.     To access your existing account, please contact your [facility] or call [phone] for assistance.        Care EveryWhere ID     This is your Care EveryWhere ID. This could be used by other organizations to access your [place] medical records  [id number]        Your Vitals Were     Height BMI (Body Mass Index)                1.651 m (5' 5\") 27.36 kg/m2           Blood Pressure from Last 3 Encounters:   06/07/17 115/79   05/22/17 128/80   05/05/17 140/86    Weight from Last 3 Encounters:   07/17/17 74.6 kg (164 lb 6.4 oz)   06/07/17 72.6 kg (160 lb)   05/22/17 75.1 kg (165 lb 8 oz)              We Performed the Following     Pelvic and Breast Exam Procedure []          Today's Medication Changes          These changes are accurate as of: 7/17/17 11:59 PM.  If you have any questions, ask your nurse or doctor.               These medicines have changed or have updated prescriptions.        Dose/Directions    conjugated estrogens cream   Commonly known as:  PREMARIN   This may have changed:  medication strength   Used for:  Essential hypertension   Changed by:  Apoorva Ayon MD        Dose:  2 g   Place 2 g vaginally daily   Quantity:  180 g   Refills:  3            Where to get your medicines      These medications were sent to [place] [address] [place] [address] [address]     Phone:  [phone]     conjugated estrogens " cream                Primary Care Provider Office Phone # Fax #    Vj Centeno [phone]        [facility] [address]  [facility] [address]        Equal Access to Services     KAYLA BELL : Hadii aad ku hadjulietao Soaugustaali, waaxda luqadaha, qaybta kaalmada adeegyada, naif mcdonald katie fowler. So [facility] [phone].    ATENCIÓN: Si habla español, tiene a vila disposición servicios gratuitos de asistencia lingüística. Llame al [phone].    We comply with applicable federal civil rights laws and [place] laws. We do not discriminate on the basis of race, color, national origin, age, disability sex, sexual orientation or gender identity.            Thank you!     Thank you for choosing [facility]  for your care. Our goal is always to provide you with excellent care. Hearing back from our patients is one way we can continue to improve our services. Please take a few minutes to complete the written survey that you may receive in the mail after your visit with us. Thank you!             Your Updated Medication List - Protect others around you: Learn how to safely use, store and throw away your medicines at www.disposemymeds.org.          This list is accurate as of: 7/17/17 11:59 PM.  Always use your most recent med list.                   Brand Name Dispense Instructions for use Diagnosis    albuterol 108 (90 BASE) MCG/ACT Inhaler    PROAIR HFA/PROVENTIL HFA/VENTOLIN HFA    1 Inhaler    Inhale 2 puffs into the lungs every 6 hours    Reactive airway disease, mild persistent, uncomplicated       ATIVAN 2 MG tablet   Generic drug:  LORazepam      Take 2 mg by mouth At Bedtime 2 tablets at night    Other chronic pain       budesonide-formoterol 80-4.5 MCG/ACT Inhaler    SYMBICORT    1 Inhaler    Inhale 2 puffs into the lungs 2 times daily    Reactive airway disease, mild persistent, uncomplicated       cetirizine 10 MG tablet    zyrTEC      Take 10 mg by mouth daily.        conjugated estrogens cream    PREMARIN    180 g    Place 2 g vaginally daily    Essential hypertension       Gel Heel Cushions Womens Pads     1 Device    1 Device daily    Plantar fasciitis       hydrochlorothiazide 25 MG tablet    HYDRODIURIL    100 tablet    Take 1 tablet (25 mg) by mouth daily    Essential hypertension       Levothyroxine Sodium 50 MCG Caps     100 capsule    1 daily    Hypothyroidism, unspecified type       lisinopril 30 MG tablet    PRINIVIL,ZESTRIL    100 tablet    Take 1 tablet (30 mg) by mouth At Bedtime    Benign essential hypertension       Lysine 1000 MG Tabs       Other chronic pain, Mixed cryoglobulinemia (H), History of hepatitis C, Secondary hypertension, hypertension with unspecified goal       order for DME     1 Device    1 Device by Device route daily as needed Air filtration system    Chronic bronchitis, unspecified chronic bronchitis type (H)       penciclovir 1 % cream    DENAVIR    1.5 g    Apply cream at the first sign or symptom of cold sore (eg, tingling, swelling); apply every 2 hours during waking hours for 4 days.    Recurrent cold sores       traMADol 50 MG tablet    ULTRAM    360 tablet    Take 1-2 tablets ( mg) by mouth every 6 hours as needed    Other chronic pain, Mixed cryoglobulinemia (H), History of hepatitis C       traZODone 50 MG tablet    DESYREL     Take 1 mg by mouth At Bedtime
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
## 2017-07-20 ENCOUNTER — TRANSFERRED RECORDS (OUTPATIENT)
Dept: HEALTH INFORMATION MANAGEMENT | Facility: CLINIC | Age: 62
End: 2017-07-20

## 2017-07-24 ENCOUNTER — RADIANT APPOINTMENT (OUTPATIENT)
Dept: MAMMOGRAPHY | Facility: CLINIC | Age: 62
End: 2017-07-24

## 2017-07-24 DIAGNOSIS — Z12.31 VISIT FOR SCREENING MAMMOGRAM: ICD-10-CM

## 2017-07-24 DIAGNOSIS — N60.01 BREAST CYST, RIGHT: ICD-10-CM
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
## 2017-07-27 ENCOUNTER — TELEPHONE (OUTPATIENT)
Dept: INTERNAL MEDICINE | Facility: CLINIC | Age: 62
End: 2017-07-27

## 2017-07-27 NOTE — TELEPHONE ENCOUNTER
"I spoke to pt today. She stated she had a throbbing lump on her hand. She stated pinky and ring finger have some numbness to them. She stated that she \"slammed\" her wrist in her eyeglasses case. I advised her to take ibuprofen, ice her hand, and rest. Informed her that if symptoms do not improve or worsen over the next week, she should make an appointment. Pt agreed. She stated she cannot take ibuprofen as it has previously caused her rectal bleeding, but she stated that she will follow up if symptoms do not improve.    Ayde Govea RN  "

## 2017-07-31 ENCOUNTER — OFFICE VISIT (OUTPATIENT)
Dept: OPHTHALMOLOGY | Facility: CLINIC | Age: 62
End: 2017-07-31
Attending: OPHTHALMOLOGY
Payer: MEDICARE

## 2017-07-31 DIAGNOSIS — H34.8392 BRVO (BRANCH RETINAL VEIN OCCLUSION) (H): ICD-10-CM

## 2017-07-31 DIAGNOSIS — H25.11 NUCLEAR SCLEROSIS, RIGHT: Primary | ICD-10-CM

## 2017-07-31 PROCEDURE — 99212 OFFICE O/P EST SF 10 MIN: CPT | Mod: ZF

## 2017-07-31 ASSESSMENT — REFRACTION_WEARINGRX
OS_SPHERE: +0.25
OD_ADD: +2.50
OD_AXIS: 133
OS_AXIS: 085
OS_CYLINDER: +0.25
SPECS_TYPE: BIFOCAL
OD_SPHERE: PLANO
OD_CYLINDER: +0.50
OS_ADD: +2.50

## 2017-07-31 ASSESSMENT — CONF VISUAL FIELD
OD_NORMAL: 1
OS_NORMAL: 1

## 2017-07-31 ASSESSMENT — VISUAL ACUITY
CORRECTION_TYPE: GLASSES
METHOD: SNELLEN - LINEAR
OD_CC: 20/100
OD_PH_CC: 20/25
OS_CC: 20/25

## 2017-07-31 ASSESSMENT — CUP TO DISC RATIO
OS_RATIO: 0.2
OD_RATIO: 0.2

## 2017-07-31 ASSESSMENT — TONOMETRY
IOP_METHOD: TONOPEN
OS_IOP_MMHG: 12
OD_IOP_MMHG: 10

## 2017-07-31 ASSESSMENT — EXTERNAL EXAM - RIGHT EYE: OD_EXAM: NORMAL

## 2017-07-31 ASSESSMENT — SLIT LAMP EXAM - LIDS
COMMENTS: NORMAL
COMMENTS: NORMAL

## 2017-07-31 NOTE — PROGRESS NOTES
"Chief Complaints and History of Present Illnesses   Patient presents with     Consult For     Possible Retinal detachment       HPI: Pt is a 61 y.o F w/ hx of BRVO in RE w/ Vit heme s/p PPV/EL/FAX OD on 2/14/17 who presents today w/ 1day history of blurry vision OD. Pt reports she was looking at the computer yesterday evening when her vision seemed blurry. After occluding both her right and left eye, she identified that her RE did not see as well as her left and was resulting in the blurring. She denies flashes, floaters, shadow/curtain like visual loss. Eyes have been comfortable without pain.     POHx:  Vitreous Hemorrhage RE s/p PPV/ EL/FAX OD 2/14/17  Cataract RE    Current Eye Medications:   None    Assessment & Plan     Sarah Sanford is a 61 year old female with the following diagnoses:   1. Nuclear sclerosis, right    2. BRVO (branch retinal vein occlusion)       1. Nuclear Sclerosis Right Eye   Visually significant 2+ NS OD.   Refracts to 20/40 right eye per chart review. Ph 20/25 OD today.  Plan:  -Pt visually bothered by vision in RE. Discussed options for CE/IOL placement   -Pt to see Dr. Cintron Wednesday for retina clearane, will schedule to see Dr. Grider thereafter for IOL Calcs and further surgical discussion.     2. Status post PPV/EL/FAX OD (2/14/17) for vitreous hemorrhage possibly secondary to history of  BRVO.  -Retina attached on todays exam. Pt did not tolerate 360  2/2 to \"I cant tolerate things touching around my eye, they had to put me asleep for surgery\". Retina appears attached.     Plan:   -Retina detachment precautions discussed    -F/u w/ Dr. Cintron as scheduled     F/u w/ Dr. Cintron as scheduled Wednesday, and then f/u w/ Dr. Grider for cataract eval OD.     Teaching statement:  Complete documentation of historical and exam elements from today's encounter can be found in the full encounter summary report (not reduplicated in this progress note). I personally obtained the " chief complaint(s) and history of present illness.  I confirmed and edited as necessary the review of systems, past medical/surgical history, family history, social history, and examination findings as documented by others; and I examined the patient myself. I personally reviewed the relevant tests, images, and reports as documented above.     I formulated and edited as necessary the assessment and plan and discussed the findings and management plan with the patient and family.    Sylvia Grider MD  Comprehensive Ophthalmology & Ocular Pathology  Department of Ophthalmology and Visual Neurosciences  [email or web address]  Pager [phone]

## 2017-07-31 NOTE — NURSING NOTE
Chief Complaints and History of Present Illnesses   Patient presents with     Consult For     Possible Retinal detachment     HPI    Affected eye(s):  Right   Symptoms:        Duration:  1 day   Frequency:  Constant       Do you have eye pain now?:  No      Comments:  Pt. States that she went on computer last night and VA was blurry RE.  No flashes or floaters BE.  No c/o comfort BE.  Swetha Camarillo COT 3:03 PM July 31, 2017

## 2017-07-31 NOTE — MR AVS SNAPSHOT
After Visit Summary   7/31/2017    Sarah Sanford    MRN: [id number]           Patient Information     Date Of Birth          1955        Visit Information        Provider Department      7/31/2017 3:00 PM Apoorva Phillip MD [facility]        Today's Diagnoses     Nuclear sclerosis, right    -  1    BRVO (branch retinal vein occlusion)           Follow-ups after your visit        Follow-up notes from your care team     Return for Follow Up as scheduled previously w/ Dr. Cintron,, and f/u w. Marni Man Wednesday IOL calcs OD.      Your next 10 appointments already scheduled     Aug 02, 2017  1:00 PM CDT   RETURN RETINA with Steph Cintron MD   Eye Clinic ([facility])    [address]  9[facility] [address] [place] [id number] [phone]            Aug 02, 2017  2:00 PM CDT   RETURN GENERAL with Sylvia Grider MD   [facility] Clinic ([facility])    [address]  [address]  9[facility] [address] [place] [id number] [phone]            Aug 28, 2017  2:45 PM CDT   LAB with  LAB   [facility] ([facility])    [address] [place] [id number] [phone]           Patient must bring picture ID. Patient should be prepared to give a urine specimen  Please do not eat 10-12 hours before your appointment if you are coming in fasting for labs on lipids, cholesterol, or glucose (sugar). Pregnant women should follow their Care Team instructions. Water with medications is okay. Do not drink coffee or other fluids. If you have concerns about taking  your medications, please ask at office or if scheduling via [employer], send a message by clicking on Secure Messaging, Message Your Care Team.            Aug 28, 2017  3:40 PM CDT   (Arrive by 3:25 PM)   Return Visit with Vj Centeno MD   [facility] ([facility])    [address] [place] [address]  Se  4th Floor  [facility] [address] [phone]              Who to contact     Please call your clinic at [phone] to:    Ask questions about your health    Make or cancel appointments    Discuss your medicines    Learn about your test results    Speak to your doctor   If you have compliments or concerns about an experience at your clinic, or if you wish to file a complaint, please contact [facility] Patient Relations at [phone] or email us at [email or web address]         Additional Information About Your Visit        MyChart Information     [employer] gives you secure access to your electronic health record. If you see a primary care provider, you can also send messages to your care team and make appointments. If you have questions, please call your primary care clinic.  If you do not have a primary care provider, please call [phone] and they will assist you.      Workspace is an electronic gateway that provides easy, online access to your medical records. With Workspace, you can request a clinic appointment, read your test results, renew a prescription or communicate with your care team.     To access your existing account, please contact your [facility] or call [phone] for assistance.        Care EveryWhere ID     This is your Care EveryWhere ID. This could be used by other organizations to access your [facility] medical records  [id number]         Blood Pressure from Last 3 Encounters:   06/07/17 115/79   05/22/17 128/80   05/05/17 140/86    Weight from Last 3 Encounters:   07/17/17 74.6 kg (164 lb 6.4 oz)   06/07/17 72.6 kg (160 lb)   05/22/17 75.1 kg (165 lb 8 oz)              Today, you had the following     No orders found for display       Primary Care Provider Office Phone # Fax #    Vj Centeno [phone]        [facility] [address] [facility] [address]  [facility] [address]        [facility]  Access to Services     [facility]: Hadii nasir sandoval yaima Valdez, wamagdalenada luqadaha, qaybta kaalmanaif de la paz. So [facility] [phone].    ATENCIÓN: Si habla español, tiene a vila disposición servicios gratuitos de asistencia lingüística. Llame al [phone].    We comply with applicable federal civil rights laws and [place] laws. We do not discriminate on the basis of race, color, national origin, age, disability sex, sexual orientation or gender identity.            Thank you!     Thank you for choosing EYE CLINIC  for your care. Our goal is always to provide you with excellent care. Hearing back from our patients is one way we can continue to improve our services. Please take a few minutes to complete the written survey that you may receive in the mail after your visit with us. Thank you!             Your Updated Medication List - Protect others around you: Learn how to safely use, store and throw away your medicines at www.disposemymeds.org.          This list is accurate as of: 7/31/17 11:59 PM.  Always use your most recent med list.                   Brand Name Dispense Instructions for use Diagnosis    albuterol 108 (90 BASE) MCG/ACT Inhaler    PROAIR HFA/PROVENTIL HFA/VENTOLIN HFA    1 Inhaler    Inhale 2 puffs into the lungs every 6 hours    Reactive airway disease, mild persistent, uncomplicated       ATIVAN 2 MG tablet   Generic drug:  LORazepam      Take 2 mg by mouth At Bedtime 2 tablets at night    Other chronic pain       budesonide-formoterol 80-4.5 MCG/ACT Inhaler    SYMBICORT    1 Inhaler    Inhale 2 puffs into the lungs 2 times daily    Reactive airway disease, mild persistent, uncomplicated       cetirizine 10 MG tablet    zyrTEC     Take 10 mg by mouth daily.        conjugated estrogens cream    PREMARIN    180 g    Place 2 g vaginally daily    Essential hypertension       Gel Heel Cushions Womens Pads     1 Device    1 Device daily    Plantar  fasciitis       hydrochlorothiazide 25 MG tablet    HYDRODIURIL    100 tablet    Take 1 tablet (25 mg) by mouth daily    Essential hypertension       Levothyroxine Sodium 50 MCG Caps     100 capsule    1 daily    Hypothyroidism, unspecified type       lisinopril 30 MG tablet    PRINIVIL,ZESTRIL    100 tablet    Take 1 tablet (30 mg) by mouth At Bedtime    Benign essential hypertension       Lysine 1000 MG Tabs       Other chronic pain, Mixed cryoglobulinemia (H), History of hepatitis C, Secondary hypertension, hypertension with unspecified goal       order for DME     1 Device    1 Device by Device route daily as needed Air filtration system    Chronic bronchitis, unspecified chronic bronchitis type (H)       penciclovir 1 % cream    DENAVIR    1.5 g    Apply cream at the first sign or symptom of cold sore (eg, tingling, swelling); apply every 2 hours during waking hours for 4 days.    Recurrent cold sores       traMADol 50 MG tablet    ULTRAM    360 tablet    Take 1-2 tablets ( mg) by mouth every 6 hours as needed    Other chronic pain, Mixed cryoglobulinemia (H), History of hepatitis C       traZODone 50 MG tablet    DESYREL     Take 1 mg by mouth At Bedtime

## 2017-08-01 DIAGNOSIS — H25.10 SENILE NUCLEAR SCLEROSIS: Primary | ICD-10-CM

## 2017-08-02 ENCOUNTER — OFFICE VISIT (OUTPATIENT)
Dept: OPHTHALMOLOGY | Facility: CLINIC | Age: 62
End: 2017-08-02
Attending: OPHTHALMOLOGY
Payer: MEDICARE

## 2017-08-02 VITALS — BODY MASS INDEX: 27.99 KG/M2 | WEIGHT: 168 LBS | HEIGHT: 65 IN

## 2017-08-02 DIAGNOSIS — H43.11 VITREOUS HEMORRHAGE, RIGHT (H): ICD-10-CM

## 2017-08-02 DIAGNOSIS — H25.10 SENILE NUCLEAR SCLEROSIS: ICD-10-CM

## 2017-08-02 DIAGNOSIS — H34.8392 BRVO (BRANCH RETINAL VEIN OCCLUSION) (H): ICD-10-CM

## 2017-08-02 DIAGNOSIS — Z48.810 AFTERCARE FOLLOWING SURGERY OF A SENSE ORGAN: Primary | ICD-10-CM

## 2017-08-02 PROCEDURE — 40000269 ZZH STATISTIC NO CHARGE FACILITY FEE: Mod: ZF

## 2017-08-02 PROCEDURE — 76519 ECHO EXAM OF EYE: CPT | Mod: ZF | Performed by: OPHTHALMOLOGY

## 2017-08-02 PROCEDURE — 92134 CPTRZ OPH DX IMG PST SGM RTA: CPT | Mod: ZF | Performed by: OPHTHALMOLOGY

## 2017-08-02 PROCEDURE — 99213 OFFICE O/P EST LOW 20 MIN: CPT | Mod: ZF

## 2017-08-02 ASSESSMENT — CUP TO DISC RATIO
OS_RATIO: 0.2
OD_RATIO: 0.2
OS_RATIO: 0.2
OD_RATIO: 0.2

## 2017-08-02 ASSESSMENT — TONOMETRY
OD_IOP_MMHG: 12
OS_IOP_MMHG: 14
IOP_METHOD: TONOPEN
OS_IOP_MMHG: 14
OD_IOP_MMHG: 12
IOP_METHOD: TONOPEN

## 2017-08-02 ASSESSMENT — EXTERNAL EXAM - RIGHT EYE
OD_EXAM: NORMAL
OD_EXAM: NORMAL

## 2017-08-02 ASSESSMENT — VISUAL ACUITY
OS_CC: 20/25
METHOD: SNELLEN - LINEAR
OD_PH_CC: 20/30
METHOD: SNELLEN - LINEAR
CORRECTION_TYPE: GLASSES
OS_CC: 20/25
OD_CC: 20/100-
OD_CC: 20/100-
OD_PH_CC: 20/30
CORRECTION_TYPE: GLASSES

## 2017-08-02 ASSESSMENT — SLIT LAMP EXAM - LIDS
COMMENTS: NORMAL

## 2017-08-02 ASSESSMENT — CONF VISUAL FIELD
OD_NORMAL: 1
METHOD: COUNTING FINGERS
OS_NORMAL: 1
OD_NORMAL: 1
OS_NORMAL: 1

## 2017-08-02 NOTE — NURSING NOTE
Chief Complaints and History of Present Illnesses   Patient presents with     Follow Up For     Status post PPV/EL/FAX OD (2/14/17) for vitreous hemorrhage possibly secondary to history of  BRVO     HPI    Affected eye(s):  Right   Symptoms:     Blurred vision   Decreased vision   Glare   Starbursts         Do you have eye pain now?:  No      Comments:  Pt anxious and nervous. No changes since Monday, VA poor REDorothy Figueroa COA August 2, 2017 1:23 PM

## 2017-08-02 NOTE — NURSING NOTE
Chief Complaints and History of Present Illnesses   Patient presents with     Cataract Evaluation     HPI    Affected eye(s):  Right   Symptoms:     Blurred vision   Decreased vision   Glare   Starbursts            Comments:  RE about the same as Monday, bad. Pt very nervous today  Joslyn Figueroa COA August 2, 2017 1:41 PM

## 2017-08-02 NOTE — MR AVS SNAPSHOT
After Visit Summary   8/2/2017    Sarah Sanford    MRN: [id number]           Patient Information     Date Of Birth          1955        Visit Information        Provider Department      8/2/2017 1:00 PM Steph Cintron MD [facility]        Today's Diagnoses     Aftercare following surgery of a sense organ    -  1    Senile nuclear sclerosis           Follow-ups after your visit        Follow-up notes from your care team     Return in about 6 months (around 2/2/2018).      Your next 10 appointments already scheduled     Aug 28, 2017  2:45 PM CDT   LAB with  LAB   [facility] ([facility])    [address] [place] [address]  [address] [facility] [id number] [phone]           Patient must bring picture ID. Patient should be prepared to give a urine specimen  Please do not eat 10-12 hours before your appointment if you are coming in fasting for labs on lipids, cholesterol, or glucose (sugar). Pregnant women should follow their Care Team instructions. Water with medications is okay. Do not drink coffee or other fluids. If you have concerns about taking  your medications, please ask at office or if scheduling via makexyz, send a message by clicking on Secure Messaging, Message Your Care Team.            Aug 28, 2017  3:40 PM CDT   (Arrive by 3:25 PM)   Return Visit with Vj Centeno MD   [facility] ([facility])    [address] [place] [address]  [address] [facility] [id number] [phone]            Feb 07, 2018  1:00 PM CST   RETURN RETINA with Steph Cintron MD   Eye Clinic ([facility])    Henri Zamudio [facility]  [address]  [address] [address]  [facility] [id number] [phone]              Who to contact     Please call your clinic at [phone] to:    Ask questions about your health    Make or cancel appointments    Discuss your medicines    Learn about your test results    Speak to  your doctor   If you have compliments or concerns about an experience at your clinic, or if you wish to file a complaint, please contact [facility] Patient Relations at [phone] or email us at [email or web address]         Additional Information About Your Visit        MyChart Information     [employer] gives you secure access to your electronic health record. If you see a primary care provider, you can also send messages to your care team and make appointments. If you have questions, please call your primary care clinic.  If you do not have a primary care provider, please call [phone] and they will assist you.      [employer] is an electronic gateway that provides easy, online access to your medical records. With [employer], you can request a clinic appointment, read your test results, renew a prescription or communicate with your care team.     To access your existing account, please contact your [facility] or call [phone] for assistance.        Care EveryWhere ID     This is your Care EveryWhere ID. This could be used by other organizations to access your [place] medical records  [id number]         Blood Pressure from Last 3 Encounters:   06/07/17 115/79   05/22/17 128/80   05/05/17 140/86    Weight from Last 3 Encounters:   08/02/17 76.2 kg (168 lb)   07/17/17 74.6 kg (164 lb 6.4 oz)   06/07/17 72.6 kg (160 lb)              We Performed the Following     IOL Biometry w/ IOL calc OU (both eye)     OCT Retina Spectralis OU (both eyes)        Primary Care Provider Office Phone # Fax #    Vj Centeno [phone]        PHYSICIANS [address] [facility] [address]  [facility] [address]        Equal Access to Services     KAYLA BELL : lashonda Cervantes, naif joyce. So [facility] [phone].    ATENCIÓN: Si reese wolfe vila  disposición servicios gratuitos de asistencia lingüística. Joby brower [phone].    We comply with applicable federal civil rights laws and [place] laws. We do not discriminate on the basis of race, color, national origin, age, disability sex, sexual orientation or gender identity.            Thank you!     Thank you for choosing EYE CLINIC  for your care. Our goal is always to provide you with excellent care. Hearing back from our patients is one way we can continue to improve our services. Please take a few minutes to complete the written survey that you may receive in the mail after your visit with us. Thank you!             Your Updated Medication List - Protect others around you: Learn how to safely use, store and throw away your medicines at www.disposemymeds.org.          This list is accurate as of: 8/2/17  3:43 PM.  Always use your most recent med list.                   Brand Name Dispense Instructions for use Diagnosis    albuterol 108 (90 BASE) MCG/ACT Inhaler    PROAIR HFA/PROVENTIL HFA/VENTOLIN HFA    1 Inhaler    Inhale 2 puffs into the lungs every 6 hours    Reactive airway disease, mild persistent, uncomplicated       ATIVAN 2 MG tablet   Generic drug:  LORazepam      Take 2 mg by mouth At Bedtime 2 tablets at night    Other chronic pain       budesonide-formoterol 80-4.5 MCG/ACT Inhaler    SYMBICORT    1 Inhaler    Inhale 2 puffs into the lungs 2 times daily    Reactive airway disease, mild persistent, uncomplicated       cetirizine 10 MG tablet    zyrTEC     Take 10 mg by mouth daily.        conjugated estrogens cream    PREMARIN    180 g    Place 2 g vaginally daily    Essential hypertension       Gel Heel Cushions Womens Pads     1 Device    1 Device daily    Plantar fasciitis       hydrochlorothiazide 25 MG tablet    HYDRODIURIL    100 tablet    Take 1 tablet (25 mg) by mouth daily    Essential hypertension       Levothyroxine Sodium 50 MCG Caps     100 capsule    1 daily    Hypothyroidism,  unspecified type       lisinopril 30 MG tablet    PRINIVIL,ZESTRIL    100 tablet    Take 1 tablet (30 mg) by mouth At Bedtime    Benign essential hypertension       Lysine 1000 MG Tabs       Other chronic pain, Mixed cryoglobulinemia (H), History of hepatitis C, Secondary hypertension, hypertension with unspecified goal       order for DME     1 Device    1 Device by Device route daily as needed Air filtration system    Chronic bronchitis, unspecified chronic bronchitis type (H)       penciclovir 1 % cream    DENAVIR    1.5 g    Apply cream at the first sign or symptom of cold sore (eg, tingling, swelling); apply every 2 hours during waking hours for 4 days.    Recurrent cold sores       traMADol 50 MG tablet    ULTRAM    360 tablet    Take 1-2 tablets ( mg) by mouth every 6 hours as needed    Other chronic pain, Mixed cryoglobulinemia (H), History of hepatitis C       traZODone 50 MG tablet    DESYREL     Take 1 mg by mouth At Bedtime

## 2017-08-02 NOTE — LETTER
8/2/2017       RE: Sarah Sanford  [address] [place] [address]     Dear Colleague,    Thank you for referring your patient, Sarah Sanford, to the EYE CLINIC at [facility]. Please see a copy of my visit note below.    CC: status post PPV/EL/FAX OD (2/14/17) for vitreous hemorrhage possibly secondary to microaneurysm after BRVO.    Karin  States VA is improving, no eye pain , no flashes and floaters     OCT 8/2/17  RE: inferotemporal thinning (stable), otherwise normal contour  LE: normal    Assessment and plan:  1. status post PPV/EL/FAX OD (2/14/17) for vitreous hemorrhage possibly secondary to history of  BRVO.  - retina attached, patient doing well    2. Cataract right eye   Visually significant , options for cataract surgery discussed, patient is interested, she is scheduled to see Dr. Grider for phaco today for cataract surgery    Follow up in 6 months    Jon Flannery MD, PhD  Vitreoretinal Surgery Fellow    ~~~~~~~~~~~~~~~~~~~~~~~~~~~~~~~~~~   Complete documentation of historical and exam elements from today's encounter can be found in the full encounter summary report (not reduplicated in this progress note).  I personally obtained the chief complaint(s) and history of present illness.  I confirmed and edited as necessary the review of systems, past medical/surgical history, family history, social history, and examination findings as documented by others; and I examined the patient myself.  I personally reviewed the relevant tests, images, and reports as documented above.  I formulated and edited as necessary the assessment and plan and discussed the findings and management plan with the patient and family    Steph Cintron MD  .  Retina Service   Department of Ophthalmology and Visual Neurosciences   [facility]  Phone: [phone]   Fax: [phone]

## 2017-08-02 NOTE — PROGRESS NOTES
HPI: Karin (preferred name) Juvenal is a 61 y.o F w/ hx of BRVO in RE w/ Vit heme s/p PPV/EL/FAX OD on 2/14/17 who had acute awareness of a dense cataract and blurred vision right eye several days ago. Pt reports she was looking at the computer yesterday evening when her vision seemed blurry. After occluding both her right and left eye, she identified that her RE did not see as well as her left and was resulting in the blurring. She denies flashes, floaters, shadow/curtain like visual loss. Eyes have been comfortable without pain.     POHx:  Vitreous Hemorrhage RE s/p PPV/ EL/FAX OD 2/14/17  Cataract RE    Assessment & Plan   (H25.13) Nuclear cataract of both eyes  (primary encounter diagnosis)  Comment: Visually significant OS >> OD with dense nuclear sclerosis right eye.    Dilates to: 8 mm  Alpha blockers/Flomax: None  Trauma/Pseudoxfoliation: PPVx OD  Fuchs dystrophy/guttae: None    Diabetes: No  Anticoagulation: None    Cyl: 0.29 @ 074 OD, 0.40 @ 119 OS    We discussed the risks and benefits of cataract surgery in the right eye, and informed consent was obtained.  Proceed with CE/IOL OD.    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  Plan: Seen by Dr. Cintron today who feels the retina is stable.     Return for scheduled procedure.    Teaching statement:  Complete documentation of historical and exam elements from today's encounter can be found in the full encounter summary report (not reduplicated in this progress note). I personally obtained the chief complaint(s) and history of present illness.  I confirmed and edited as necessary the review of systems, past medical/surgical history, family history, social  history, and examination findings as documented by others; and I examined the patient myself. I personally reviewed the relevant tests, images, and reports as documented above.     I formulated and edited as necessary the assessment and plan and discussed the findings and management plan with the patient and family.    Sylvia Grider MD  Comprehensive Ophthalmology & Ocular Pathology  Department of Ophthalmology and Visual Neurosciences  [email or web address]  Pager [phone]

## 2017-08-02 NOTE — PROGRESS NOTES
CC: status post PPV/EL/FAX OD (2/14/17) for vitreous hemorrhage possibly secondary to microaneurysm after BRVO.    Karin  States VA is improving, no eye pain , no flashes and floaters     OCT 8/2/17  RE: inferotemporal thinning (stable), otherwise normal contour  LE: normal    Assessment and plan:  1. status post PPV/EL/FAX OD (2/14/17) for vitreous hemorrhage possibly secondary to history of  BRVO.  - retina attached, patient doing well    2. Cataract right eye   Visually significant , options for cataract surgery discussed, patient is interested, she is scheduled to see Dr. Grider for phaco today for cataract surgery    Follow up in 6 months    Jon Flannery MD, PhD  Vitreoretinal Surgery Fellow    ~~~~~~~~~~~~~~~~~~~~~~~~~~~~~~~~~~   Complete documentation of historical and exam elements from today's encounter can be found in the full encounter summary report (not reduplicated in this progress note).  I personally obtained the chief complaint(s) and history of present illness.  I confirmed and edited as necessary the review of systems, past medical/surgical history, family history, social history, and examination findings as documented by others; and I examined the patient myself.  I personally reviewed the relevant tests, images, and reports as documented above.  I formulated and edited as necessary the assessment and plan and discussed the findings and management plan with the patient and family    Steph Cintron MD  .  Retina Service   Department of Ophthalmology and Visual Neurosciences   [facility]  Phone: [phone]   Fax: [phone]     '

## 2017-08-02 NOTE — MR AVS SNAPSHOT
After Visit Summary   8/2/2017    Sarah Sanford    MRN: [id number]           Patient Information     Date Of Birth          1955        Visit Information        Provider Department      8/2/2017 2:00 PM Sylvia Grider MD [facility]        Today's Diagnoses     Nuclear cataract of both eyes    -  1       Follow-ups after your visit        Your next 10 appointments already scheduled     Aug 28, 2017  2:45 PM CDT   LAB with  LAB   [facility] ([facility])    [address]  1st Floor  [facility] [id number] [phone]           Patient must bring picture ID. Patient should be prepared to give a urine specimen  Please do not eat 10-12 hours before your appointment if you are coming in fasting for labs on lipids, cholesterol, or glucose (sugar). Pregnant women should follow their Care Team instructions. Water with medications is okay. Do not drink coffee or other fluids. If you have concerns about taking  your medications, please ask at office or if scheduling via [employer], send a message by clicking on Secure Messaging, Message Your Care Team.            Aug 28, 2017  3:40 PM CDT   (Arrive by 3:25 PM)   Return Visit with Vj Centeno MD   [facility] ([facility])    [address]  4th Floor  [facility] [id number] [phone]            Oct 02, 2017  1:00 PM CDT   Post-Op with Sylvia Grider MD   Eye Clinic ([facility])    [address]  [address]  [address]  [facility] [address]   [phone]            Oct 25, 2017  1:00 PM CDT   Post-Op with Sylvia Grider MD   Eye Clinic ([facility])    Henri St[address]  [address]  [address]  [facility] [address]   [phone]            Feb 07, 2018  1:00 PM CST   RETURN RETINA with Steph Cintron MD   Eye Clinic ([facility])    Henri [address]war[address]  [address]  "[address] Clin [address]a  [facility] [address]   [phone]              Who to contact     Please call your clinic at [phone] to:    Ask questions about your health    Make or cancel appointments    Discuss your medicines    Learn about your test results    Speak to your doctor   If you have compliments or concerns about an experience at your clinic, or if you wish to file a complaint, please contact [facility] Patient Relations at [phone] or email us at [email or web address]         Additional Information About Your Visit        [employer] Information     [employer] gives you secure access to your electronic health record. If you see a primary care provider, you can also send messages to your care team and make appointments. If you have questions, please call your primary care clinic.  If you do not have a primary care provider, please call [phone] and they will assist you.      [employer] is an electronic gateway that provides easy, online access to your medical records. With [employer], you can request a clinic appointment, read your test results, renew a prescription or communicate with your care team.     To access your existing account, please contact your [facility] or call [phone] for assistance.        Care EveryWhere ID     This is your Care EveryWhere ID. This could be used by other organizations to access your [place] medical records  [id number]        Your Vitals Were     Height BMI (Body Mass Index)                1.651 m (5' 5\") 27.96 kg/m2           Blood Pressure from Last 3 Encounters:   06/07/17 115/79   05/22/17 128/80   05/05/17 140/86    Weight from Last 3 Encounters:   08/02/17 76.2 kg (168 lb)   07/17/17 74.6 kg (164 lb 6.4 oz)   06/07/17 72.6 kg (160 lb)              We Performed the Following     Silvana-Operative Worksheet        Primary Care Provider Office Phone # Fax #    Vj Centeno MD " [phone]        PHYSICIANS [address] [facility] [address]  [facility] [address]        Equal Access to Services     KAYLA BELL : Hadsebas aad ku hadzeb Valdez, wamagdalenada luqeuefmia, qasalota kapaulda sarah, naif nowaksweetie janeth. So [facility] [phone].    ATENCIÓN: Si habla español, tiene a vila disposición servicios gratuitos de asistencia lingüística. Llame al [phone].    We comply with applicable federal civil rights laws and [place] laws. We do not discriminate on the basis of race, color, national origin, age, disability sex, sexual orientation or gender identity.            Thank you!     Thank you for choosing EYE CLINIC  for your care. Our goal is always to provide you with excellent care. Hearing back from our patients is one way we can continue to improve our services. Please take a few minutes to complete the written survey that you may receive in the mail after your visit with us. Thank you!             Your Updated Medication List - Protect others around you: Learn how to safely use, store and throw away your medicines at www.disposemymeds.org.          This list is accurate as of: 8/2/17  3:58 PM.  Always use your most recent med list.                   Brand Name Dispense Instructions for use Diagnosis    albuterol 108 (90 BASE) MCG/ACT Inhaler    PROAIR HFA/PROVENTIL HFA/VENTOLIN HFA    1 Inhaler    Inhale 2 puffs into the lungs every 6 hours    Reactive airway disease, mild persistent, uncomplicated       ATIVAN 2 MG tablet   Generic drug:  LORazepam      Take 2 mg by mouth At Bedtime 2 tablets at night    Other chronic pain       budesonide-formoterol 80-4.5 MCG/ACT Inhaler    SYMBICORT    1 Inhaler    Inhale 2 puffs into the lungs 2 times daily    Reactive airway disease, mild persistent, uncomplicated       cetirizine 10 MG tablet    zyrTEC     Take 10 mg by mouth daily.        conjugated estrogens cream    PREMARIN    180 g    Place 2 g vaginally daily     Essential hypertension       Gel Heel Cushions Womens Pads     1 Device    1 Device daily    Plantar fasciitis       hydrochlorothiazide 25 MG tablet    HYDRODIURIL    100 tablet    Take 1 tablet (25 mg) by mouth daily    Essential hypertension       Levothyroxine Sodium 50 MCG Caps     100 capsule    1 daily    Hypothyroidism, unspecified type       lisinopril 30 MG tablet    PRINIVIL,ZESTRIL    100 tablet    Take 1 tablet (30 mg) by mouth At Bedtime    Benign essential hypertension       Lysine 1000 MG Tabs       Other chronic pain, Mixed cryoglobulinemia (H), History of hepatitis C, Secondary hypertension, hypertension with unspecified goal       order for DME     1 Device    1 Device by Device route daily as needed Air filtration system    Chronic bronchitis, unspecified chronic bronchitis type (H)       penciclovir 1 % cream    DENAVIR    1.5 g    Apply cream at the first sign or symptom of cold sore (eg, tingling, swelling); apply every 2 hours during waking hours for 4 days.    Recurrent cold sores       traMADol 50 MG tablet    ULTRAM    360 tablet    Take 1-2 tablets ( mg) by mouth every 6 hours as needed    Other chronic pain, Mixed cryoglobulinemia (H), History of hepatitis C       traZODone 50 MG tablet    DESYREL     Take 1 mg by mouth At Bedtime

## 2017-08-06 ENCOUNTER — OFFICE VISIT (OUTPATIENT)
Dept: URGENT CARE | Facility: URGENT CARE | Age: 62
End: 2017-08-06
Payer: MEDICARE

## 2017-08-06 VITALS
BODY MASS INDEX: 27.32 KG/M2 | DIASTOLIC BLOOD PRESSURE: 80 MMHG | HEIGHT: 65 IN | SYSTOLIC BLOOD PRESSURE: 122 MMHG | OXYGEN SATURATION: 96 % | TEMPERATURE: 96.4 F | WEIGHT: 164 LBS | HEART RATE: 72 BPM

## 2017-08-06 DIAGNOSIS — H10.33 ACUTE BACTERIAL CONJUNCTIVITIS OF BOTH EYES: Primary | ICD-10-CM

## 2017-08-06 PROCEDURE — 99213 OFFICE O/P EST LOW 20 MIN: CPT | Performed by: FAMILY MEDICINE

## 2017-08-06 RX ORDER — GENTAMICIN SULFATE 3 MG/ML
1 SOLUTION/ DROPS OPHTHALMIC 4 TIMES DAILY
Qty: 5 ML | Refills: 0 | Status: SHIPPED | OUTPATIENT
Start: 2017-08-06 | End: 2017-08-12

## 2017-08-06 NOTE — PROGRESS NOTES
SUBJECTIVE:   Chief Complaint   Patient presents with     Urgent Care     Pt in clinic c/o eye irritation.     Eye Problem     Sarah Sanford is a 61 year old female with burning, redness, discharge and mattering in both eyes for 1 days.  No other symptoms.  No significant prior ophthalmological history. No change in visual acuity, no photophobia, no severe eye pain.     Patient does not  use contact lenses.  She was evaluated by ophthalmology for right cataract 4 and 6 days ago in preparation of surgery.    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     Nephrolithiasis     Hx of stones     Obesity      Uncomplicated asthma     from second smoke in building       ALLERGIES:  No clinical screening - see comments; Erythromycin; Keflex [cephalexin hcl]; Penicillins; Sulfa drugs; and Tetracycline      Current Outpatient Prescriptions on File Prior to Visit:  conjugated estrogens (PREMARIN) cream Place 2 g vaginally daily   traMADol (ULTRAM) 50 MG tablet Take 1-2 tablets ( mg) by mouth every 6 hours as needed   lisinopril (PRINIVIL,ZESTRIL) 30 MG tablet Take 1 tablet (30 mg) by mouth At Bedtime   Levothyroxine Sodium 50 MCG CAPS 1 daily   penciclovir (DENAVIR) 1 % cream Apply cream at the first sign or symptom of cold sore (eg, tingling, swelling); apply every 2 hours during waking hours for 4 days.   albuterol (PROAIR HFA/PROVENTIL HFA/VENTOLIN HFA) 108 (90 BASE) MCG/ACT Inhaler Inhale 2 puffs into the lungs every 6 hours   budesonide-formoterol (SYMBICORT) 80-4.5 MCG/ACT Inhaler Inhale 2 puffs  "into the lungs 2 times daily   order for DME 1 Device by Device route daily as needed Air filtration system   hydrochlorothiazide (HYDRODIURIL) 25 MG tablet Take 1 tablet (25 mg) by mouth daily   Lysine 1000 MG TABS    Foot Care Products (GEL HEEL CUSHIONS WOMENS) PADS 1 Device daily   LORazepam (ATIVAN) 2 MG tablet Take 2 mg by mouth At Bedtime 2 tablets at night   traZODone (DESYREL) 50 MG tablet Take 1 mg by mouth At Bedtime    cetirizine (ZYRTEC) 10 MG tablet Take 10 mg by mouth daily.     No current facility-administered medications on file prior to visit.     Social History   Substance Use Topics     Smoking status: Never Smoker     Smokeless tobacco: Never Used     Alcohol use Yes      Comment: occ.       Family History   Problem Relation Age of Onset     Asthma Daughter      CANCER Maternal Grandmother      female     Alcohol/Drug Mother      Lipids Mother      Hypertension Mother      Psychotic Disorder Mother      Alcohol/Drug Maternal Grandfather      Glaucoma No family hx of      Macular Degeneration No family hx of      Melanoma No family hx of      Skin Cancer No family hx of          ROS:  CONSTITUTIONAL:NEGATIVE for fever, chills, change in weight  INTEGUMENTARY/SKIN: NEGATIVE for worrisome rashes, moles or lesions  ENT/MOUTH: NEGATIVE for ear, mouth and throat problems  GI: NEGATIVE for nausea, abdominal pain, heartburn, or change in bowel habits    OBJECTIVE:   /80  Pulse 72  Temp 96.4  F (35.8  C) (Tympanic)  Ht 5' 5\" (1.651 m)  Wt 164 lb (74.4 kg)  SpO2 96%  BMI 27.29 kg/m2    Patient appears well, vitals signs are normal. Eyes: both eyes with findings of typical conjunctivitis noted; erythema and discharge. PERRLA, no foreign body noted. No periorbital cellulitis. The corneas are clear and fundi normal left, cataract right. Visual acuity unchanged    Nose:  Mild swelling of turbinates bilateral, with clear discharge  Face:  No sinus tenderness  Ears: no erythema, no swelling of the " bilateral ear canals.  TM's pearly bilateral  Mouth:  Pharynx, no erythema, no swelling  Neck: no cervical lymphadenopathy    ASSESSMENT:   Acute bacterial conjunctivitis of both eyes      - gentamicin (GARAMYCIN) 0.3 % ophthalmic solution; Place 1 drop into both eyes 4 times daily for 6 days     Antibiotic drops per order. Hygiene discussed- frequent hand washing and to not share towels, washcloths or pillows to prevent transmission within the household.   Call prn.

## 2017-08-06 NOTE — PATIENT INSTRUCTIONS
What Is Conjunctivitis?    Conjunctivitis is an irritation or infection. It affects the membrane that covers the white of your eye and the inside of your eyelid (conjunctiva). It can happen to one or both eyes. The membrane swells and the blood vessels enlarge (dilate). This makes your eye red. That's why conjunctivitis is sometimes called red eye or pink eye.  What are the symptoms?  If you have one or more of these symptoms, see an eye doctor:    Redness in and around your eye    Eyes that are puffy and sore    Itching, burning, or stinging eyes    Watery eyes or discharge from your eye    Eyelids that are crusty or stuck together when you wake up in the morning    Pink color in the whites of one or both eyes  Getting treatment quickly can help prevent damage to your eyes.  How is it diagnosed?  Conjunctivitis is usually a minor eye infection. But it can sometimes become a more serious problem. Some more serious eye diseases have symptoms that look like conjunctivitis. So it's important for an eye doctor to diagnose you. Your eye doctor will ask about your symptoms and any medicines you take. He or she will ask about any illnesses or medical conditions you may have. The doctor will also check your eyes with a hand-held light and a special microscope called a slit lamp.  Date Last Reviewed: 6/11/2015 2000-2017 [employer]. [address] [place] [address], [place] [address]. All rights reserved. This information is not intended as a substitute for professional medical care. Always follow your healthcare professional's instructions.

## 2017-08-06 NOTE — MR AVS SNAPSHOT
After Visit Summary   8/6/2017    Sarah Sanford    MRN: [id number]           Patient Information     Date Of Birth          1955        Visit Information        Provider Department      8/6/2017 10:05 AM Rochelle Joseph MD [facility]        Today's Diagnoses     Acute bacterial conjunctivitis of both eyes    -  1      Care Instructions      What Is Conjunctivitis?    Conjunctivitis is an irritation or infection. It affects the membrane that covers the white of your eye and the inside of your eyelid (conjunctiva). It can happen to one or both eyes. The membrane swells and the blood vessels enlarge (dilate). This makes your eye red. That's why conjunctivitis is sometimes called red eye or pink eye.  What are the symptoms?  If you have one or more of these symptoms, see an eye doctor:    Redness in and around your eye    Eyes that are puffy and sore    Itching, burning, or stinging eyes    Watery eyes or discharge from your eye    Eyelids that are crusty or stuck together when you wake up in the morning    Pink color in the whites of one or both eyes  Getting treatment quickly can help prevent damage to your eyes.  How is it diagnosed?  Conjunctivitis is usually a minor eye infection. But it can sometimes become a more serious problem. Some more serious eye diseases have symptoms that look like conjunctivitis. So it's important for an eye doctor to diagnose you. Your eye doctor will ask about your symptoms and any medicines you take. He or she will ask about any illnesses or medical conditions you may have. The doctor will also check your eyes with a hand-held light and a special microscope called a slit lamp.  Date Last Reviewed: 6/11/2015 2000-2017 [employer]. [address] [place] [address]. All rights reserved. This information is not intended as a substitute for professional medical care. Always follow your healthcare professional's  instructions.                Follow-ups after your visit        Your next 10 appointments already scheduled     Aug 28, 2017  2:45 PM CDT   LAB with  LAB   [facility] ([facility])    [address] [place] [address]  1st [facility] [id number] [phone]           Patient must bring picture ID. Patient should be prepared to give a urine specimen  Please do not eat 10-12 hours before your appointment if you are coming in fasting for labs on lipids, cholesterol, or glucose (sugar). Pregnant women should follow their Care Team instructions. Water with medications is okay. Do not drink coffee or other fluids. If you have concerns about taking  your medications, please ask at office or if scheduling via [employer], send a message by clicking on Secure Messaging, Message Your Care Team.            Aug 28, 2017  3:40 PM CDT   (Arrive by 3:25 PM)   Return Visit with Vj Centeno MD   [facility] ([facility])    [address] [place] [address]  4th [facility] [id number] [phone]            Sep 20, 2017  6:25 PM CDT   (Arrive by 6:10 PM)   PRE-OP with Vj Centeno MD   [facility] ([facility])    [address] [place] [address]  4th [facility] [address] [phone]            Oct 02, 2017  1:00 PM CDT   Post-Op with Sylvia Grider MD   Eye Clinic ([facility])    [address]  [address]  [address]  [facility] [address]   [phone]            Oct 25, 2017  1:00 PM CDT   Post-Op with Sylvia Grider MD   Eye Clinic ([facility])    [address]  [address]  [address]  [facility] [address]   [phone]            Feb 07, 2018  1:00 PM CST   RETURN RETINA with Steph Cintron MD   Eye Clinic ([facility])    [address]  [address]  [address]  [facility] [address]  "  [phone]              Who to contact     If you have questions or need follow up information about today's clinic visit or your schedule please contact [facility] directly at [phone].  Normal or non-critical lab and imaging results will be communicated to you by MyChart, letter or phone within 4 business days after the clinic has received the results. If you do not hear from us within 7 days, please contact the clinic through Meet You[employer] or phone. If you have a critical or abnormal lab result, we will notify you by phone as soon as possible.  Submit refill requests through [employer] or call your pharmacy and they will forward the refill request to us. Please allow 3 business days for your refill to be completed.          Additional Information About Your Visit        Meet Youhart Information     [employer] gives you secure access to your electronic health record. If you see a primary care provider, you can also send messages to your care team and make appointments. If you have questions, please call your primary care clinic.  If you do not have a primary care provider, please call [phone] and they will assist you.        Care EveryWhere ID     This is your Care EveryWhere ID. This could be used by other organizations to access your [facility] medical records  [id number]        Your Vitals Were     Pulse Temperature Height Pulse Oximetry BMI (Body Mass Index)       72 96.4  F (35.8  C) (Tympanic) 5' 5\" (1.651 m) 96% 27.29 kg/m2        Blood Pressure from Last 3 Encounters:   08/06/17 122/80   06/07/17 115/79   05/22/17 128/80    Weight from Last 3 Encounters:   08/06/17 164 lb (74.4 kg)   08/02/17 168 lb (76.2 kg)   07/17/17 164 lb 6.4 oz (74.6 kg)              Today, you had the following     No orders found for display         Today's Medication Changes          These changes are accurate as of: 8/6/17 10:32 AM.  If you have any questions, ask your nurse or doctor.               Start " taking these medicines.        Dose/Directions    gentamicin 0.3 % ophthalmic solution   Commonly known as:  GARAMYCIN   Used for:  Acute bacterial conjunctivitis of both eyes   Started by:  Rochelle Joseph MD        Dose:  1 drop   Place 1 drop into both eyes 4 times daily for 6 days   Quantity:  5 mL   Refills:  0            Where to get your medicines      These medications were sent to [id number] - [place], [place] - [address] AT [address]  [address], [place] [address]     Phone:  [phone]     gentamicin 0.3 % ophthalmic solution                Primary Care Provider Office Phone # Fax #    Vj Tin Centeno [phone]        [facility] [address] [place] [address]        Equal Access to Services     KAYLA BELL AH: Hadii nasir sandoval hadasho Soomaali, waaxda luqadaha, qaybta kaalmada adeegyada, waxay ashli fowler. So [facility] [phone].    ATENCIÓN: Si habla español, tiene a vila disposición servicios gratuitos de asistencia lingüística. Joby al [phone].    We comply with applicable federal civil rights laws and [place] laws. We do not discriminate on the basis of race, color, national origin, age, disability sex, sexual orientation or gender identity.            Thank you!     Thank you for choosing [facility]  for your care. Our goal is always to provide you with excellent care. Hearing back from our patients is one way we can continue to improve our services. Please take a few minutes to complete the written survey that you may receive in the mail after your visit with us. Thank you!             Your Updated Medication List - Protect others around you: Learn how to safely use, store and throw away your medicines at www.disposemymeds.org.          This list is accurate as of: 8/6/17 10:32 AM.  Always use your most recent med list.                   Brand Name Dispense  Instructions for use Diagnosis    albuterol 108 (90 BASE) MCG/ACT Inhaler    PROAIR HFA/PROVENTIL HFA/VENTOLIN HFA    1 Inhaler    Inhale 2 puffs into the lungs every 6 hours    Reactive airway disease, mild persistent, uncomplicated       ATIVAN 2 MG tablet   Generic drug:  LORazepam      Take 2 mg by mouth At Bedtime 2 tablets at night    Other chronic pain       budesonide-formoterol 80-4.5 MCG/ACT Inhaler    SYMBICORT    1 Inhaler    Inhale 2 puffs into the lungs 2 times daily    Reactive airway disease, mild persistent, uncomplicated       cetirizine 10 MG tablet    zyrTEC     Take 10 mg by mouth daily.        conjugated estrogens cream    PREMARIN    180 g    Place 2 g vaginally daily    Essential hypertension       Gel Heel Cushions Womens Pads     1 Device    1 Device daily    Plantar fasciitis       gentamicin 0.3 % ophthalmic solution    GARAMYCIN    5 mL    Place 1 drop into both eyes 4 times daily for 6 days    Acute bacterial conjunctivitis of both eyes       hydrochlorothiazide 25 MG tablet    HYDRODIURIL    100 tablet    Take 1 tablet (25 mg) by mouth daily    Essential hypertension       Levothyroxine Sodium 50 MCG Caps     100 capsule    1 daily    Hypothyroidism, unspecified type       lisinopril 30 MG tablet    PRINIVIL,ZESTRIL    100 tablet    Take 1 tablet (30 mg) by mouth At Bedtime    Benign essential hypertension       Lysine 1000 MG Tabs       Other chronic pain, Mixed cryoglobulinemia (H), History of hepatitis C, Secondary hypertension, hypertension with unspecified goal       order for DME     1 Device    1 Device by Device route daily as needed Air filtration system    Chronic bronchitis, unspecified chronic bronchitis type (H)       penciclovir 1 % cream    DENAVIR    1.5 g    Apply cream at the first sign or symptom of cold sore (eg, tingling, swelling); apply every 2 hours during waking hours for 4 days.    Recurrent cold sores       traMADol 50 MG tablet    ULTRAM    360 tablet     Take 1-2 tablets ( mg) by mouth every 6 hours as needed    Other chronic pain, Mixed cryoglobulinemia (H), History of hepatitis C       traZODone 50 MG tablet    DESYREL     Take 1 mg by mouth At Bedtime

## 2017-08-06 NOTE — NURSING NOTE
"Chief Complaint   Patient presents with     Urgent Care     Pt in clinic c/o eye irritation.     Eye Problem       Initial /80  Pulse 72  Temp 96.4  F (35.8  C) (Tympanic)  Ht 5' 5\" (1.651 m)  Wt 164 lb (74.4 kg)  SpO2 96%  BMI 27.29 kg/m2 Estimated body mass index is 27.29 kg/(m^2) as calculated from the following:    Height as of this encounter: 5' 5\" (1.651 m).    Weight as of this encounter: 164 lb (74.4 kg).  Medication Reconciliation: complete   Kyleigh De La Rosa/ MA    "

## 2017-08-08 ENCOUNTER — TELEPHONE (OUTPATIENT)
Dept: OPHTHALMOLOGY | Facility: CLINIC | Age: 62
End: 2017-08-08

## 2017-08-08 NOTE — TELEPHONE ENCOUNTER
Left eye foreign boday sensation last Friday and then in right eye on Sunday  Redness/itching.  Some tearing  No mattering/discharge  Some left eye blurring of vision on Friday  Seen in urgent care and gentamycin 4/day for 6 days prescribed  Pt states been having improvement temporarily before symptoms come back  Pt used tears on Friday that helped with symptoms, but stopped after starting the gentamycin    Pt has anxiety and concerned about eyes  Offered appt today and pt decided to hold til tomorrow-- has appt elsewhere and takes hours with public transportation  Reviewed conjunctivitis large majority is viral and takes time to resolve-- symptoms may peak around 4 days   Recommended starting cool compresses, resume artificial tears-- may try cool tears for comfort/itching  Reviewed good hand hygiene and clean commonly touched areas and will need to wash pillow cases/towel when resolved (pt lives at home by self)  Pt will monitor for and worsening symptoms and call direct triage number for appt today and may call after hours for worsening symptoms (after hour protocols reviewed)    Pt seemed comfortable with plan of care    Note to resident on call for review  Bo Good RN 10:15 AM 08/08/17

## 2017-08-08 NOTE — TELEPHONE ENCOUNTER
----- Message from Chary Ledezma sent at 8/8/2017  9:30 AM CDT -----  Regarding: Pt has infection in both eyes since last Fri nite, she went to  and was given...  Contact: [phone]  Drops, but drops are not working.  Pt is scared.  I scheduled her to see Dr. Grider tomorrow, Wed. 8/9/17 at 1:15pm.  Would you call pt and check-in on her, she is scared.  Pt has a doctor appt (for another issue) this afternoon on the other side of the [facility] and needs to take public transporation for 2 hours.  Will be getting ready soon to leave home.  Question:  Should pt be seen TODAY for this infection?    Please call pt at [phone].    Thank you,  Bonilla MCFARLAND    Please DO NOT send this message and/or reply back to sender.  Call Center Representatives DO NOT respond to messages.

## 2017-08-09 ENCOUNTER — OFFICE VISIT (OUTPATIENT)
Dept: OPHTHALMOLOGY | Facility: CLINIC | Age: 62
End: 2017-08-09
Attending: OPHTHALMOLOGY
Payer: MEDICARE

## 2017-08-09 DIAGNOSIS — H01.02B SQUAMOUS BLEPHARITIS OF UPPER AND LOWER EYELIDS OF BOTH EYES: Primary | ICD-10-CM

## 2017-08-09 DIAGNOSIS — H01.02A SQUAMOUS BLEPHARITIS OF UPPER AND LOWER EYELIDS OF BOTH EYES: Primary | ICD-10-CM

## 2017-08-09 PROCEDURE — 99212 OFFICE O/P EST SF 10 MIN: CPT

## 2017-08-09 RX ORDER — FLUOROMETHOLONE 0.1 %
1 SUSPENSION, DROPS(FINAL DOSAGE FORM)(ML) OPHTHALMIC (EYE) 2 TIMES DAILY
Qty: 1 BOTTLE | Refills: 0 | Status: SHIPPED | OUTPATIENT
Start: 2017-08-09 | End: 2017-09-20

## 2017-08-09 ASSESSMENT — VISUAL ACUITY
OS_CC: 20/25
OD_CC: 20/125
CORRECTION_TYPE: GLASSES
METHOD: SNELLEN - LINEAR
OD_PH_CC: 20/40

## 2017-08-09 ASSESSMENT — CONF VISUAL FIELD
METHOD: COUNTING FINGERS
OS_NORMAL: 1
OD_NORMAL: 1

## 2017-08-09 ASSESSMENT — TONOMETRY
OS_IOP_MMHG: 14
IOP_METHOD: TONOPEN
OD_IOP_MMHG: 14

## 2017-08-09 ASSESSMENT — SLIT LAMP EXAM - LIDS
COMMENTS: MGD, LID MARGIN TELANGIECTASIAS
COMMENTS: MGD, LID MARGIN TELANGIECTASIAS

## 2017-08-09 ASSESSMENT — EXTERNAL EXAM - RIGHT EYE: OD_EXAM: NORMAL

## 2017-08-09 ASSESSMENT — CUP TO DISC RATIO
OD_RATIO: 0.2
OS_RATIO: 0.2

## 2017-08-09 NOTE — MR AVS SNAPSHOT
After Visit Summary   8/9/2017    Sarah Sanford    MRN: [id number]           Patient Information     Date Of Birth          1955        Visit Information        Provider Department      8/9/2017 1:15 PM Sylvia Grider MD [facility] Clinic        Today's Diagnoses     Squamous blepharitis of upper and lower eyelids of both eyes    -  1       Follow-ups after your visit        Follow-up notes from your care team     Return in about 2 weeks (around 8/23/2017), or if symptoms worsen or fail to improve.      Your next 10 appointments already scheduled     Aug 23, 2017 12:30 PM CDT   RETURN GENERAL with Sylvia Grider MD   Eye Clinic ([facility])    [address]  [address] [facility]  9th Fl Clin 9a  [facility] [id number] [phone]            Aug 28, 2017  2:45 PM CDT   LAB with  LAB   [facility] ([facility])    [address] [place] [address]  [address] [facility] [id number] [phone]           Patient must bring picture ID. Patient should be prepared to give a urine specimen  Please do not eat 10-12 hours before your appointment if you are coming in fasting for labs on lipids, cholesterol, or glucose (sugar). Pregnant women should follow their Care Team instructions. Water with medications is okay. Do not drink coffee or other fluids. If you have concerns about taking  your medications, please ask at office or if scheduling via [employer], send a message by clicking on Secure Messaging, Message Your Care Team.            Aug 28, 2017  3:40 PM CDT   (Arrive by 3:25 PM)   Return Visit with Vj Centeno MD   [facility] ([facility])    [address] [place] [address]  [address] [facility] [id number] [phone]            Sep 20, 2017  6:25 PM CDT   (Arrive by 6:10 PM)   PRE-OP with Vj Centeno MD   [facility] ([facility])    [address] [place] [address]  Floor  [facility] [address] [phone]            Sep 29, 2017   Procedure with Sylvia Grider MD   [facility] PeriOP Services (--)    [address]  [place] [address]   [phone]            Oct 02, 2017  1:00 PM CDT   Post-Op with Sylvia Grider MD   Eye Clinic ([facility])    [address]  [address] Clin [address]  [facility] [address]   [phone]            Oct 25, 2017  1:00 PM CDT   Post-Op with Sylvia Grider MD   Eye Clinic ([facility])    [address]  [address] [facility]  [address] Clin 9a  [facility] [address]   [phone]            Feb 07, 2018  1:00 PM CST   RETURN RETINA with Steph Cintron MD   Eye Clinic ([facility])    [address]  [address] [facility]  [address] Clin 9a  [facility] [address]   [phone]              Who to contact     Please call your clinic at [phone] to:    Ask questions about your health    Make or cancel appointments    Discuss your medicines    Learn about your test results    Speak to your doctor   If you have compliments or concerns about an experience at your clinic, or if you wish to file a complaint, please contact [facility] Patient Relations at [phone] or email us at [email or web address]         Additional Information About Your Visit        Clinklehart Information     [email or web address] gives you secure access to your electronic health record. If you see a primary care provider, you can also send messages to your care team and make appointments. If you have questions, please call your primary care clinic.  If you do not have a primary care provider, please call [phone] and they will assist you.      [email or web address] is an electronic gateway that provides easy, online access to your medical records. With [email or web address], you can request a clinic appointment, read your test results, renew a prescription or communicate with your  care team.     To access your existing account, please contact your [facility] or call [phone] for assistance.        Care EveryWhere ID     This is your Care EveryWhere ID. This could be used by other organizations to access your [place] medical records  [id number]         Blood Pressure from Last 3 Encounters:   08/06/17 122/80   06/07/17 115/79   05/22/17 128/80    Weight from Last 3 Encounters:   08/06/17 74.4 kg (164 lb)   08/02/17 76.2 kg (168 lb)   07/17/17 74.6 kg (164 lb 6.4 oz)              Today, you had the following     No orders found for display         Today's Medication Changes          These changes are accurate as of: 8/9/17  2:20 PM.  If you have any questions, ask your nurse or doctor.               Start taking these medicines.        Dose/Directions    fluorometholone 0.1 % ophthalmic susp   Commonly known as:  FML LIQUIFILM   Used for:  Squamous blepharitis of upper and lower eyelids of both eyes   Started by:  Sylvia Grider MD        Dose:  1 drop   Place 1 drop into both eyes 2 times daily   Quantity:  1 Bottle   Refills:  0            Where to get your medicines      These medications were sent to [place] [address] [place] [facility] [address] [facility]  [address] [facility] [address]     Phone:  [phone]     fluorometholone 0.1 % ophthalmic susp                Primary Care Provider Office Phone # Fax #    Vj Tin Centeno [phone]       [address] [place] [address]        Equal Access to Services     KAYLA BELL AH: Hadii nasir ku hadasho Soomaali, waaxda luqadaha, qaybta kaalmada marcoegyada, naif fowler. So [facility] [phone].    ATENCIÓN: Si habla español, tiene a vila disposición servicios gratuitos de asistencia lingüística. Llame al [phone].    We comply with applicable federal civil rights laws and [place] laws. We do not discriminate on the basis of race,  color, national origin, age, disability sex, sexual orientation or gender identity.            Thank you!     Thank you for choosing EYE CLINIC  for your care. Our goal is always to provide you with excellent care. Hearing back from our patients is one way we can continue to improve our services. Please take a few minutes to complete the written survey that you may receive in the mail after your visit with us. Thank you!             Your Updated Medication List - Protect others around you: Learn how to safely use, store and throw away your medicines at www.disposemymeds.org.          This list is accurate as of: 8/9/17  2:20 PM.  Always use your most recent med list.                   Brand Name Dispense Instructions for use Diagnosis    albuterol 108 (90 BASE) MCG/ACT Inhaler    PROAIR HFA/PROVENTIL HFA/VENTOLIN HFA    1 Inhaler    Inhale 2 puffs into the lungs every 6 hours    Reactive airway disease, mild persistent, uncomplicated       ATIVAN 2 MG tablet   Generic drug:  LORazepam      Take 2 mg by mouth At Bedtime 2 tablets at night    Other chronic pain       budesonide-formoterol 80-4.5 MCG/ACT Inhaler    SYMBICORT    1 Inhaler    Inhale 2 puffs into the lungs 2 times daily    Reactive airway disease, mild persistent, uncomplicated       cetirizine 10 MG tablet    zyrTEC     Take 10 mg by mouth daily.        conjugated estrogens cream    PREMARIN    180 g    Place 2 g vaginally daily    Essential hypertension       fluorometholone 0.1 % ophthalmic susp    FML LIQUIFILM    1 Bottle    Place 1 drop into both eyes 2 times daily    Squamous blepharitis of upper and lower eyelids of both eyes       Gel Heel Cushions Womens Pads     1 Device    1 Device daily    Plantar fasciitis       gentamicin 0.3 % ophthalmic solution    GARAMYCIN    5 mL    Place 1 drop into both eyes 4 times daily for 6 days    Acute bacterial conjunctivitis of both eyes       hydrochlorothiazide 25 MG tablet    HYDRODIURIL    100 tablet     Take 1 tablet (25 mg) by mouth daily    Essential hypertension       Levothyroxine Sodium 50 MCG Caps     100 capsule    1 daily    Hypothyroidism, unspecified type       lisinopril 30 MG tablet    PRINIVIL,ZESTRIL    100 tablet    Take 1 tablet (30 mg) by mouth At Bedtime    Benign essential hypertension       Lysine 1000 MG Tabs       Other chronic pain, Mixed cryoglobulinemia (H), History of hepatitis C, Secondary hypertension, hypertension with unspecified goal       order for DME     1 Device    1 Device by Device route daily as needed Air filtration system    Chronic bronchitis, unspecified chronic bronchitis type (H)       penciclovir 1 % cream    DENAVIR    1.5 g    Apply cream at the first sign or symptom of cold sore (eg, tingling, swelling); apply every 2 hours during waking hours for 4 days.    Recurrent cold sores       traMADol 50 MG tablet    ULTRAM    360 tablet    Take 1-2 tablets ( mg) by mouth every 6 hours as needed    Other chronic pain, Mixed cryoglobulinemia (H), History of hepatitis C       traZODone 50 MG tablet    DESYREL     Take 1 mg by mouth At Bedtime

## 2017-08-09 NOTE — PROGRESS NOTES
HPI: Karin (preferred name) Juvenal is a 61 y.o F w/ hx of BRVO in RE w/ Vit heme s/p PPV/EL/FAX OD on 2/14/17 here for evaluation of left eye redness and irritation for the last 5 days. The symptoms then began in the right eye a day or so after that. She was seen in Urgent Care and prescribed gentamicin drops. These have not helped.     POHx:  Vitreous Hemorrhage RE s/p PPV/ EL/FAX OD 2/14/17  Cataract RE    Assessment & Plan   (H01.021,  H01.022,  H01.024,  H01.025) Squamous blepharitis of upper and lower eyelids of both eyes  (primary encounter diagnosis)  Comment: Symptomatic  Plan:   PFATs at least 3x daily  Warm compresses OU BID  FML BID OU    NOT ADDRESSED TODAY:    (H25.13) Nuclear cataract of both eyes  (primary encounter diagnosis)  Comment: Visually significant OS >> OD with dense nuclear sclerosis right eye.    Dilates to: 8 mm  Alpha blockers/Flomax: None  Trauma/Pseudoxfoliation: PPVx OD  Fuchs dystrophy/guttae: None    Diabetes: No  Anticoagulation: None    Cyl: 0.29 @ 074 OD, 0.40 @ 119 OS    We discussed the risks and benefits of cataract surgery in the right eye, and informed consent was obtained.  Proceed with CE/IOL OD.    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  Plan: Seen by Dr. Cintron today who feels the retina is stable.     Return in 2 weeks if symptoms have not improved.    Teaching statement:  Complete documentation of historical and exam elements from today's encounter can be found in the full encounter summary report (not reduplicated in this progress note). I personally obtained the chief complaint(s) and history of present illness.  I confirmed and edited as  necessary the review of systems, past medical/surgical history, family history, social history, and examination findings as documented by others; and I examined the patient myself. I personally reviewed the relevant tests, images, and reports as documented above.     I formulated and edited as necessary the assessment and plan and discussed the findings and management plan with the patient and family.    Sylvia Grider MD  Comprehensive Ophthalmology & Ocular Pathology  Department of Ophthalmology and Visual Neurosciences  [email or web address]  Pager [phone]

## 2017-08-09 NOTE — NURSING NOTE
Chief Complaints and History of Present Illnesses   Patient presents with     Consult For     itching and burning BE     HPI    Affected eye(s):  Both   Symptoms:     Redness (Comment: BE)   Foreign body sensation (Comment: BE)   Itching (Comment: BE)   Burning (Comment: BE)      Frequency:  Constant       Do you have eye pain now?:  Yes   Location:  OU   Pain Level:  Mild Pain (2)   Pain Frequency:  Intermittent   Pain Characteristics:  Burning      Comments:  Itching, burning, redness, painful Ariela 6 days    seen in urgent care using gentamicin qid BE  No improvement on drops   Dorcas Garcia COA 1:15 PM August 9, 2017

## 2017-08-14 DIAGNOSIS — E03.9 HYPOTHYROIDISM: Primary | ICD-10-CM

## 2017-08-15 RX ORDER — LEVOTHYROXINE SODIUM 50 UG/1
50 TABLET ORAL DAILY
Qty: 100 TABLET | Refills: 2 | Status: SHIPPED | OUTPATIENT
Start: 2017-08-15 | End: 2018-05-16

## 2017-08-23 ENCOUNTER — OFFICE VISIT (OUTPATIENT)
Dept: OPHTHALMOLOGY | Facility: CLINIC | Age: 62
End: 2017-08-23
Attending: OPHTHALMOLOGY
Payer: MEDICARE

## 2017-08-23 DIAGNOSIS — H01.01B ULCERATIVE BLEPHARITIS OF BOTH UPPER AND LOWER EYELID OF LEFT EYE: Primary | ICD-10-CM

## 2017-08-23 PROCEDURE — 99213 OFFICE O/P EST LOW 20 MIN: CPT | Mod: ZF

## 2017-08-23 RX ORDER — NEOMYCIN SULFATE, POLYMYXIN B SULFATE, AND DEXAMETHASONE 3.5; 10000; 1 MG/G; [USP'U]/G; MG/G
1 OINTMENT OPHTHALMIC AT BEDTIME
Qty: 1 TUBE | Refills: 3 | Status: SHIPPED | OUTPATIENT
Start: 2017-08-23 | End: 2017-09-20

## 2017-08-23 ASSESSMENT — VISUAL ACUITY
OD_CC: 20/60
OS_CC: 20/20
OD_PH_CC: 20/40
OS_CC+: -1
METHOD: SNELLEN - LINEAR

## 2017-08-23 ASSESSMENT — REFRACTION_WEARINGRX
OS_SPHERE: +0.25
OD_SPHERE: PLANO
OD_ADD: +2.50
OS_CYLINDER: +0.25
OS_ADD: +2.50
SPECS_TYPE: BIFOCAL
OD_AXIS: 133
OS_AXIS: 085
OD_CYLINDER: +0.50

## 2017-08-23 ASSESSMENT — EXTERNAL EXAM - RIGHT EYE: OD_EXAM: NORMAL

## 2017-08-23 ASSESSMENT — CUP TO DISC RATIO
OD_RATIO: 0.2
OS_RATIO: 0.2

## 2017-08-23 ASSESSMENT — CONF VISUAL FIELD
OS_NORMAL: 1
OD_NORMAL: 1
METHOD: COUNTING FINGERS

## 2017-08-23 ASSESSMENT — SLIT LAMP EXAM - LIDS
COMMENTS: MGD, LID MARGIN TELANGIECTASIAS
COMMENTS: MGD, LID MARGIN TELANGIECTASIAS

## 2017-08-23 ASSESSMENT — TONOMETRY
OS_IOP_MMHG: 17
OD_IOP_MMHG: 15
IOP_METHOD: TONOPEN

## 2017-08-23 NOTE — NURSING NOTE
Chief Complaints and History of Present Illnesses   Patient presents with     Follow Up For     foreign body feeling in left eye.     HPI    Affected eye(s):  Left   Symptoms:        Unknown duration    Frequency:  Constant       Do you have eye pain now?:  Yes   Location:  OS   Pain Level:  Moderate Pain (5)   Pain Duration:  2 weeks   Pain Frequency:  Constant      Comments:  She is here today complaining of a foreign body feeling in her left eye. This feeling has been present at least 2 weeks.   She has been using FML OU. She says she feels the drop go in her right eye but does not feel it go in her left  Saturday night she felt a swooshing feeling in her right eye. That feeling has not returned since then. She also saw dots and a brick. She feels this was also in her right eye.    Lee Baptiste COT 12:58 PM August 23, 2017

## 2017-08-23 NOTE — MR AVS SNAPSHOT
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      Your next 10 appointments already scheduled     Aug 28, 2017  2:45 PM CDT   LAB with  LAB   [facility] ([facility])    [address] [place] [address]  1st [facility] [id number] [phone]           Patient must bring picture ID. Patient should be prepared to give a urine specimen  Please do not eat 10-12 hours before your appointment if you are coming in fasting for labs on lipids, cholesterol, or glucose (sugar). Pregnant women should follow their Care Team instructions. Water with medications is okay. Do not drink coffee or other fluids. If you have concerns about taking  your medications, please ask at office or if scheduling via [employer], send a message by clicking on Secure Messaging, Message Your Care Team.            Aug 28, 2017  3:40 PM CDT   (Arrive by 3:25 PM)   Return Visit with Vj Centeno MD   [facility] ([facility])    [address] [place] [address]  4th [facility] [address] [phone]            Aug 30, 2017  1:45 PM CDT   RETURN GENERAL with Sylvia Grider MD   Eye Clinic ([facility])    [address] [facility]  [address] [facility]  [address] Clin 9a  [facility] [address]   [phone]            Sep 20, 2017  6:25 PM CDT   (Arrive by 6:10 PM)   PRE-OP with Vj Centeno MD   [facility] ([facility])    [address] [place] [address]  Floor  [facility] [address] [phone]            Sep 29, 2017   Procedure with Sylvia Grider MD   [facility] (--)    [address]  [place] [address]   [phone]            Oct 02, 2017  1:00 PM CDT   Post-Op with Sylvia Grider MD   Eye Clinic ([facility])    [address]  [address] Clin [address]a  [facility] [address]   [phone]            Oct 25, 2017  1:00 PM CDT   Post-Op with Sylvia Grider MD   Eye Clinic ([facility])    [address]  [address] [facility]  [address] Clin 9a  [facility] [address]   [phone]            Feb 07, 2018  1:00 PM CST   RETURN RETINA with Steph Cintron MD   Eye Clinic ([facility])    [address]  [address] [facility]  [address] Clin 9a  [facility] [address]   [phone]              Who to contact     Please call your clinic at [phone] to:    Ask questions about your health    Make or cancel appointments    Discuss your medicines    Learn about your test results    Speak to your doctor   If you have compliments or concerns about an experience at your clinic, or if you wish to file a complaint, please contact [facility] Patient Relations at [phone] or email us at [email or web address]         Additional Information About Your Visit        Taykeyhart Information     [employer] gives you secure access to your electronic health record. If you see a primary care provider, you can also send messages to your care team and make appointments. If you have questions, please call your primary care clinic.  If you do not have a primary care provider, please call [phone] and they will assist you.      [employer] is an electronic gateway that provides easy, online access to your medical records. With [employer], you can request a clinic appointment, read your test results, renew a prescription or communicate with your  care team.     To access your existing account, please contact your [facility] or call [phone] for assistance.        Care EveryWhere ID     This is your Care EveryWhere ID. This could be used by other organizations to access your [place] medical records  [id number]         Blood Pressure from Last 3 Encounters:   08/06/17 122/80   06/07/17 115/79   05/22/17 128/80    Weight from Last 3 Encounters:   08/06/17 74.4 kg (164 lb)   08/02/17 76.2 kg (168 lb)   07/17/17 74.6 kg (164 lb 6.4 oz)              Today, you had the following     No orders found for display         Today's Medication Changes          These changes are accurate as of: 8/23/17  1:33 PM.  If you have any questions, ask your nurse or doctor.               Start taking these medicines.        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        Dose:  1 Application   Place 1 Application Into the left eye At Bedtime   Quantity:  1 Tube   Refills:  3            Where to get your medicines      These medications were sent to [facility] [address] [facility] [address]  [address] [facility] [address] [id number]    Hours:  TRANSPLANT PHONE NUMBER [phone] Phone:  [phone]     neomycin-polymyxin-dexamethasone 3.5-77634-9.1 Oint ophthalmic ointment                Primary Care Provider Office Phone # Fax #    Vj Centeno [phone]       [address] [place] [address] [address]  [facility] [address]        Equal Access to Services     ARMANDO [facility] HospitalANGEL AH: Hadii nasir erwin Solorenza, wamagdalenada luqadaha, qaybta kaalmada adebatshevayada, naif fowler. So [facility] [phone].    ATENCIÓN: Si habla español, tiene a vila disposición servicios gratuitos de asistencia lingüística. Llame al  [phone].    We comply with applicable federal civil rights laws and [place] laws. We do not discriminate on the basis of race, color, national origin, age, disability sex, sexual orientation or gender identity.            Thank you!     Thank you for choosing EYE CLINIC  for your care. Our goal is always to provide you with excellent care. Hearing back from our patients is one way we can continue to improve our services. Please take a few minutes to complete the written survey that you may receive in the mail after your visit with us. Thank you!             Your Updated Medication List - Protect others around you: Learn how to safely use, store and throw away your medicines at www.disposemymeds.org.          This list is accurate as of: 8/23/17  1:33 PM.  Always use your most recent med list.                   Brand Name Dispense Instructions for use Diagnosis    albuterol 108 (90 BASE) MCG/ACT Inhaler    PROAIR HFA/PROVENTIL HFA/VENTOLIN HFA    1 Inhaler    Inhale 2 puffs into the lungs every 6 hours    Reactive airway disease, mild persistent, uncomplicated       ATIVAN 2 MG tablet   Generic drug:  LORazepam      Take 2 mg by mouth At Bedtime 2 tablets at night    Other chronic pain       budesonide-formoterol 80-4.5 MCG/ACT Inhaler    SYMBICORT    1 Inhaler    Inhale 2 puffs into the lungs 2 times daily    Reactive airway disease, mild persistent, uncomplicated       cetirizine 10 MG tablet    zyrTEC     Take 10 mg by mouth daily.        conjugated estrogens cream    PREMARIN    180 g    Place 2 g vaginally daily    Essential hypertension       fluorometholone 0.1 % ophthalmic susp    FML LIQUIFILM    1 Bottle    Place 1 drop into both eyes 2 times daily    Squamous blepharitis of upper and lower eyelids of both eyes       Gel Heel Cushions Womens Pads     1 Device    1 Device daily    Plantar fasciitis       hydrochlorothiazide 25 MG tablet    HYDRODIURIL    100 tablet    Take 1 tablet (25 mg) by mouth  daily    Essential hypertension       * Levothyroxine Sodium 50 MCG Caps     100 capsule    1 daily    Hypothyroidism, unspecified type       * levothyroxine 50 MCG tablet    SYNTHROID/LEVOTHROID    100 tablet    Take 1 tablet (50 mcg) by mouth daily    Hypothyroidism       lisinopril 30 MG tablet    PRINIVIL,ZESTRIL    100 tablet    Take 1 tablet (30 mg) by mouth At Bedtime    Benign essential hypertension       Lysine 1000 MG Tabs       Other chronic pain, Mixed cryoglobulinemia (H), History of hepatitis C, Secondary hypertension, hypertension with unspecified goal       neomycin-polymyxin-dexamethasone 3.5-73471-6.1 Oint ophthalmic ointment    MAXITROL    1 Tube    Place 1 Application Into the left eye At Bedtime    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    Recurrent cold sores       traMADol 50 MG tablet    ULTRAM    360 tablet    Take 1-2 tablets ( mg) by mouth every 6 hours as needed    Other chronic pain, Mixed cryoglobulinemia (H), History of hepatitis C       traZODone 50 MG tablet    DESYREL     Take 1 mg by mouth At Bedtime        * Notice:  This list has 2 medication(s) that are the same as other medications prescribed for you. Read the directions carefully, and ask your doctor or other care provider to review them with you.

## 2017-08-28 ENCOUNTER — OFFICE VISIT (OUTPATIENT)
Dept: INTERNAL MEDICINE | Facility: CLINIC | Age: 62
End: 2017-08-28

## 2017-08-28 VITALS
DIASTOLIC BLOOD PRESSURE: 86 MMHG | HEART RATE: 66 BPM | WEIGHT: 168.2 LBS | SYSTOLIC BLOOD PRESSURE: 134 MMHG | BODY MASS INDEX: 27.99 KG/M2

## 2017-08-28 DIAGNOSIS — E03.9 HYPOTHYROIDISM, UNSPECIFIED TYPE: Primary | ICD-10-CM

## 2017-08-28 DIAGNOSIS — Z86.19 HISTORY OF HEPATITIS C: ICD-10-CM

## 2017-08-28 DIAGNOSIS — E03.9 HYPOTHYROIDISM, UNSPECIFIED TYPE: ICD-10-CM

## 2017-08-28 DIAGNOSIS — D89.1 MIXED CRYOGLOBULINEMIA (H): ICD-10-CM

## 2017-08-28 DIAGNOSIS — G89.29 OTHER CHRONIC PAIN: ICD-10-CM

## 2017-08-28 LAB — TSH SERPL DL<=0.005 MIU/L-ACNC: 2.54 MU/L (ref 0.4–4)

## 2017-08-28 RX ORDER — TRAMADOL HYDROCHLORIDE 50 MG/1
50-100 TABLET ORAL EVERY 6 HOURS PRN
Qty: 360 TABLET | Refills: 0 | Status: SHIPPED | OUTPATIENT
Start: 2017-08-28 | End: 2017-11-24

## 2017-08-28 ASSESSMENT — PAIN SCALES - GENERAL: PAINLEVEL: NO PAIN (0)

## 2017-08-28 NOTE — NURSING NOTE
Chief Complaint   Patient presents with     Recheck Medication     Patient here for medication recheck.       Ayush Arroyo CMA at 3:08 PM on 8/28/2017.

## 2017-08-28 NOTE — PATIENT INSTRUCTIONS
[facility] Medication Refill Request Information:  * Please contact your pharmacy regarding ANY request for medication refills.  ** [facility] Prescription Fax = [phone]  * Please allow 3 business days for routine medication refills.  * Please allow 5 business days for controlled substance medication refills.     [facility] Test Result notification information:  *You will be notified with in 7-10 days of your appointment day regarding the results of your test.  If you are on MyChart you will be notified as soon as the provider has reviewed the results and signed off on them.    [facility] [phone]

## 2017-08-28 NOTE — MR AVS SNAPSHOT
After Visit Summary   8/28/2017    Sarah Sanford    MRN: [id number]           Patient Information     Date Of Birth          1955        Visit Information        Provider Department      8/28/2017 3:40 PM Vj Centeno MD [facility]        Today's Diagnoses     Hypothyroidism, unspecified type    -  1    Other chronic pain        Mixed cryoglobulinemia (H)        History of hepatitis C          Care Instructions    Primary Care Center Medication Refill Request Information:  * Please contact your pharmacy regarding ANY request for medication refills.  ** [facility] Prescription Fax = [phone]  * Please allow 3 business days for routine medication refills.  * Please allow 5 business days for controlled substance medication refills.     Primary Care Center Test Result notification information:  *You will be notified with in 7-10 days of your appointment day regarding the results of your test.  If you are on MyChart you will be notified as soon as the provider has reviewed the results and signed off on them.    Primary Care Center [phone]             Follow-ups after your visit        Follow-up notes from your care team     Return in about 3 months (around 11/28/2017).      Your next 10 appointments already scheduled     Aug 28, 2017  3:40 PM CDT   (Arrive by 3:25 PM)   Return Visit with Vj Centeno MD   [facility] ([facility])    [address] [place] [id number] [phone]            Aug 30, 2017  1:45 PM CDT   RETURN GENERAL with Sylvia Grider MD   Eye Clinic ([facility])    [address] [facility]  [address] Clin 9a  [facility] [address]   [phone]            Sep 20, 2017  6:25 PM CDT   (Arrive by 6:10 PM)   PRE-OP with Vj Centeno MD   [facility] ([facility])    [address] [place] [address]  Floor  [facility] [address]   [phone]            Sep 29, 2017   Procedure with Sylvia Grider MD   [facility] PeriOP Services (--)    [address]  [place] [address]   [phone]            Oct 02, 2017  1:00 PM CDT   Post-Op with Sylvia Grider MD   Eye Clinic ([facility])    [address] [place] [address]   [phone]            Oct 25, 2017  1:00 PM CDT   Post-Op with Sylvia Grider MD   Eye Clinic ([facility])    [address] [place] [address]   [phone]            Nov 24, 2017  1:40 PM CST   (Arrive by 1:25 PM)   Return Visit with Vj Centeno MD   [facility] ([facility])    [address]  4th Floor  [facility] [address] [phone]            Feb 07, 2018  1:00 PM CST   RETURN RETINA with Steph Cintron MD   Eye Clinic ([facility])    [address] [address]   [phone]              Future tests that were ordered for you today     Open Future Orders        Priority Expected Expires Ordered    TSH Routine 11/28/2017 8/28/2018 8/28/2017            Who to contact     Please call your clinic at [phone] to:    Ask questions about your health    Make or cancel appointments    Discuss your medicines    Learn about your test results    Speak to your doctor   If you have compliments or concerns about an experience at your clinic, or if you wish to file a complaint, please contact [facility] Patient Relations at [phone] or email us at [email or web address]         Additional Information About Your Visit        MyChart Information     [employer] gives you secure access to your electronic health record. If you see a primary care provider, you can also send  messages to your care team and make appointments. If you have questions, please call your primary care clinic.  If you do not have a primary care provider, please call [phone] and they will assist you.      [facility] is an electronic gateway that provides easy, online access to your medical records. With [facility], you can request a clinic appointment, read your test results, renew a prescription or communicate with your care team.     To access your existing account, please contact your [facility] or call [phone] for assistance.        Care EveryWhere ID     This is your Care EveryWhere ID. This could be used by other organizations to access your [place] medical records  [id number]        Your Vitals Were     Pulse Breastfeeding? BMI (Body Mass Index)             66 No 27.99 kg/m2          Blood Pressure from Last 3 Encounters:   08/28/17 134/86   08/06/17 122/80   06/07/17 115/79    Weight from Last 3 Encounters:   08/28/17 76.3 kg (168 lb 3.2 oz)   08/06/17 74.4 kg (164 lb)   08/02/17 76.2 kg (168 lb)                 Where to get your medicines      Some of these will need a paper prescription and others can be bought over the counter.  Ask your nurse if you have questions.     Bring a paper prescription for each of these medications     traMADol 50 MG tablet          Primary Care Provider Office Phone # Fax #    Vj Tin Centeno [phone]       [address] [place] [address]        Equal Access to Services     KAYLA BELL : Hadii nasir sheriffo Solorenza, waaxda luqadaha, qaybta kaalmada sarah, naif fowler. So [facility] [phone].    ATENCIÓN: Si habla español, tiene a vila disposición servicios gratuitos de asistencia lingüística. Llame al [phone].    We comply with applicable federal civil rights laws and [place] laws. We do not discriminate on the basis of race, color, national origin, age,  disability sex, sexual orientation or gender identity.            Thank you!     Thank you for choosing [facility]  for your care. Our goal is always to provide you with excellent care. Hearing back from our patients is one way we can continue to improve our services. Please take a few minutes to complete the written survey that you may receive in the mail after your visit with us. Thank you!             Your Updated Medication List - Protect others around you: Learn how to safely use, store and throw away your medicines at www.disposemymeds.org.          This list is accurate as of: 8/28/17  3:29 PM.  Always use your most recent med list.                   Brand Name Dispense Instructions for use Diagnosis    albuterol 108 (90 BASE) MCG/ACT Inhaler    PROAIR HFA/PROVENTIL HFA/VENTOLIN HFA    1 Inhaler    Inhale 2 puffs into the lungs every 6 hours    Reactive airway disease, mild persistent, uncomplicated       ATIVAN 2 MG tablet   Generic drug:  LORazepam      Take 2 mg by mouth At Bedtime 2 tablets at night    Other chronic pain       budesonide-formoterol 80-4.5 MCG/ACT Inhaler    SYMBICORT    1 Inhaler    Inhale 2 puffs into the lungs 2 times daily    Reactive airway disease, mild persistent, uncomplicated       cetirizine 10 MG tablet    zyrTEC     Take 10 mg by mouth daily.        conjugated estrogens cream    PREMARIN    180 g    Place 2 g vaginally daily    Essential hypertension       fluorometholone 0.1 % ophthalmic susp    FML LIQUIFILM    1 Bottle    Place 1 drop into both eyes 2 times daily    Squamous blepharitis of upper and lower eyelids of both eyes       Gel Heel Cushions Womens Pads     1 Device    1 Device daily    Plantar fasciitis       hydrochlorothiazide 25 MG tablet    HYDRODIURIL    100 tablet    Take 1 tablet (25 mg) by mouth daily    Essential hypertension       * Levothyroxine Sodium 50 MCG Caps     100 capsule    1 daily    Hypothyroidism, unspecified type       *  levothyroxine 50 MCG tablet    SYNTHROID/LEVOTHROID    100 tablet    Take 1 tablet (50 mcg) by mouth daily    Hypothyroidism       lisinopril 30 MG tablet    PRINIVIL,ZESTRIL    100 tablet    Take 1 tablet (30 mg) by mouth At Bedtime    Benign essential hypertension       Lysine 1000 MG Tabs       Other chronic pain, Mixed cryoglobulinemia (H), History of hepatitis C, Secondary hypertension, hypertension with unspecified goal       neomycin-polymyxin-dexamethasone 3.5-95899-1.1 Oint ophthalmic ointment    MAXITROL    1 Tube    Place 1 Application Into the left eye At Bedtime    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    Recurrent cold sores       traMADol 50 MG tablet    ULTRAM    360 tablet    Take 1-2 tablets ( mg) by mouth every 6 hours as needed    Other chronic pain, Mixed cryoglobulinemia (H), History of hepatitis C       traZODone 50 MG tablet    DESYREL     Take 1 mg by mouth At Bedtime        * Notice:  This list has 2 medication(s) that are the same as other medications prescribed for you. Read the directions carefully, and ask your doctor or other care provider to review them with you.
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
## 2017-08-30 ENCOUNTER — OFFICE VISIT (OUTPATIENT)
Dept: OPHTHALMOLOGY | Facility: CLINIC | Age: 62
End: 2017-08-30
Attending: OPHTHALMOLOGY
Payer: MEDICARE

## 2017-08-30 DIAGNOSIS — H01.02B SQUAMOUS BLEPHARITIS OF UPPER AND LOWER EYELIDS OF BOTH EYES: Primary | ICD-10-CM

## 2017-08-30 DIAGNOSIS — H01.02A SQUAMOUS BLEPHARITIS OF UPPER AND LOWER EYELIDS OF BOTH EYES: Primary | ICD-10-CM

## 2017-08-30 PROCEDURE — 99212 OFFICE O/P EST SF 10 MIN: CPT | Mod: ZF

## 2017-08-30 ASSESSMENT — VISUAL ACUITY
OD_PH_CC: 20/60
METHOD: SNELLEN - LINEAR
OD_CC: 20/100
OS_PH_SC: 20/20
CORRECTION_TYPE: GLASSES
OS_CC: 20/25

## 2017-08-30 ASSESSMENT — REFRACTION_WEARINGRX
OS_AXIS: 085
SPECS_TYPE: BIFOCAL
OD_CYLINDER: +0.50
OD_ADD: +2.50
OS_ADD: +2.50
OD_SPHERE: PLANO
OS_SPHERE: +0.25
OD_AXIS: 133
OS_CYLINDER: +0.25

## 2017-08-30 ASSESSMENT — CONF VISUAL FIELD
METHOD: COUNTING FINGERS
OD_NORMAL: 1
OS_NORMAL: 1

## 2017-08-30 ASSESSMENT — CUP TO DISC RATIO
OS_RATIO: 0.2
OD_RATIO: 0.2

## 2017-08-30 ASSESSMENT — TONOMETRY
OD_IOP_MMHG: 10
IOP_METHOD: ICARE
OS_IOP_MMHG: 11

## 2017-08-30 ASSESSMENT — SLIT LAMP EXAM - LIDS
COMMENTS: MGD, LID MARGIN TELANGIECTASIAS
COMMENTS: MGD, LID MARGIN TELANGIECTASIAS

## 2017-08-30 ASSESSMENT — EXTERNAL EXAM - RIGHT EYE: OD_EXAM: NORMAL

## 2017-08-30 NOTE — MR AVS SNAPSHOT
After Visit Summary   8/30/2017    Sarah Sanford    MRN: [id number]           Patient Information     Date Of Birth          1955        Visit Information        Provider Department      8/30/2017 1:45 PM Sylvia Grider MD Eye Clinic        Today's Diagnoses     Squamous blepharitis of upper and lower eyelids of both eyes    -  1       Follow-ups after your visit        Follow-up notes from your care team     Return for cataract surgery, already scheduled.      Your next 10 appointments already scheduled     Sep 20, 2017  6:25 PM CDT   (Arrive by 6:10 PM)   PRE-OP with Vj Centeno MD   [facility] ([facility])    [address]  [address]th [facility] [id number] [phone]            Sep 29, 2017   Procedure with Sylvia Grider MD   [facility] PeriOP Services (--)    [address]  [facility] [address]   [phone]            Oct 02, 2017  1:00 PM CDT   Post-Op with Sylvia Grider MD   Eye Clinic ([facility])    [address]  [address]   [address] [address] [place] [address]   [phone]            Oct 25, 2017  1:00 PM CDT   Post-Op with Sylvia Grider MD   Eye Clinic ([facility])    [address]  [address]   [address] [address] [place] [address]   [phone]            Nov 24, 2017  1:40 PM CST   (Arrive by 1:25 PM)   Return Visit with Vj Centeno MD   [facility] ([facility])    [address]  [address] [facility] [address] [phone]            Feb 07, 2018  1:00 PM CST   RETURN RETINA with Steph Cintron MD   Eye Clinic ([facility])    [address] Clin [address] [place] [address]   [phone]              Who to contact     Please call your clinic at [phone] to:    Ask questions about your  health    Make or cancel appointments    Discuss your medicines    Learn about your test results    Speak to your doctor   If you have compliments or concerns about an experience at your clinic, or if you wish to file a complaint, please contact [facility] Patient Relations at [phone] or email us at [email or web address]         Additional Information About Your Visit        MyChart Information     [employer] gives you secure access to your electronic health record. If you see a primary care provider, you can also send messages to your care team and make appointments. If you have questions, please call your primary care clinic.  If you do not have a primary care provider, please call [phone] and they will assist you.      [employer] is an electronic gateway that provides easy, online access to your medical records. With [employer], you can request a clinic appointment, read your test results, renew a prescription or communicate with your care team.     To access your existing account, please contact your [facility] or call [phone] for assistance.        Care EveryWhere ID     This is your Care EveryWhere ID. This could be used by other organizations to access your [place] medical records  [id number]         Blood Pressure from Last 3 Encounters:   08/28/17 134/86   08/06/17 122/80   06/07/17 115/79    Weight from Last 3 Encounters:   08/28/17 76.3 kg (168 lb 3.2 oz)   08/06/17 74.4 kg (164 lb)   08/02/17 76.2 kg (168 lb)              Today, you had the following     No orders found for display       Primary Care Provider Office Phone # Fax #    Vj Centeno [phone]       [address] [place] [address]        Equal Access to Services     KAYLA BELL : lashonda Cervantes qaybta kaalmada adeegyada, waxay idiin hayaan adeeg kharash la'aan ah. So [facility]  [phone].    ATENCIÓN: Si olegario epps, tiene a vila disposición servicios gratuitos de asistencia lingüística. Joby brower [phone].    We comply with applicable federal civil rights laws and [place] laws. We do not discriminate on the basis of race, color, national origin, age, disability sex, sexual orientation or gender identity.            Thank you!     Thank you for choosing EYE CLINIC  for your care. Our goal is always to provide you with excellent care. Hearing back from our patients is one way we can continue to improve our services. Please take a few minutes to complete the written survey that you may receive in the mail after your visit with us. Thank you!             Your Updated Medication List - Protect others around you: Learn how to safely use, store and throw away your medicines at www.disposemymeds.org.          This list is accurate as of: 8/30/17  3:01 PM.  Always use your most recent med list.                   Brand Name Dispense Instructions for use Diagnosis    albuterol 108 (90 BASE) MCG/ACT Inhaler    PROAIR HFA/PROVENTIL HFA/VENTOLIN HFA    1 Inhaler    Inhale 2 puffs into the lungs every 6 hours    Reactive airway disease, mild persistent, uncomplicated       ATIVAN 2 MG tablet   Generic drug:  LORazepam      Take 2 mg by mouth At Bedtime 2 tablets at night    Other chronic pain       budesonide-formoterol 80-4.5 MCG/ACT Inhaler    SYMBICORT    1 Inhaler    Inhale 2 puffs into the lungs 2 times daily    Reactive airway disease, mild persistent, uncomplicated       cetirizine 10 MG tablet    zyrTEC     Take 10 mg by mouth daily.        conjugated estrogens cream    PREMARIN    180 g    Place 2 g vaginally daily    Essential hypertension       fluorometholone 0.1 % ophthalmic susp    FML LIQUIFILM    1 Bottle    Place 1 drop into both eyes 2 times daily    Squamous blepharitis of upper and lower eyelids of both eyes       Gel Heel Cushions Womens Pads     1 Device    1 Device daily     Plantar fasciitis       hydrochlorothiazide 25 MG tablet    HYDRODIURIL    100 tablet    Take 1 tablet (25 mg) by mouth daily    Essential hypertension       * Levothyroxine Sodium 50 MCG Caps     100 capsule    1 daily    Hypothyroidism, unspecified type       * levothyroxine 50 MCG tablet    SYNTHROID/LEVOTHROID    100 tablet    Take 1 tablet (50 mcg) by mouth daily    Hypothyroidism       lisinopril 30 MG tablet    PRINIVIL,ZESTRIL    100 tablet    Take 1 tablet (30 mg) by mouth At Bedtime    Benign essential hypertension       Lysine 1000 MG Tabs       Other chronic pain, Mixed cryoglobulinemia (H), History of hepatitis C, Secondary hypertension, hypertension with unspecified goal       neomycin-polymyxin-dexamethasone 3.5-11799-8.1 Oint ophthalmic ointment    MAXITROL    1 Tube    Place 1 Application Into the left eye At Bedtime    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    Recurrent cold sores       traMADol 50 MG tablet    ULTRAM    360 tablet    Take 1-2 tablets ( mg) by mouth every 6 hours as needed    Other chronic pain, Mixed cryoglobulinemia (H), History of hepatitis C       traZODone 50 MG tablet    DESYREL     Take 1 mg by mouth At Bedtime        * Notice:  This list has 2 medication(s) that are the same as other medications prescribed for you. Read the directions carefully, and ask your doctor or other care provider to review them with you.

## 2017-08-30 NOTE — PROGRESS NOTES
HPI: Karin (preferred name) Juvenal is a 61 y.o F w/ hx of BRVO in RE w/ Vit heme s/p PPV/EL/FAX OD on 2/14/17 here for recheck of blepharitis. She feels much better after using the Maxitrol ointment.    POHx:  Vitreous Hemorrhage RE s/p PPV/ EL/FAX OD 2/14/17  Cataract RE    Assessment & Plan   (H01.021,  H01.022,  H01.024,  H01.025) Squamous blepharitis of upper and lower eyelids of both eyes  (primary encounter diagnosis)  Comment: Symptomatic  Plan:   PFATs at least 4x daily  Warm compresses OU BID  Continue Maxitrol ointment OU QHS    NOT ADDRESSED TODAY:    (H25.13) Nuclear cataract of both eyes  (primary encounter diagnosis)  Comment: Visually significant OS >> OD with dense nuclear sclerosis right eye.    Dilates to: 8 mm  Alpha blockers/Flomax: None  Trauma/Pseudoxfoliation: PPVx OD  Fuchs dystrophy/guttae: None    Diabetes: No  Anticoagulation: None    Cyl: 0.29 @ 074 OD, 0.40 @ 119 OS    We discussed the risks and benefits of cataract surgery in the right eye, and informed consent was obtained.  Proceed with CE/IOL OD.    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  Plan: Seen by Dr. Cintron today who feels the retina is stable.     Return for CE/IOL right eye already scheduled    Teaching statement:  Complete documentation of historical and exam elements from today's encounter can be found in the full encounter summary report (not reduplicated in this progress note). I personally obtained the chief complaint(s) and history of present illness.  I confirmed and edited as necessary the review of systems, past medical/surgical history, family history, social history, and examination  findings as documented by others; and I examined the patient myself. I personally reviewed the relevant tests, images, and reports as documented above.     I formulated and edited as necessary the assessment and plan and discussed the findings and management plan with the patient and family.    Sylvia Grider MD  Comprehensive Ophthalmology & Ocular Pathology  Department of Ophthalmology and Visual Neurosciences  [email or web address]  Pager [phone]

## 2017-09-20 ENCOUNTER — OFFICE VISIT (OUTPATIENT)
Dept: INTERNAL MEDICINE | Facility: CLINIC | Age: 62
End: 2017-09-20

## 2017-09-20 VITALS
RESPIRATION RATE: 16 BRPM | DIASTOLIC BLOOD PRESSURE: 78 MMHG | SYSTOLIC BLOOD PRESSURE: 125 MMHG | HEIGHT: 65 IN | HEART RATE: 71 BPM | WEIGHT: 167.9 LBS | BODY MASS INDEX: 27.97 KG/M2

## 2017-09-20 DIAGNOSIS — Z00.00 ROUTINE HEALTH MAINTENANCE: Primary | ICD-10-CM

## 2017-09-20 DIAGNOSIS — H91.90 HEARING LOSS, UNSPECIFIED HEARING LOSS TYPE, UNSPECIFIED LATERALITY: ICD-10-CM

## 2017-09-20 NOTE — MR AVS SNAPSHOT
After Visit Summary   9/20/2017    Sarah Sanford    MRN: [id number]           Patient Information     Date Of Birth          1955        Visit Information        Provider Department      9/20/2017 6:25 PM Vj Centeno MD [facility]        Today's Diagnoses     Routine health maintenance    -  1    Hearing loss, unspecified hearing loss type, unspecified laterality          Care Instructions    Primary Care Center Phone Number [phone]  Primary Care Center Medication Refill Request Information:  * Please contact your pharmacy regarding ANY request for medication refills.  ** [facility] Prescription Fax = [phone]  * Please allow 3 business days for routine medication refills.  * Please allow 5 business days for controlled substance medication refills.     Primary Care Center Test Result notification information:  *You will be notified with in 7-10 days of your appointment day regarding the results of your test.  If you are on MyChart you will be notified as soon as the provider has reviewed the results and signed off on them.      Please schedule the following appointments:  Audiology [phone] ([facility] 4th floor)              Follow-ups after your visit        Additional Services     AUDIOLOGY ADULT REFERRAL       Your provider has referred you to: [facility]: [facility] [place] [phone]   [email or web address]    Specialty Testing:  Audiogram w/Tymps and Reflexes (Comprehensive Audiology Evaluation)                  Follow-up notes from your care team     Return in about 3 months (around 12/20/2017).      Your next 10 appointments already scheduled     Sep 29, 2017   Procedure with Sylvia Grider MD   [facility] (--)    [address]  [facility] [address]   [phone]            Oct 02, 2017  1:00 PM CDT   Post-Op with Sylvia MEDRANO  MD Marni   Eye Clinic ([facility])    [address]  [address]  [address]  [facility] [address]   [phone]            Oct 25, 2017  1:00 PM CDT   Post-Op with Sylvia Grider MD   Eye Clinic ([facility])    [address]  [address]  [address]  [facility] [address]   [phone]            Nov 24, 2017  1:40 PM CST   (Arrive by 1:25 PM)   Return Visit with Vj Centeno MD   [facility] ([facility])    [address]  4th Floor  [facility] [id number] [phone]            Feb 07, 2018  1:00 PM CST   RETURN RETINA with Steph Cintron MD   Eye Clinic ([facility])    [address]  [address]  [address]  [facility] [address]   [phone]              Future tests that were ordered for you today     Open Future Orders        Priority Expected Expires Ordered    Lipid Profile Routine  9/20/2018 9/20/2017            Who to contact     Please call your clinic at [phone] to:    Ask questions about your health    Make or cancel appointments    Discuss your medicines    Learn about your test results    Speak to your doctor   If you have compliments or concerns about an experience at your clinic, or if you wish to file a complaint, please contact [facility] Patient Relations at [phone] or email us at [email or web address]         Additional Information About Your Visit        Episencialhart Information     [employer] gives you secure access to your electronic health record. If you see a primary care provider, you can also send messages to your care team and make appointments. If you have questions, please call your primary care clinic.  If you do not have a primary care provider, please call [phone] and they will assist you.      [employer] is an electronic gateway that provides easy, online access to your  "medical records. With [email or web address], you can request a clinic appointment, read your test results, renew a prescription or communicate with your care team.     To access your existing account, please contact your [facility] or call [phone] for assistance.        Care EveryWhere ID     This is your Care EveryWhere ID. This could be used by other organizations to access your [place] medical records  [id number]        Your Vitals Were     Pulse Respirations Height Breastfeeding? BMI (Body Mass Index)       71 16 1.638 m (5' 4.5\") No 28.37 kg/m2        Blood Pressure from Last 3 Encounters:   09/20/17 125/78   08/28/17 134/86   08/06/17 122/80    Weight from Last 3 Encounters:   09/20/17 76.2 kg (167 lb 14.4 oz)   08/28/17 76.3 kg (168 lb 3.2 oz)   08/06/17 74.4 kg (164 lb)              We Performed the Following     AUDIOLOGY ADULT REFERRAL          Today's Medication Changes          These changes are accurate as of: 9/20/17  6:47 PM.  If you have any questions, ask your nurse or doctor.               These medicines have changed or have updated prescriptions.        Dose/Directions    levothyroxine 50 MCG tablet   Commonly known as:  SYNTHROID/LEVOTHROID   This may have changed:  Another medication with the same name was removed. Continue taking this medication, and follow the directions you see here.   Used for:  Hypothyroidism   Changed by:  Vj Centeno MD        Dose:  50 mcg   Take 1 tablet (50 mcg) by mouth daily   Quantity:  100 tablet   Refills:  2         Stop taking these medicines if you haven't already. Please contact your care team if you have questions.     fluorometholone 0.1 % ophthalmic susp   Commonly known as:  FML LIQUIFILM   Stopped by:  Vj Centeno MD           neomycin-polymyxin-dexamethasone 3.5-19289-7.1 Oint ophthalmic ointment   Commonly known as:  MAXITROL   Stopped by:  Vj Centeno MD           penciclovir 1 % cream "   Commonly known as:  DENAVIR   Stopped by:  Vj Centeno MD                    Primary Care Provider Office Phone # Fax #    Vj Centeno [phone]       [address] [place] [address]        Equal Access to Services     ARMAND[facility] : Hadii aad ku hadasho Soomaali, waaxda luqadaha, qaybta kaalmada adeegyada, waxay maryin haynavn adebatsheva tamara katie fowler. So [facility] [phone].    ATENCIÓN: Si habla español, tiene a vila disposición servicios gratuitos de asistencia lingüística. Llame al [phone].    We comply with applicable federal civil rights laws and [place] laws. We do not discriminate on the basis of race, color, national origin, age, disability sex, sexual orientation or gender identity.            Thank you!     Thank you for choosing [facility]  for your care. Our goal is always to provide you with excellent care. Hearing back from our patients is one way we can continue to improve our services. Please take a few minutes to complete the written survey that you may receive in the mail after your visit with us. Thank you!             Your Updated Medication List - Protect others around you: Learn how to safely use, store and throw away your medicines at www.disposemymeds.org.          This list is accurate as of: 9/20/17  6:47 PM.  Always use your most recent med list.                   Brand Name Dispense Instructions for use Diagnosis    albuterol 108 (90 BASE) MCG/ACT Inhaler    PROAIR HFA/PROVENTIL HFA/VENTOLIN HFA    1 Inhaler    Inhale 2 puffs into the lungs every 6 hours    Reactive airway disease, mild persistent, uncomplicated       ATIVAN 2 MG tablet   Generic drug:  LORazepam      Take 2 mg by mouth At Bedtime 2 tablets at night    Other chronic pain       budesonide-formoterol 80-4.5 MCG/ACT Inhaler    SYMBICORT    1 Inhaler    Inhale 2 puffs into the lungs 2 times daily    Reactive airway disease, mild persistent,  uncomplicated       cetirizine 10 MG tablet    zyrTEC     Take 10 mg by mouth daily.        conjugated estrogens cream    PREMARIN    180 g    Place 2 g vaginally daily    Essential hypertension       Gel Heel Cushions Womens Pads     1 Device    1 Device daily    Plantar fasciitis       hydrochlorothiazide 25 MG tablet    HYDRODIURIL    100 tablet    Take 1 tablet (25 mg) by mouth daily    Essential hypertension       levothyroxine 50 MCG tablet    SYNTHROID/LEVOTHROID    100 tablet    Take 1 tablet (50 mcg) by mouth daily    Hypothyroidism       lisinopril 30 MG tablet    PRINIVIL,ZESTRIL    100 tablet    Take 1 tablet (30 mg) by mouth At Bedtime    Benign essential hypertension       Lysine 1000 MG Tabs      Take by mouth 2 times daily    Other chronic pain, Mixed cryoglobulinemia (H), History of hepatitis C, Secondary hypertension, hypertension with unspecified goal       order for DME     1 Device    1 Device by Device route daily as needed Air filtration system    Chronic bronchitis, unspecified chronic bronchitis type (H)       traMADol 50 MG tablet    ULTRAM    360 tablet    Take 1-2 tablets ( mg) by mouth every 6 hours as needed    Other chronic pain, Mixed cryoglobulinemia (H), History of hepatitis C       traZODone 50 MG tablet    DESYREL     Take 1 mg by mouth At Bedtime

## 2017-09-20 NOTE — PATIENT INSTRUCTIONS
Primary Care Center Phone Number [phone]  Primary Care Center Medication Refill Request Information:  * Please contact your pharmacy regarding ANY request for medication refills.  ** [facility] Prescription Fax = [phone]  * Please allow 3 business days for routine medication refills.  * Please allow 5 business days for controlled substance medication refills.     Primary [facility] Test Result notification information:  *You will be notified with in 7-10 days of your appointment day regarding the results of your test.  If you are on MyChart you will be notified as soon as the provider has reviewed the results and signed off on them.      Please schedule the following appointments:  Audiology [phone] ([facility])
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
## 2017-09-20 NOTE — NURSING NOTE
Chief Complaint   Patient presents with     Pre-Op Exam     pt is here for a pre op exam       Marcelina Mckeon CMA at 5:53 PM on 9/20/2017

## 2017-09-27 RX ORDER — OFLOXACIN 3 MG/ML
SOLUTION/ DROPS OPHTHALMIC
Qty: 1 BOTTLE | Refills: 1 | Status: SHIPPED | OUTPATIENT
Start: 2017-09-27 | End: 2017-11-27

## 2017-09-27 RX ORDER — PREDNISOLONE ACETATE 10 MG/ML
SUSPENSION/ DROPS OPHTHALMIC
Qty: 1 BOTTLE | Refills: 1 | Status: SHIPPED | OUTPATIENT
Start: 2017-09-27 | End: 2017-11-27
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
## 2017-09-29 ENCOUNTER — SURGERY (OUTPATIENT)
Age: 62
End: 2017-09-29

## 2017-09-29 ENCOUNTER — OFFICE VISIT (OUTPATIENT)
Dept: OPHTHALMOLOGY | Facility: CLINIC | Age: 62
End: 2017-09-29

## 2017-09-29 ENCOUNTER — HOSPITAL ENCOUNTER (OUTPATIENT)
Facility: CLINIC | Age: 62
Discharge: HOME | End: 2017-09-29
Attending: OPHTHALMOLOGY | Admitting: OPHTHALMOLOGY
Payer: MEDICARE

## 2017-09-29 ENCOUNTER — ANESTHESIA EVENT (OUTPATIENT)
Dept: SURGERY | Facility: CLINIC | Age: 62
End: 2017-09-29
Payer: MEDICARE

## 2017-09-29 ENCOUNTER — ANESTHESIA (OUTPATIENT)
Dept: SURGERY | Facility: CLINIC | Age: 62
End: 2017-09-29
Payer: MEDICARE

## 2017-09-29 ENCOUNTER — HOSPITAL ENCOUNTER (OUTPATIENT)
Dept: LAB | Facility: CLINIC | Age: 62
End: 2017-09-29
Attending: INTERNAL MEDICINE | Admitting: OPHTHALMOLOGY
Payer: MEDICARE

## 2017-09-29 VITALS
RESPIRATION RATE: 12 BRPM | OXYGEN SATURATION: 97 % | DIASTOLIC BLOOD PRESSURE: 62 MMHG | SYSTOLIC BLOOD PRESSURE: 94 MMHG | TEMPERATURE: 97.7 F

## 2017-09-29 DIAGNOSIS — E03.9 HYPOTHYROIDISM, UNSPECIFIED TYPE: ICD-10-CM

## 2017-09-29 DIAGNOSIS — Z96.1 PSEUDOPHAKIA, RIGHT EYE: Primary | ICD-10-CM

## 2017-09-29 DIAGNOSIS — Z00.00 ROUTINE HEALTH MAINTENANCE: ICD-10-CM

## 2017-09-29 LAB
CHOLEST SERPL-MCNC: 176 MG/DL
HDLC SERPL-MCNC: 65 MG/DL
LDLC SERPL CALC-MCNC: 101 MG/DL
NONHDLC SERPL-MCNC: 111 MG/DL
TRIGL SERPL-MCNC: 49 MG/DL
TSH SERPL DL<=0.005 MIU/L-ACNC: 3.81 MU/L (ref 0.4–4)

## 2017-09-29 PROCEDURE — 25000128 H RX IP 250 OP 636: Performed by: OPHTHALMOLOGY

## 2017-09-29 PROCEDURE — 80061 LIPID PANEL: CPT | Performed by: INTERNAL MEDICINE

## 2017-09-29 PROCEDURE — 71000004 ZZH RECOVERY EYE PHASE 1 LEVEL 1 FIRST HR: Performed by: OPHTHALMOLOGY

## 2017-09-29 PROCEDURE — 25000125 ZZHC RX 250: Performed by: NURSE ANESTHETIST, CERTIFIED REGISTERED

## 2017-09-29 PROCEDURE — 37000008 ZZH ANESTHESIA TECHNICAL FEE, 1ST 30 MIN: Performed by: OPHTHALMOLOGY

## 2017-09-29 PROCEDURE — 36000102 ZZH EYE SURGERY LEVEL 3 EA 15 ADDTL MIN: Performed by: OPHTHALMOLOGY

## 2017-09-29 PROCEDURE — 84443 ASSAY THYROID STIM HORMONE: CPT | Performed by: INTERNAL MEDICINE

## 2017-09-29 PROCEDURE — V2632 POST CHMBR INTRAOCULAR LENS: HCPCS | Performed by: OPHTHALMOLOGY

## 2017-09-29 PROCEDURE — 25000128 H RX IP 250 OP 636: Performed by: NURSE ANESTHETIST, CERTIFIED REGISTERED

## 2017-09-29 PROCEDURE — 25000566 ZZH SEVOFLURANE, EA 15 MIN: Performed by: OPHTHALMOLOGY

## 2017-09-29 PROCEDURE — 37000009 ZZH ANESTHESIA TECHNICAL FEE, EACH ADDTL 15 MIN: Performed by: OPHTHALMOLOGY

## 2017-09-29 PROCEDURE — 25000125 ZZHC RX 250: Performed by: OPHTHALMOLOGY

## 2017-09-29 PROCEDURE — 36000101 ZZH EYE SURGERY LEVEL 3 1ST 30 MIN: Performed by: OPHTHALMOLOGY

## 2017-09-29 PROCEDURE — 36415 COLL VENOUS BLD VENIPUNCTURE: CPT | Performed by: INTERNAL MEDICINE

## 2017-09-29 PROCEDURE — 40000170 ZZH STATISTIC PRE-PROCEDURE ASSESSMENT II: Performed by: OPHTHALMOLOGY

## 2017-09-29 PROCEDURE — 25000128 H RX IP 250 OP 636: Performed by: ANESTHESIOLOGY

## 2017-09-29 PROCEDURE — 25000125 ZZHC RX 250: Performed by: ANESTHESIOLOGY

## 2017-09-29 PROCEDURE — 71000005 ZZH RECOVERY EYE PHASE 1 LEVEL 1 EA ADDTL HR: Performed by: OPHTHALMOLOGY

## 2017-09-29 PROCEDURE — 71000028 ZZH EYE RECOVERY PHASE 2 EACH 15 MINS: Performed by: OPHTHALMOLOGY

## 2017-09-29 PROCEDURE — 27210794 ZZH OR GENERAL SUPPLY STERILE: Performed by: OPHTHALMOLOGY

## 2017-09-29 DEVICE — EYE IMP IOL AMO PCL TECNIS ZCB00 21.0: Type: IMPLANTABLE DEVICE | Site: EYE | Status: FUNCTIONAL

## 2017-09-29 RX ORDER — LIDOCAINE HYDROCHLORIDE 10 MG/ML
INJECTION, SOLUTION EPIDURAL; INFILTRATION; INTRACAUDAL; PERINEURAL PRN
Status: DISCONTINUED | OUTPATIENT
Start: 2017-09-29 | End: 2017-09-29 | Stop reason: HOSPADM

## 2017-09-29 RX ORDER — FENTANYL CITRATE 50 UG/ML
25-50 INJECTION, SOLUTION INTRAMUSCULAR; INTRAVENOUS
Status: DISCONTINUED | OUTPATIENT
Start: 2017-09-29 | End: 2017-09-29 | Stop reason: HOSPADM

## 2017-09-29 RX ORDER — NALOXONE HYDROCHLORIDE 0.4 MG/ML
.1-.4 INJECTION, SOLUTION INTRAMUSCULAR; INTRAVENOUS; SUBCUTANEOUS
Status: DISCONTINUED | OUTPATIENT
Start: 2017-09-29 | End: 2017-09-29 | Stop reason: HOSPADM

## 2017-09-29 RX ORDER — ONDANSETRON 2 MG/ML
INJECTION INTRAMUSCULAR; INTRAVENOUS PRN
Status: DISCONTINUED | OUTPATIENT
Start: 2017-09-29 | End: 2017-09-29

## 2017-09-29 RX ORDER — BALANCED SALT SOLUTION 6.4; .75; .48; .3; 3.9; 1.7 MG/ML; MG/ML; MG/ML; MG/ML; MG/ML; MG/ML
SOLUTION OPHTHALMIC PRN
Status: DISCONTINUED | OUTPATIENT
Start: 2017-09-29 | End: 2017-09-29 | Stop reason: HOSPADM

## 2017-09-29 RX ORDER — ONDANSETRON 2 MG/ML
4 INJECTION INTRAMUSCULAR; INTRAVENOUS EVERY 30 MIN PRN
Status: DISCONTINUED | OUTPATIENT
Start: 2017-09-29 | End: 2017-09-29 | Stop reason: HOSPADM

## 2017-09-29 RX ORDER — FENTANYL CITRATE 50 UG/ML
INJECTION, SOLUTION INTRAMUSCULAR; INTRAVENOUS PRN
Status: DISCONTINUED | OUTPATIENT
Start: 2017-09-29 | End: 2017-09-29

## 2017-09-29 RX ORDER — EPHEDRINE SULFATE 50 MG/ML
INJECTION, SOLUTION INTRAMUSCULAR; INTRAVENOUS; SUBCUTANEOUS PRN
Status: DISCONTINUED | OUTPATIENT
Start: 2017-09-29 | End: 2017-09-29

## 2017-09-29 RX ORDER — LIDOCAINE HYDROCHLORIDE 20 MG/ML
INJECTION, SOLUTION INFILTRATION; PERINEURAL PRN
Status: DISCONTINUED | OUTPATIENT
Start: 2017-09-29 | End: 2017-09-29

## 2017-09-29 RX ORDER — SODIUM CHLORIDE, SODIUM LACTATE, POTASSIUM CHLORIDE, CALCIUM CHLORIDE 600; 310; 30; 20 MG/100ML; MG/100ML; MG/100ML; MG/100ML
INJECTION, SOLUTION INTRAVENOUS CONTINUOUS
Status: DISCONTINUED | OUTPATIENT
Start: 2017-09-29 | End: 2017-09-29 | Stop reason: HOSPADM

## 2017-09-29 RX ORDER — ONDANSETRON 4 MG/1
4 TABLET, ORALLY DISINTEGRATING ORAL EVERY 30 MIN PRN
Status: DISCONTINUED | OUTPATIENT
Start: 2017-09-29 | End: 2017-09-29 | Stop reason: HOSPADM

## 2017-09-29 RX ORDER — MOXIFLOXACIN 5 MG/ML
1 SOLUTION/ DROPS OPHTHALMIC
Status: COMPLETED | OUTPATIENT
Start: 2017-09-29 | End: 2017-09-29

## 2017-09-29 RX ORDER — CYCLOPENTOLATE HYDROCHLORIDE 10 MG/ML
1 SOLUTION/ DROPS OPHTHALMIC
Status: COMPLETED | OUTPATIENT
Start: 2017-09-29 | End: 2017-09-29

## 2017-09-29 RX ORDER — PROPOFOL 10 MG/ML
INJECTION, EMULSION INTRAVENOUS PRN
Status: DISCONTINUED | OUTPATIENT
Start: 2017-09-29 | End: 2017-09-29

## 2017-09-29 RX ORDER — OFLOXACIN 3 MG/ML
SOLUTION/ DROPS OPHTHALMIC PRN
Status: DISCONTINUED | OUTPATIENT
Start: 2017-09-29 | End: 2017-09-29 | Stop reason: HOSPADM

## 2017-09-29 RX ORDER — PROPARACAINE HYDROCHLORIDE 5 MG/ML
1 SOLUTION/ DROPS OPHTHALMIC ONCE
Status: COMPLETED | OUTPATIENT
Start: 2017-09-29 | End: 2017-09-29

## 2017-09-29 RX ORDER — DEXAMETHASONE SODIUM PHOSPHATE 4 MG/ML
INJECTION, SOLUTION INTRA-ARTICULAR; INTRALESIONAL; INTRAMUSCULAR; INTRAVENOUS; SOFT TISSUE PRN
Status: DISCONTINUED | OUTPATIENT
Start: 2017-09-29 | End: 2017-09-29

## 2017-09-29 RX ORDER — TROPICAMIDE 10 MG/ML
1 SOLUTION/ DROPS OPHTHALMIC
Status: COMPLETED | OUTPATIENT
Start: 2017-09-29 | End: 2017-09-29

## 2017-09-29 RX ORDER — PHENYLEPHRINE HYDROCHLORIDE 25 MG/ML
1 SOLUTION/ DROPS OPHTHALMIC
Status: COMPLETED | OUTPATIENT
Start: 2017-09-29 | End: 2017-09-29

## 2017-09-29 RX ORDER — MEPERIDINE HYDROCHLORIDE 25 MG/ML
12.5 INJECTION INTRAMUSCULAR; INTRAVENOUS; SUBCUTANEOUS
Status: DISCONTINUED | OUTPATIENT
Start: 2017-09-29 | End: 2017-09-29 | Stop reason: HOSPADM

## 2017-09-29 RX ORDER — HYDROMORPHONE HYDROCHLORIDE 1 MG/ML
.3-.5 INJECTION, SOLUTION INTRAMUSCULAR; INTRAVENOUS; SUBCUTANEOUS EVERY 10 MIN PRN
Status: DISCONTINUED | OUTPATIENT
Start: 2017-09-29 | End: 2017-09-29 | Stop reason: HOSPADM

## 2017-09-29 RX ORDER — PREDNISOLONE ACETATE 10 MG/ML
SUSPENSION/ DROPS OPHTHALMIC PRN
Status: DISCONTINUED | OUTPATIENT
Start: 2017-09-29 | End: 2017-09-29 | Stop reason: HOSPADM

## 2017-09-29 RX ADMIN — Medication 5 MG: at 07:44

## 2017-09-29 RX ADMIN — PHENYLEPHRINE HYDROCHLORIDE 1 DROP: 2.5 SOLUTION/ DROPS OPHTHALMIC at 06:35

## 2017-09-29 RX ADMIN — ONDANSETRON 4 MG: 2 INJECTION INTRAMUSCULAR; INTRAVENOUS at 07:31

## 2017-09-29 RX ADMIN — DEXMEDETOMIDINE HYDROCHLORIDE 12 MCG: 100 INJECTION, SOLUTION INTRAVENOUS at 08:01

## 2017-09-29 RX ADMIN — BALANCED SALT SOLUTION 15 ML: 6.4; .75; .48; .3; 3.9; 1.7 SOLUTION OPHTHALMIC at 07:49

## 2017-09-29 RX ADMIN — FENTANYL CITRATE 50 MCG: 50 INJECTION, SOLUTION INTRAMUSCULAR; INTRAVENOUS at 09:24

## 2017-09-29 RX ADMIN — DEXMEDETOMIDINE HYDROCHLORIDE 12 MCG: 100 INJECTION, SOLUTION INTRAVENOUS at 07:57

## 2017-09-29 RX ADMIN — FENTANYL CITRATE 50 MCG: 50 INJECTION, SOLUTION INTRAMUSCULAR; INTRAVENOUS at 07:25

## 2017-09-29 RX ADMIN — PHENYLEPHRINE HYDROCHLORIDE 1 DROP: 2.5 SOLUTION/ DROPS OPHTHALMIC at 06:42

## 2017-09-29 RX ADMIN — CYCLOPENTOLATE HYDROCHLORIDE 1 DROP: 10 SOLUTION/ DROPS OPHTHALMIC at 06:42

## 2017-09-29 RX ADMIN — MIDAZOLAM HYDROCHLORIDE 2 MG: 1 INJECTION, SOLUTION INTRAMUSCULAR; INTRAVENOUS at 07:21

## 2017-09-29 RX ADMIN — Medication 5 MG: at 07:40

## 2017-09-29 RX ADMIN — CYCLOPENTOLATE HYDROCHLORIDE 1 DROP: 10 SOLUTION/ DROPS OPHTHALMIC at 06:47

## 2017-09-29 RX ADMIN — TROPICAMIDE 1 DROP: 10 SOLUTION/ DROPS OPHTHALMIC at 06:42

## 2017-09-29 RX ADMIN — OFLOXACIN 1 DROP: 3 SOLUTION/ DROPS OPHTHALMIC at 08:01

## 2017-09-29 RX ADMIN — FENTANYL CITRATE 50 MCG: 50 INJECTION, SOLUTION INTRAMUSCULAR; INTRAVENOUS at 08:40

## 2017-09-29 RX ADMIN — MOXIFLOXACIN 1 DROP: 5 SOLUTION/ DROPS OPHTHALMIC at 06:35

## 2017-09-29 RX ADMIN — DEXMEDETOMIDINE HYDROCHLORIDE 8 MCG: 100 INJECTION, SOLUTION INTRAVENOUS at 07:59

## 2017-09-29 RX ADMIN — PROPARACAINE HYDROCHLORIDE 1 DROP: 5 SOLUTION/ DROPS OPHTHALMIC at 06:35

## 2017-09-29 RX ADMIN — LIDOCAINE HYDROCHLORIDE 100 MG: 20 INJECTION, SOLUTION INFILTRATION; PERINEURAL at 07:25

## 2017-09-29 RX ADMIN — CYCLOPENTOLATE HYDROCHLORIDE 1 DROP: 10 SOLUTION/ DROPS OPHTHALMIC at 06:35

## 2017-09-29 RX ADMIN — SODIUM CHONDROITIN SULFATE / SODIUM HYALURONATE 1 ML: 0.55-0.5 INJECTION INTRAOCULAR at 07:50

## 2017-09-29 RX ADMIN — DEXAMETHASONE SODIUM PHOSPHATE 4 MG: 4 INJECTION, SOLUTION INTRA-ARTICULAR; INTRALESIONAL; INTRAMUSCULAR; INTRAVENOUS; SOFT TISSUE at 07:31

## 2017-09-29 RX ADMIN — MOXIFLOXACIN 1 DROP: 5 SOLUTION/ DROPS OPHTHALMIC at 06:47

## 2017-09-29 RX ADMIN — TROPICAMIDE 1 DROP: 10 SOLUTION/ DROPS OPHTHALMIC at 06:47

## 2017-09-29 RX ADMIN — PREDNISOLONE ACETATE 1 DROP: 10 SUSPENSION/ DROPS OPHTHALMIC at 08:01

## 2017-09-29 RX ADMIN — LIDOCAINE HYDROCHLORIDE 1 ML: 10 INJECTION, SOLUTION EPIDURAL; INFILTRATION; INTRACAUDAL; PERINEURAL at 06:59

## 2017-09-29 RX ADMIN — TROPICAMIDE 1 DROP: 10 SOLUTION/ DROPS OPHTHALMIC at 06:35

## 2017-09-29 RX ADMIN — PHENYLEPHRINE HYDROCHLORIDE 1 DROP: 2.5 SOLUTION/ DROPS OPHTHALMIC at 06:47

## 2017-09-29 RX ADMIN — SODIUM CHLORIDE, POTASSIUM CHLORIDE, SODIUM LACTATE AND CALCIUM CHLORIDE: 600; 310; 30; 20 INJECTION, SOLUTION INTRAVENOUS at 06:59

## 2017-09-29 RX ADMIN — MOXIFLOXACIN 1 DROP: 5 SOLUTION/ DROPS OPHTHALMIC at 06:42

## 2017-09-29 RX ADMIN — PROPOFOL 200 MG: 10 INJECTION, EMULSION INTRAVENOUS at 07:25

## 2017-09-29 RX ADMIN — LIDOCAINE HYDROCHLORIDE 1 ML: 10 INJECTION, SOLUTION EPIDURAL; INFILTRATION; INTRACAUDAL; PERINEURAL at 07:50

## 2017-09-29 RX ADMIN — EPINEPHRINE 500 ML: 1 INJECTION, SOLUTION, CONCENTRATE INTRAVENOUS at 07:49

## 2017-09-29 RX ADMIN — Medication 10 MG: at 07:52

## 2017-09-29 ASSESSMENT — TONOMETRY
OD_IOP_MMHG: 14
IOP_METHOD: TONOPEN

## 2017-09-29 ASSESSMENT — SLIT LAMP EXAM - LIDS: COMMENTS: NORMAL

## 2017-09-29 ASSESSMENT — VISUAL ACUITY
OD_SC: 20/50
METHOD: SNELLEN - LINEAR

## 2017-09-29 ASSESSMENT — EXTERNAL EXAM - RIGHT EYE: OD_EXAM: NORMAL

## 2017-09-29 NOTE — OR NURSING
PNDS met, po per I&O sheet. Hand-off report called to Chary TAYLOR.  [facility].

## 2017-09-29 NOTE — PROCEDURES
Ophthalmology Post-op Procedure Note    Surgical Service:    Ophthalmology & Visual Sciences  Date Performed:      September 29, 2017  Location: [facility]      Pre-operative Diagnosis: Visually significant cataract, right eye  Post-operative Diagnosis:  Pseudophakia, right eye  Operative Procedure:  Phacoemulsification with intraocular lens implantation, right eye      Surgeon(s):  Fellow/Staff Surgeon:       Sylvia Grider MD  Resident Surgeon:            Alexa Pepe MD    Anesthesia:   General  Findings:  No unusual findings   Blood Loss:    Minimal  Implants:  ZCB00 21.0 intraocular lens  Specimens:  None     Complications:  The patient did not experience any complications.   Condition: Stable    Operative Report Completion:    Description of Operation/Procedure:  After appropriate informed consent was obtained, the patient was brought to the operating room. The appropriate cardiac and blood pressure monitors were placed and general anesthesia was achieved. A final pause occurred just before the start of the procedure during which the entire procedure team actively confirmed the correct patient, procedure, site, special equipment and special requirements. The patient was prepped and draped in the usual sterile fashion using 5% povidone/iodine.     An eyelid speculum was placed to open the eyelids. A paracentesis port was placed approximately sixty degrees to the left of the planned temporal incision location using the sideport blade. Approximately 0.8 cc of 1% nonpreserved lidocaine was placed into the anterior chamber. The anterior chamber was filled with dispersive viscoelastic. A clear corneal temporal incision was made with a metal 2.6 mm keratome. A round continuous tear capsulorhexis was initiated with a cystotome and completed with the Utrata forceps. Balanced salt solution on a cannula was used to perform hydrodissection. The nucleus was removed using phacoemulsification  with a chop technique. The remaining cortical material was removed using the irrigation/aspiration handpiece. The capsular bag was filled with dispersive viscoelastic. A lens of the  model and power listed above was placed into the capsular bag using the cartridge injection system. The remaining viscoelastic was removed using the irrigation aspiration handpiece. The paracentesis and temporal wounds were hydrated with balanced salt solution. At the conclusion of the case, the wounds were felt to be watertight, the pupil was round, the lens was centered, and the anterior chamber was deep. A few drops of antibiotic and prednisolone were given to the operative eye. The eyelid speculum was removed. A shield was placed over the operative eye.      Attending Attestation:  I was present for and performed the entire procedure.  Sylvia Grider MD

## 2017-09-29 NOTE — ANESTHESIA PREPROCEDURE EVALUATION
Anesthesia Evaluation     .             ROS/MED HX    ENT/Pulmonary:     (+)Mild Persistent asthma , . .   (-) sleep apnea   Neurologic:       Cardiovascular:     (+) hypertension----. : . . . :. .       METS/Exercise Tolerance:     Hematologic:     (+) Anemia, -      Musculoskeletal:  - neg musculoskeletal ROS       GI/Hepatic:     (+) hepatitis type C, liver disease,      (-) GERD   Renal/Genitourinary:     (+) chronic renal disease, Nephrolithiasis ,       Endo:     (+) thyroid problem hypothyroidism, Obesity, .      Psychiatric:     (+) psychiatric history other (comment) and depression      Infectious Disease:         Malignancy:         Other:                     Physical Exam  Normal systems: cardiovascular, pulmonary and dental    Airway   Mallampati: II  TM distance: >3 FB  Neck ROM: full    Dental     Cardiovascular       Pulmonary                     Anesthesia Plan      History & Physical Review  History and physical reviewed and following examination; no interval change.    ASA Status:  2 .    NPO Status:  > 8 hours    Plan for General and LMA with Intravenous induction. Maintenance will be Balanced.    PONV prophylaxis:  Ondansetron (or other 5HT-3)       Postoperative Care  Postoperative pain management:  IV analgesics.      Consents  Anesthetic plan, risks, benefits and alternatives discussed with:  Patient..              Procedure: Procedure(s):  PHACOEMULSIFICATION CLEAR CORNEA WITH STANDARD INTRAOCULAR LENS IMPLANT  Preop diagnosis: CATARACT     Allergies   Allergen Reactions     No Clinical Screening - See Comments      Pt states she is allergic to all antibiotics which cause a raised red rash that is hot to touch, Pt states can take Levaquin and cefaclor.      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     Nephrolithiasis     Hx of stones     Obesity      Uncomplicated asthma     from second smoke in building     Past Surgical History:   Procedure Laterality Date     BIOPSY OF SKIN LESION      liver biopsy in 2011     CHOLECYSTECTOMY       EXTRACORPOREAL SHOCK WAVE LITHOTRIPSY (ESWL)       GENITOURINARY SURGERY      litho     GYN SURGERY      hyst     HYSTERECTOMY      age 31 with cervical removal     VITRECTOMY PARSPLANA WITH 25 GAUGE SYSTEM Right 2/14/2017    Procedure: VITRECTOMY PARSPLANA WITH 25 GAUGE SYSTEM;  Surgeon: Steph Cintron MD;  Location: [facility]     Prior to Admission medications    Medication Sig Start Date End Date Taking? Authorizing Provider   ofloxacin (OCUFLOX) 0.3 % ophthalmic solution 1 drop 4x daily in the surgical eye for 1 week after surgery, then stop 9/27/17   Sylvia Grider MD   prednisoLONE acetate (PRED FORTE) 1 % ophthalmic susp 1 drop in surgical eye as directed, 4x daily after surgery for 1 week, 3x daily for 1 week, 2x daily for 1 week, daily for 1 week, then stop 9/27/17   Sylvia Grider MD   traMADol (ULTRAM) 50 MG tablet Take 1-2 tablets ( mg) by mouth every 6 hours as needed 8/28/17   Vj Centeno MD   levothyroxine (SYNTHROID/LEVOTHROID) 50 MCG tablet Take 1 tablet (50 mcg) by mouth daily 8/15/17   Vj Centeno MD   conjugated estrogens (PREMARIN) cream Place 2 g vaginally daily 7/17/17   Apoorva Ayon MD   lisinopril (PRINIVIL,ZESTRIL) 30 MG tablet Take 1 tablet (30 mg) by mouth At Bedtime 6/7/17   Vj Centeno MD   albuterol (PROAIR HFA/PROVENTIL HFA/VENTOLIN HFA)  108 (90 BASE) MCG/ACT Inhaler Inhale 2 puffs into the lungs every 6 hours 5/1/17   Dori Oviedo APRN CNP   budesonide-formoterol (SYMBICORT) 80-4.5 MCG/ACT Inhaler Inhale 2 puffs into the lungs 2 times daily 5/1/17   Dori Oviedo APRN CNP   order for DME 1 Device by Device route daily as needed Air filtration system 1/2/17   Vj Centeno MD   hydrochlorothiazide (HYDRODIURIL) 25 MG tablet Take 1 tablet (25 mg) by mouth daily 12/5/16   Vj Centeno MD   Lysine 1000 MG TABS Take by mouth 2 times daily     Reported, Patient   Foot Care Products (GEL HEEL CUSHIONS WOMENS) PADS 1 Device daily 11/12/15   Vj Centeno MD   LORazepam (ATIVAN) 2 MG tablet Take 2 mg by mouth At Bedtime 2 tablets at night    Reported, Patient   traZODone (DESYREL) 50 MG tablet Take 1 mg by mouth At Bedtime     Reported, Patient   cetirizine (ZYRTEC) 10 MG tablet Take 10 mg by mouth daily.    Reported, Patient     Current Facility-Administered Medications Ordered in Epic   Medication Dose Route Frequency Last Rate Last Dose     cyclopentolate (CYCLOGYL) 1 % ophthalmic solution 1 drop  1 drop Ophthalmic q5 Min Prior to Surgery         phenylephrine (MYDFRIN /ROXY-SYNEPHRINE) 2.5 % ophthalmic solution 1 drop  1 drop Ophthalmic q5 Min Prior to Surgery         tropicamide (MYDRIACYL) 1 % ophthalmic solution 1 drop  1 drop Ophthalmic q5 Min Prior to Surgery         lidocaine (AKTEN) ophthalmic gel 0.5 mL  0.5 mL Ophthalmic Once         proparacaine (ALCAINE) 0.5 % ophthalmic solution 1 drop  1 drop Ophthalmic Once         moxifloxacin (VIGAMOX) 0.5 % ophthalmic solution 1 drop  1 drop Ophthalmic Q15 Min         povidone-iodine 5 % ophthalmic solution 1 drop  1 drop Ophthalmic Once         No current [facility]-ordered outpatient prescriptions on file.     Wt Readings from Last 1 Encounters:   09/20/17 76.2 kg (167 lb 14.4 oz)     Temp Readings from Last 1 Encounters:   08/06/17 35.8  C (96.4  F)  (Tympanic)     BP Readings from Last 6 Encounters:   09/20/17 125/78   08/28/17 134/86   08/06/17 122/80   06/07/17 115/79   05/22/17 128/80   05/05/17 140/86     Pulse Readings from Last 4 Encounters:   09/20/17 71   08/28/17 66   08/06/17 72   06/07/17 64     Resp Readings from Last 1 Encounters:   09/20/17 16     SpO2 Readings from Last 1 Encounters:   08/06/17 96%     Recent Labs   Lab Test  02/06/17   1340  12/21/16   1143   NA  141  138   POTASSIUM  4.0  4.5   CHLORIDE  103  101   CO2  33*  31   ANIONGAP  5  6   GLC  93  95   BUN  10  7   CR  0.60  0.66   DARIEL  8.8  9.4     Recent Labs   Lab Test  11/09/16   1222  06/08/16   1700   AST  15  14   ALT  17  17     Recent Labs   Lab Test  02/06/17   1340  11/09/16   1222  06/08/16   1700   WBC   --   5.1  6.0   HGB  14.4  15.4  14.4   PLT   --   180  190     Recent Labs   Lab Test  01/27/15   1054  01/08/14   1349   INR  0.97  1.01      Recent Labs   Lab Test  04/16/15   1806   TROPI  <0.015  The 99th percentile for upper reference range is 0.045 ug/L.  Troponin values in   the range of 0.045 - 0.120 ug/L may be associated with risks of adverse   clinical events.       RECENT LABS:   ECG:   ECHO:   CXR:                .

## 2017-09-29 NOTE — ANESTHESIA CARE TRANSFER NOTE
Patient: Sarah Sanford    Procedure(s):  RIGHT EYE PHACOEMULSIFICATION CLEAR CORNEA WITH STANDARD INTRAOCULAR LENS IMPLANT  - Wound Class: I-Clean    Diagnosis: CATARACT   Diagnosis Additional Information: No value filed.    Anesthesia Type:   MAC     Note:  Airway :Face Mask  Patient transferred to:PACU  Comments: Pt to PACU with O2 via mask, airway patent, VSS.  Report to RN.      Vitals: (Last set prior to Anesthesia Care Transfer)    CRNA VITALS  9/29/2017 0739 - 9/29/2017 0814      9/29/2017             Pulse: 65    Ht Rate: 59    SpO2: 98 %    Resp Rate (set): 10                Electronically Signed By: MEDINA Borden CRNA  September 29, 2017  8:14 AM

## 2017-09-29 NOTE — IP AVS SNAPSHOT
[place] [address]    [place] [address]    Phone:  [phone]                                       After Visit Summary   9/29/2017    Sarah Sanford    MRN: [id number]           After Visit Summary Signature Page     I have received my discharge instructions, and my questions have been answered. I have discussed any challenges I see with this plan with the nurse or doctor.    ..........................................................................................................................................  Patient/Patient Representative Signature      ..........................................................................................................................................  Patient Representative Print Name and Relationship to Patient    ..................................................               ................................................  Date                                            Time    ..........................................................................................................................................  Reviewed by Signature/Title    ...................................................              ..............................................  Date                                                            Time

## 2017-09-29 NOTE — MR AVS SNAPSHOT
After Visit Summary   9/29/2017    Sarah Sanford    MRN: [id number]           Patient Information     Date Of Birth          1955        Visit Information        Provider Department      9/29/2017 10:47 Sylvia Shetty MD [facility]        Today's Diagnoses     Pseudophakia, right eye    -  1       Follow-ups after your visit        Follow-up notes from your care team     Return in about 3 weeks (around 10/20/2017) for refraction and dilation.      Your next 10 appointments already scheduled     Oct 02, 2017  1:00 PM CDT   Post-Op with Sylvia Grider MD   Eye Clinic ([facility])    [address]  [address]   [facility] [address] [place] [address] [phone]            Oct 25, 2017  1:00 PM CDT   Post-Op with Sylvia Grider MD   Eye Clinic ([facility])    [address]  [address]   [facility] [address] [place] [address] [phone]            Nov 24, 2017  1:40 PM CST   (Arrive by 1:25 PM)   Return Visit with Vj Centeno MD   [facility] ([facility])    [address]  4th Floor  [facility] [id number] [phone]            Feb 07, 2018  1:00 PM CST   RETURN RETINA with Steph Cintron MD   Eye Clinic ([facility])    [address]  [address]   [facility] [address] [place] [id number] [phone]              Who to contact     Please call your clinic at [phone] to:    Ask questions about your health    Make or cancel appointments    Discuss your medicines    Learn about your test results    Speak to your doctor   If you have compliments or concerns about an experience at your clinic, or if you wish to file a complaint, please contact [facility] Patient Relations at [phone] or email us at [email or web address]         Additional Information About Your Visit        MyChart Information      [employer] gives you secure access to your electronic health record. If you see a primary care provider, you can also send messages to your care team and make appointments. If you have questions, please call your primary care clinic.  If you do not have a primary care provider, please call [phone] and they will assist you.      [employer] is an electronic gateway that provides easy, online access to your medical records. With [employer], you can request a clinic appointment, read your test results, renew a prescription or communicate with your care team.     To access your existing account, please contact your [facility] or call [phone] for assistance.        Care EveryWhere ID     This is your Care EveryWhere ID. This could be used by other organizations to access your [place] medical records  [id number]         Blood Pressure from Last 3 Encounters:   09/29/17 94/62   09/20/17 125/78   08/28/17 134/86    Weight from Last 3 Encounters:   09/20/17 76.2 kg (167 lb 14.4 oz)   08/28/17 76.3 kg (168 lb 3.2 oz)   08/06/17 74.4 kg (164 lb)              Today, you had the following     No orders found for display       Primary Care Provider Office Phone # Fax #    Vj Centeno [phone]       [address] [place] [address]        Equal Access to Services     KAYLA BELL AH: Hadii nasir ku hadasho Solorenza, waaxda luqadaha, qaybta kaalmada sarah, naif fowler. So [facility] [phone].    ATENCIÓN: Si habla español, tiene a vila disposición servicios gratuitos de asistencia lingüística. Llcarolina al [phone].    We comply with applicable federal civil rights laws and [place] laws. We do not discriminate on the basis of race, color, national origin, age, disability sex, sexual orientation or gender identity.            Thank you!     Thank you for choosing EYE CLINIC  for your care. Our goal is always to provide you  with excellent care. Hearing back from our patients is one way we can continue to improve our services. Please take a few minutes to complete the written survey that you may receive in the mail after your visit with us. Thank you!             Your Updated Medication List - Protect others around you: Learn how to safely use, store and throw away your medicines at www.disposemymeds.org.          This list is accurate as of: 9/29/17 10:50 AM.  Always use your most recent med list.                   Brand Name Dispense Instructions for use Diagnosis    albuterol 108 (90 BASE) MCG/ACT Inhaler    PROAIR HFA/PROVENTIL HFA/VENTOLIN HFA    1 Inhaler    Inhale 2 puffs into the lungs every 6 hours    Reactive airway disease, mild persistent, uncomplicated       ATIVAN 2 MG tablet   Generic drug:  LORazepam      Take 2 mg by mouth At Bedtime 2 tablets at night    Other chronic pain       budesonide-formoterol 80-4.5 MCG/ACT Inhaler    SYMBICORT    1 Inhaler    Inhale 2 puffs into the lungs 2 times daily    Reactive airway disease, mild persistent, uncomplicated       cetirizine 10 MG tablet    zyrTEC     Take 10 mg by mouth daily.        conjugated estrogens cream    PREMARIN    180 g    Place 2 g vaginally daily    Essential hypertension       Gel Heel Cushions Womens Pads     1 Device    1 Device daily    Plantar fasciitis       hydrochlorothiazide 25 MG tablet    HYDRODIURIL    100 tablet    Take 1 tablet (25 mg) by mouth daily    Essential hypertension       levothyroxine 50 MCG tablet    SYNTHROID/LEVOTHROID    100 tablet    Take 1 tablet (50 mcg) by mouth daily    Hypothyroidism       lisinopril 30 MG tablet    PRINIVIL,ZESTRIL    100 tablet    Take 1 tablet (30 mg) by mouth At Bedtime    Benign essential hypertension       Lysine 1000 MG Tabs      Take by mouth 2 times daily    Other chronic pain, Mixed cryoglobulinemia (H), History of hepatitis C, Secondary hypertension, hypertension with unspecified goal        ofloxacin 0.3 % ophthalmic solution    OCUFLOX    1 Bottle    1 drop 4x daily in the surgical eye for 1 week after surgery, then stop    Nuclear cataract of right eye       order for DME     1 Device    1 Device by Device route daily as needed Air filtration system    Chronic bronchitis, unspecified chronic bronchitis type (H)       prednisoLONE acetate 1 % ophthalmic susp    PRED FORTE    1 Bottle    1 drop in surgical eye as directed, 4x daily after surgery for 1 week, 3x daily for 1 week, 2x daily for 1 week, daily for 1 week, then stop    Nuclear cataract of right eye       traMADol 50 MG tablet    ULTRAM    360 tablet    Take 1-2 tablets ( mg) by mouth every 6 hours as needed    Other chronic pain, Mixed cryoglobulinemia (H), History of hepatitis C       traZODone 50 MG tablet    DESYREL     Take 1 mg by mouth At Bedtime

## 2017-09-29 NOTE — IP AVS SNAPSHOT
MRN:[id number]                      After Visit Summary   9/29/2017    Sarah Sanford    MRN: [id number]           Thank you!     Thank you for choosing [facility] for your care. Our goal is always to provide you with excellent care. Hearing back from our patients is one way we can continue to improve our services. Please take a few minutes to complete the written survey that you may receive in the mail after you visit with us. Thank you!        Patient Information     Date Of Birth          1955        About your hospital stay     You were admitted on:  September 29, 2017 You last received care in the:  [facility] PACU    You were discharged on:  September 29, 2017       Who to Call     For medical emergencies, please call 911.  For non-urgent questions about your medical care, please call your primary care provider or clinic, [phone]  For questions related to your surgery, please call your surgery clinic        Attending Provider     Provider Specialty    Sylvia Grider MD Ophthalmology       Primary Care Provider Office Phone # Fax #    Vj Centeno [phone]      Your next 10 appointments already scheduled     Oct 02, 2017  1:00 PM CDT   Post-Op with Sylvia Grider MD   Eye Clinic ([facility])    [address]  [address] [address]   [phone]            Oct 25, 2017  1:00 PM CDT   Post-Op with Sylvia Grider MD   Eye Clinic ([facility])    [address]  [address] [facility]  [address] [address]   [phone]            Nov 24, 2017  1:40 PM CST   (Arrive by 1:25 PM)   Return Visit with Vj Centeno MD   [facility] ([facility])    [address]  4th Floor  [facility] [address] [phone]            Feb 07, 2018  1:00 PM CST   RETURN RETINA with Steph Cintron MD   Eye  Clinic ([facility])    [address]  [address] [facility] [address]  9th [address] Clin [address]  [facility] [address] [phone]              Further instructions from your care team         Same Day Surgery Discharge Instructions for  Sedation and General Anesthesia       It's not unusual to feel dizzy, light-headed or faint for up to 24 hours after surgery or while taking pain medication.  If you have these symptoms: sit for a few minutes before standing and have someone assist you when you get up to walk or use the bathroom.      You should rest and relax for the next 24 hours. We recommend you make arrangements to have an adult stay with you for at least 24 hours after your discharge.  Avoid hazardous and strenuous activity.      DO NOT DRIVE any vehicle or operate mechanical equipment for 24 hours following the end of your surgery.  Even though you may feel normal, your reactions may be affected by the medication you have received.      Do not drink alcoholic beverages for 24 hours following surgery.       Slowly progress to your regular diet as you feel able. It's not unusual to feel nauseated and/or vomit after receiving anesthesia.  If you develop these symptoms, drink clear liquids (apple juice, ginger ale, broth, 7-up, etc. ) until you feel better.  If your nausea and vomiting persists for 24 hours, please notify your surgeon.        All narcotic pain medications, along with inactivity and anesthesia, can cause constipation. Drinking plenty of liquids and increasing fiber intake will help.      For any questions of a medical nature, call your surgeon.      Do not make important decisions for 24 hours.      If you had general anesthesia, you may have a sore throat for a couple of days related to the breathing tube used during surgery.  You may use Cepacol lozenges to help with this discomfort.  If it worsens or if you develop a fever, contact your surgeon.       If you feel your pain is not well managed  with the pain medications prescribed by your surgeon, please contact your surgeon's office to let them know so they can address your concerns.           Dr. Sylvia Grider  [facility]  [phone]  Post Operative Cataract Instructions        If you have a gauze eye patch on, please do not remove it until it is removed by your physician at your first post-operative visit.  You will start your eye drops the next day.    OR      If you only have a clear eye shield on, you may remove the eye shield on arrival home and begin eye drops today as directed by Dr. Grider.      Wear the clear eye shield when sleeping for protection for 5 days.      Do not rub the operated eye.      Light sensitivity may be noticed. Sunglasses may be worn for comfort.      Some discomfort and irritation may be noticed. Acetaminophen (Tylenol) or Ibuprofen (Advil) may be taken for discomfort. If pain persists please call Dr. Grider's office.      Keep the operated eye dry. You may wash your hair, bathe or shower, but keep the operated eye closed while doing so.       If you take glaucoma medications, bring them with you to the clinic on your first post operative visit.      Bring your prescribed eye drops with you to your scheduled post-operative appointment.      Use medication exactly as prescribed by your doctor. You may restart your regular home medications.       Call Dr. Grider's office at [phone] if any of the following should occur:    - Any sudden vision changes, including decreased vision  - Nausea or severe headache  - Increase in pain not controlled  - Signs of infection (pus, increasing redness or tenderness)  - Severe sensitivity to light            Pending Results     No orders found from 9/27/2017 to 9/30/2017.            Admission Information     Date & Time Provider Department Dept. Phone    9/29/2017 Sylvia Grider MD [facility] PACU [phone]      Your Vitals Were     Blood Pressure Temperature  Respirations Pulse Oximetry          102/67 97  F (36.1  C) (Temporal) 14 98%        [employer] Information     [employer] gives you secure access to your electronic health record. If you see a primary care provider, you can also send messages to your care team and make appointments. If you have questions, please call your primary care clinic.  If you do not have a primary care provider, please call [phone] and they will assist you.        Care EveryWhere ID     This is your Care EveryWhere ID. This could be used by other organizations to access your [place] medical records  [id number]        Equal Access to Services     [facility]: Hadsebas Valdez, waadriana perez, jared smith, naif wilson . So [facility] [phone].    ATENCIÓN: Si habla español, tiene a vila disposición servicios gratuitos de asistencia lingüística. Llame al [phone].    We comply with applicable federal civil rights laws and [place] laws. We do not discriminate on the basis of race, color, national origin, age, disability sex, sexual orientation or gender identity.               Review of your medicines      UNREVIEWED medicines. Ask your doctor about these medicines        Dose / Directions    albuterol 108 (90 BASE) MCG/ACT Inhaler   Commonly known as:  PROAIR HFA/PROVENTIL HFA/VENTOLIN HFA   Used for:  Reactive airway disease, mild persistent, uncomplicated        Dose:  2 puff   Inhale 2 puffs into the lungs every 6 hours   Quantity:  1 Inhaler   Refills:  1       ATIVAN 2 MG tablet   Used for:  Other chronic pain   Generic drug:  LORazepam        Dose:  2 mg   Take 2 mg by mouth At Bedtime 2 tablets at night   Refills:  0       budesonide-formoterol 80-4.5 MCG/ACT Inhaler   Commonly known as:  SYMBICORT   Used for:  Reactive airway disease, mild persistent, uncomplicated        Dose:  2 puff   Inhale 2 puffs into the lungs 2 times daily   Quantity:  1 Inhaler   Refills:  1        cetirizine 10 MG tablet   Commonly known as:  zyrTEC        Dose:  10 mg   Take 10 mg by mouth daily.   Refills:  0       conjugated estrogens cream   Commonly known as:  PREMARIN   Used for:  Essential hypertension        Dose:  2 g   Place 2 g vaginally daily   Quantity:  180 g   Refills:  3       hydrochlorothiazide 25 MG tablet   Commonly known as:  HYDRODIURIL   Used for:  Essential hypertension        Dose:  25 mg   Take 1 tablet (25 mg) by mouth daily   Quantity:  100 tablet   Refills:  3       levothyroxine 50 MCG tablet   Commonly known as:  SYNTHROID/LEVOTHROID   Used for:  Hypothyroidism        Dose:  50 mcg   Take 1 tablet (50 mcg) by mouth daily   Quantity:  100 tablet   Refills:  2       lisinopril 30 MG tablet   Commonly known as:  PRINIVIL,ZESTRIL   Used for:  Benign essential hypertension        Dose:  30 mg   Take 1 tablet (30 mg) by mouth At Bedtime   Quantity:  100 tablet   Refills:  3       Lysine 1000 MG Tabs   Used for:  Other chronic pain, Mixed cryoglobulinemia (H), History of hepatitis C, Secondary hypertension, hypertension with unspecified goal        Take by mouth 2 times daily   Refills:  0       ofloxacin 0.3 % ophthalmic solution   Commonly known as:  OCUFLOX   Used for:  Nuclear cataract of right eye        1 drop 4x daily in the surgical eye for 1 week after surgery, then stop   Quantity:  1 Bottle   Refills:  1       prednisoLONE acetate 1 % ophthalmic susp   Commonly known as:  PRED FORTE   Used for:  Nuclear cataract of right eye        1 drop in surgical eye as directed, 4x daily after surgery for 1 week, 3x daily for 1 week, 2x daily for 1 week, daily for 1 week, then stop   Quantity:  1 Bottle   Refills:  1       traMADol 50 MG tablet   Commonly known as:  ULTRAM   Used for:  Other chronic pain, Mixed cryoglobulinemia (H), History of hepatitis C        Dose:   mg   Take 1-2 tablets ( mg) by mouth every 6 hours as needed   Quantity:  360 tablet   Refills:  0        traZODone 50 MG tablet   Commonly known as:  DESYREL        Dose:  1 mg   Take 1 mg by mouth At Bedtime   Refills:  0         CONTINUE these medicines which have NOT CHANGED        Dose / Directions    Gel Heel Cushions Womens Pads   Used for:  Plantar fasciitis        Dose:  1 Device   1 Device daily   Quantity:  1 Device   Refills:  0       order for DME   Used for:  Chronic bronchitis, unspecified chronic bronchitis type (H)        Dose:  1 Device   1 Device by Device route daily as needed Air filtration system   Quantity:  1 Device   Refills:  0                Protect others around you: Learn how to safely use, store and throw away your medicines at www.disposemymeds.org.             Medication List: This is a list of all your medications and when to take them. Check marks below indicate your daily home schedule. Keep this list as a reference.      Medications           Morning Afternoon Evening Bedtime As Needed    albuterol 108 (90 BASE) MCG/ACT Inhaler   Commonly known as:  PROAIR HFA/PROVENTIL HFA/VENTOLIN HFA   Inhale 2 puffs into the lungs every 6 hours                                ATIVAN 2 MG tablet   Take 2 mg by mouth At Bedtime 2 tablets at night   Generic drug:  LORazepam                                budesonide-formoterol 80-4.5 MCG/ACT Inhaler   Commonly known as:  SYMBICORT   Inhale 2 puffs into the lungs 2 times daily                                cetirizine 10 MG tablet   Commonly known as:  zyrTEC   Take 10 mg by mouth daily.                                conjugated estrogens cream   Commonly known as:  PREMARIN   Place 2 g vaginally daily                                Gel Heel Cushions Womens Pads   1 Device daily                                hydrochlorothiazide 25 MG tablet   Commonly known as:  HYDRODIURIL   Take 1 tablet (25 mg) by mouth daily                                levothyroxine 50 MCG tablet   Commonly known as:  SYNTHROID/LEVOTHROID   Take 1 tablet (50 mcg) by mouth daily                                 lisinopril 30 MG tablet   Commonly known as:  PRINIVIL,ZESTRIL   Take 1 tablet (30 mg) by mouth At Bedtime                                Lysine 1000 MG Tabs   Take by mouth 2 times daily                                ofloxacin 0.3 % ophthalmic solution   Commonly known as:  OCUFLOX   1 drop 4x daily in the surgical eye for 1 week after surgery, then stop   Last time this was given:  1 drop on 9/29/2017  8:01 AM                                order for DME   1 Device by Device route daily as needed Air filtration system                                prednisoLONE acetate 1 % ophthalmic susp   Commonly known as:  PRED FORTE   1 drop in surgical eye as directed, 4x daily after surgery for 1 week, 3x daily for 1 week, 2x daily for 1 week, daily for 1 week, then stop   Last time this was given:  1 drop on 9/29/2017  8:01 AM                                traMADol 50 MG tablet   Commonly known as:  ULTRAM   Take 1-2 tablets ( mg) by mouth every 6 hours as needed                                traZODone 50 MG tablet   Commonly known as:  DESYREL   Take 1 mg by mouth At Bedtime

## 2017-09-29 NOTE — ANESTHESIA POSTPROCEDURE EVALUATION
Patient: Sarah Sanford    Procedure(s):  RIGHT EYE PHACOEMULSIFICATION CLEAR CORNEA WITH STANDARD INTRAOCULAR LENS IMPLANT  - Wound Class: I-Clean    Diagnosis:CATARACT   Diagnosis Additional Information: No value filed.    Anesthesia Type:  General    Note:  Anesthesia Post Evaluation    Patient location during evaluation: bedside  Patient participation: Able to fully participate in evaluation  Level of consciousness: awake  Pain management: adequate  Airway patency: patent  Cardiovascular status: acceptable  Respiratory status: acceptable  Hydration status: acceptable  PONV: none     Anesthetic complications: None    Comments: No anesthetic complications noted.         Last vitals:  Vitals:    09/29/17 0930 09/29/17 0943 09/29/17 0955   BP: 108/69  94/62   Resp: 16 10 12   Temp: 36.5  C (97.7  F)     SpO2: 92% 92% 97%         Electronically Signed By: Herman Bryan DO, DO  September 29, 2017  11:33 AM

## 2017-09-29 NOTE — PROGRESS NOTES
POD#0, status post cataract surgery, OD eye    No complaints.  Denies eye pain.    Impression/Plan:  Pseudophakia, OD: POD0, good post-operative appearance. IOP reasonable.    - Levofloxacin 4x daily in the surgical eye for 1 week  - Prednisolone 4x daily in the surgical eye for 1 week, then weekly taper      Eye protection at all times and eye shield at night for 1 week.    Limited activities with no exercise or heavy lifting for 1 week.    Instructed patient to contact us for decreasing vision, eye pain, new floaters or flashes of light or other concerning symptoms.    Written instructions given    Return to clinic 3-4 weeks with refraction and dilation.    Teaching statement:  Complete documentation of historical and exam elements from today's encounter can be found in the full encounter summary report (not reduplicated in this progress note). I personally obtained the chief complaint(s) and history of present illness.  I confirmed and edited as necessary the review of systems, past medical/surgical history, family history, social history, and examination findings as documented by others; and I examined the patient myself. I personally reviewed the relevant tests, images, and reports as documented above.     I formulated and edited as necessary the assessment and plan and discussed the findings and management plan with the patient and family.    Sylvia Grider MD  Comprehensive Ophthalmology & Ocular Pathology  Department of Ophthalmology and Visual Neurosciences  [email or web address]  Pager [phone]

## 2017-09-29 NOTE — DISCHARGE INSTRUCTIONS
Same Day Surgery Discharge Instructions for  Sedation and General Anesthesia       It's not unusual to feel dizzy, light-headed or faint for up to 24 hours after surgery or while taking pain medication.  If you have these symptoms: sit for a few minutes before standing and have someone assist you when you get up to walk or use the bathroom.      You should rest and relax for the next 24 hours. We recommend you make arrangements to have an adult stay with you for at least 24 hours after your discharge.  Avoid hazardous and strenuous activity.      DO NOT DRIVE any vehicle or operate mechanical equipment for 24 hours following the end of your surgery.  Even though you may feel normal, your reactions may be affected by the medication you have received.      Do not drink alcoholic beverages for 24 hours following surgery.       Slowly progress to your regular diet as you feel able. It's not unusual to feel nauseated and/or vomit after receiving anesthesia.  If you develop these symptoms, drink clear liquids (apple juice, ginger ale, broth, 7-up, etc. ) until you feel better.  If your nausea and vomiting persists for 24 hours, please notify your surgeon.        All narcotic pain medications, along with inactivity and anesthesia, can cause constipation. Drinking plenty of liquids and increasing fiber intake will help.      For any questions of a medical nature, call your surgeon.      Do not make important decisions for 24 hours.      If you had general anesthesia, you may have a sore throat for a couple of days related to the breathing tube used during surgery.  You may use Cepacol lozenges to help with this discomfort.  If it worsens or if you develop a fever, contact your surgeon.       If you feel your pain is not well managed with the pain medications prescribed by your surgeon, please contact your surgeon's office to let them know so they can address your concerns.           Dr. Sylvia Grider  [facility]  [place]  [phone]  Post Operative Cataract Instructions        If you have a gauze eye patch on, please do not remove it until it is removed by your physician at your first post-operative visit.  You will start your eye drops the next day.    OR      If you only have a clear eye shield on, you may remove the eye shield on arrival home and begin eye drops today as directed by Dr. Grider.      Wear the clear eye shield when sleeping for protection for 5 days.      Do not rub the operated eye.      Light sensitivity may be noticed. Sunglasses may be worn for comfort.      Some discomfort and irritation may be noticed. Acetaminophen (Tylenol) or Ibuprofen (Advil) may be taken for discomfort. If pain persists please call Dr. Grider's office.      Keep the operated eye dry. You may wash your hair, bathe or shower, but keep the operated eye closed while doing so.       If you take glaucoma medications, bring them with you to the clinic on your first post operative visit.      Bring your prescribed eye drops with you to your scheduled post-operative appointment.      Use medication exactly as prescribed by your doctor. You may restart your regular home medications.       Call Dr. Grider's office at [phone] if any of the following should occur:    - Any sudden vision changes, including decreased vision  - Nausea or severe headache  - Increase in pain not controlled  - Signs of infection (pus, increasing redness or tenderness)  - Severe sensitivity to light

## 2017-10-02 ENCOUNTER — OFFICE VISIT (OUTPATIENT)
Dept: OPHTHALMOLOGY | Facility: CLINIC | Age: 62
End: 2017-10-02
Attending: OPHTHALMOLOGY
Payer: MEDICARE

## 2017-10-02 DIAGNOSIS — Z96.1 PSEUDOPHAKIA, RIGHT EYE: Primary | ICD-10-CM

## 2017-10-02 PROCEDURE — 99212 OFFICE O/P EST SF 10 MIN: CPT | Mod: ZF

## 2017-10-02 ASSESSMENT — VISUAL ACUITY
OD_PH_SC: 20/20
METHOD: SNELLEN - LINEAR
OD_SC: 20/50

## 2017-10-02 ASSESSMENT — EXTERNAL EXAM - RIGHT EYE: OD_EXAM: NORMAL

## 2017-10-02 ASSESSMENT — TONOMETRY
IOP_METHOD: ICARE
OD_IOP_MMHG: 11

## 2017-10-02 ASSESSMENT — SLIT LAMP EXAM - LIDS: COMMENTS: NORMAL

## 2017-10-02 NOTE — MR AVS SNAPSHOT
After Visit Summary   10/2/2017    Sarah Sanford    MRN: [id number]           Patient Information     Date Of Birth          1955        Visit Information        Provider Department      10/2/2017 1:00 PM Sylvia Grider MD Eye Clinic        Today's Diagnoses     Pseudophakia, right eye    -  1       Follow-ups after your visit        Follow-up notes from your care team     Return in about 3 weeks (around 10/23/2017) for refraction and dilation.      Your next 10 appointments already scheduled     Oct 25, 2017  1:00 PM CDT   Post-Op with Sylvia Grider MD   Eye Clinic ([facility])    [address]  [address] [place] [address]   [phone]            Nov 24, 2017  1:40 PM CST   (Arrive by 1:25 PM)   Return Visit with Vj Centeno MD   [facility] ([facility])    [address]  4th Floor  [facility] [id number] [phone]            Feb 07, 2018  1:00 PM CST   RETURN RETINA with Steph Cintron MD   Eye Clinic ([facility])    [address]  [address] [facility]  [address] [address] [phone]              Who to contact     Please call your clinic at [phone] to:    Ask questions about your health    Make or cancel appointments    Discuss your medicines    Learn about your test results    Speak to your doctor   If you have compliments or concerns about an experience at your clinic, or if you wish to file a complaint, please contact [facility] Patient Relations at [phone] or email us at [email or web address]         Additional Information About Your Visit        MyChart Information     [employer] gives you secure access to your electronic health record. If you see a primary care provider, you can also send messages to your care team and make appointments. If you have questions, please call  your primary care clinic.  If you do not have a primary care provider, please call [phone] and they will assist you.      One[employer] is an electronic gateway that provides easy, online access to your medical records. With One[employer], you can request a clinic appointment, read your test results, renew a prescription or communicate with your care team.     To access your existing account, please contact your [facility] or call [phone] for assistance.        Care EveryWhere ID     This is your Care EveryWhere ID. This could be used by other organizations to access your [facility] medical records  [id number]         Blood Pressure from Last 3 Encounters:   09/29/17 94/62   09/20/17 125/78   08/28/17 134/86    Weight from Last 3 Encounters:   09/20/17 76.2 kg (167 lb 14.4 oz)   08/28/17 76.3 kg (168 lb 3.2 oz)   08/06/17 74.4 kg (164 lb)              Today, you had the following     No orders found for display       Primary Care Provider Office Phone # Fax #    Vj Centeno [phone]       [address] [place] [address]        Equal Access to Services     KAYLA BELL AH: Hadii nasir sheriffo Soaugustaali, waaxda luqadaha, qaybta kaalmada adeegyada, naif fowler. So [facility] [phone].    ATENCIÓN: Si habla español, tiene a vila disposición servicios gratuitos de asistencia lingüística. Joby al [phone].    We comply with applicable federal civil rights laws and [place] laws. We do not discriminate on the basis of race, color, national origin, age, disability, sex, sexual orientation, or gender identity.            Thank you!     Thank you for choosing EYE CLINIC  for your care. Our goal is always to provide you with excellent care. Hearing back from our patients is one way we can continue to improve our services. Please take a few minutes to complete the written survey that you may receive in the mail after your  visit with us. Thank you!             Your Updated Medication List - Protect others around you: Learn how to safely use, store and throw away your medicines at www.disposemymeds.org.          This list is accurate as of: 10/2/17  2:57 PM.  Always use your most recent med list.                   Brand Name Dispense Instructions for use Diagnosis    albuterol 108 (90 BASE) MCG/ACT Inhaler    PROAIR HFA/PROVENTIL HFA/VENTOLIN HFA    1 Inhaler    Inhale 2 puffs into the lungs every 6 hours    Reactive airway disease, mild persistent, uncomplicated       ATIVAN 2 MG tablet   Generic drug:  LORazepam      Take 2 mg by mouth At Bedtime 2 tablets at night    Other chronic pain       budesonide-formoterol 80-4.5 MCG/ACT Inhaler    SYMBICORT    1 Inhaler    Inhale 2 puffs into the lungs 2 times daily    Reactive airway disease, mild persistent, uncomplicated       cetirizine 10 MG tablet    zyrTEC     Take 10 mg by mouth daily.        conjugated estrogens cream    PREMARIN    180 g    Place 2 g vaginally daily    Essential hypertension       Gel Heel Cushions Womens Pads     1 Device    1 Device daily    Plantar fasciitis       hydrochlorothiazide 25 MG tablet    HYDRODIURIL    100 tablet    Take 1 tablet (25 mg) by mouth daily    Essential hypertension       levothyroxine 50 MCG tablet    SYNTHROID/LEVOTHROID    100 tablet    Take 1 tablet (50 mcg) by mouth daily    Hypothyroidism       lisinopril 30 MG tablet    PRINIVIL,ZESTRIL    100 tablet    Take 1 tablet (30 mg) by mouth At Bedtime    Benign essential hypertension       Lysine 1000 MG Tabs      Take by mouth 2 times daily    Other chronic pain, Mixed cryoglobulinemia (H), History of hepatitis C, Secondary hypertension, hypertension with unspecified goal       ofloxacin 0.3 % ophthalmic solution    OCUFLOX    1 Bottle    1 drop 4x daily in the surgical eye for 1 week after surgery, then stop    Nuclear cataract of right eye       order for DME     1 Device    1 Device  by Device route daily as needed Air filtration system    Chronic bronchitis, unspecified chronic bronchitis type (H)       prednisoLONE acetate 1 % ophthalmic susp    PRED FORTE    1 Bottle    1 drop in surgical eye as directed, 4x daily after surgery for 1 week, 3x daily for 1 week, 2x daily for 1 week, daily for 1 week, then stop    Nuclear cataract of right eye       traMADol 50 MG tablet    ULTRAM    360 tablet    Take 1-2 tablets ( mg) by mouth every 6 hours as needed    Other chronic pain, Mixed cryoglobulinemia (H), History of hepatitis C       traZODone 50 MG tablet    DESYREL     Take 1 mg by mouth At Bedtime

## 2017-10-02 NOTE — NURSING NOTE
Chief Complaints and History of Present Illnesses   Patient presents with     Post Op (Ophthalmology) Right Eye     1 day s/p CE/IOL RE     HPI    Affected eye(s):  Right   Symptoms:        Duration:  1 day   Frequency:  Constant       Do you have eye pain now?:  No      Comments:  Pt. States that she is doing well.  VA is much improved REDorothy Avendañocy Rabia COT 1:16 PM October 2, 2017

## 2017-10-02 NOTE — PROGRESS NOTES
POD#3, status post cataract surgery, right eye  Here for post-op visit today as surgery was done under general anesthesia, and her anxiety is best managed with frequent checks.    No complaints.  Denies eye pain.    Impression/Plan:  Pseudophakia, OD: POD3, good post-operative appearance. IOP reasonable.    - Levofloxacin 4x daily in the surgical eye for 1 week total  - Prednisolone 4x daily in the surgical eye for 1 week total, then weekly taper      Eye protection at all times and eye shield at night for 1 week.    Limited activities with no exercise or heavy lifting for 1 week.    Instructed patient to contact us for decreasing vision, eye pain, new floaters or flashes of light or other concerning symptoms.    Written instructions given    Return to clinic 3-4 weeks with refraction and dilation.    Teaching statement:  Complete documentation of historical and exam elements from today's encounter can be found in the full encounter summary report (not reduplicated in this progress note). I personally obtained the chief complaint(s) and history of present illness.  I confirmed and edited as necessary the review of systems, past medical/surgical history, family history, social history, and examination findings as documented by others; and I examined the patient myself. I personally reviewed the relevant tests, images, and reports as documented above.     I formulated and edited as necessary the assessment and plan and discussed the findings and management plan with the patient and family.    Sylvia Grider MD  Comprehensive Ophthalmology & Ocular Pathology  Department of Ophthalmology and Visual Neurosciences  [email or web address]  Pager [phone]

## 2017-10-10 ENCOUNTER — MYC MEDICAL ADVICE (OUTPATIENT)
Dept: OPHTHALMOLOGY | Facility: CLINIC | Age: 62
End: 2017-10-10

## 2017-10-25 ENCOUNTER — OFFICE VISIT (OUTPATIENT)
Dept: OPHTHALMOLOGY | Facility: CLINIC | Age: 62
End: 2017-10-25
Attending: OPHTHALMOLOGY
Payer: MEDICARE

## 2017-10-25 DIAGNOSIS — Z96.1 PSEUDOPHAKIA OF RIGHT EYE: Primary | ICD-10-CM

## 2017-10-25 PROCEDURE — 99212 OFFICE O/P EST SF 10 MIN: CPT | Mod: ZF

## 2017-10-25 PROCEDURE — 92015 DETERMINE REFRACTIVE STATE: CPT | Mod: 52,ZF

## 2017-10-25 ASSESSMENT — SLIT LAMP EXAM - LIDS
COMMENTS: NORMAL
COMMENTS: NORMAL

## 2017-10-25 ASSESSMENT — REFRACTION_WEARINGRX
OD_SPHERE: PLANO
OS_AXIS: 085
SPECS_TYPE: BIFOCAL
OD_AXIS: 133
OS_CYLINDER: +0.25
OD_CYLINDER: +0.50
OS_SPHERE: +0.25
OD_ADD: +2.50
OS_ADD: +2.50

## 2017-10-25 ASSESSMENT — EXTERNAL EXAM - RIGHT EYE: OD_EXAM: NORMAL

## 2017-10-25 ASSESSMENT — CUP TO DISC RATIO
OD_RATIO: 0.2
OS_RATIO: 0.2

## 2017-10-25 ASSESSMENT — VISUAL ACUITY
OD_SC: 20/80
METHOD: SNELLEN - LINEAR
OS_CC: 20/25

## 2017-10-25 ASSESSMENT — REFRACTION_MANIFEST
OD_AXIS: 135
OD_CYLINDER: +0.25
OD_SPHERE: -1.00

## 2017-10-25 ASSESSMENT — EXTERNAL EXAM - LEFT EYE: OS_EXAM: NORMAL

## 2017-10-25 ASSESSMENT — TONOMETRY
OD_IOP_MMHG: 11
IOP_METHOD: TONOPEN

## 2017-10-25 NOTE — NURSING NOTE
Chief Complaints and History of Present Illnesses   Patient presents with     Post Op (Ophthalmology) Right Eye     status post cataract surgery, right eye     HPI    Affected eye(s):  Right   Symptoms:        Duration:  3 weeks   Frequency:  Constant       Do you have eye pain now?:  No      Comments:  Pt. States that she is doing well.   No c/o comfort BE.  No change in VA BE.  Swetha Camarillo COT 1:20 PM October 25, 2017

## 2017-10-25 NOTE — MR AVS SNAPSHOT
After Visit Summary   10/25/2017    Sarah Sanford    MRN: [id number]           Patient Information     Date Of Birth          1955        Visit Information        Provider Department      10/25/2017 1:00 PM Sylvia Grider MD Eye Clinic        Today's Diagnoses     Pseudophakia of right eye    -  1       Follow-ups after your visit        Your next 10 appointments already scheduled     Nov 24, 2017  1:40 PM CST   (Arrive by 1:25 PM)   Return Visit with Vj Centeno MD   [facility] ([facility] [place])    [address]  4th [facility] [address] [phone]            Feb 07, 2018  1:00 PM CST   RETURN RETINA with Steph Cintron MD   Eye Clinic ([facility])    [address]  [address] [facility]  9th [address] Clin [address]  [facility] [address] [phone]              Who to contact     Please call your clinic at [phone] to:    Ask questions about your health    Make or cancel appointments    Discuss your medicines    Learn about your test results    Speak to your doctor   If you have compliments or concerns about an experience at your clinic, or if you wish to file a complaint, please contact [facility] Patient Relations at [phone] or email us at [email or web address]         Additional Information About Your Visit        MyChart Information     [employer] gives you secure access to your electronic health record. If you see a primary care provider, you can also send messages to your care team and make appointments. If you have questions, please call your primary care clinic.  If you do not have a primary care provider, please call [phone] and they will assist you.      Branch is an electronic gateway that provides easy, online access to your medical records. With Branch, you can request a clinic appointment, read your test results, renew a prescription or  communicate with your care team.     To access your existing account, please contact your [facility] or call [phone] for assistance.        Care EveryWhere ID     This is your Care EveryWhere ID. This could be used by other organizations to access your [facility] medical records  [id number]         Blood Pressure from Last 3 Encounters:   09/29/17 94/62   09/20/17 125/78   08/28/17 134/86    Weight from Last 3 Encounters:   09/20/17 76.2 kg (167 lb 14.4 oz)   08/28/17 76.3 kg (168 lb 3.2 oz)   08/06/17 74.4 kg (164 lb)              Today, you had the following     No orders found for display       Primary Care Provider Office Phone # Fax #    Vj Centeon [phone]       [address] [place] [address]        Equal Access to Services     [facility] Res[facility] HospitalANGEL : Hadii aad ku hadasho Soomaali, waaxda luqadaha, qaybta kaalmada adeegyada, waxay amryin hayaan bonnie wilson . So [facility] [phone].    ATENCIÓN: Si habla español, tiene a vila disposición servicios gratuitos de asistencia lingüística. Joby al [phone].    We comply with applicable federal civil rights laws and [place] laws. We do not discriminate on the basis of race, color, national origin, age, disability, sex, sexual orientation, or gender identity.            Thank you!     Thank you for choosing EYE CLINIC  for your care. Our goal is always to provide you with excellent care. Hearing back from our patients is one way we can continue to improve our services. Please take a few minutes to complete the written survey that you may receive in the mail after your visit with us. Thank you!             Your Updated Medication List - Protect others around you: Learn how to safely use, store and throw away your medicines at www.disposemymeds.org.          This list is accurate as of: 10/25/17  2:19 PM.  Always use your most recent med list.                   Brand Name Dispense  Instructions for use Diagnosis    albuterol 108 (90 BASE) MCG/ACT Inhaler    PROAIR HFA/PROVENTIL HFA/VENTOLIN HFA    1 Inhaler    Inhale 2 puffs into the lungs every 6 hours    Reactive airway disease, mild persistent, uncomplicated       ATIVAN 2 MG tablet   Generic drug:  LORazepam      Take 2 mg by mouth At Bedtime 2 tablets at night    Other chronic pain       budesonide-formoterol 80-4.5 MCG/ACT Inhaler    SYMBICORT    1 Inhaler    Inhale 2 puffs into the lungs 2 times daily    Reactive airway disease, mild persistent, uncomplicated       cetirizine 10 MG tablet    zyrTEC     Take 10 mg by mouth daily.        conjugated estrogens cream    PREMARIN    180 g    Place 2 g vaginally daily    Essential hypertension       Gel Heel Cushions Womens Pads     1 Device    1 Device daily    Plantar fasciitis       hydrochlorothiazide 25 MG tablet    HYDRODIURIL    100 tablet    Take 1 tablet (25 mg) by mouth daily    Essential hypertension       levothyroxine 50 MCG tablet    SYNTHROID/LEVOTHROID    100 tablet    Take 1 tablet (50 mcg) by mouth daily    Hypothyroidism       lisinopril 30 MG tablet    PRINIVIL,ZESTRIL    100 tablet    Take 1 tablet (30 mg) by mouth At Bedtime    Benign essential hypertension       Lysine 1000 MG Tabs      Take by mouth 2 times daily    Other chronic pain, Mixed cryoglobulinemia (H), History of hepatitis C, Secondary hypertension, hypertension with unspecified goal       ofloxacin 0.3 % ophthalmic solution    OCUFLOX    1 Bottle    1 drop 4x daily in the surgical eye for 1 week after surgery, then stop    Nuclear cataract of right eye       order for DME     1 Device    1 Device by Device route daily as needed Air filtration system    Chronic bronchitis, unspecified chronic bronchitis type (H)       prednisoLONE acetate 1 % ophthalmic susp    PRED FORTE    1 Bottle    1 drop in surgical eye as directed, 4x daily after surgery for 1 week, 3x daily for 1 week, 2x daily for 1 week, daily for 1  week, then stop    Nuclear cataract of right eye       traMADol 50 MG tablet    ULTRAM    360 tablet    Take 1-2 tablets ( mg) by mouth every 6 hours as needed    Other chronic pain, Mixed cryoglobulinemia (H), History of hepatitis C       traZODone 50 MG tablet    DESYREL     Take 1 mg by mouth At Bedtime

## 2017-10-25 NOTE — PROGRESS NOTES
HPI: Karin (preferred name) Juvenal is a 61 y.o F w/ hx of BRVO in RE w/ Vit heme s/p PPV/EL/FAX OD on 2/14/17 here for follow-up after CE/IOL right eye. She notes the vision was a bit better after surgery than it is today.    POHx:  Vitreous Hemorrhage RE s/p PPV/ EL/FAX OD 2/14/17  Cataract RE    Assessment & Plan   (Z96.1) Pseudophakia of right eye  (primary encounter diagnosis)  Comment: Good post-op appearance. Refracts to 20/20.  Plan: Complete drop taper. She prefers to continue with reading glasses only.    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  Plan: Followed by Dr. Cintron     RTC 1 year or sooner as needed.    Teaching statement:  Complete documentation of historical and exam elements from today's encounter can be found in the full encounter summary report (not reduplicated in this progress note). I personally obtained the chief complaint(s) and history of present illness.  I confirmed and edited as necessary the review of systems, past medical/surgical history, family history, social history, and examination findings as documented by others; and I examined the patient myself. I personally reviewed the relevant tests, images, and reports as documented above.     I formulated and edited as necessary the assessment and plan and discussed the findings and management plan with the patient and family.    Sylvia Grider MD  Comprehensive Ophthalmology & Ocular Pathology  Department of Ophthalmology and Visual Neurosciences  [email or web address]  Pager [phone]

## 2017-11-14 ENCOUNTER — TELEPHONE (OUTPATIENT)
Dept: INTERNAL MEDICINE | Facility: CLINIC | Age: 62
End: 2017-11-14

## 2017-11-15 ENCOUNTER — OFFICE VISIT (OUTPATIENT)
Dept: INTERNAL MEDICINE | Facility: CLINIC | Age: 62
End: 2017-11-15

## 2017-11-15 VITALS
HEART RATE: 61 BPM | DIASTOLIC BLOOD PRESSURE: 84 MMHG | RESPIRATION RATE: 16 BRPM | BODY MASS INDEX: 29.42 KG/M2 | SYSTOLIC BLOOD PRESSURE: 136 MMHG | WEIGHT: 174.1 LBS

## 2017-11-15 DIAGNOSIS — R53.83 FATIGUE, UNSPECIFIED TYPE: ICD-10-CM

## 2017-11-15 DIAGNOSIS — E55.9 VITAMIN D DEFICIENCY: ICD-10-CM

## 2017-11-15 DIAGNOSIS — R53.83 FATIGUE, UNSPECIFIED TYPE: Primary | ICD-10-CM

## 2017-11-15 DIAGNOSIS — J34.89 NASAL DRYNESS: ICD-10-CM

## 2017-11-15 DIAGNOSIS — I10 ESSENTIAL HYPERTENSION: ICD-10-CM

## 2017-11-15 LAB
ALBUMIN SERPL-MCNC: 3.7 G/DL (ref 3.4–5)
ALP SERPL-CCNC: 61 U/L (ref 40–150)
ALT SERPL W P-5'-P-CCNC: 23 U/L (ref 0–50)
ANION GAP SERPL CALCULATED.3IONS-SCNC: 5 MMOL/L (ref 3–14)
AST SERPL W P-5'-P-CCNC: 17 U/L (ref 0–45)
BILIRUB SERPL-MCNC: 0.4 MG/DL (ref 0.2–1.3)
BUN SERPL-MCNC: 14 MG/DL (ref 7–30)
CALCIUM SERPL-MCNC: 8.6 MG/DL (ref 8.5–10.1)
CHLORIDE SERPL-SCNC: 103 MMOL/L (ref 94–109)
CO2 SERPL-SCNC: 29 MMOL/L (ref 20–32)
CREAT SERPL-MCNC: 0.66 MG/DL (ref 0.52–1.04)
ERYTHROCYTE [DISTWIDTH] IN BLOOD BY AUTOMATED COUNT: 12.7 % (ref 10–15)
GFR SERPL CREATININE-BSD FRML MDRD: >90 ML/MIN/1.7M2
GLUCOSE SERPL-MCNC: 86 MG/DL (ref 70–99)
HCT VFR BLD AUTO: 43.3 % (ref 35–47)
HGB BLD-MCNC: 14.1 G/DL (ref 11.7–15.7)
MCH RBC QN AUTO: 29.9 PG (ref 26.5–33)
MCHC RBC AUTO-ENTMCNC: 32.6 G/DL (ref 31.5–36.5)
MCV RBC AUTO: 92 FL (ref 78–100)
PLATELET # BLD AUTO: 181 10E9/L (ref 150–450)
POTASSIUM SERPL-SCNC: 4 MMOL/L (ref 3.4–5.3)
PROT SERPL-MCNC: 7.4 G/DL (ref 6.8–8.8)
RBC # BLD AUTO: 4.71 10E12/L (ref 3.8–5.2)
SODIUM SERPL-SCNC: 137 MMOL/L (ref 133–144)
TSH SERPL DL<=0.005 MIU/L-ACNC: 1.57 MU/L (ref 0.4–4)
WBC # BLD AUTO: 5.6 10E9/L (ref 4–11)

## 2017-11-15 PROCEDURE — 82306 VITAMIN D 25 HYDROXY: CPT | Performed by: NURSE PRACTITIONER

## 2017-11-15 RX ORDER — HYDROCHLOROTHIAZIDE 25 MG/1
25 TABLET ORAL DAILY
Qty: 100 TABLET | Refills: 3 | Status: SHIPPED | OUTPATIENT
Start: 2017-11-15 | End: 2019-02-09

## 2017-11-15 NOTE — PROGRESS NOTES
Rooming Note    Health Maintenance  Health Maintenance Due   Topic Date Due     URINE DRUG SCREEN Q1 YR  10/23/1972     JULIA QUESTIONNAIRE 1 YEAR  10/23/1975     PHQ-9 Q1YR  10/23/1975     Blood Pressure  BP Readings from Last 1 Encounters:   11/15/17 150/90   AHA Protocol BP recheck started, 1:17 PM (7 minutes) AHA Average: 136/84    Marcelina Mckeon CMA at 1:06 PM on 11/15/2017

## 2017-11-15 NOTE — NURSING NOTE
Chief Complaint   Patient presents with     Hypertension     pt is here to discuss fluctuating Blood pressure readings       Marcelina Mckeon CMA at 1:06 PM on 11/15/2017

## 2017-11-15 NOTE — MR AVS SNAPSHOT
After Visit Summary   11/15/2017    Sarah Sanford    MRN: [id number]           Patient Information     Date Of Birth          1955        Visit Information        Provider Department      11/15/2017 1:20 PM Dori Oviedo APRN [facility]        Today's Diagnoses     Fatigue, unspecified type    -  1    Vitamin D deficiency        Nasal dryness        Essential hypertension          Care Instructions    Primary Care Center Phone Number [phone]  Primary Care Center Medication Refill Request Information:  * Please contact your pharmacy regarding ANY request for medication refills.  ** [facility] Prescription Fax = [phone]  * Please allow 3 business days for routine medication refills.  * Please allow 5 business days for controlled substance medication refills.     Primary Care Center Test Result notification information:  *You will be notified with in 7-10 days of your appointment day regarding the results of your test.  If you are on MyChart you will be notified as soon as the provider has reviewed the results and signed off on them.                  Follow-ups after your visit        Your next 10 appointments already scheduled     Nov 24, 2017  1:40 PM CST   (Arrive by 1:25 PM)   Return Visit with Vj Centeno MD   [facility] ([facility])    [address]  4th [facility] [id number] [phone]            Feb 07, 2018  1:00 PM CST   RETURN RETINA with Steph Cintron MD   Eye Clinic ([facility])    [address]  [address] [place] [id number] [phone]            Oct 29, 2018  1:15 PM CDT   RETURN GENERAL with Sylvia Grider MD   Eye Clinic ([facility] [address])    [address]  [address] [facility]  [address] [address] [place] [id number]   [phone]              Who to contact     Please call your clinic at  [phone] to:    Ask questions about your health    Make or cancel appointments    Discuss your medicines    Learn about your test results    Speak to your doctor   If you have compliments or concerns about an experience at your clinic, or if you wish to file a complaint, please contact [facility] Patient Relations at [phone] or email us at [email or web address]         Additional Information About Your Visit        [employer] Information     [employer] gives you secure access to your electronic health record. If you see a primary care provider, you can also send messages to your care team and make appointments. If you have questions, please call your primary care clinic.  If you do not have a primary care provider, please call [phone] and they will assist you.      [employer] is an electronic gateway that provides easy, online access to your medical records. With [employer], you can request a clinic appointment, read your test results, renew a prescription or communicate with your care team.     To access your existing account, please contact your [facility] or call [phone] for assistance.        Care EveryWhere ID     This is your Care EveryWhere ID. This could be used by other organizations to access your [place] medical records  [id number]        Your Vitals Were     Pulse Respirations Breastfeeding? BMI (Body Mass Index)          61 16 No 29.42 kg/m2         Blood Pressure from Last 3 Encounters:   11/15/17 136/84   09/29/17 94/62   09/20/17 125/78    Weight from Last 3 Encounters:   11/15/17 79 kg (174 lb 1.6 oz)   09/20/17 76.2 kg (167 lb 14.4 oz)   08/28/17 76.3 kg (168 lb 3.2 oz)                 Today's Medication Changes          These changes are accurate as of: 11/15/17  3:55 PM.  If you have any questions, ask your nurse or doctor.               Start taking these medicines.        Dose/Directions    order for DME   Used for:   Nasal dryness   Started by:  Dori Oviedo APRN CNP        Equipment being ordered: Humidifier   Quantity:  1 each   Refills:  0            Where to get your medicines      These medications were sent to [place], [place] - [address]  [address], [facility] [address]    Hours:  TRANSPLANT PHONE NUMBER [phone] Phone:  [phone]     hydrochlorothiazide 25 MG tablet         Some of these will need a paper prescription and others can be bought over the counter.  Ask your nurse if you have questions.     Bring a paper prescription for each of these medications     order for DME                Primary Care Provider Office Phone # Fax #    Vj Centeno [phone]       [address] [place] [address] [address]  [facility] [address]        Equal Access to Services     KAYLA BELL : Hadii aad ku hadasho Soomaali, waaxda luqadaha, qaybta kaalmada adeegyada, naif fowler. So [facility] [phone].    ATENCIÓN: Si habla español, tiene a vila disposición servicios gratuitos de asistencia lingüística. Joby al [phone].    We comply with applicable federal civil rights laws and [place] laws. We do not discriminate on the basis of race, color, national origin, age, disability, sex, sexual orientation, or gender identity.            Thank you!     Thank you for choosing [facility]  for your care. Our goal is always to provide you with excellent care. Hearing back from our patients is one way we can continue to improve our services. Please take a few minutes to complete the written survey that you may receive in the mail after your visit with us. Thank you!             Your Updated Medication List - Protect others around you: Learn how to safely use, store and throw away your medicines at www.disposemymeds.org.          This list is accurate as of: 11/15/17  3:55 PM.  Always use your most  recent med list.                   Brand Name Dispense Instructions for use Diagnosis    albuterol 108 (90 BASE) MCG/ACT Inhaler    PROAIR HFA/PROVENTIL HFA/VENTOLIN HFA    1 Inhaler    Inhale 2 puffs into the lungs every 6 hours    Reactive airway disease, mild persistent, uncomplicated       ATIVAN 2 MG tablet   Generic drug:  LORazepam      Take 2 mg by mouth At Bedtime 2 tablets at night    Other chronic pain       budesonide-formoterol 80-4.5 MCG/ACT Inhaler    SYMBICORT    1 Inhaler    Inhale 2 puffs into the lungs 2 times daily    Reactive airway disease, mild persistent, uncomplicated       cetirizine 10 MG tablet    zyrTEC     Take 10 mg by mouth daily.        conjugated estrogens cream    PREMARIN    180 g    Place 2 g vaginally daily    Essential hypertension       Gel Heel Cushions Womens Pads     1 Device    1 Device daily    Plantar fasciitis       hydrochlorothiazide 25 MG tablet    HYDRODIURIL    100 tablet    Take 1 tablet (25 mg) by mouth daily    Essential hypertension       levothyroxine 50 MCG tablet    SYNTHROID/LEVOTHROID    100 tablet    Take 1 tablet (50 mcg) by mouth daily    Hypothyroidism       lisinopril 30 MG tablet    PRINIVIL,ZESTRIL    100 tablet    Take 1 tablet (30 mg) by mouth At Bedtime    Benign essential hypertension       Lysine 1000 MG Tabs      Take by mouth 2 times daily    Other chronic pain, Mixed cryoglobulinemia (H), History of hepatitis C, Secondary hypertension, hypertension with unspecified goal       ofloxacin 0.3 % ophthalmic solution    OCUFLOX    1 Bottle    1 drop 4x daily in the surgical eye for 1 week after surgery, then stop    Nuclear cataract of right eye       order for DME     1 Device    1 Device by Device route daily as needed Air filtration system    Chronic bronchitis, unspecified chronic bronchitis type (H)       order for DME     1 each    Equipment being ordered: Humidifier    Nasal dryness       prednisoLONE acetate 1 % ophthalmic susp    PRED  FORTE    1 Bottle    1 drop in surgical eye as directed, 4x daily after surgery for 1 week, 3x daily for 1 week, 2x daily for 1 week, daily for 1 week, then stop    Nuclear cataract of right eye       traMADol 50 MG tablet    ULTRAM    360 tablet    Take 1-2 tablets ( mg) by mouth every 6 hours as needed    Other chronic pain, Mixed cryoglobulinemia (H), History of hepatitis C       traZODone 50 MG tablet    DESYREL     Take 1 mg by mouth At Bedtime

## 2017-11-15 NOTE — PATIENT INSTRUCTIONS
Primary Care Center Phone Number [phone]  Primary Care Center Medication Refill Request Information:  * Please contact your pharmacy regarding ANY request for medication refills.  ** [facility] Prescription Fax = [phone]  * Please allow 3 business days for routine medication refills.  * Please allow 5 business days for controlled substance medication refills.     Primary Care Center Test Result notification information:  *You will be notified with in 7-10 days of your appointment day regarding the results of your test.  If you are on MyChart you will be notified as soon as the provider has reviewed the results and signed off on them.

## 2017-11-15 NOTE — TELEPHONE ENCOUNTER
"I spoke to Karin today. She stated that she has been checking her blood pressure and keeping a log. Stated she had low BPs in recent healthcare visits. Stated that on Saturday she had thought that her \"blood was having trouble getting to organs.\" She did not take her medication Sunday, Monday, or today until this evening. Stated reading upon waking was 83/66. At 1156 reading was 109/79. Reading at 1230 was then 102/70. Pt stated that she has been somewhat fatigued and feels body aches. No SOB, dizziness, chest pain. Stated BP today was 112/83 upon waking then started steadily increasing. Stated BP at 6pm was 146/85. Stated she took medication after that elevated reading. Karin stated she is unsure what to do anymore. Has an appt with Dr. Centeno next week and wants to keep it. If sooner appointments with Dr. Centeno are possible, I informed pt I would update her. Advised pt to continue monitoring BP. Scheduled appt in clinic tomorrow as pt still feels fatigued. Stated she will bring BP numbers to visit.     Ayde Govea RN  "
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
## 2017-11-16 DIAGNOSIS — E55.9 VITAMIN D DEFICIENCY: Primary | ICD-10-CM

## 2017-11-16 LAB — DEPRECATED CALCIDIOL+CALCIFEROL SERPL-MC: 33 UG/L (ref 20–75)

## 2017-11-24 ENCOUNTER — OFFICE VISIT (OUTPATIENT)
Dept: INTERNAL MEDICINE | Facility: CLINIC | Age: 62
End: 2017-11-24

## 2017-11-24 VITALS
SYSTOLIC BLOOD PRESSURE: 145 MMHG | BODY MASS INDEX: 29.44 KG/M2 | HEART RATE: 65 BPM | WEIGHT: 174.2 LBS | DIASTOLIC BLOOD PRESSURE: 91 MMHG

## 2017-11-24 DIAGNOSIS — D89.1 MIXED CRYOGLOBULINEMIA (H): ICD-10-CM

## 2017-11-24 DIAGNOSIS — Z86.19 HISTORY OF HEPATITIS C: ICD-10-CM

## 2017-11-24 DIAGNOSIS — G89.29 OTHER CHRONIC PAIN: ICD-10-CM

## 2017-11-24 DIAGNOSIS — R41.3 MEMORY LOSS: Primary | ICD-10-CM

## 2017-11-24 RX ORDER — TRAMADOL HYDROCHLORIDE 50 MG/1
50-100 TABLET ORAL EVERY 6 HOURS PRN
Qty: 360 TABLET | Refills: 0 | Status: SHIPPED | OUTPATIENT
Start: 2017-11-24 | End: 2018-02-21

## 2017-11-24 ASSESSMENT — PAIN SCALES - GENERAL: PAINLEVEL: NO PAIN (0)
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
## 2017-11-24 NOTE — MR AVS SNAPSHOT
After Visit Summary   11/24/2017    Sarah Sanford    MRN: [id number]           Patient Information     Date Of Birth          1955        Visit Information        Provider Department      11/24/2017 1:40 PM Vj Centeno MD [facility]        Today's Diagnoses     Memory loss    -  1    Other chronic pain        Mixed cryoglobulinemia (H)        History of hepatitis C          Care Instructions    Primary Care Center: [phone]     Primary Care Center Medication Refill Request Information:  * Please contact your pharmacy regarding ANY request for medication refills.  ** [facility] Prescription Fax = [phone]  * Please allow 3 business days for routine medication refills.  * Please allow 5 business days for controlled substance medication refills.     Primary Care Center Test Result notification information:  *You will be notified with in 7-10 days of your appointment day regarding the results of your test.  If you are on MyChart you will be notified as soon as the provider has reviewed the results and signed off on them.            Follow-ups after your visit        Your next 10 appointments already scheduled     Jan 22, 2018  1:30 PM CST   (Arrive by 1:15 PM)   Hearing Aid Evaluation with Maria Victoria Feliciano [facility] Audiology ([facility])    [address] [place] [address] [phone]           Please see your medical professional for ear cleaning prior to this appointment if you believe wax buildup may be an issue. All patients are required to have a physician's order stating the medical reason for the hearing test. Your doctor can send an electronic order, use their own form or we have provided a form (called Physician's Order for Audiology Services). It states that there is a medical reason for your exam. Without an order you may need to be rescheduled until the order can be obtained.            Jan 22,  2018  3:40 PM CST   (Arrive by 3:25 PM)   Return Visit with Vj Centeno MD   [facility] ([facility])    [address] [place] [address] [phone]            Feb 07, 2018  1:00 PM CST   RETURN RETINA with Steph Cintron MD   Eye Clinic ([facility])    Henri Tiwarig  [address] [address] [phone]            Feb 21, 2018  2:00 PM CST   Hearing Aid Fitting with Luisa Perez [facility] Audiology ([facility])    [address] [place] [id number] [phone]            Feb 21, 2018  3:05 PM CST   (Arrive by 2:50 PM)   ACUTE/CHRONIC SINGLE CONDITION with Vj Centeno MD   [facility] ([facility])    [address] [place] [address] [phone]            Mar 14, 2018  1:00 PM CDT   (Arrive by 12:45 PM)   Initial Review Program with Luisa Perez [facility] Audiology ([facility])    [address] [place] [id number] [phone]            Oct 29, 2018  1:15 PM CDT   RETURN GENERAL with Sylvia Grider MD   Eye Clinic ([facility])    Henri Stinson  [address] [address] [phone]              Who to contact     Please call your clinic at [phone] to:    Ask questions about your health    Make or cancel appointments    Discuss your medicines    Learn about your test results    Speak to your doctor   If you have compliments or concerns about an experience at your clinic, or if you wish to file a complaint, please contact [facility] Patient Relations at [phone] or email us at [email or web address]         Additional Information About Your Visit        Nim[employer]art  Information     [employer] gives you secure access to your electronic health record. If you see a primary care provider, you can also send messages to your care team and make appointments. If you have questions, please call your primary care clinic.  If you do not have a primary care provider, please call [phone] and they will assist you.      [employer] is an electronic gateway that provides easy, online access to your medical records. With [employer], you can request a clinic appointment, read your test results, renew a prescription or communicate with your care team.     To access your existing account, please contact your [facility] or call [phone] for assistance.        Care EveryWhere ID     This is your Care EveryWhere ID. This could be used by other organizations to access your [place] medical records  [id number]        Your Vitals Were     Pulse Breastfeeding? BMI (Body Mass Index)             65 No 29.44 kg/m2          Blood Pressure from Last 3 Encounters:   11/24/17 (!) 145/91   11/15/17 136/84   09/29/17 94/62    Weight from Last 3 Encounters:   11/24/17 79 kg (174 lb 3.2 oz)   11/15/17 79 kg (174 lb 1.6 oz)   09/20/17 76.2 kg (167 lb 14.4 oz)              We Performed the Following     Neuropsychological testing          Where to get your medicines      Some of these will need a paper prescription and others can be bought over the counter.  Ask your nurse if you have questions.     Bring a paper prescription for each of these medications     traMADol 50 MG tablet          Primary Care Provider Office Phone # Fax #    Vj Centeno [phone]       [address] [place] [address]        Equal Access to Services     [facility] ServicesANGEL : Hossein Valdez, lashonda perez, naif joyce. So [facility] [phone].    ATENCIÓN: Si habla español, tiene a vila disposición  servicios gratuitos de asistencia lingüística. Joby brower [phone].    We comply with applicable federal civil rights laws and [place] laws. We do not discriminate on the basis of race, color, national origin, age, disability, sex, sexual orientation, or gender identity.            Thank you!     Thank you for choosing [facility]IMARY [facility]IC  for your care. Our goal is always to provide you with excellent care. Hearing back from our patients is one way we can continue to improve our services. Please take a few minutes to complete the written survey that you may receive in the mail after your visit with us. Thank you!             Your Updated Medication List - Protect others around you: Learn how to safely use, store and throw away your medicines at www.disposemymeds.org.          This list is accurate as of: 11/24/17  2:37 PM.  Always use your most recent med list.                   Brand Name Dispense Instructions for use Diagnosis    albuterol 108 (90 BASE) MCG/ACT Inhaler    PROAIR HFA/PROVENTIL HFA/VENTOLIN HFA    1 Inhaler    Inhale 2 puffs into the lungs every 6 hours    Reactive airway disease, mild persistent, uncomplicated       ATIVAN 2 MG tablet   Generic drug:  LORazepam      Take 2 mg by mouth At Bedtime 2 tablets at night    Other chronic pain       budesonide-formoterol 80-4.5 MCG/ACT Inhaler    SYMBICORT    1 Inhaler    Inhale 2 puffs into the lungs 2 times daily    Reactive airway disease, mild persistent, uncomplicated       cetirizine 10 MG tablet    zyrTEC     Take 10 mg by mouth daily.        cholecalciferol 1000 UNIT tablet    vitamin D3    90 tablet    Take 1 tablet (1,000 Units) by mouth daily    Vitamin D deficiency       conjugated estrogens cream    PREMARIN    180 g    Place 2 g vaginally daily    Essential hypertension       Gel Heel Cushions Womens Pads     1 Device    1 Device daily    Plantar fasciitis       hydrochlorothiazide 25 MG tablet    HYDRODIURIL    100 tablet     Take 1 tablet (25 mg) by mouth daily    Essential hypertension       levothyroxine 50 MCG tablet    SYNTHROID/LEVOTHROID    100 tablet    Take 1 tablet (50 mcg) by mouth daily    Hypothyroidism       lisinopril 30 MG tablet    PRINIVIL,ZESTRIL    100 tablet    Take 1 tablet (30 mg) by mouth At Bedtime    Benign essential hypertension       Lysine 1000 MG Tabs      Take by mouth 2 times daily    Other chronic pain, Mixed cryoglobulinemia (H), History of hepatitis C, Secondary hypertension, hypertension with unspecified goal       ofloxacin 0.3 % ophthalmic solution    OCUFLOX    1 Bottle    1 drop 4x daily in the surgical eye for 1 week after surgery, then stop    Nuclear cataract of right eye       order for DME     1 Device    1 Device by Device route daily as needed Air filtration system    Chronic bronchitis, unspecified chronic bronchitis type (H)       order for DME     1 each    Equipment being ordered: Humidifier    Nasal dryness       prednisoLONE acetate 1 % ophthalmic susp    PRED FORTE    1 Bottle    1 drop in surgical eye as directed, 4x daily after surgery for 1 week, 3x daily for 1 week, 2x daily for 1 week, daily for 1 week, then stop    Nuclear cataract of right eye       traMADol 50 MG tablet    ULTRAM    360 tablet    Take 1-2 tablets ( mg) by mouth every 6 hours as needed    Other chronic pain, Mixed cryoglobulinemia (H), History of hepatitis C       traZODone 50 MG tablet    DESYREL     Take 1 mg by mouth At Bedtime

## 2017-11-24 NOTE — PATIENT INSTRUCTIONS
[facility]: [phone]     [facility] Medication Refill Request Information:  * Please contact your pharmacy regarding ANY request for medication refills.  ** [facility] Prescription Fax = [phone]  * Please allow 3 business days for routine medication refills.  * Please allow 5 business days for controlled substance medication refills.     [facility] Test Result notification information:  *You will be notified with in 7-10 days of your appointment day regarding the results of your test.  If you are on MyChart you will be notified as soon as the provider has reviewed the results and signed off on them.

## 2017-11-24 NOTE — NURSING NOTE
Chief Complaint   Patient presents with     Recheck Medication     Patient here for tramadol recheck.       Ayush Arroyo CMA at 1:31 PM on 11/24/2017.

## 2017-12-28 ENCOUNTER — TRANSFERRED RECORDS (OUTPATIENT)
Dept: HEALTH INFORMATION MANAGEMENT | Facility: CLINIC | Age: 62
End: 2017-12-28

## 2018-01-15 NOTE — NURSING NOTE
Chief Complaint   Patient presents with     Medication Request     pt would like to discuss medications     Nasal Congestion     pt states being vimal Nugent CMA at 6:19 PM on 5/22/2017.    
General

## 2018-01-17 ENCOUNTER — OFFICE VISIT (OUTPATIENT)
Dept: NEUROPSYCHOLOGY | Facility: CLINIC | Age: 63
End: 2018-01-17
Payer: MEDICARE

## 2018-01-17 DIAGNOSIS — F33.1 MAJOR DEPRESSIVE DISORDER, RECURRENT EPISODE, MODERATE (H): ICD-10-CM

## 2018-01-17 DIAGNOSIS — F09 COGNITIVE DISORDER: Primary | ICD-10-CM

## 2018-01-17 NOTE — PROGRESS NOTES
The patient was seen for neuropsychological evaluation at the request of Vj Centeno MD for the purposes of diagnostic clarification and treatment planning.  1 hour and 30 minutes of face-to-face testing were provided by this writer.  Please see Dr. Yelena Gardner's report for a full interpretation of the findings.

## 2018-01-17 NOTE — MR AVS SNAPSHOT
After Visit Summary   1/17/2018    Sarah Sanford    MRN: [id number]           Patient Information     Date Of Birth          1955        Visit Information        Provider Department      1/17/2018 12:30 PM Yelena Gardner PsyD [facility] Neuropsychology        Today's Diagnoses     Cognitive disorder    -  1    Major depressive disorder, recurrent episode, moderate (H)           Follow-ups after your visit        Your next 10 appointments already scheduled     Feb 01, 2018 12:15 PM CST   RETURN RETINA with Steph Cintron MD   Eye Clinic ([facility])    [address]  [address] Clin [address] [place] [address]   [phone]            Feb 14, 2018  1:00 PM CST   RETURN RETINA with Steph Cintron MD   Eye Clinic ([facility])    [address]  [address] Clin [address] [place] [address]   [phone]            Feb 21, 2018  3:05 PM CST   (Arrive by 2:50 PM)   MEDICAL REFILL EVAL with Vj Centeno MD   [facility] ([facility])    [address] [place] [address]  4[address] [facility] [address] [phone]            Oct 29, 2018  1:15 PM CDT   RETURN GENERAL with Sylvia Grider MD   Eye Clinic ([facility])    [address]  [address] [address]   [phone]              Who to contact     Please call your clinic at [phone] to:    Ask questions about your health    Make or cancel appointments    Discuss your medicines    Learn about your test results    Speak to your doctor   If you have compliments or concerns about an experience at your clinic, or if you wish to file a complaint, please contact [facility] Patient Relations at [phone] or email us at [email or web address]         Additional Information About Your Visit        MyChart Information     MyChart  gives you secure access to your electronic health record. If you see a primary care provider, you can also send messages to your care team and make appointments. If you have questions, please call your primary care clinic.  If you do not have a primary care provider, please call [phone] and they will assist you.      [employer] is an electronic gateway that provides easy, online access to your medical records. With [employer], you can request a clinic appointment, read your test results, renew a prescription or communicate with your care team.     To access your existing account, please contact your [facility] or call [phone] for assistance.        Care EveryWhere ID     This is your Care EveryWhere ID. This could be used by other organizations to access your [facility] medical records  [id number]         Blood Pressure from Last 3 Encounters:   11/24/17 (!) 145/91   11/15/17 136/84   09/29/17 94/62    Weight from Last 3 Encounters:   11/24/17 79 kg (174 lb 3.2 oz)   11/15/17 79 kg (174 lb 1.6 oz)   09/20/17 76.2 kg (167 lb 14.4 oz)              We Performed the Following     23327-DCKDYUGDOO TESTING, PER HR/PSYCHOLOGIST     63957-[id number] TESTING, PER HR/PSYCHOLOGIST     NEUROPSYCH TESTING BY [facility]     NEUROPSYCH TESTING BY [facility]        Primary Care Provider Office Phone # Fax #    Vj Tin Centeno [phone]       [address] [place] [address]        Equal Access to Services     KAYLA BELL : Hadii nasir sheriffo Solorenza, waaxda luqadaha, qaybta kaalmada bonnieyazak, naif folwer. So [facility] [place] [phone].    ATENCIÓN: Si habla español, tiene a vila disposición servicios gratuitos de asistencia lingüística. Llame al [phone].    We comply with applicable federal civil rights laws and [place] laws. We do not discriminate on the basis of race, color, national origin, age, disability, sex, sexual orientation, or  gender identity.            Thank you!     Thank you for choosing [facility] NEUROPSYCHOLOGY  for your care. Our goal is always to provide you with excellent care. Hearing back from our patients is one way we can continue to improve our services. Please take a few minutes to complete the written survey that you may receive in the mail after your visit with us. Thank you!             Your Updated Medication List - Protect others around you: Learn how to safely use, store and throw away your medicines at www.disposemymeds.org.          This list is accurate as of 1/17/18 11:59 PM.  Always use your most recent med list.                   Brand Name Dispense Instructions for use Diagnosis    albuterol 108 (90 BASE) MCG/ACT Inhaler    PROAIR HFA/PROVENTIL HFA/VENTOLIN HFA    1 Inhaler    Inhale 2 puffs into the lungs every 6 hours    Reactive airway disease, mild persistent, uncomplicated       ATIVAN 2 MG tablet   Generic drug:  LORazepam      Take 2 mg by mouth At Bedtime 2 tablets at night    Other chronic pain       budesonide-formoterol 80-4.5 MCG/ACT Inhaler    SYMBICORT    1 Inhaler    Inhale 2 puffs into the lungs 2 times daily    Reactive airway disease, mild persistent, uncomplicated       cetirizine 10 MG tablet    zyrTEC     Take 10 mg by mouth daily.        cholecalciferol 1000 UNIT tablet    vitamin D3    90 tablet    Take 1 tablet (1,000 Units) by mouth daily    Vitamin D deficiency       conjugated estrogens cream    PREMARIN    180 g    Place 2 g vaginally daily    Essential hypertension       Gel Heel Cushions Womens Pads     1 Device    1 Device daily    Plantar fasciitis       hydrochlorothiazide 25 MG tablet    HYDRODIURIL    100 tablet    Take 1 tablet (25 mg) by mouth daily    Essential hypertension       levothyroxine 50 MCG tablet    SYNTHROID/LEVOTHROID    100 tablet    Take 1 tablet (50 mcg) by mouth daily    Hypothyroidism       lisinopril 30 MG tablet    PRINIVIL,ZESTRIL    100 tablet    Take 1  tablet (30 mg) by mouth At Bedtime    Benign essential hypertension       Lysine 1000 MG Tabs      Take by mouth 2 times daily    Other chronic pain, Mixed cryoglobulinemia (H), History of hepatitis C, Secondary hypertension, hypertension with unspecified goal       order for DME     1 Device    1 Device by Device route daily as needed Air filtration system    Chronic bronchitis, unspecified chronic bronchitis type (H)       order for DME     1 each    Equipment being ordered: Humidifier    Nasal dryness       traMADol 50 MG tablet    ULTRAM    360 tablet    Take 1-2 tablets ( mg) by mouth every 6 hours as needed    Other chronic pain, Mixed cryoglobulinemia (H), History of hepatitis C       traZODone 50 MG tablet    DESYREL     Take 1 mg by mouth At Bedtime

## 2018-01-20 ENCOUNTER — OFFICE VISIT (OUTPATIENT)
Dept: OPHTHALMOLOGY | Facility: CLINIC | Age: 63
End: 2018-01-20
Payer: MEDICARE

## 2018-01-20 DIAGNOSIS — Z96.1 PSEUDOPHAKIA OF RIGHT EYE: Primary | ICD-10-CM

## 2018-01-20 DIAGNOSIS — H43.11 VITREOUS HEMORRHAGE OF RIGHT EYE (H): ICD-10-CM

## 2018-01-20 ASSESSMENT — VISUAL ACUITY
OS_SC: 20/30
OD_SC: 20/40
METHOD: SNELLEN - LINEAR

## 2018-01-20 ASSESSMENT — EXTERNAL EXAM - LEFT EYE: OS_EXAM: NORMAL

## 2018-01-20 ASSESSMENT — TONOMETRY
OS_IOP_MMHG: 15
OD_IOP_MMHG: 15
IOP_METHOD: TONOPEN

## 2018-01-20 ASSESSMENT — CONF VISUAL FIELD
OD_SUPERIOR_NASAL_RESTRICTION: 3
OS_NORMAL: 1

## 2018-01-20 ASSESSMENT — CUP TO DISC RATIO
OD_RATIO: 0.2
OS_RATIO: 0.2

## 2018-01-20 ASSESSMENT — SLIT LAMP EXAM - LIDS
COMMENTS: NORMAL
COMMENTS: NORMAL

## 2018-01-20 ASSESSMENT — EXTERNAL EXAM - RIGHT EYE: OD_EXAM: NORMAL

## 2018-01-20 NOTE — PROGRESS NOTES
HPI:  Sarah Sanford is a 62 year old female here for new floaters. Reports that on 1/12/18 she noticed a bunch of new floaters, no flashes, with burning irritation went away until today, this morning woke up and noticed new floaters everywhere, hundreds, persistent, vision is foggy, like a curtain, no flashes. Denies eye pain, epiphora.     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 Pseudophakia of right eye    2. Vitreous hemorrhage of right eye (H)      - Small infratemporal vitreous hemorrhage of right eye consistent with new floaters, unable to identify source of hemorrhage; no retinal breaks or tears 360 on dilated depressed exam   - Avoid NSAIDs   - Keep HOB elevated  - Discussed return precautions for increased floaters, flashes of light or decreasing vision   - Follow up in Retina clinic early this week     Patient disposition: Return in about 2 days (around 1/22/2018) for Follow Up.     Nnamdi Sr MD  Ophthalmology Resident, PGY-2  [facility]     Discussed with Dr. Venegas who agrees with the assessment and plan.

## 2018-01-20 NOTE — LETTER
1/20/2018         RE: Sarah Sanford  [address]    [place] [address]        Dear Colleague,    Thank you for referring your patient, Sarah Sanford, to the [facility]. Please see a copy of my visit note below.    HPI:  Sarah Sanford is a 62 year old female here for new floaters. Reports that on 1/12/18 she noticed a bunch of new floaters, no flashes, with burning irritation went away until today, this morning woke up and noticed new floaters everywhere, hundreds, persistent, vision is foggy, like a curtain, no flashes. Denies eye pain, epiphora.     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 Pseudophakia of right eye    2. Vitreous hemorrhage of right eye (H)      - Small infratemporal vitreous hemorrhage of right eye consistent with new floaters, unable to identify source of hemorrhage; no retinal breaks or tears 360 on dilated depressed exam   - Avoid NSAIDs   - Keep HOB elevated  - Discussed return precautions for increased floaters, flashes of light or decreasing vision   - Follow up in Retina clinic early this week     Patient disposition: Return in about 2 days (around 1/22/2018) for Follow Up.     Nnamdi Sr MD  Ophthalmology Resident, PGY-2  [facility]     Discussed with Dr. Venegas who agrees with the assessment and plan.       Again, thank you for allowing me to participate in the care of your patient.        Sincerely,        Nnamdi Rosas MD

## 2018-01-22 ENCOUNTER — OFFICE VISIT (OUTPATIENT)
Dept: OPHTHALMOLOGY | Facility: CLINIC | Age: 63
End: 2018-01-22
Attending: OPHTHALMOLOGY
Payer: MEDICARE

## 2018-01-22 DIAGNOSIS — H26.491 POSTERIOR CAPSULAR OPACIFICATION OF RIGHT EYE, OBSCURING VISION: Primary | ICD-10-CM

## 2018-01-22 PROCEDURE — 25000125 ZZHC RX 250: Mod: ZF | Performed by: OPHTHALMOLOGY

## 2018-01-22 PROCEDURE — G0463 HOSPITAL OUTPT CLINIC VISIT: HCPCS | Mod: ZF

## 2018-01-22 PROCEDURE — 66821 AFTER CATARACT LASER SURGERY: CPT | Mod: ZF | Performed by: OPHTHALMOLOGY

## 2018-01-22 RX ADMIN — APRACLONIDINE HYDROCHLORIDE 1 DROP: 10 SOLUTION/ DROPS OPHTHALMIC at 14:12

## 2018-01-22 ASSESSMENT — REFRACTION_WEARINGRX
OS_SPHERE: +0.25
OD_SPHERE: PLANO
OD_ADD: +2.50
OS_AXIS: 085
OS_ADD: +2.50
OD_AXIS: 133
OS_CYLINDER: +0.25
OD_CYLINDER: +0.50
SPECS_TYPE: BIFOCAL

## 2018-01-22 ASSESSMENT — TONOMETRY
OD_IOP_MMHG: 09
IOP_METHOD: TONOPEN
OS_IOP_MMHG: 13

## 2018-01-22 ASSESSMENT — CUP TO DISC RATIO
OS_RATIO: 0.2
OD_RATIO: 0.2

## 2018-01-22 ASSESSMENT — VISUAL ACUITY
METHOD: SNELLEN - LINEAR
OS_SC: 20/40
OD_SC: 20/40
OD_SC+: -2
OS_SC+: +1
OS_PH_SC: 20/25+1

## 2018-01-22 ASSESSMENT — CONF VISUAL FIELD
OS_NORMAL: 1
OD_SUPERIOR_NASAL_RESTRICTION: 3

## 2018-01-22 ASSESSMENT — SLIT LAMP EXAM - LIDS
COMMENTS: NORMAL
COMMENTS: NORMAL

## 2018-01-22 ASSESSMENT — EXTERNAL EXAM - LEFT EYE: OS_EXAM: NORMAL

## 2018-01-22 ASSESSMENT — EXTERNAL EXAM - RIGHT EYE: OD_EXAM: NORMAL

## 2018-01-22 NOTE — LETTER
January 22, 2018      Re: Sarah Sanford   1955    To Whom It May Concern:    This is to confirm that the above patient was seen on 1/22/2018.  Sarah Sanfordshould not be lifting anything over 10 pounds, bending over or straining for the next 2 weeks.    Thank you for your cooperation in this matter.  Please do not hesitate to contact me if you have any further questions.    Sincerely,      HERNANDEZ CONNELL

## 2018-01-22 NOTE — PROGRESS NOTES
"I have confirmed the patient's and reviewed Past Medical History, Past Surgical History, Social History, Family History, Problem List, Medication List and agree with Tech note.    CC: F/u New floaters right eye     HPI: Sarah Sanford is a 62 year old female here for new floaters who presented over the weekend and was diagnosed with recurrent vitreous hemorrhage right eye. Reports that on 1/12/18 she noticed a bunch of new floaters, no flashes, with burning irritation went away. She awoke over the weekend 1/20/18 and noticed new floaters everywhere, hundreds, persistent, vision is foggy, like a curtain, no flashes. Denies eye pain, epiphora.  Patient continues to have these symptoms in the right eye with fog/generalized blur and floaters.     POH:  -branch retinal vein occlusion  OD  -Vitreous Hemorrhage right eye s/p Pars plana vitrectomy (PPV)/EL/AFX 2/14/17   -cataract extraction/iol placement right eye     Assessment/plan:    1. Posterior capsular opacity (PCO) right eye   posterior capsular opacity (PCO) likely contributing to \"fog\" symptoms and visual obscuration. posterior capsular opacity (PCO) over visual axis.     Plan:   -risks/ benefits/ alternatives discussed with patient. Patient would like to proceed with yag cap right eye    -No signs of retinal tear, hole, or detachment. No heme.    -return to clinic 2 weeks for dilated f/u     2. Status post PPV/EL/FAX OD (2/14/17) for vitreous hemorrhage possibly secondary to history of  BRVO.  -Retina attached on todays exam. No signs of retinal tear, hole, or detachment. No heme.   - Discussed return precautions for increased floaters, flashes of light or decreasing vision     RTC 2 weeks for dilated f/u     Apoorva Phillip MD  Ophthalmology PGY3      ATTESTATION:  I have seen and examined the patient with Dr. Phillip and agree with the findings in this note, as well as the interpretations of the diagnostic tests.  I was present for procedure     Solange Zimmerman MD " PhD.  Professor & Chair

## 2018-01-22 NOTE — MR AVS SNAPSHOT
After Visit Summary   1/22/2018    Sarah Sanford    MRN: [id number]           Patient Information     Date Of Birth          1955        Visit Information        Provider Department      1/22/2018 12:45 PM Solange Currie MD [facility]         Follow-ups after your visit        Your next 10 appointments already scheduled     Feb 01, 2018 12:15 PM CST   RETURN RETINA with Steph Cintron MD   [facility] ([facility])    [address]  [address] [place] [address]   [phone]            Feb 14, 2018  1:00 PM CST   RETURN RETINA with Steph Cintron MD   [facility] ([facility])    [address]  [address] [address]   [phone]            Feb 21, 2018  3:05 PM CST   (Arrive by 2:50 PM)   MEDICAL REFILL EVAL with Vj Centeno MD   [facility] ([facility])    [address] [place] [address] [address]  [address] [facility] [address] [phone]            Oct 29, 2018  1:15 PM CDT   RETURN GENERAL with Sylvia Grider MD   [facility] ([facility])    [address]  [address] [place] [address]   [phone]              Who to contact     Please call your clinic at [phone] to:    Ask questions about your health    Make or cancel appointments    Discuss your medicines    Learn about your test results    Speak to your doctor   If you have compliments or concerns about an experience at your clinic, or if you wish to file a complaint, please contact [facility] Patient Relations at [phone] or email us at [email or web address]         Additional Information About Your Visit        MyChart Information     [employer] gives you secure access to your electronic health record. If you see a primary care provider, you can also send messages to  your care team and make appointments. If you have questions, please call your primary care clinic.  If you do not have a primary care provider, please call [phone] and they will assist you.      "[employer] is an electronic gateway that provides easy, online access to your medical records. With "[employer] you can request a clinic appointment, read your test results, renew a prescription or communicate with your care team.     To access your existing account, please contact your [facility] or call [phone] for assistance.        Care EveryWhere ID     This is your Care EveryWhere ID. This could be used by other organizations to access your [facility] medical records  [id number]         Blood Pressure from Last 3 Encounters:   11/24/17 (!) 145/91   11/15/17 136/84   09/29/17 94/62    Weight from Last 3 Encounters:   11/24/17 79 kg (174 lb 3.2 oz)   11/15/17 79 kg (174 lb 1.6 oz)   09/20/17 76.2 kg (167 lb 14.4 oz)              Today, you had the following     No orders found for display       Primary Care Provider Office Phone # Fax #    Vj Centeno [phone]       [address] [place] [address]        Equal Access to Services     KAYLA BELL : Hadii aad ku hadasho Soomaali, waaxda luqadaha, qaybta kaalmada adeegyada, waxay maryin haysascha fowler. So [facility] [phone].    ATENCIÓN: Si habla español, tiene a vila disposición servicios gratuitos de asistencia lingüística. Llame al [phone].    We comply with applicable federal civil rights laws and [place] laws. We do not discriminate on the basis of race, color, national origin, age, disability, sex, sexual orientation, or gender identity.            Thank you!     Thank you for choosing [facility]INIC  for your care. Our goal is always to provide you with excellent care. Hearing back from our patients is one way we can continue to improve our services. Please take a few  minutes to complete the written survey that you may receive in the mail after your visit with us. Thank you!             Your Updated Medication List - Protect others around you: Learn how to safely use, store and throw away your medicines at www.disposemymeds.org.          This list is accurate as of: 1/22/18  2:15 PM.  Always use your most recent med list.                   Brand Name Dispense Instructions for use Diagnosis    albuterol 108 (90 BASE) MCG/ACT Inhaler    PROAIR HFA/PROVENTIL HFA/VENTOLIN HFA    1 Inhaler    Inhale 2 puffs into the lungs every 6 hours    Reactive airway disease, mild persistent, uncomplicated       ATIVAN 2 MG tablet   Generic drug:  LORazepam      Take 2 mg by mouth At Bedtime 2 tablets at night    Other chronic pain       budesonide-formoterol 80-4.5 MCG/ACT Inhaler    SYMBICORT    1 Inhaler    Inhale 2 puffs into the lungs 2 times daily    Reactive airway disease, mild persistent, uncomplicated       cetirizine 10 MG tablet    zyrTEC     Take 10 mg by mouth daily.        cholecalciferol 1000 UNIT tablet    vitamin D3    90 tablet    Take 1 tablet (1,000 Units) by mouth daily    Vitamin D deficiency       conjugated estrogens cream    PREMARIN    180 g    Place 2 g vaginally daily    Essential hypertension       Gel Heel Cushions Womens Pads     1 Device    1 Device daily    Plantar fasciitis       hydrochlorothiazide 25 MG tablet    HYDRODIURIL    100 tablet    Take 1 tablet (25 mg) by mouth daily    Essential hypertension       levothyroxine 50 MCG tablet    SYNTHROID/LEVOTHROID    100 tablet    Take 1 tablet (50 mcg) by mouth daily    Hypothyroidism       lisinopril 30 MG tablet    PRINIVIL,ZESTRIL    100 tablet    Take 1 tablet (30 mg) by mouth At Bedtime    Benign essential hypertension       Lysine 1000 MG Tabs      Take by mouth 2 times daily    Other chronic pain, Mixed cryoglobulinemia (H), History of hepatitis C, Secondary hypertension, hypertension with unspecified goal        order for DME     1 Device    1 Device by Device route daily as needed Air filtration system    Chronic bronchitis, unspecified chronic bronchitis type (H)       order for DME     1 each    Equipment being ordered: Humidifier    Nasal dryness       traMADol 50 MG tablet    ULTRAM    360 tablet    Take 1-2 tablets ( mg) by mouth every 6 hours as needed    Other chronic pain, Mixed cryoglobulinemia (H), History of hepatitis C       traZODone 50 MG tablet    DESYREL     Take 1 mg by mouth At Bedtime

## 2018-01-22 NOTE — NURSING NOTE
Chief Complaints and History of Present Illnesses   Patient presents with     Follow Up For     Vitreous hemorrhage of right eye     HPI    Affected eye(s):  Right   Symptoms:     No floaters   No flashes      Duration:  2 days   Frequency:  Constant       Do you have eye pain now?:  No      Comments:  Pt here for f/u vitreous hemorrhage, right eye  Pt states this is a recurrence of what she had happen a couple years ago - is quite worked up with this, and this has caused her a headache  Pt had pars plana vitrectomy (PPV)/EL/AFX 2/14/17 for vitreous hemorrhage in the right eye  Pt denies flashes and floaters - pt states she notes a black spot, states that the eye is bleeding    Sylvia Jefferson COA 1:02 PM January 22, 2018

## 2018-01-24 NOTE — PROGRESS NOTES
[employer] ADULT INTELLIGENCE SCALE-IV CONTROLLED WORD ASSOCIATION TEST        Age-Scaled Score Score   36    Vocabulary          13         Digit Span          12    TRAIL MAKING TEST   Block Design            8     Coding            7   Time Errors     A  28              0    [employer] MEMORY SCALES    B  dc d           -       Logical Mem Immediate  15/15  PSYCHOMOTOR TESTS   Logical Mem 30 Minute  14/13     Visual Repr Immediate                   5        Right    Left   Visual Repr 30                    5   Finger Tapping  54      48           30 Minute Recognition                   1    Grooved Pegboard  74           82       Drops: 1           Drops: 1     TEST OF SUSTAINED ATTENTION & TRACKING     [place] CARD SORTING TEST-1 deck    Total Time         133       Total Errors    8         # of categories   3        [employer] VERBAL LEARNING TEST [place] NAMING TEST       Trial 1                             7            Score  52    Trial 2                           11                Trial 3                             9                                                                                            25  Recall                     11                 25  Recognition            12
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
## 2018-01-29 DIAGNOSIS — H34.8392 BRVO (BRANCH RETINAL VEIN OCCLUSION) (H): Primary | ICD-10-CM

## 2018-02-01 ENCOUNTER — OFFICE VISIT (OUTPATIENT)
Dept: OPHTHALMOLOGY | Facility: CLINIC | Age: 63
End: 2018-02-01
Attending: OPHTHALMOLOGY
Payer: MEDICARE

## 2018-02-01 DIAGNOSIS — H34.8392 BRVO (BRANCH RETINAL VEIN OCCLUSION) (H): ICD-10-CM

## 2018-02-01 PROCEDURE — G0463 HOSPITAL OUTPT CLINIC VISIT: HCPCS | Mod: ZF

## 2018-02-01 PROCEDURE — 92134 CPTRZ OPH DX IMG PST SGM RTA: CPT | Mod: ZF | Performed by: OPHTHALMOLOGY

## 2018-02-01 ASSESSMENT — EXTERNAL EXAM - LEFT EYE: OS_EXAM: NORMAL

## 2018-02-01 ASSESSMENT — VISUAL ACUITY
OS_CC+: -2
OD_CC: 20/25
OS_PH_SC: 20/25+2
OD_CC+: -1
OD_SC+: -2
OD_SC: 20/40
OS_SC: 20/50
OS_CC: 20/25
METHOD: SNELLEN - LINEAR
OD_PH_SC: 20/20-1
OS_SC+: +2

## 2018-02-01 ASSESSMENT — TONOMETRY
OS_IOP_MMHG: 15
OD_IOP_MMHG: 13
IOP_METHOD: TONOPEN

## 2018-02-01 ASSESSMENT — REFRACTION_WEARINGRX
OS_SPHERE: +0.25
OD_AXIS: 133
OS_CYLINDER: +0.25
OD_ADD: +2.50
SPECS_TYPE: BIFOCAL
OS_AXIS: 085
OD_SPHERE: PLANO
OS_ADD: +2.50
OD_CYLINDER: +0.50

## 2018-02-01 ASSESSMENT — CONF VISUAL FIELD
METHOD: COUNTING FINGERS
OD_NORMAL: 1
OS_NORMAL: 1

## 2018-02-01 ASSESSMENT — SLIT LAMP EXAM - LIDS
COMMENTS: NORMAL
COMMENTS: NORMAL

## 2018-02-01 ASSESSMENT — CUP TO DISC RATIO
OD_RATIO: 0.2
OS_RATIO: 0.2

## 2018-02-01 ASSESSMENT — EXTERNAL EXAM - RIGHT EYE: OD_EXAM: NORMAL

## 2018-02-01 NOTE — PROGRESS NOTES
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  S/p yag pc laser right eye  1-22-18    Karin  States VA is improving, no eye pain , no flashes and floaters   Sarah Sanford is a 62 year old female here for new floaters who presented over the weekend and was diagnosed with recurrent vitreous hemorrhage right eye. Reports that on 1/12/18 she noticed a bunch of new floaters, no flashes, with burning irritation went away. She awoke over the weekend 1/20/18 and noticed new floaters everywhere, hundreds, persistent, vision is foggy, like a curtain, no flashes. Denies eye pain, epiphora.  Patient continues to have these symptoms in the right eye with fog/generalized blur and floaters.     OCT  2-1-18  RE: inferotemporal thinning (stable), otherwise normal contour  LE: normal    Assessment and plan:    1. status post PPV/EL/FAX OD (2/14/17) for vitreous hemorrhage possibly secondary to history of  BRVO.  - retina attached, patient doing well    2. posterior chamber intraocular lens (PCIOL) with  Posterior capsular opacity (PCO) right eye   posterior capsular opacity (PCO) likely contributing to \"fog\" symptoms and visual obscuration. posterior capsular opacity (PCO) over visual axis.  Status post yag  Open capsule - doing well     3. Vitreous debri- vs resolving vitreous hemorrhage    symptoms improving   -No signs of retinal tear, hole, or detachment.   - Discussed return precautions for increased floaters, flashes of light or decreasing vision   Will obtain fluorescein angiography next follow up to rule out neovascularization elsewhere     4. Cataract left eye   Becoming visually significant  Observe    Follow up in one month with fluorescein angiography transits right eye   ~~~~~~~~~~~~~~~~~~~~~~~~~~~~~~~~~~   Complete documentation of historical and exam elements from today's encounter can be found in the full encounter summary report (not reduplicated in " this progress note).  I personally obtained the chief complaint(s) and history of present illness.  I confirmed and edited as necessary the review of systems, past medical/surgical history, family history, social history, and examination findings as documented by others; and I examined the patient myself.  I personally reviewed the relevant tests, images, and reports as documented above.  I personally reviewed the ophthalmic test(s) associated with this encounter, agree with the interpretation(s) as documented by the resident/fellow, and have edited the corresponding report(s) as necessary.   I formulated and edited as necessary the assessment and plan and discussed the findings and management plan with the patient and family    Steph Cintron MD  .  Retina Service   Department of Ophthalmology and Visual Neurosciences   [facility]  Phone: [phone]   Fax: [phone]         '

## 2018-02-01 NOTE — MR AVS SNAPSHOT
After Visit Summary   2/1/2018    Sarah Sanford    MRN: [id number]           Patient Information     Date Of Birth          1955        Visit Information        Provider Department      2/1/2018 12:15 PM Steph Cintron MD [facility]        Today's Diagnoses     BRVO (branch retinal vein occlusion)           Follow-ups after your visit        Your next 10 appointments already scheduled     Feb 21, 2018  3:05 PM CST   (Arrive by 2:50 PM)   MEDICAL REFILL EVAL with Vj Centeno MD   [facility] ([facility] [place])    [address]  4th [address]  [place] [address] [phone]            Mar 01, 2018  1:45 PM CST   RETURN RETINA with Steph Cintron MD   Eye Clinic ([facility])    [address]  [address]  [place] [address] [phone]            Oct 29, 2018  1:15 PM CDT   RETURN GENERAL with Sylvia Grider MD   Eye Clinic ([facility])    [address]  [address]  [place] [address] [phone]              Who to contact     Please call your clinic at [phone] to:    Ask questions about your health    Make or cancel appointments    Discuss your medicines    Learn about your test results    Speak to your doctor   If you have compliments or concerns about an experience at your clinic, or if you wish to file a complaint, please contact [facility] Patient Relations at [phone] or email us at [email or web address]         Additional Information About Your Visit        MyChart Information     [employer] gives you secure access to your electronic health record. If you see a primary care provider, you can also send messages to your care team and make appointments. If you have questions, please call your primary care clinic.  If you do not have a primary care provider, please call  [phone] and they will assist you.      [employer] is an electronic gateway that provides easy, online access to your medical records. With [employer], you can request a clinic appointment, read your test results, renew a prescription or communicate with your care team.     To access your existing account, please contact your [facility] or call [phone] for assistance.        Care EveryWhere ID     This is your Care EveryWhere ID. This could be used by other organizations to access your [facility] medical records  [id number]         Blood Pressure from Last 3 Encounters:   11/24/17 (!) 145/91   11/15/17 136/84   09/29/17 94/62    Weight from Last 3 Encounters:   11/24/17 79 kg (174 lb 3.2 oz)   11/15/17 79 kg (174 lb 1.6 oz)   09/20/17 76.2 kg (167 lb 14.4 oz)              We Performed the Following     OCT Retina Spectralis OU (both eyes)        Primary Care Provider Office Phone # Fax #    Vj Tin Centeno [phone]       [address] [place] [address]        Equal Access to Services     [facility] Center[facility]EL : Hadii aad ku jazielo Solorenza, waaxda luqadaha, qaybta kaalmada adebatshevayada, naif wilson . So [facility] [phone].    ATENCIÓN: Si habla español, tiene a vila disposición servicios gratuitos de asistencia lingüística. Llame al [phone].    We comply with applicable federal civil rights laws and [place] laws. We do not discriminate on the basis of race, color, national origin, age, disability, sex, sexual orientation, or gender identity.            Thank you!     Thank you for choosing EYE CLINIC  for your care. Our goal is always to provide you with excellent care. Hearing back from our patients is one way we can continue to improve our services. Please take a few minutes to complete the written survey that you may receive in the mail after your visit with us. Thank you!             Your Updated Medication List -  Protect others around you: Learn how to safely use, store and throw away your medicines at www.disposemymeds.org.          This list is accurate as of 2/1/18  1:59 PM.  Always use your most recent med list.                   Brand Name Dispense Instructions for use Diagnosis    albuterol 108 (90 BASE) MCG/ACT Inhaler    PROAIR HFA/PROVENTIL HFA/VENTOLIN HFA    1 Inhaler    Inhale 2 puffs into the lungs every 6 hours    Reactive airway disease, mild persistent, uncomplicated       ATIVAN 2 MG tablet   Generic drug:  LORazepam      Take 2 mg by mouth At Bedtime 2 tablets at night    Other chronic pain       budesonide-formoterol 80-4.5 MCG/ACT Inhaler    SYMBICORT    1 Inhaler    Inhale 2 puffs into the lungs 2 times daily    Reactive airway disease, mild persistent, uncomplicated       cetirizine 10 MG tablet    zyrTEC     Take 10 mg by mouth daily.        cholecalciferol 1000 UNIT tablet    vitamin D3    90 tablet    Take 1 tablet (1,000 Units) by mouth daily    Vitamin D deficiency       conjugated estrogens cream    PREMARIN    180 g    Place 2 g vaginally daily    Essential hypertension       Gel Heel Cushions Womens Pads     1 Device    1 Device daily    Plantar fasciitis       hydrochlorothiazide 25 MG tablet    HYDRODIURIL    100 tablet    Take 1 tablet (25 mg) by mouth daily    Essential hypertension       levothyroxine 50 MCG tablet    SYNTHROID/LEVOTHROID    100 tablet    Take 1 tablet (50 mcg) by mouth daily    Hypothyroidism       lisinopril 30 MG tablet    PRINIVIL,ZESTRIL    100 tablet    Take 1 tablet (30 mg) by mouth At Bedtime    Benign essential hypertension       Lysine 1000 MG Tabs      Take by mouth 2 times daily    Other chronic pain, Mixed cryoglobulinemia (H), History of hepatitis C, Secondary hypertension, hypertension with unspecified goal       order for DME     1 Device    1 Device by Device route daily as needed Air filtration system    Chronic bronchitis, unspecified chronic bronchitis  type (H)       order for DME     1 each    Equipment being ordered: Humidifier    Nasal dryness       traMADol 50 MG tablet    ULTRAM    360 tablet    Take 1-2 tablets ( mg) by mouth every 6 hours as needed    Other chronic pain, Mixed cryoglobulinemia (H), History of hepatitis C       traZODone 50 MG tablet    DESYREL     Take 1 mg by mouth At Bedtime

## 2018-02-01 NOTE — NURSING NOTE
Chief Complaints and History of Present Illnesses   Patient presents with     Follow Up For      BRVO (branch retinal vein occlusion) / post YAG OD      HPI    Last Eye Exam:  1/22/18   Affected eye(s):  Right   Symptoms:        Unknown duration    Frequency:  Constant       Do you have eye pain now?:  No      Comments:  Karin is here today for a follow up of   BRVO (branch retinal vein occlusion) / post YAG OD   She feels her vision is the same as before the YAG surgery.   She says she sees black spots in her RE.   Since the surgery she has had to lift and bend  As she lives alone even though she was not supposed to.      Lee Baptiste COT 12:24 PM February 1, 2018

## 2018-02-20 DIAGNOSIS — H34.8392 BRVO (BRANCH RETINAL VEIN OCCLUSION) (H): Primary | ICD-10-CM

## 2018-02-21 ENCOUNTER — OFFICE VISIT (OUTPATIENT)
Dept: INTERNAL MEDICINE | Facility: CLINIC | Age: 63
End: 2018-02-21
Payer: MEDICARE

## 2018-02-21 ENCOUNTER — TELEPHONE (OUTPATIENT)
Dept: ORTHOPEDICS | Facility: CLINIC | Age: 63
End: 2018-02-21

## 2018-02-21 VITALS
HEART RATE: 84 BPM | WEIGHT: 184.5 LBS | BODY MASS INDEX: 31.18 KG/M2 | RESPIRATION RATE: 16 BRPM | SYSTOLIC BLOOD PRESSURE: 122 MMHG | DIASTOLIC BLOOD PRESSURE: 77 MMHG

## 2018-02-21 DIAGNOSIS — D89.1 MIXED CRYOGLOBULINEMIA (H): ICD-10-CM

## 2018-02-21 DIAGNOSIS — G89.29 OTHER CHRONIC PAIN: ICD-10-CM

## 2018-02-21 DIAGNOSIS — L72.3 SEBACEOUS CYST: Primary | ICD-10-CM

## 2018-02-21 DIAGNOSIS — Z86.19 HISTORY OF HEPATITIS C: ICD-10-CM

## 2018-02-21 RX ORDER — TRAMADOL HYDROCHLORIDE 50 MG/1
50-100 TABLET ORAL EVERY 6 HOURS PRN
Qty: 120 TABLET | Refills: 0 | Status: SHIPPED | OUTPATIENT
Start: 2018-02-21 | End: 2018-02-21

## 2018-02-21 RX ORDER — TRAMADOL HYDROCHLORIDE 50 MG/1
50-100 TABLET ORAL EVERY 6 HOURS PRN
Qty: 120 TABLET | Refills: 0 | Status: SHIPPED | OUTPATIENT
Start: 2018-02-21 | End: 2018-05-16

## 2018-02-21 ASSESSMENT — PAIN SCALES - GENERAL: PAINLEVEL: NO PAIN (0)

## 2018-02-21 NOTE — PATIENT INSTRUCTIONS
[facility]: [phone]     [facility] Medication Refill Request Information:  * Please contact your pharmacy regarding ANY request for medication refills.  ** [facility] Prescription Fax = [phone]  * Please allow 3 business days for routine medication refills.  * Please allow 5 business days for controlled substance medication refills.     [facility] Test Result notification information:  *You will be notified with in 7-10 days of your appointment day regarding the results of your test.  If you are on MyChart you will be notified as soon as the provider has reviewed the results and signed off on them.

## 2018-02-21 NOTE — MR AVS SNAPSHOT
After Visit Summary   2/21/2018    Sarah Sanford    MRN: [id number]           Patient Information     Date Of Birth          1955        Visit Information        Provider Department      2/21/2018 3:05 PM Vj Centeno MD [facility]        Today's Diagnoses     Sebaceous cyst    -  1    Other chronic pain        Mixed cryoglobulinemia (H)        History of hepatitis C          Care Instructions    Primary Care Center: [phone]     Primary Care Center Medication Refill Request Information:  * Please contact your pharmacy regarding ANY request for medication refills.  ** [facility] Prescription Fax = [phone]  * Please allow 3 business days for routine medication refills.  * Please allow 5 business days for controlled substance medication refills.     Primary Care Center Test Result notification information:  *You will be notified with in 7-10 days of your appointment day regarding the results of your test.  If you are on MyChart you will be notified as soon as the provider has reviewed the results and signed off on them.            Follow-ups after your visit        Follow-up notes from your care team     Return in about 3 months (around 5/21/2018).      Your next 10 appointments already scheduled     Mar 01, 2018  1:45 PM CST   RETURN RETINA with Steph Cintron MD   Eye Clinic ([email or web address] Clinics)    [address] [id number] [phone]            Mar 22, 2018  1:00 PM CDT   (Arrive by 12:45 PM)   Hearing Aid Evaluation with Luisa Lyon   [facility] Audiology ([facility])    [address]  [address] [facility] [id number] [phone]           Please see your medical professional for ear cleaning prior to this appointment if you believe wax buildup may be an issue. All patients are required to have a physician's order stating the medical  reason for the hearing test. Your doctor can send an electronic order, use their own form or we have provided a form (called Physician's Order for Audiology Services). It states that there is a medical reason for your exam. Without an order you may need to be rescheduled until the order can be obtained.            Apr 30, 2018  1:30 PM CDT   Hearing Aid Fitting with Luisa Lyon [facility] ([facility])    [address] [place] [address]   [phone]            May 23, 2018  1:30 PM CDT   (Arrive by 1:15 PM)   Initial Review Program with Luisa Perez [facility] ([facility])    [address] [place] [address]   [phone]            May 23, 2018  3:05 PM CDT   (Arrive by 2:50 PM)   Return Visit with Vj Centeno MD   [facility] ([facility])    [address] [place] [id number] [phone]            Oct 29, 2018  1:15 PM CDT   RETURN GENERAL with Sylvia Grider MD   Eye Clinic ([facility])    [address] [address] [phone]              Future tests that were ordered for you today     Open Future Orders        Priority Expected Expires Ordered    Fluorescein Angiography OU (both eyes) Routine  8/20/2019 2/20/2018    OCT Retina Spectralis OU (both eyes) Routine  2/20/2019 2/20/2018            Who to contact     Please call your clinic at [phone] to:    Ask questions about your health    Make or cancel appointments    Discuss your medicines    Learn about your test results    Speak to your doctor            Additional Information About Your Visit        MyChart Information     [employer] gives you secure access to your electronic health record. If you see a primary care provider, you can also send messages to  your care team and make appointments. If you have questions, please call your primary care clinic.  If you do not have a primary care provider, please call [phone] and they will assist you.      [employer] is an electronic gateway that provides easy, online access to your medical records. With [employer], you can request a clinic appointment, read your test results, renew a prescription or communicate with your care team.     To access your existing account, please contact your [facility] or call [phone] for assistance.        Care EveryWhere ID     This is your Care EveryWhere ID. This could be used by other organizations to access your [facility] medical records  [id number]        Your Vitals Were     Pulse Respirations BMI (Body Mass Index)             84 16 31.18 kg/m2          Blood Pressure from Last 3 Encounters:   02/21/18 122/77   11/24/17 (!) 145/91   11/15/17 136/84    Weight from Last 3 Encounters:   02/21/18 83.7 kg (184 lb 8 oz)   11/24/17 79 kg (174 lb 3.2 oz)   11/15/17 79 kg (174 lb 1.6 oz)              Today, you had the following     No orders found for display         Today's Medication Changes          These changes are accurate as of 2/21/18  3:45 PM.  If you have any questions, ask your nurse or doctor.               Start taking these medicines.        Dose/Directions    traMADol 50 MG tablet   Commonly known as:  ULTRAM   Used for:  Other chronic pain, Mixed cryoglobulinemia (H), History of hepatitis C   Started by:  Vj Centeno MD        Dose:   mg   Take 1-2 tablets ( mg) by mouth every 6 hours as needed   Quantity:  120 tablet   Refills:  0            Where to get your medicines      Some of these will need a paper prescription and others can be bought over the counter.  Ask your nurse if you have questions.     Bring a paper prescription for each of these medications     traMADol 50 MG tablet                Primary Care  Provider Office Phone # Fax #    Vj Centeno [phone]       [address] [place] [id number]        Equal Access to Services     KAYLA BELL : Hadii aad ku hadjulietacj José Miguel, waaxda luqadaha, qaybta kaalmada sarah, naif romero marcobatsheva mcdonald katie fowler. [facility] [phone].    ATENCIÓN: Si habla español, tiene a vila disposición servicios gratuitos de asistencia lingüística. Llame al [phone].    We comply with applicable federal civil rights laws and [place] laws. We do not discriminate on the basis of race, color, national origin, age, disability, sex, sexual orientation, or gender identity.            Thank you!     Thank you for choosing [facility]  for your care. Our goal is always to provide you with excellent care. Hearing back from our patients is one way we can continue to improve our services. Please take a few minutes to complete the written survey that you may receive in the mail after your visit with us. Thank you!             Your Updated Medication List - Protect others around you: Learn how to safely use, store and throw away your medicines at www.disposemymeds.org.          This list is accurate as of 2/21/18  3:45 PM.  Always use your most recent med list.                   Brand Name Dispense Instructions for use Diagnosis    albuterol 108 (90 BASE) MCG/ACT Inhaler    PROAIR HFA/PROVENTIL HFA/VENTOLIN HFA    1 Inhaler    Inhale 2 puffs into the lungs every 6 hours    Reactive airway disease, mild persistent, uncomplicated       ATIVAN 2 MG tablet   Generic drug:  LORazepam      Take 2 mg by mouth At Bedtime 2 tablets at night    Other chronic pain       budesonide-formoterol 80-4.5 MCG/ACT Inhaler    SYMBICORT    1 Inhaler    Inhale 2 puffs into the lungs 2 times daily    Reactive airway disease, mild persistent, uncomplicated       cetirizine 10 MG tablet    zyrTEC     Take 10 mg by mouth daily.        cholecalciferol  1000 UNIT tablet    vitamin D3    90 tablet    Take 1 tablet (1,000 Units) by mouth daily    Vitamin D deficiency       conjugated estrogens cream    PREMARIN    180 g    Place 2 g vaginally daily    Essential hypertension       Gel Heel Cushions Womens Pads     1 Device    1 Device daily    Plantar fasciitis       hydrochlorothiazide 25 MG tablet    HYDRODIURIL    100 tablet    Take 1 tablet (25 mg) by mouth daily    Essential hypertension       levothyroxine 50 MCG tablet    SYNTHROID/LEVOTHROID    100 tablet    Take 1 tablet (50 mcg) by mouth daily    Hypothyroidism       lisinopril 30 MG tablet    PRINIVIL,ZESTRIL    100 tablet    Take 1 tablet (30 mg) by mouth At Bedtime    Benign essential hypertension       Lysine 1000 MG Tabs      Take by mouth 2 times daily    Other chronic pain, Mixed cryoglobulinemia (H), History of hepatitis C, Secondary hypertension, hypertension with unspecified goal       order for DME     1 Device    1 Device by Device route daily as needed Air filtration system    Chronic bronchitis, unspecified chronic bronchitis type (H)       order for DME     1 each    Equipment being ordered: Humidifier    Nasal dryness       traMADol 50 MG tablet    ULTRAM    120 tablet    Take 1-2 tablets ( mg) by mouth every 6 hours as needed    Other chronic pain, Mixed cryoglobulinemia (H), History of hepatitis C       traZODone 50 MG tablet    DESYREL     Take 1 mg by mouth At Bedtime
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
## 2018-02-21 NOTE — NURSING NOTE
Chief Complaint   Patient presents with     Medication Request     patient would like to discuss pain medication     JN NATION at 2:55 PM on 2/21/2018.

## 2018-02-21 NOTE — TELEPHONE ENCOUNTER
The clinic coordinator for PCP asked me to speak with Karin in regards to her care here for her left knee. I went into Karin's room with she was waiting to see Dr. Centeno. Karin informed me that she used to see Dr. Lechuga for her left knee to get cortisone injections. She started to have reactions to the cortisone and asked for Synvisc, which she used to get in [place]. She believes she was treated differently after she mentioned her living situation and that Dr. Lechuga didn't give her the injection because of it. She reported that she left same that day without an injection and was told his nurse would call her with whether or not it would be covered. She felt very unahppy with this and didn't like how she was treated.   Dr. Lechuga's nurse cleared documented that she started the PA that day and called the patient when it came back. The patient had already called [facility] and gotten in to get a synvisc injection same day, without needing a PA. Karin does not understand why we couldn't just give her the injection here, even after she told them she knew if would be covered because it had been in [place]. I explained that our process is to get a PA with all patients unless they want to sign a self-pay waiver, as these injections are quite spendy. I also explained that her insurance could have had different coverage in [place] than [place], and that we were looking out her for best interested to not stick with with a high bill. I also explained that this is common practice with all of our providers and that she was not singled out.  She requested an appointment with a different provider in [facility]. I told her some choices and she wishes to be scheduled with Dr. Au. His schedule is not open in July, which will be 6 months from her last synvisc injection. I told her I would call her in a month and schedule her appointment and also start the PA which would be good for 1 year. She was in agreement with this plan and both Dr. Lechuga and  Dr. Au's nurses were informed.

## 2018-03-01 ENCOUNTER — OFFICE VISIT (OUTPATIENT)
Dept: OPHTHALMOLOGY | Facility: CLINIC | Age: 63
End: 2018-03-01
Attending: OPHTHALMOLOGY
Payer: MEDICARE

## 2018-03-01 DIAGNOSIS — H35.051 RETINAL NEOVASCULARIZATION OF RIGHT EYE: Primary | ICD-10-CM

## 2018-03-01 DIAGNOSIS — H34.8392 BRVO (BRANCH RETINAL VEIN OCCLUSION) (H): ICD-10-CM

## 2018-03-01 PROCEDURE — 92134 CPTRZ OPH DX IMG PST SGM RTA: CPT | Mod: ZF | Performed by: OPHTHALMOLOGY

## 2018-03-01 PROCEDURE — 92235 FLUORESCEIN ANGRPH MLTIFRAME: CPT | Mod: ZF | Performed by: OPHTHALMOLOGY

## 2018-03-01 PROCEDURE — G0463 HOSPITAL OUTPT CLINIC VISIT: HCPCS | Mod: ZF | Performed by: TECHNICIAN/TECHNOLOGIST

## 2018-03-01 PROCEDURE — 67228 TREATMENT X10SV RETINOPATHY: CPT | Mod: ZF | Performed by: OPHTHALMOLOGY

## 2018-03-01 ASSESSMENT — VISUAL ACUITY
OS_SC: 20/40
METHOD: SNELLEN - LINEAR
OD_SC: 20/30

## 2018-03-01 ASSESSMENT — TONOMETRY
OD_IOP_MMHG: 10
IOP_METHOD: ICARE
OS_IOP_MMHG: 12

## 2018-03-01 ASSESSMENT — CONF VISUAL FIELD
OD_NORMAL: 1
METHOD: COUNTING FINGERS
OS_NORMAL: 1

## 2018-03-01 ASSESSMENT — EXTERNAL EXAM - LEFT EYE: OS_EXAM: NORMAL

## 2018-03-01 ASSESSMENT — EXTERNAL EXAM - RIGHT EYE: OD_EXAM: NORMAL

## 2018-03-01 ASSESSMENT — CUP TO DISC RATIO
OS_RATIO: 0.2
OD_RATIO: 0.2

## 2018-03-01 ASSESSMENT — SLIT LAMP EXAM - LIDS
COMMENTS: NORMAL
COMMENTS: NORMAL

## 2018-03-01 NOTE — PROGRESS NOTES
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  S/p yag pc laser right eye  1-22-18    Sarah Sanford is a 62 year old female here for new floaters who presented over the weekend and was diagnosed with recurrent vitreous hemorrhage right eye. Reports that on 1/12/18 she noticed a bunch of new floaters, no flashes, with burning irritation went away. She awoke over the weekend 1/20/18 and noticed new floaters everywhere, hundreds, persistent, vision is foggy, like a curtain, no flashes. Denies eye pain, epiphora.  Patient continues to have these symptoms in the right eye with fog/generalized blur and floaters.     Interim: reports improvement of the floaters    OCT  3-1-18  RE: inferotemporal thinning (stable), otherwise normal contour  LE: normal    fluorescein angiography 3-1-18  Right eye: superior temporal macula area of capillary non perfusion and Small areas of NVE IT superior to laser  Left eye: Normal    Fundus photos and Autofluorescence: consistent with exam     Assessment and plan:    1. status post PPV/EL/FAX OD (2/14/17) for vitreous hemorrhage possibly secondary to history of  BRVO.  - retina attached, patient doing well    2. posterior chamber intraocular lens (PCIOL) with  Posterior capsular opacity (PCO) right eye   posterior capsular opacity (PCO) likely contributing to \"fog\" symptoms and visual obscuration. posterior capsular opacity (PCO) over visual axis.  Status post yag  Open capsule - doing well     3. resolving vitreous hemorrhage    symptoms improving   -No signs of retinal tear, hole, or detachment.   - fluorescein angiography showed persistent mild/early neovascularization along inferior temporal arcade    Plan for laser filling right eye today     4. Cataract left eye   Becoming visually significant  Observe    Follow up in 2 months     Aditya Barreto MD  Ophthalmology, PGY-3      ~~~~~~~~~~~~~~~~~~~~~~~~~~~~~~~~~~   Complete " documentation of historical and exam elements from today's encounter can be found in the full encounter summary report (not reduplicated in this progress note).  I personally obtained the chief complaint(s) and history of present illness.  I confirmed and edited as necessary the review of systems, past medical/surgical history, family history, social history, and examination findings as documented by others; and I examined the patient myself.  I personally reviewed the relevant tests, images, and reports as documented above.  I personally reviewed the ophthalmic test(s) associated with this encounter, agree with the interpretation(s) as documented by the resident/fellow, and have edited the corresponding report(s) as necessary.   I formulated and edited as necessary the assessment and plan and discussed the findings and management plan with the patient and family    Steph Cintron MD  .  Retina Service   Department of Ophthalmology and Visual Neurosciences   [facility]  Phone: [phone]   Fax: [phone]

## 2018-03-01 NOTE — NURSING NOTE
"Chief Complaints and History of Present Illnesses   Patient presents with     Follow Up For     status post PPV/EL/FAX OD (2/14/17) for vitreous hemorrhage possibly secondary to microaneurysm after BRVO     HPI    Symptoms:              Comments:  4 weeks follow up for status post PPV/EL/FAX OD (2/14/17) for vitreous hemorrhage possibly secondary to microaneurysm after BRVO.    Patient states first time noticed flashes of light in her right eye this morning when she was taking a shower.   Patient states she still see \"black things\" floating across the right eye.   Increasing anxiety per patient.   HORACE Rice 3/1/2018 1:41 PM               "

## 2018-03-01 NOTE — MR AVS SNAPSHOT
After Visit Summary   3/1/2018    Sarah Sanford    MRN: [id number]           Patient Information     Date Of Birth          1955        Visit Information        Provider Department      3/1/2018 1:45 PM Steph Cintron MD [facility]        Today's Diagnoses     BRVO (branch retinal vein occlusion)           Follow-ups after your visit        Your next 10 appointments already scheduled     Mar 22, 2018  1:00 PM CDT   (Arrive by 12:45 PM)   Hearing Aid Evaluation with Luisa Lyon [facility] ([facility])    [address] [place] [address]   [phone]           Please see your medical professional for ear cleaning prior to this appointment if you believe wax buildup may be an issue. All patients are required to have a physician's order stating the medical reason for the hearing test. Your doctor can send an electronic order, use their own form or we have provided a form (called Physician's Order for Audiology Services). It states that there is a medical reason for your exam. Without an order you may need to be rescheduled until the order can be obtained.            Apr 30, 2018  1:30 PM CDT   Hearing Aid Fitting with Luisa Lyon [facility] ([facility])    [address] [place] [address]   [phone]            May 10, 2018  1:00 PM CDT   RETURN RETINA with Steph Cintron MD   Eye Clinic ([facility])    [address]  [address] [address]  [facility] [address]   [phone]            May 23, 2018  1:30 PM CDT   (Arrive by 1:15 PM)   Initial Review Program with Luisa Perez [facility] ([facility])    [address] [place] [address]   [phone]            May 23, 2018  3:05 PM CDT   (Arrive by 2:50 PM)   Return Visit  with Vj Centeno MD   [facility] ([facility])    [address]  4th Floor  [facility] [address] [phone]            Oct 29, 2018  1:15 PM CDT   RETURN GENERAL with Sylvia Grider MD   Eye Clinic ([facility])    [address]  [address] Clin [address]a  [facility] [id number] [phone]              Who to contact     Please call your clinic at [phone] to:    Ask questions about your health    Make or cancel appointments    Discuss your medicines    Learn about your test results    Speak to your doctor            Additional Information About Your Visit        [employer] Information     [employer] gives you secure access to your electronic health record. If you see a primary care provider, you can also send messages to your care team and make appointments. If you have questions, please call your primary care clinic.  If you do not have a primary care provider, please call [phone] and they will assist you.      [employer] is an electronic gateway that provides easy, online access to your medical records. With [employer], you can request a clinic appointment, read your test results, renew a prescription or communicate with your care team.     To access your existing account, please contact your [facility] or call [phone] for assistance.        Care EveryWhere ID     This is your Care EveryWhere ID. This could be used by other organizations to access your [place] medical records  [id number]         Blood Pressure from Last 3 Encounters:   02/21/18 122/77   11/24/17 (!) 145/91   11/15/17 136/84    Weight from Last 3 Encounters:   02/21/18 83.7 kg (184 lb 8 oz)   11/24/17 79 kg (174 lb 3.2 oz)   11/15/17 79 kg (174 lb 1.6 oz)              We Performed the Following     Fluorescein Angiography OU (both eyes)     OCT Retina Spectralis OU (both eyes)        Primary Care Provider  Office Phone # Fax #    Vj Tin Centeno [phone]       [address] [place] [address]        Equal Access to Services     KAYLA BELL : Hadsebas nasir sandoval yaima Valdez, waaxda luqadaha, qaybta kaalmada sarah, naif romero marcobatsheva mcdonald katie fowler. So [facility] [phone].    ATENCIÓN: Si habla español, tiene a vila disposición servicios gratuitos de asistencia lingüística. Jocelyneame al [phone].    We comply with applicable federal civil rights laws and [place] laws. We do not discriminate on the basis of race, color, national origin, age, disability, sex, sexual orientation, or gender identity.            Thank you!     Thank you for choosing EYE CLINIC  for your care. Our goal is always to provide you with excellent care. Hearing back from our patients is one way we can continue to improve our services. Please take a few minutes to complete the written survey that you may receive in the mail after your visit with us. Thank you!             Your Updated Medication List - Protect others around you: Learn how to safely use, store and throw away your medicines at www.disposemymeds.org.          This list is accurate as of 3/1/18  5:25 PM.  Always use your most recent med list.                   Brand Name Dispense Instructions for use Diagnosis    albuterol 108 (90 BASE) MCG/ACT Inhaler    PROAIR HFA/PROVENTIL HFA/VENTOLIN HFA    1 Inhaler    Inhale 2 puffs into the lungs every 6 hours    Reactive airway disease, mild persistent, uncomplicated       ATIVAN 2 MG tablet   Generic drug:  LORazepam      Take 2 mg by mouth At Bedtime 2 tablets at night    Other chronic pain       budesonide-formoterol 80-4.5 MCG/ACT Inhaler    SYMBICORT    1 Inhaler    Inhale 2 puffs into the lungs 2 times daily    Reactive airway disease, mild persistent, uncomplicated       cetirizine 10 MG tablet    zyrTEC     Take 10 mg by mouth daily.        cholecalciferol 1000 UNIT tablet    vitamin D3     90 tablet    Take 1 tablet (1,000 Units) by mouth daily    Vitamin D deficiency       conjugated estrogens cream    PREMARIN    180 g    Place 2 g vaginally daily    Essential hypertension       Gel Heel Cushions Womens Pads     1 Device    1 Device daily    Plantar fasciitis       hydrochlorothiazide 25 MG tablet    HYDRODIURIL    100 tablet    Take 1 tablet (25 mg) by mouth daily    Essential hypertension       levothyroxine 50 MCG tablet    SYNTHROID/LEVOTHROID    100 tablet    Take 1 tablet (50 mcg) by mouth daily    Hypothyroidism       lisinopril 30 MG tablet    PRINIVIL,ZESTRIL    100 tablet    Take 1 tablet (30 mg) by mouth At Bedtime    Benign essential hypertension       Lysine 1000 MG Tabs      Take by mouth 2 times daily    Other chronic pain, Mixed cryoglobulinemia (H), History of hepatitis C, Secondary hypertension, hypertension with unspecified goal       order for DME     1 Device    1 Device by Device route daily as needed Air filtration system    Chronic bronchitis, unspecified chronic bronchitis type (H)       order for DME     1 each    Equipment being ordered: Humidifier    Nasal dryness       traMADol 50 MG tablet    ULTRAM    120 tablet    Take 1-2 tablets ( mg) by mouth every 6 hours as needed    Other chronic pain, Mixed cryoglobulinemia (H), History of hepatitis C       traZODone 50 MG tablet    DESYREL     Take 1 mg by mouth At Bedtime

## 2018-03-05 ENCOUNTER — TELEPHONE (OUTPATIENT)
Dept: ORTHOPEDICS | Facility: CLINIC | Age: 63
End: 2018-03-05

## 2018-03-05 DIAGNOSIS — M17.12 PRIMARY OSTEOARTHRITIS OF LEFT KNEE: Primary | ICD-10-CM

## 2018-03-05 NOTE — TELEPHONE ENCOUNTER
I called Karin to get her appointment with Dr. Au set up. A note was sent to his nurse to enter an order for Synvisc so that the PA will be complete before her appointment in July.

## 2018-03-30 ENCOUNTER — OFFICE VISIT (OUTPATIENT)
Dept: ORTHOPEDICS | Facility: CLINIC | Age: 63
End: 2018-03-30
Payer: MEDICARE

## 2018-03-30 VITALS
BODY MASS INDEX: 31.94 KG/M2 | WEIGHT: 189 LBS | RESPIRATION RATE: 16 BRPM | HEART RATE: 69 BPM | SYSTOLIC BLOOD PRESSURE: 146 MMHG | DIASTOLIC BLOOD PRESSURE: 91 MMHG

## 2018-03-30 DIAGNOSIS — M94.262 CHONDROMALACIA OF KNEE, LEFT: Primary | ICD-10-CM

## 2018-03-30 NOTE — MR AVS SNAPSHOT
After Visit Summary   3/30/2018    Sarah Sanford    MRN: [id number]           Patient Information     Date Of Birth          1955        Visit Information        Provider Department      3/30/2018 12:30 PM Warren Mojica MD [facility]        Today's Diagnoses     Chondromalacia of knee, left    -  1       Follow-ups after your visit        Additional Services     PHYSICAL THERAPY REFERRAL (Internal)       Physical Therapy Referral    Pelvifemoral strengthening  Patellar taping  1X/week for PRN duration                  Your next 10 appointments already scheduled     Apr 09, 2018  1:20 PM CDT   (Arrive by 1:05 PM)   KATHERYN Extremity with Jose Alves PT   [facility] Physical Therapy KATHERYN ([facility])    [address] [facility] [id number] [phone]            May 10, 2018  1:00 PM CDT   RETURN RETINA with Steph Cintron MD   Eye Clinic ([facility])    [address] [id number] [phone]            May 16, 2018  3:05 PM CDT   (Arrive by 2:50 PM)   Return Visit with Vj Centeno MD   [facility] ([facility])    [address]th [facility] [id number] [phone]            Jul 11, 2018  1:00 PM CDT   (Arrive by 12:45 PM)   NEW KNEE with Uche Au MD   [facility]opa[facility] ([facility])    [address]  4th [facility] [id number] [phone]            Oct 29, 2018  1:15 PM CDT   RETURN GENERAL with Sylvia Grider MD   Eye Clinic ([facility])    [address] [id number] [phone]              Who to contact     Please call your clinic at [phone] to:    Ask questions about your health    Make or cancel  appointments    Discuss your medicines    Learn about your test results    Speak to your doctor            Additional Information About Your Visit        [employer] Information     [employer] gives you secure access to your electronic health record. If you see a primary care provider, you can also send messages to your care team and make appointments. If you have questions, please call your primary care clinic.  If you do not have a primary care provider, please call [phone] and they will assist you.      [employer] is an electronic gateway that provides easy, online access to your medical records. With [employer], you can request a clinic appointment, read your test results, renew a prescription or communicate with your care team.     To access your existing account, please contact your [facility] or call [phone] for assistance.        Care EveryWhere ID     This is your Care EveryWhere ID. This could be used by other organizations to access your [place] medical records  [id number]        Your Vitals Were     Pulse Respirations BMI (Body Mass Index)             69 16 31.94 kg/m2          Blood Pressure from Last 3 Encounters:   03/30/18 (!) 146/91   02/21/18 122/77   11/24/17 (!) 145/91    Weight from Last 3 Encounters:   03/30/18 85.7 kg (189 lb)   02/21/18 83.7 kg (184 lb 8 oz)   11/24/17 79 kg (174 lb 3.2 oz)              We Performed the Following     PHYSICAL THERAPY REFERRAL (Internal)        Primary Care Provider Office Phone # Fax #    Vj Centeon [phone]       [address] [place] [address]        Equal Access to Services     KAYLA BELL : Hadii aad ku hadasho Soomaali, waaxda luqadaha, qaybta kaalmada adeegyada, naif fowler. So [facility] [phone].    ATENCIÓN: Si habla español, tiene a vila disposición servicios gratuitos de asistencia lingüística. Llame al [phone].    We comply with  applicable federal civil rights laws and [place] laws. We do not discriminate on the basis of race, color, national origin, age, disability, sex, sexual orientation, or gender identity.            Thank you!     Thank you for choosing [facility]  for your care. Our goal is always to provide you with excellent care. Hearing back from our patients is one way we can continue to improve our services. Please take a few minutes to complete the written survey that you may receive in the mail after your visit with us. Thank you!             Your Updated Medication List - Protect others around you: Learn how to safely use, store and throw away your medicines at www.disposemymeds.org.          This list is accurate as of 3/30/18  1:04 PM.  Always use your most recent med list.                   Brand Name Dispense Instructions for use Diagnosis    albuterol 108 (90 BASE) MCG/ACT Inhaler    PROAIR HFA/PROVENTIL HFA/VENTOLIN HFA    1 Inhaler    Inhale 2 puffs into the lungs every 6 hours    Reactive airway disease, mild persistent, uncomplicated       ATIVAN 2 MG tablet   Generic drug:  LORazepam      Take 2 mg by mouth At Bedtime 2 tablets at night    Other chronic pain       budesonide-formoterol 80-4.5 MCG/ACT Inhaler    SYMBICORT    1 Inhaler    Inhale 2 puffs into the lungs 2 times daily    Reactive airway disease, mild persistent, uncomplicated       cetirizine 10 MG tablet    zyrTEC     Take 10 mg by mouth daily.        cholecalciferol 1000 UNIT tablet    vitamin D3    90 tablet    Take 1 tablet (1,000 Units) by mouth daily    Vitamin D deficiency       conjugated estrogens cream    PREMARIN    180 g    Place 2 g vaginally daily    Essential hypertension       Gel Heel Cushions Womens Pads     1 Device    1 Device daily    Plantar fasciitis       hydrochlorothiazide 25 MG tablet    HYDRODIURIL    100 tablet    Take 1 tablet (25 mg) by mouth daily    Essential hypertension       levothyroxine 50 MCG tablet     SYNTHROID/LEVOTHROID    100 tablet    Take 1 tablet (50 mcg) by mouth daily    Hypothyroidism       lisinopril 30 MG tablet    PRINIVIL,ZESTRIL    100 tablet    Take 1 tablet (30 mg) by mouth At Bedtime    Benign essential hypertension       Lysine 1000 MG Tabs      Take by mouth 2 times daily    Other chronic pain, Mixed cryoglobulinemia (H), History of hepatitis C, Secondary hypertension, hypertension with unspecified goal       order for DME     1 Device    1 Device by Device route daily as needed Air filtration system    Chronic bronchitis, unspecified chronic bronchitis type (H)       order for DME     1 each    Equipment being ordered: Humidifier    Nasal dryness       traMADol 50 MG tablet    ULTRAM    120 tablet    Take 1-2 tablets ( mg) by mouth every 6 hours as needed    Other chronic pain, Mixed cryoglobulinemia (H), History of hepatitis C       traZODone 50 MG tablet    DESYREL     Take 1 mg by mouth At Bedtime

## 2018-03-30 NOTE — LETTER
3/30/2018      RE: Sarah Sanford  [address]    [place] [address]        Subjective:   Sarah Sanford is a 62 year old female who is here presenting with left knee pain. She notes swelling in left knee.     Ms. Sanford is a 62-year-old female who has a long history of left knee pain.  She explains to me that she was treated out in [place] for left knee pain and was initially treated with corticosteroid injections and since that gave her such good relief of her pain and they continued to do so.  She then moved to [place] and was seen here and these injections continued on a p.r.n. basis.  She then notes that she started having reactions to the corticosteroid, but tells me that she would forget that she had the reaction to the corticosteroid after it was given to her and so she would present to clinic and would agree to another corticosteroid injection and would subsequently have a poor reaction to it.  This went on until she recognized that needed to stop these.  She became disenchanted perhaps with her care here at the [facility] and so went [facility] and received Synvisc-One in 07/2017 and then again in 01/2018.  She has an appointment with Dr. Au in 07/2018 for another Synvisc-One injection.  She states her first injection gave her good relief but the second injection has not at all been beneficial.  She looks at her knee and believes that it is swollen and she believes today it is currently swollen in terms of left knee.  Pain is really at the anterior aspect of the knee.  She denies locking or instability.  There is no trauma involved.       Background:   Date of injury: NA  Duration of symptoms: 6 years  Mechanism of Injury: Chronic; Unknown   Aggravating factors: Standing, walking stairs  Relieving Factors: rest and ice  Prior Evaluation: Prior Physician Evalutation: [facility] Synvisc 1 January.     PAST MEDICAL, SOCIAL, SURGICAL AND FAMILY  HISTORY: She  has a past medical history of Anemia; Anxiety; Arthritis; Borderline personality disorder; Cervical cancer (H); Chronic pain; Depression; Hepatitis C; Hypertension; Hypothyroidism, unspecified type (6/7/2017); Mixed cryoglobulinemia (H); Nephrolithiasis; Obesity; and Uncomplicated asthma. She also has no past medical history of Diabetes (H).  She  has a past surgical history that includes Cholecystectomy; Hysterectomy; Extracorporeal shock wave lithotripsy (ESWL); GYN surgery; Abdomen surgery; biopsy of skin lesion; Vitrectomy parsplana with 25 gauge system (Right, 2/14/2017); Phacoemulsification clear cornea with standard intraocular lens implant (Right, 9/29/2017); and cataract iol, rt/lt.  Her family history includes Alcohol/Drug in her maternal grandfather and mother; Asthma in her daughter; CANCER in her maternal grandmother; Hypertension in her mother; Lipids in her mother; Psychotic Disorder in her mother. There is no history of Glaucoma, Macular Degeneration, Melanoma, or Skin Cancer.  She reports that she has never smoked. She has never used smokeless tobacco. She reports that she drinks alcohol. She reports that she does not use illicit drugs.    ALLERGIES: She is allergic to no clinical screening - see comments; erythromycin; keflex [cephalexin hcl]; penicillins; sulfa drugs; and tetracycline.    CURRENT MEDICATIONS: She has a current medication list which includes the following prescription(s): tramadol, cholecalciferol, order for dme, hydrochlorothiazide, levothyroxine, conjugated estrogens, lisinopril, albuterol, budesonide-formoterol, order for dme, lysine, gel heel cushions womens, lorazepam, trazodone, and cetirizine.     REVIEW OF SYSTEMS: 12 point review of systems is negative except as noted above.     Exam:   BP (!) 146/91  Pulse 69  Resp 16      CONSTITUTIONAL: alert and no distress  HEAD: Normocephalic. No masses, lesions, tenderness or abnormalities  SKIN: no suspicious  lesions or rashes  GAIT: broad based  PSYCHIATRIC: mentation appears normal.    MUSCULOSKELETAL:   LEFT KNEE: Comprehensive knee examination demonstrates FROM, (-) effusion, (++) medial/lateral patellar facet tenderness, (+) patellar inhibition, (-) apprehension; (-) pain or laxity with valgus stress testing in 0 or 30 degrees of knee flexion, (-) pain or laxity with varus stress testing in 0 or 30 degrees of knee flexion, (-) lachman, (-) PD; (-) medial joint line tenderness, (-) lateral joint line tenderness, (-) bounce test, (-) Patel test. No extensor lag. Patellar tendon nontender.       Assessment/Plan:   (M94.262) Chondromalacia of knee, left  (primary encounter diagnosis)  Plan: PHYSICAL THERAPY REFERRAL (Internal)          Ms. Sanford is a 62-year-old female with left knee pain and with clinical exam consistent with chondromalacia.  She has no lateral joint line tenderness.  She has no pain with full knee flexion.  There was no underlying effusion.  It is difficult for me to suggest that her knee pain is due to her lateral tibiofemoral degenerative changes.  At this juncture, I have recommended that she see physical therapy.  She states she did physical therapy once previously and it was painful and something that she did not like.  I have explained to her the importance and utility of physical therapy in treating kneecap or patellar disorders.  I have not recommended any further injections at this particular time.  I have not recommended any other medical therapy.  I placed a physical therapy order for her.  I have advised her that I think a surgical intervention would be the wrong decision at this point in time.  I have been able to review all of her radiographs.  I have not been able to review her MRIs since they are not in the [employer] system.  I have been able to see the reports, but again I have not been able to review the actual images.                 Warren Mojica MD

## 2018-03-30 NOTE — PROGRESS NOTES
Subjective:   Sarah Sanford is a 62 year old female who is here presenting with left knee pain. She notes swelling in left knee.     Ms. Sanford is a 62-year-old female who has a long history of left knee pain.  She explains to me that she was treated out in [place] for left knee pain and was initially treated with corticosteroid injections and since that gave her such good relief of her pain and they continued to do so.  She then moved to [place] and was seen here and these injections continued on a p.r.n. basis.  She then notes that she started having reactions to the corticosteroid, but tells me that she would forget that she had the reaction to the corticosteroid after it was given to her and so she would present to clinic and would agree to another corticosteroid injection and would subsequently have a poor reaction to it.  This went on until she recognized that needed to stop these.  She became disenchanted perhaps with her care here at the [facility] and so went [facility] and received Synvisc-One in 07/2017 and then again in 01/2018.  She has an appointment with Dr. Au in 07/2018 for another Synvisc-One injection.  She states her first injection gave her good relief but the second injection has not at all been beneficial.  She looks at her knee and believes that it is swollen and she believes today it is currently swollen in terms of left knee.  Pain is really at the anterior aspect of the knee.  She denies locking or instability.  There is no trauma involved.       Background:   Date of injury: NA  Duration of symptoms: 6 years  Mechanism of Injury: Chronic; Unknown   Aggravating factors: Standing, walking stairs  Relieving Factors: rest and ice  Prior Evaluation: Prior Physician Evalutation: [facility] Synvisc 1 January.     PAST MEDICAL, SOCIAL, SURGICAL AND FAMILY HISTORY: She  has a past medical history of Anemia; Anxiety; Arthritis; Borderline personality  disorder; Cervical cancer (H); Chronic pain; Depression; Hepatitis C; Hypertension; Hypothyroidism, unspecified type (6/7/2017); Mixed cryoglobulinemia (H); Nephrolithiasis; Obesity; and Uncomplicated asthma. She also has no past medical history of Diabetes (H).  She  has a past surgical history that includes Cholecystectomy; Hysterectomy; Extracorporeal shock wave lithotripsy (ESWL); GYN surgery; Abdomen surgery; biopsy of skin lesion; Vitrectomy parsplana with 25 gauge system (Right, 2/14/2017); Phacoemulsification clear cornea with standard intraocular lens implant (Right, 9/29/2017); and cataract iol, rt/lt.  Her family history includes Alcohol/Drug in her maternal grandfather and mother; Asthma in her daughter; CANCER in her maternal grandmother; Hypertension in her mother; Lipids in her mother; Psychotic Disorder in her mother. There is no history of Glaucoma, Macular Degeneration, Melanoma, or Skin Cancer.  She reports that she has never smoked. She has never used smokeless tobacco. She reports that she drinks alcohol. She reports that she does not use illicit drugs.    ALLERGIES: She is allergic to no clinical screening - see comments; erythromycin; keflex [cephalexin hcl]; penicillins; sulfa drugs; and tetracycline.    CURRENT MEDICATIONS: She has a current medication list which includes the following prescription(s): tramadol, cholecalciferol, order for dme, hydrochlorothiazide, levothyroxine, conjugated estrogens, lisinopril, albuterol, budesonide-formoterol, order for dme, lysine, gel heel cushions womens, lorazepam, trazodone, and cetirizine.     REVIEW OF SYSTEMS: 12 point review of systems is negative except as noted above.     Exam:   BP (!) 146/91  Pulse 69  Resp 16      CONSTITUTIONAL: alert and no distress  HEAD: Normocephalic. No masses, lesions, tenderness or abnormalities  SKIN: no suspicious lesions or rashes  GAIT: broad based  PSYCHIATRIC: mentation appears normal.    MUSCULOSKELETAL:    LEFT KNEE: Comprehensive knee examination demonstrates FROM, (-) effusion, (++) medial/lateral patellar facet tenderness, (+) patellar inhibition, (-) apprehension; (-) pain or laxity with valgus stress testing in 0 or 30 degrees of knee flexion, (-) pain or laxity with varus stress testing in 0 or 30 degrees of knee flexion, (-) lachman, (-) PD; (-) medial joint line tenderness, (-) lateral joint line tenderness, (-) bounce test, (-) Patel test. No extensor lag. Patellar tendon nontender.       Assessment/Plan:   (M94.262) Chondromalacia of knee, left  (primary encounter diagnosis)  Plan: PHYSICAL THERAPY REFERRAL (Internal)          Ms. Sanford is a 62-year-old female with left knee pain and with clinical exam consistent with chondromalacia.  She has no lateral joint line tenderness.  She has no pain with full knee flexion.  There was no underlying effusion.  It is difficult for me to suggest that her knee pain is due to her lateral tibiofemoral degenerative changes.  At this juncture, I have recommended that she see physical therapy.  She states she did physical therapy once previously and it was painful and something that she did not like.  I have explained to her the importance and utility of physical therapy in treating kneecap or patellar disorders.  I have not recommended any further injections at this particular time.  I have not recommended any other medical therapy.  I placed a physical therapy order for her.  I have advised her that I think a surgical intervention would be the wrong decision at this point in time.  I have been able to review all of her radiographs.  I have not been able to review her MRIs since they are not in the freshbag system.  I have been able to see the reports, but again I have not been able to review the actual images.

## 2018-04-09 ENCOUNTER — THERAPY VISIT (OUTPATIENT)
Dept: PHYSICAL THERAPY | Facility: CLINIC | Age: 63
End: 2018-04-09
Payer: MEDICARE

## 2018-04-09 DIAGNOSIS — M17.12 OSTEOARTHRITIS OF LEFT KNEE, UNSPECIFIED OSTEOARTHRITIS TYPE: Primary | ICD-10-CM

## 2018-04-09 DIAGNOSIS — M25.562 LEFT KNEE PAIN: ICD-10-CM

## 2018-04-09 PROCEDURE — 97162 PT EVAL MOD COMPLEX 30 MIN: CPT | Mod: GP | Performed by: PHYSICAL THERAPIST

## 2018-04-09 PROCEDURE — 97110 THERAPEUTIC EXERCISES: CPT | Mod: GP | Performed by: PHYSICAL THERAPIST

## 2018-04-09 PROCEDURE — G8978 MOBILITY CURRENT STATUS: HCPCS | Mod: GP | Performed by: PHYSICAL THERAPIST

## 2018-04-09 PROCEDURE — 97530 THERAPEUTIC ACTIVITIES: CPT | Mod: GP | Performed by: PHYSICAL THERAPIST

## 2018-04-09 PROCEDURE — G8979 MOBILITY GOAL STATUS: HCPCS | Mod: GP | Performed by: PHYSICAL THERAPIST

## 2018-04-09 ASSESSMENT — ACTIVITIES OF DAILY LIVING (ADL)
STAND: ACTIVITY IS FAIRLY DIFFICULT
RISE FROM A CHAIR: ACTIVITY IS FAIRLY DIFFICULT
HOW_WOULD_YOU_RATE_THE_CURRENT_FUNCTION_OF_YOUR_KNEE_DURING_YOUR_USUAL_DAILY_ACTIVITIES_ON_A_SCALE_FROM_0_TO_100_WITH_100_BEING_YOUR_LEVEL_OF_KNEE_FUNCTION_PRIOR_TO_YOUR_INJURY_AND_0_BEING_THE_INABILITY_TO_PERFORM_ANY_OF_YOUR_USUAL_DAILY_ACTIVITIES?: 30
SIT WITH YOUR KNEE BENT: ACTIVITY IS SOMEWHAT DIFFICULT
GO DOWN STAIRS: ACTIVITY IS FAIRLY DIFFICULT
RAW_SCORE: 20
SWELLING: THE SYMPTOM AFFECTS MY ACTIVITY SEVERELY
WALK: ACTIVITY IS VERY DIFFICULT
SQUAT: I AM UNABLE TO DO THE ACTIVITY
LIMPING: THE SYMPTOM AFFECTS MY ACTIVITY MODERATELY
HOW_WOULD_YOU_RATE_THE_OVERALL_FUNCTION_OF_YOUR_KNEE_DURING_YOUR_USUAL_DAILY_ACTIVITIES?: ABNORMAL
KNEEL ON THE FRONT OF YOUR KNEE: I AM UNABLE TO DO THE ACTIVITY
AS_A_RESULT_OF_YOUR_KNEE_INJURY,_HOW_WOULD_YOU_RATE_YOUR_CURRENT_LEVEL_OF_DAILY_ACTIVITY?: ABNORMAL
GIVING WAY, BUCKLING OR SHIFTING OF KNEE: THE SYMPTOM AFFECTS MY ACTIVITY MODERATELY
GO UP STAIRS: ACTIVITY IS VERY DIFFICULT
STIFFNESS: THE SYMPTOM AFFECTS MY ACTIVITY SEVERELY
WEAKNESS: THE SYMPTOM AFFECTS MY ACTIVITY MODERATELY
KNEE_ACTIVITY_OF_DAILY_LIVING_SCORE: 28.57
KNEE_ACTIVITY_OF_DAILY_LIVING_SUM: 20
PAIN: THE SYMPTOM AFFECTS MY ACTIVITY SEVERELY

## 2018-04-09 NOTE — LETTER
"DEPARTMENT OF HEALTH AND HUMAN SERVICES  CENTERS FOR MEDICARE & MEDICAID SERVICES    PLAN/UPDATED PLAN OF PROGRESS FOR OUTPATIENT REHABILITATION    PATIENTS NAME:  Sarah Sanford     : 1955    PROVIDER NUMBER:    [id number]    [id number]     PROVIDER NAME: [facility]ICAL [facility]AP[facility]    MEDICAL RECORD NUMBER: [id number]     START OF CARE DATE:  SOC Date: 18   TYPE:  PT    PRIMARY/TREATMENT DIAGNOSIS: (Pertinent Medical Diagnosis)  Osteoarthritis of left knee, unspecified osteoarthritis type  Left knee pain    VISITS FROM START OF CARE:  Rxs Used: 1     Physical Therapy Initial Examination/Evaluation  2018    Sarah Sanford  is a 62 year old  female referred to physical therapy by Dr. Still for treatment of left knee.  \"Karin\", as she prefers to be called, has a referral diagnosis of chondromalacia left knee.  Her first comment, after being roomed, was \"I'm not doing this f!!!!! therapy\". She had some PT in [place] and apparently had a bad experience. She reports the therapist didn't listen to her and had her doing exercises that were painful and too difficult   She also has had unsatisfactory experiences with physicians and has had numerous care providers over the years.  Her course of care has been limited to injections. Steroid injections were initially helpful, but then became ineffective and actually caused a reaction. She the received Synvisc, which again was initially helpful, but have been ineffective recently.  She refuses additional injections and has been told surgery is not indicated at this point (which she is not interested in anyway). She is resistant to this course of PT  We had a long discussion about her current status. She clearly is not happy with her knee in regards to pain and disability. However, she is resistant to all courses of care for her knee. I explained that if she is not willing to follow through with any care strategies, then she is basically " telling me that her knee is not bad enough to pursue care, or that she feels it is manageable in it's current status. In further discussion, she admitted her knee is not in manageable condition. She stated she was willing to have me evaluate her knee and discuss my plan of care.  After my exam and outline of plan of care, she conceded to proceed. Fortunately, at the end of our visit, she was appreciative of my care and was pleased at the tolerance and appropriateness of her exercise program  DOI/onset 3/30/18 (MD visit and PT referral). Knee has been chronically painful for 6 years  Prior treatment failed course of PT, injections, which have become ineffective or caused reactions.     PATIENTS NAME:  Sarah Sanford   : 1955    Chief Complaint:   Left knee pain and disability with ADL's. Symptoms are daily and constant, but do vary based on activity.    Symptoms have remained the same. Some intermittent improvement with injections, but responses have diminished or even caused reactions.    Current pain 8/10.  Pain at best 6/10.  Pain at worst 10/10.    Symptoms aggravated by any activity.    Symptoms improved with rest..    Occupation: patient is on medical disability..    Patient having difficulty with ADLs: walking, stairs, kneeling, squatting, sitting.    Patient's goals are reduce pain, improve tolerance of ADL's.  Patient reports general health as fair-good. EHR was reviewed for health history, medication, imaging, surgery.  Outcome measure:   KOS today was 20/70  Return to MD:  18.      Objective:  System  AROM L knee 0-4-125. PROM 4-0-140  Tight heel cords, quads.  Poor cores strength and stability  Weakness of left hip, abduction and ER 3+/5  L Hamstring 5/5  L Quad 4-/5, with tentative effort  Deficient balance with 3 sec max single leg stance    Assessment/Plan  Patient is a 63 yo female with left knee complaints  Diagnosis: left knee pain  Findings/POC:  Pain - Cold therapy, taping, education,  "home program, self management  Decreased flexibility/motion - therapeutic exercise  Decreased strength - therapeutic exercise  Impaired balance - neuromuscular re education  Decreased function - therapeutic activities    Therapy Evaluation Codes  1. History comprised of    Personal factors that impact plan of care: fear avoidance, negative impression of care providers   Comorbidity factors: none   Medications impacting care: none  2. Examination of body structures: left knee  3. Activity limitations: walking, sitting, standing, stairs  4. Clinical characteristics: stable and unchanging  5. Decision making: moderate complexity  Cumulative Therapy Evaluation is: Moderate complexity  Previous and current functional limitations (see Goal Flowsheet)  Short Term and Long Term Goals (see Goal Flow sheet)  Communication: patient is able to clearly communicate and understands verbal and written communication  Treatment plan was discussed with patient and received patient consent  PATIENTS NAME:  Sarah Sanford   : 1955    Patient should benefit from PT. Rehab potential is fair  Frequency/Duration: 1 x week for 6 weeks  Discharge plan: achieve all goals, transition to self directed care          Caregiver Signature/Credentials _____________________________ Date ________       Treating Provider: Jose Alves PT   I have reviewed and certified the need for these services and plan of treatment while under my care.        PHYSICIAN'S SIGNATURE:   _____________________________________  Date___________    Warren Mojica MD    Certification period:  Beginning of Cert date period: 18 to  End of Cert period date: 18     Functional Level Progress Report: Please see attached \"Goal Flow sheet for Functional level.\"    ____X____ Continue Services or       ________ DC Services                Service dates: From  SOC Date: 18 date to present                         "

## 2018-04-09 NOTE — MR AVS SNAPSHOT
After Visit Summary   4/9/2018    Sarah Sanford    MRN: [id number]           Patient Information     Date Of Birth          1955        Visit Information        Provider Department      4/9/2018 1:20 PM Jose Alves, PT [facility]        Today's Diagnoses     Osteoarthritis of left knee, unspecified osteoarthritis type    -  1    Left knee pain           Follow-ups after your visit        Your next 10 appointments already scheduled     Apr 17, 2018  2:10 PM CDT   [facility] Extremity with NIKKIE [facility] ([facility])    [address] [place] [address] [id number] [phone]            Apr 23, 2018  4:30 PM CDT   [facility] Extremity with MARISOL [facility] ([facility])    [address] [place] [address] [id number] [phone]            Apr 30, 2018  1:10 PM CDT   [facility] Extremity with MARISOL [facility] ([facility])    [address] [place] [address] [phone]            May 07, 2018  1:00 PM CDT   [facility] Extremity with [facility] ([facility])    [address] [place] [address] [id number] [phone]            May 10, 2018  1:00 PM CDT   RETURN RETINA with Steph Cintron MD   [facility] ([facility])    [address] [address] [phone]            May 16, 2018  3:05 PM CDT   (Arrive by 2:50 PM)   Return Visit with Vj Centeno MD   [facility] ([facility])    [address] [place] [address] [id number] [phone]            May 17, 2018  1:30 PM CDT   KATHERYN Extremity with Kath Gould  PTA   [facility] ([facility])    [address] [facility] [address] [phone]            May 23, 2018  1:20 PM CDT   KATHERYN Extremity with César Moran PT   [facility] ([facility])    [address] [place] [address] [address] [facility] [id number] [phone]            May 30, 2018  2:30 PM CDT   (Arrive by 2:15 PM)   NEW KNEE with Uche Au MD   [facility] ([facility])    [address] [place] [address]  [address]th [facility] [id number] [phone]            Jul 23, 2018  1:00 PM CDT   Annual Visit with Apoorva Ayon MD   [facility] ([facility])    [facility] [address]  [address]  [facility] [phone]              Who to contact     If you have questions or need follow up information about today's clinic visit or your schedule please contact [facility] directly at [phone].  Normal or non-critical lab and imaging results will be communicated to you by MyChart, letter or phone within 4 business days after the clinic has received the results. If you do not hear from us within 7 days, please contact the clinic through iTManhart or phone. If you have a critical or abnormal lab result, we will notify you by phone as soon as possible.  Submit refill requests through [employer] or call your pharmacy and they will forward the refill request to us. Please allow 3 business days for your refill to be completed.          Additional Information About Your Visit        Scoreoid Information     [employer]oid gives you secure access to your electronic health record. If you see a primary care provider, you can also send messages to your care team and make appointments. If you have questions, please call your primary care clinic.  If you do not have a primary  care provider, please call [phone] and they will assist you.        Care EveryWhere ID     This is your Care EveryWhere ID. This could be used by other organizations to access your [facility] medical records  [id number]         Blood Pressure from Last 3 Encounters:   03/30/18 (!) 146/91   02/21/18 122/77   11/24/17 (!) 145/91    Weight from Last 3 Encounters:   03/30/18 85.7 kg (189 lb)   02/21/18 83.7 kg (184 lb 8 oz)   11/24/17 79 kg (174 lb 3.2 oz)              We Performed the Following     [facility] CERT REPORT     PT Eval, Moderate Complexity (35997)     Therapeutic Activities     Therapeutic Exercises        Primary Care Provider Office Phone # Fax #    Vj Tin Centeno [phone]       [address] [place] [address]        Equal Access to Services     [facility]: Hadii aad ku hadasho Soomaali, waaxda luqadaha, qaybta kaalmada adeegyada, waxay idiin hayaan adebatsheva wilson . So [facility] [phone].    ATENCIÓN: Si habla español, tiene a vila disposición servicios gratuitos de asistencia lingüística. Llame al [phone].    We comply with applicable federal civil rights laws and [place] laws. We do not discriminate on the basis of race, color, national origin, age, disability, sex, sexual orientation, or gender identity.            Thank you!     Thank you for choosing [facility]  for your care. Our goal is always to provide you with excellent care. Hearing back from our patients is one way we can continue to improve our services. Please take a few minutes to complete the written survey that you may receive in the mail after your visit with us. Thank you!             Your Updated Medication List - Protect others around you: Learn how to safely use, store and throw away your medicines at www.disposemymeds.org.          This list is accurate as of 4/9/18 11:59 PM.  Always use your most recent med list.                   Brand Name Dispense  Instructions for use Diagnosis    albuterol 108 (90 Base) MCG/ACT Inhaler    PROAIR HFA/PROVENTIL HFA/VENTOLIN HFA    1 Inhaler    Inhale 2 puffs into the lungs every 6 hours    Reactive airway disease, mild persistent, uncomplicated       ATIVAN 2 MG tablet   Generic drug:  LORazepam      Take 2 mg by mouth At Bedtime 2 tablets at night    Other chronic pain       budesonide-formoterol 80-4.5 MCG/ACT Inhaler    SYMBICORT    1 Inhaler    Inhale 2 puffs into the lungs 2 times daily    Reactive airway disease, mild persistent, uncomplicated       cetirizine 10 MG tablet    zyrTEC     Take 10 mg by mouth daily.        cholecalciferol 1000 UNIT tablet    vitamin D3    90 tablet    Take 1 tablet (1,000 Units) by mouth daily    Vitamin D deficiency       conjugated estrogens cream    PREMARIN    180 g    Place 2 g vaginally daily    Essential hypertension       Gel Heel Cushions Womens Pads     1 Device    1 Device daily    Plantar fasciitis       hydrochlorothiazide 25 MG tablet    HYDRODIURIL    100 tablet    Take 1 tablet (25 mg) by mouth daily    Essential hypertension       levothyroxine 50 MCG tablet    SYNTHROID/LEVOTHROID    100 tablet    Take 1 tablet (50 mcg) by mouth daily    Hypothyroidism       lisinopril 30 MG tablet    PRINIVIL,ZESTRIL    100 tablet    Take 1 tablet (30 mg) by mouth At Bedtime    Benign essential hypertension       Lysine 1000 MG Tabs      Take by mouth 2 times daily    Other chronic pain, Mixed cryoglobulinemia (H), History of hepatitis C, Secondary hypertension, hypertension with unspecified goal       order for DME     1 Device    1 Device by Device route daily as needed Air filtration system    Chronic bronchitis, unspecified chronic bronchitis type (H)       order for DME     1 each    Equipment being ordered: Humidifier    Nasal dryness       traMADol 50 MG tablet    ULTRAM    120 tablet    Take 1-2 tablets ( mg) by mouth every 6 hours as needed    Other chronic pain, Mixed  cryoglobulinemia (H), History of hepatitis C       traZODone 50 MG tablet    DESYREL     Take 1 mg by mouth At Bedtime

## 2018-04-09 NOTE — PROGRESS NOTES
"Physical Therapy Initial Examination/Evaluation  April 9, 2018    Sarah Sanford  is a 62 year old  female referred to physical therapy by Dr. Still for treatment of left knee.  \"Karin\", as she prefers to be called, has a referral diagnosis of chondromalacia left knee.    Her first comment, after being roomed, was \"I'm not doing this f!!!!! therapy\". She had some PT in [place] and apparently had a bad experience. She reports the therapist didn't listen to her and had her doing exercises that were painful and too difficult     She also has had unsatisfactory experiences with physicians and has had numerous care providers over the years.  Her course of care has been limited to injections. Steroid injections were initially helpful, but then became ineffective and actually caused a reaction. She the received Synvisc, which again was initially helpful, but have been ineffective recently.  She refuses additional injections and has been told surgery is not indicated at this point (which she is not interested in anyway). She is resistant to this course of PT    We had a long discussion about her current status. She clearly is not happy with her knee in regards to pain and disability. However, she is resistant to all courses of care for her knee. I explained that if she is not willing to follow through with any care strategies, then she is basically telling me that her knee is not bad enough to pursue care, or that she feels it is manageable in it's current status. In further discussion, she admitted her knee is not in manageable condition. She stated she was willing to have me evaluate her knee and discuss my plan of care.    After my exam and outline of plan of care, she conceded to proceed. Fortunately, at the end of our visit, she was appreciative of my care and was pleased at the tolerance and appropriateness of her exercise program    DOI/onset 3/30/18 (MD visit and PT referral). Knee has been chronically painful " for 6 years    Prior treatment failed course of PT, injections, which have become ineffective or caused reactions.     Chief Complaint:   Left knee pain and disability with ADL's. Symptoms are daily and constant, but do vary based on activity.      Symptoms have remained the same. Some intermittent improvement with injections, but responses have diminished or even caused reactions.    Current pain 8/10.  Pain at best 6/10.  Pain at worst 10/10.    Symptoms aggravated by any activity.    Symptoms improved with rest.  .    Occupation: patient is on medical disability..    Patient having difficulty with ADLs: walking, stairs, kneeling, squatting, sitting.    Patient's goals are reduce pain, improve tolerance of ADL's.    Patient reports general health as fair-good. EHR was reviewed for health history, medication, imaging, surgery.     Outcome measure:   KOS today was 20/70  Return to MD:  5/25/18.          HPI                    Objective:  System  AROM L knee 0-4-125. PROM 4-0-140  Tight heel cords, quads.  Poor cores strength and stability  Weakness of left hip, abduction and ER 3+/5  L Hamstring 5/5  L Quad 4-/5, with tentative effort  Deficient balance with 3 sec max single leg stance    Physical Exam    General     ROS    Assessment/Plan  Patient is a 63 yo female with left knee complaints  Diagnosis: left knee pain  Findings/POC:  Pain - Cold therapy, taping, education, home program, self management  Decreased flexibility/motion - therapeutic exercise  Decreased strength - therapeutic exercise  Impaired balance - neuromuscular re education  Decreased function - therapeutic activities    Therapy Evaluation Codes  1. History comprised of    Personal factors that impact plan of care: fear avoidance, negative impression of care providers   Comorbidity factors: none   Medications impacting care: none  2. Examination of body structures: left knee  3. Activity limitations: walking, sitting, standing, stairs  4. Clinical  characteristics: stable and unchanging  5. Decision making: moderate complexity  Cumulative Therapy Evaluation is: Moderate complexity    Previous and current functional limitations (see Goal Flowsheet)  Short Term and Long Term Goals (see Goal Flow sheet)    Communication: patient is able to clearly communicate and understands verbal and written communication  Treatment plan was discussed with patient and received patient consent  Patient should benefit from PT. Rehab potential is fair    Frequency/Duration: 1 x week for 6 weeks  Discharge plan: achieve all goals, transition to self directed care    Refer to daily flowsheet for total tretmant time and 1:1 timed interventions

## 2018-04-15 PROBLEM — M25.562 LEFT KNEE PAIN: Status: ACTIVE | Noted: 2018-04-15

## 2018-04-17 ENCOUNTER — THERAPY VISIT (OUTPATIENT)
Dept: PHYSICAL THERAPY | Facility: CLINIC | Age: 63
End: 2018-04-17
Payer: MEDICARE

## 2018-04-17 DIAGNOSIS — M25.562 LEFT KNEE PAIN: Primary | ICD-10-CM

## 2018-04-17 PROCEDURE — 97110 THERAPEUTIC EXERCISES: CPT | Mod: GP | Performed by: PHYSICAL THERAPY ASSISTANT

## 2018-04-17 PROCEDURE — 97530 THERAPEUTIC ACTIVITIES: CPT | Mod: GP | Performed by: PHYSICAL THERAPY ASSISTANT

## 2018-05-10 ENCOUNTER — OFFICE VISIT (OUTPATIENT)
Dept: OPHTHALMOLOGY | Facility: CLINIC | Age: 63
End: 2018-05-10
Attending: OPHTHALMOLOGY
Payer: MEDICARE

## 2018-05-10 DIAGNOSIS — H34.8392 BRVO (BRANCH RETINAL VEIN OCCLUSION) (H): Primary | ICD-10-CM

## 2018-05-10 PROCEDURE — G0463 HOSPITAL OUTPT CLINIC VISIT: HCPCS | Mod: ZF

## 2018-05-10 ASSESSMENT — REFRACTION_WEARINGRX
OS_AXIS: 085
OD_SPHERE: +0.00
OD_AXIS: 133
OD_CYLINDER: +0.50
OS_ADD: +2.50
OS_CYLINDER: +0.25
OD_ADD: +2.50
OS_SPHERE: +0.25

## 2018-05-10 ASSESSMENT — CONF VISUAL FIELD
OS_NORMAL: 1
OD_NORMAL: 1

## 2018-05-10 ASSESSMENT — VISUAL ACUITY
OS_CC+: -2
METHOD: SNELLEN - LINEAR
CORRECTION_TYPE: GLASSES
OS_CC: 20/30
OD_CC: 20/20
OD_CC+: -3

## 2018-05-10 ASSESSMENT — TONOMETRY
OD_IOP_MMHG: 9
IOP_METHOD: ICARE
OS_IOP_MMHG: 10

## 2018-05-10 NOTE — MR AVS SNAPSHOT
After Visit Summary   5/10/2018    Sarah Sanford    MRN: [id number]           Patient Information     Date Of Birth          1955        Visit Information        Provider Department      5/10/2018 1:00 PM Steph Cintron MD [facility]         Follow-ups after your visit        Your next 10 appointments already scheduled     May 16, 2018  3:05 PM CDT   (Arrive by 2:50 PM)   Return Visit with Vj Centeno MD   [facility] ([facility])    [address] [place] [id number] [phone]            May 30, 2018  2:30 PM CDT   (Arrive by 2:15 PM)   NEW KNEE with Uche Au MD   [facility] ([facility])    [address] [place] [id number] [phone]            Jul 23, 2018  1:00 PM CDT   Annual Visit with Apoorva Ayon MD   [facility] ([facility])    [facility]  [address]  [address]  [facility] [address]   [phone]            Aug 01, 2018  2:00 PM CDT   RETURN RETINA with Steph Cintron MD   [facility] ([facility])    [address] [facility] [address] [place] [address]   [phone]            Nov 05, 2018  2:00 PM CST   RETURN GENERAL with Sylvia Grider MD   [facility] ([facility])    [address] [facility] [address] [place] [address]   [phone]              Who to contact     Please call your clinic at [phone] to:    Ask questions about your health    Make or cancel appointments    Discuss your medicines    Learn about your test results    Speak to your doctor            Additional Information About Your Visit        MyChart Information     MyChart gives you secure access to your electronic health record. If you see a primary care provider, you can  also send messages to your care team and make appointments. If you have questions, please call your primary care clinic.  If you do not have a primary care provider, please call [phone] and they will assist you.      [employer] is an electronic gateway that provides easy, online access to your medical records. With [employer], you can request a clinic appointment, read your test results, renew a prescription or communicate with your care team.     To access your existing account, please contact your [facility] or call [phone] for assistance.        Care EveryWhere ID     This is your Care EveryWhere ID. This could be used by other organizations to access your [place] medical records  [id number]         Blood Pressure from Last 3 Encounters:   03/30/18 (!) 146/91   02/21/18 122/77   11/24/17 (!) 145/91    Weight from Last 3 Encounters:   03/30/18 85.7 kg (189 lb)   02/21/18 83.7 kg (184 lb 8 oz)   11/24/17 79 kg (174 lb 3.2 oz)              Today, you had the following     No orders found for display       Primary Care Provider Office Phone # Fax #    Vj Centeno [phone]       [address] [address]        Equal Access to Services     KAYLA BELL : Hadii nasir ku hadasho Soomaali, waaxda luqadaha, qaybta kaalmada adeegyada, waxblaine cornelius haynavn bonnie fowler. So [facility] [phone].    ATENCIÓN: Si habla español, tiene a vila disposición servicios gratuitos de asistencia lingüística. Llame al [phone].    We comply with applicable federal civil rights laws and [place] laws. We do not discriminate on the basis of race, color, national origin, age, disability, sex, sexual orientation, or gender identity.            Thank you!     Thank you for choosing [facility]  for your care. Our goal is always to provide you with excellent care. Hearing back from our patients is one way we can continue to improve our services.  Please take a few minutes to complete the written survey that you may receive in the mail after your visit with us. Thank you!             Your Updated Medication List - Protect others around you: Learn how to safely use, store and throw away your medicines at www.disposemymeds.org.          This list is accurate as of 5/10/18  1:34 PM.  Always use your most recent med list.                   Brand Name Dispense Instructions for use Diagnosis    albuterol 108 (90 Base) MCG/ACT Inhaler    PROAIR HFA/PROVENTIL HFA/VENTOLIN HFA    1 Inhaler    Inhale 2 puffs into the lungs every 6 hours    Reactive airway disease, mild persistent, uncomplicated       ATIVAN 2 MG tablet   Generic drug:  LORazepam      Take 2 mg by mouth At Bedtime 2 tablets at night    Other chronic pain       budesonide-formoterol 80-4.5 MCG/ACT Inhaler    SYMBICORT    1 Inhaler    Inhale 2 puffs into the lungs 2 times daily    Reactive airway disease, mild persistent, uncomplicated       cetirizine 10 MG tablet    zyrTEC     Take 10 mg by mouth daily.        cholecalciferol 1000 UNIT tablet    vitamin D3    90 tablet    Take 1 tablet (1,000 Units) by mouth daily    Vitamin D deficiency       conjugated estrogens cream    PREMARIN    180 g    Place 2 g vaginally daily    Essential hypertension       Gel Heel Cushions Womens Pads     1 Device    1 Device daily    Plantar fasciitis       hydrochlorothiazide 25 MG tablet    HYDRODIURIL    100 tablet    Take 1 tablet (25 mg) by mouth daily    Essential hypertension       levothyroxine 50 MCG tablet    SYNTHROID/LEVOTHROID    100 tablet    Take 1 tablet (50 mcg) by mouth daily    Hypothyroidism       lisinopril 30 MG tablet    PRINIVIL,ZESTRIL    100 tablet    Take 1 tablet (30 mg) by mouth At Bedtime    Benign essential hypertension       Lysine 1000 MG Tabs      Take by mouth 2 times daily    Other chronic pain, Mixed cryoglobulinemia (H), History of hepatitis C, Secondary hypertension, hypertension with  unspecified goal       order for DME     1 Device    1 Device by Device route daily as needed Air filtration system    Chronic bronchitis, unspecified chronic bronchitis type (H)       order for DME     1 each    Equipment being ordered: Humidifier    Nasal dryness       traMADol 50 MG tablet    ULTRAM    120 tablet    Take 1-2 tablets ( mg) by mouth every 6 hours as needed    Other chronic pain, Mixed cryoglobulinemia (H), History of hepatitis C       traZODone 50 MG tablet    DESYREL     Take 1 mg by mouth At Bedtime

## 2018-05-10 NOTE — PROGRESS NOTES
"CC: status post PPV/EL/FAX OD (2/14/17) for vitreous hemorrhage possibly secondary to neovascularization after BRVO.  Status post Panretinal laser photocoagulation (PRP) filling last eye exam    Sarah Sanford is a 62 year old female here for new floaters who presented over the weekend and was diagnosed with recurrent vitreous hemorrhage right eye. Reports that on 1/12/18 she noticed a bunch of new floaters, no flashes, with burning irritation went away. She awoke over the weekend 1/20/18 and noticed new floaters everywhere, hundreds, persistent, vision is foggy, like a curtain, no flashes. Denies eye pain, epiphora.  Patient continues to have these symptoms in the right eye with fog/generalized blur and floaters.     Interim: reports improvement of the floaters. VA stable    OCT  3-1-18  RE: inferotemporal thinning (stable), otherwise normal contour  LE: normal    fluorescein angiography 3-1-18  Right eye: superior temporal macula area of capillary non perfusion and Small areas of NVE IT superior to laser  Left eye: Normal    Fundus photos and Autofluorescence: consistent with exam     Assessment and plan:    1. status post PPV/EL/FAX OD (2/14/17) for vitreous hemorrhage possibly secondary to history of  BRVO.  - retina attached, patient doing well    2. posterior chamber intraocular lens (PCIOL) with  Posterior capsular opacity (PCO) right eye   posterior capsular opacity (PCO) likely contributing to \"fog\" symptoms and visual obscuration. posterior capsular opacity (PCO) over visual axis.  Status post yag  Open capsule - doing well     3. resolving vitreous hemorrhage    symptoms improving   -No signs of retinal tear, hole, or detachment.   - prior fluorescein angiography showed persistent mild/early neovascularization along inferior temporal arcade  Status post  laser filling right eye 3.1.18  Doing well  Continue to observe    4. Cataract left eye   Becoming visually significant  Observe    Follow up in 3 months " with fluorescein angiography transits right eye and Optical Coherence Tomography       ~~~~~~~~~~~~~~~~~~~~~~~~~~~~~~~~~~   Complete documentation of historical and exam elements from today's encounter can be found in the full encounter summary report (not reduplicated in this progress note).  I personally obtained the chief complaint(s) and history of present illness.  I confirmed and edited as necessary the review of systems, past medical/surgical history, family history, social history, and examination findings as documented by others; and I examined the patient myself.  I personally reviewed the relevant tests, images, and reports as documented above.  I personally reviewed the ophthalmic test(s) associated with this encounter, agree with the interpretation(s) as documented by the resident/fellow, and have edited the corresponding report(s) as necessary.   I formulated and edited as necessary the assessment and plan and discussed the findings and management plan with the patient and family    Steph Cintron MD  .  Retina Service   Department of Ophthalmology and Visual Neurosciences   [facility]  Phone: [phone]   Fax: [phone]

## 2018-05-10 NOTE — NURSING NOTE
"Chief Complaints and History of Present Illnesses   Patient presents with     Post Op (Ophthalmology) Right Eye     S/P PPV, EL, Fax RE 2-14-18     HPI    Last Eye Exam:  3/1/18   Affected eye(s):  Right   Symptoms:     Floaters (Comment: Seeing black things in vision. Feels that it comes nd goes. \" Lighter looking ' )   Flashes (Comment: Notice RE, flickers notice more in the shower. )   Dryness      Duration:  2 months   Frequency:  Constant       Do you have eye pain now?:  No      Comments:  Pt complains of black spots in vision. Comes and goes, notes that they look lighter in color since last visit. Vision seems blurry. Aware that if she wears her glasses foe distance \" its a lot clearer, just dont need to see anything that clear \". BE dry often, using At's which is helpful. SPENCER SMITH, COA 12:48 PM 05/10/2018                "

## 2018-05-11 ASSESSMENT — CUP TO DISC RATIO
OD_RATIO: 0.2
OS_RATIO: 0.2

## 2018-05-11 ASSESSMENT — EXTERNAL EXAM - LEFT EYE: OS_EXAM: NORMAL

## 2018-05-11 ASSESSMENT — SLIT LAMP EXAM - LIDS
COMMENTS: NORMAL
COMMENTS: NORMAL

## 2018-05-11 ASSESSMENT — EXTERNAL EXAM - RIGHT EYE: OD_EXAM: NORMAL

## 2018-05-16 ENCOUNTER — OFFICE VISIT (OUTPATIENT)
Dept: INTERNAL MEDICINE | Facility: CLINIC | Age: 63
End: 2018-05-16
Payer: MEDICARE

## 2018-05-16 VITALS
WEIGHT: 194 LBS | BODY MASS INDEX: 32.79 KG/M2 | DIASTOLIC BLOOD PRESSURE: 78 MMHG | HEART RATE: 81 BPM | RESPIRATION RATE: 18 BRPM | SYSTOLIC BLOOD PRESSURE: 120 MMHG

## 2018-05-16 DIAGNOSIS — D89.1 MIXED CRYOGLOBULINEMIA (H): ICD-10-CM

## 2018-05-16 DIAGNOSIS — Z86.19 HISTORY OF HEPATITIS C: ICD-10-CM

## 2018-05-16 DIAGNOSIS — E03.9 HYPOTHYROIDISM, UNSPECIFIED TYPE: ICD-10-CM

## 2018-05-16 DIAGNOSIS — R09.81 NASAL CONGESTION: Primary | ICD-10-CM

## 2018-05-16 DIAGNOSIS — G89.29 OTHER CHRONIC PAIN: ICD-10-CM

## 2018-05-16 LAB — TSH SERPL DL<=0.005 MIU/L-ACNC: 2.71 MU/L (ref 0.4–4)

## 2018-05-16 RX ORDER — TRAMADOL HYDROCHLORIDE 50 MG/1
50-100 TABLET ORAL EVERY 6 HOURS PRN
Qty: 120 TABLET | Refills: 0 | Status: SHIPPED | OUTPATIENT
Start: 2018-05-16 | End: 2018-05-16

## 2018-05-16 RX ORDER — LEVOTHYROXINE SODIUM 50 UG/1
50 TABLET ORAL DAILY
Qty: 90 TABLET | Refills: 3 | Status: SHIPPED | OUTPATIENT
Start: 2018-05-16 | End: 2019-05-06

## 2018-05-16 RX ORDER — FLUTICASONE PROPIONATE 50 MCG
2 SPRAY, SUSPENSION (ML) NASAL DAILY
Qty: 1 BOTTLE | Refills: 11 | Status: SHIPPED | OUTPATIENT
Start: 2018-05-16 | End: 2018-08-22

## 2018-05-16 RX ORDER — TRAMADOL HYDROCHLORIDE 50 MG/1
50-100 TABLET ORAL EVERY 6 HOURS PRN
Qty: 120 TABLET | Refills: 0 | Status: SHIPPED | OUTPATIENT
Start: 2018-05-16 | End: 2018-08-22

## 2018-05-16 ASSESSMENT — PAIN SCALES - GENERAL: PAINLEVEL: MODERATE PAIN (5)
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
## 2018-05-16 NOTE — MR AVS SNAPSHOT
After Visit Summary   5/16/2018    Sarah Sanford    MRN: [id number]           Patient Information     Date Of Birth          1955        Visit Information        Provider Department      5/16/2018 3:05 PM Vj Centeno MD [facility]        Today's Diagnoses     Nasal congestion    -  1    Other chronic pain        Mixed cryoglobulinemia (H)        History of hepatitis C        Hypothyroidism, unspecified type          Care Instructions    Primary Care Center Medication Refill Request Information:  * Please contact your pharmacy regarding ANY request for medication refills.  ** [facility] Prescription Fax = [phone]  * Please allow 3 business days for routine medication refills.  * Please allow 5 business days for controlled substance medication refills.     Primary Care Center Test Result notification information:  *You will be notified with in 7-10 days of your appointment day regarding the results of your test.  If you are on MyChart you will be notified as soon as the provider has reviewed the results and signed off on them.    Primary Care Center [phone]             Follow-ups after your visit        Follow-up notes from your care team     Return in about 3 months (around 8/16/2018).      Your next 10 appointments already scheduled     May 16, 2018  3:30 PM CDT   LAB with  LAB   [facility] ([facility])    [address] [place] [address] [phone]           Please do not eat 10-12 hours before your appointment if you are coming in fasting for labs on lipids, cholesterol, or glucose (sugar). This does not apply to pregnant women. Water, hot tea and black coffee (with nothing added) are okay. Do not drink other fluids, diet soda or chew gum.            May 30, 2018  2:30 PM CDT   (Arrive by 2:15 PM)   NEW KNEE with Uche Au MD   [facility] ([facility])    [address]  [address] [facility] [address]   [phone]            Jul 23, 2018  1:00 PM CDT   Annual Visit with Apoorva Ayon MD   [facility] ([facility])    [facility] [address]  [address]  [facility] [address]   [phone]            Aug 01, 2018  2:00 PM CDT   RETURN RETINA with Steph Cintron MD   [facility] ([facility])    [address]  [facility] [address]   [phone]            Aug 22, 2018  3:25 PM CDT   (Arrive by 3:10 PM)   Return Visit with Vj Centeno MD   [facility] ([facility])    [address] [place] [address]  [address] [facility] [address]   [phone]            Nov 05, 2018  2:00 PM CST   RETURN GENERAL with Sylvia Grider MD   Eye Clinic ([facility])    [address]  [address] [address]  [facility] [address]   [phone]              Future tests that were ordered for you today     Open Future Orders        Priority Expected Expires Ordered    TSH Routine  5/16/2019 5/16/2018            Who to contact     Please call your clinic at [phone] to:    Ask questions about your health    Make or cancel appointments    Discuss your medicines    Learn about your test results    Speak to your doctor            Additional Information About Your Visit        Sonico Information     Sonico gives you secure access to your electronic health record. If you see a primary care provider, you can also send messages to your care team and make appointments. If you have questions, please call your primary care clinic.  If you do not have a primary care provider, please call [phone] and they will assist you.      Sonico is an electronic gateway that provides easy, online access to your medical records. With [employer], you can request a  clinic appointment, read your test results, renew a prescription or communicate with your care team.     To access your existing account, please contact your [facility] or call [phone] for assistance.        Care EveryWhere ID     This is your Care EveryWhere ID. This could be used by other organizations to access your [facility] medical records  [id number]        Your Vitals Were     Pulse Respirations Breastfeeding? BMI (Body Mass Index)          81 18 No 32.79 kg/m2         Blood Pressure from Last 3 Encounters:   05/16/18 120/78   03/30/18 (!) 146/91   02/21/18 122/77    Weight from Last 3 Encounters:   05/16/18 88 kg (194 lb)   03/30/18 85.7 kg (189 lb)   02/21/18 83.7 kg (184 lb 8 oz)                 Today's Medication Changes          These changes are accurate as of 5/16/18  3:26 PM.  If you have any questions, ask your nurse or doctor.               Start taking these medicines.        Dose/Directions    fluticasone 50 MCG/ACT spray   Commonly known as:  FLONASE   Used for:  Nasal congestion   Started by:  Vj Centeno MD        Dose:  2 spray   Spray 2 sprays into both nostrils daily   Quantity:  1 Bottle   Refills:  11       traMADol 50 MG tablet   Commonly known as:  ULTRAM   Used for:  Other chronic pain, Mixed cryoglobulinemia (H), History of hepatitis C   Started by:  Vj Centeno MD        Dose:   mg   Take 1-2 tablets ( mg) by mouth every 6 hours as needed   Quantity:  120 tablet   Refills:  0            Where to get your medicines      These medications were sent to [facility] [place], [place] - [address] [facility] [address] [place] [address] [address] [address]    Hours:  TRANSPLANT PHONE NUMBER [phone] Phone:  [phone]     fluticasone 50 MCG/ACT spray    levothyroxine 50 MCG tablet         Some of these will need a paper prescription and others can be bought over the counter.  Ask  your nurse if you have questions.     Bring a paper prescription for each of these medications     traMADol 50 MG tablet               Information about OPIOIDS     PRESCRIPTION OPIOIDS: WHAT YOU NEED TO KNOW   You have a prescription for an opioid (narcotic) pain medicine. Opioids can cause addiction. If you have a history of chemical dependency of any type, you are at a higher risk of becoming addicted to opioids. Only take this medicine after all other options have been tried. Take it for as short a time and as few doses as possible.     Do not:    Drive. If you drive while taking these medicines, you could be arrested for driving under the influence (DUI).    Operate heavy machinery    Do any other dangerous activities while taking these medicines.     Drink any alcohol while taking these medicines.      Take with any other medicines that contain acetaminophen. Read all labels carefully. Look for the word  acetaminophen  or  Tylenol.  Ask your pharmacist if you have questions or are unsure.    Store your pills in a secure place, locked if possible. We will not replace any lost or stolen medicine. If you don t finish your medicine, please throw away (dispose) as directed by your pharmacist. The [place] Pollution Control Agency has more information about safe disposal: https://www.pca.[facility].[place].us/living-green/managing-unwanted-medications    All opioids tend to cause constipation. Drink plenty of water and eat foods that have a lot of fiber, such as fruits, vegetables, prune juice, apple juice and high-fiber cereal. Take a laxative (Miralax, milk of magnesia, Colace, Senna) if you don t move your bowels at least every other day.          Primary Care Provider Office Phone # Fax #    Vj Centeno [phone]       [address] [place] [address]        Equal Access to Services     KAYLA BELL AH: lashonda Cervantes qaybta kaalmada adeegyada,  naif cornelius heather thrasher'aan ah. So [facility] [phone].    ATENCIÓN: Si habla supriya, tiene a vila disposición servicios gratuitos de asistencia lingüística. Joby brower [phone].    We comply with applicable federal civil rights laws and [place] laws. We do not discriminate on the basis of race, color, national origin, age, disability, sex, sexual orientation, or gender identity.            Thank you!     Thank you for choosing [facility]  for your care. Our goal is always to provide you with excellent care. Hearing back from our patients is one way we can continue to improve our services. Please take a few minutes to complete the written survey that you may receive in the mail after your visit with us. Thank you!             Your Updated Medication List - Protect others around you: Learn how to safely use, store and throw away your medicines at www.disposemymeds.org.          This list is accurate as of 5/16/18  3:26 PM.  Always use your most recent med list.                   Brand Name Dispense Instructions for use Diagnosis    albuterol 108 (90 Base) MCG/ACT Inhaler    PROAIR HFA/PROVENTIL HFA/VENTOLIN HFA    1 Inhaler    Inhale 2 puffs into the lungs every 6 hours    Reactive airway disease, mild persistent, uncomplicated       ATIVAN 2 MG tablet   Generic drug:  LORazepam      Take 2 mg by mouth At Bedtime 2 tablets at night    Other chronic pain       budesonide-formoterol 80-4.5 MCG/ACT Inhaler    SYMBICORT    1 Inhaler    Inhale 2 puffs into the lungs 2 times daily    Reactive airway disease, mild persistent, uncomplicated       cetirizine 10 MG tablet    zyrTEC     Take 10 mg by mouth daily.        cholecalciferol 1000 UNIT tablet    vitamin D3    90 tablet    Take 1 tablet (1,000 Units) by mouth daily    Vitamin D deficiency       conjugated estrogens cream    PREMARIN    180 g    Place 2 g vaginally daily    Essential hypertension       fluticasone 50 MCG/ACT spray     FLONASE    1 Bottle    Spray 2 sprays into both nostrils daily    Nasal congestion       Gel Heel Cushions Womens Pads     1 Device    1 Device daily    Plantar fasciitis       hydrochlorothiazide 25 MG tablet    HYDRODIURIL    100 tablet    Take 1 tablet (25 mg) by mouth daily    Essential hypertension       levothyroxine 50 MCG tablet    SYNTHROID/LEVOTHROID    90 tablet    Take 1 tablet (50 mcg) by mouth daily    Hypothyroidism, unspecified type       lisinopril 30 MG tablet    PRINIVIL,ZESTRIL    100 tablet    Take 1 tablet (30 mg) by mouth At Bedtime    Benign essential hypertension       Lysine 1000 MG Tabs      Take by mouth 2 times daily    Other chronic pain, Mixed cryoglobulinemia (H), History of hepatitis C, Secondary hypertension, hypertension with unspecified goal       order for DME     1 Device    1 Device by Device route daily as needed Air filtration system    Chronic bronchitis, unspecified chronic bronchitis type (H)       order for DME     1 each    Equipment being ordered: Humidifier    Nasal dryness       traMADol 50 MG tablet    ULTRAM    120 tablet    Take 1-2 tablets ( mg) by mouth every 6 hours as needed    Other chronic pain, Mixed cryoglobulinemia (H), History of hepatitis C       traZODone 50 MG tablet    DESYREL     Take 1 mg by mouth At Bedtime

## 2018-05-16 NOTE — NURSING NOTE
Chief Complaint   Patient presents with     Recheck Medication     Patient is here for medication refills and labs     Forms     Also here for forms for housing     Joseph Shen CMA ([facility]) at 2:50 PM on 5/16/2018

## 2018-05-29 ENCOUNTER — PRE VISIT (OUTPATIENT)
Dept: ORTHOPEDICS | Facility: CLINIC | Age: 63
End: 2018-05-29

## 2018-05-29 DIAGNOSIS — M25.562 LEFT KNEE PAIN: Primary | ICD-10-CM

## 2018-05-29 NOTE — TELEPHONE ENCOUNTER
Patient is self referred  for Left knee pain.    Patient is new to this provider.  Patient has no outside records available at the time of chart prep and no imaging available    Patient is coming to clinic for initial consultation.      Per standing orders, Left knee x rays have been ordered and scheduled.     Patient visit type and questionnaires have been reviewed and are correct for this appointment? Yes    Fatou Glez LPN

## 2018-05-30 ENCOUNTER — RADIANT APPOINTMENT (OUTPATIENT)
Dept: GENERAL RADIOLOGY | Facility: CLINIC | Age: 63
End: 2018-05-30
Attending: ORTHOPAEDIC SURGERY
Payer: MEDICARE

## 2018-05-30 ENCOUNTER — OFFICE VISIT (OUTPATIENT)
Dept: ORTHOPEDICS | Facility: CLINIC | Age: 63
End: 2018-05-30
Payer: MEDICARE

## 2018-05-30 VITALS — HEIGHT: 65 IN | WEIGHT: 194.1 LBS | BODY MASS INDEX: 32.34 KG/M2

## 2018-05-30 DIAGNOSIS — M17.12 PRIMARY OSTEOARTHRITIS OF LEFT KNEE: Primary | ICD-10-CM

## 2018-05-30 DIAGNOSIS — M25.562 LEFT KNEE PAIN: ICD-10-CM
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
## 2018-05-30 NOTE — MR AVS SNAPSHOT
After Visit Summary   5/30/2018    Sarah Sanford    MRN: [id number]           Patient Information     Date Of Birth          1955        Visit Information        Provider Department      5/30/2018 2:30 PM Uche Au MD [facility]        Today's Diagnoses     Primary osteoarthritis of left knee    -  1       Follow-ups after your visit        Your next 10 appointments already scheduled     Jul 23, 2018  1:00 PM CDT   Annual Visit with Apoorva Ayon MD   [facility] ([facility])    [facility] [address]  [address]  [facility] [address]   [phone]            Aug 01, 2018  2:00 PM CDT   RETURN RETINA with Steph Cintron MD   [facility] ([facility])    [address]  [address] [address]  [facility] [address]   [phone]            Aug 22, 2018  3:25 PM CDT   (Arrive by 3:10 PM)   Return Visit with Vj Centeno MD   [facility] ([facility])    [address]  [address] [facility] [address] [phone]            Nov 05, 2018  2:00 PM CST   RETURN GENERAL with Sylvia Grider MD   [facility] Clinic ([facility])    [address] [place] [address] [phone]              Who to contact     Please call your clinic at [phone] to:    Ask questions about your health    Make or cancel appointments    Discuss your medicines    Learn about your test results    Speak to your doctor            Additional Information About Your Visit        MyChart Information     [employer] gives you secure access to your electronic health record. If you see a primary care provider, you can also send messages to your care team and make appointments. If you have questions, please call your primary care clinic.  If you do not have a primary care  "provider, please call [phone] and they will assist you.      [facility] [facility] is an electronic gateway that provides easy, online access to your medical records. With [facility] [facility], you can request a clinic appointment, read your test results, renew a prescription or communicate with your care team.     To access your existing account, please contact your [facility] or call [phone] for assistance.        Care EveryWhere ID     This is your Care EveryWhere ID. This could be used by other organizations to access your [place] medical records  [id number]        Your Vitals Were     Height BMI (Body Mass Index)                1.645 m (5' 4.76\") 32.54 kg/m2           Blood Pressure from Last 3 Encounters:   05/16/18 120/78   03/30/18 (!) 146/91   02/21/18 122/77    Weight from Last 3 Encounters:   05/30/18 88 kg (194 lb 1.6 oz)   05/16/18 88 kg (194 lb)   03/30/18 85.7 kg (189 lb)              Today, you had the following     No orders found for display         Today's Medication Changes          These changes are accurate as of 5/30/18  3:29 PM.  If you have any questions, ask your nurse or doctor.               Start taking these medicines.        Dose/Directions    diclofenac 1 % Gel topical gel   Commonly known as:  VOLTAREN   Used for:  Primary osteoarthritis of left knee   Started by:  Uche Au MD        Apply 4 grams to knees BID prn pain   Quantity:  100 g   Refills:  1            Where to get your medicines      Some of these will need a paper prescription and others can be bought over the counter.  Ask your nurse if you have questions.     Bring a paper prescription for each of these medications     diclofenac 1 % Gel topical gel                Primary Care Provider Office Phone # Fax #    Vj Centeno [phone]       [address] [place] [address] [address]  [facility] [address]        Equal Access to Services     KAYLA BELL AH: Hossein erwin " José Miguel, bertada luqadaha, qasalota kazoila smith, naif nowaksweetie janeth. So [facility] [phone].    ATENCIÓN: Si olegario epps, tiene a vila disposición servicios gratuitos de asistencia lingüística. Joby al [phone].    We comply with applicable federal civil rights laws and [place] laws. We do not discriminate on the basis of race, color, national origin, age, disability, sex, sexual orientation, or gender identity.            Thank you!     Thank you for choosing [facility]  for your care. Our goal is always to provide you with excellent care. Hearing back from our patients is one way we can continue to improve our services. Please take a few minutes to complete the written survey that you may receive in the mail after your visit with us. Thank you!             Your Updated Medication List - Protect others around you: Learn how to safely use, store and throw away your medicines at www.disposemymeds.org.          This list is accurate as of 5/30/18  3:29 PM.  Always use your most recent med list.                   Brand Name Dispense Instructions for use Diagnosis    albuterol 108 (90 Base) MCG/ACT Inhaler    PROAIR HFA/PROVENTIL HFA/VENTOLIN HFA    1 Inhaler    Inhale 2 puffs into the lungs every 6 hours    Reactive airway disease, mild persistent, uncomplicated       ATIVAN 2 MG tablet   Generic drug:  LORazepam      Take 2 mg by mouth At Bedtime 2 tablets at night    Other chronic pain       budesonide-formoterol 80-4.5 MCG/ACT Inhaler    SYMBICORT    1 Inhaler    Inhale 2 puffs into the lungs 2 times daily    Reactive airway disease, mild persistent, uncomplicated       cetirizine 10 MG tablet    zyrTEC     Take 10 mg by mouth daily.        cholecalciferol 1000 UNIT tablet    vitamin D3    90 tablet    Take 1 tablet (1,000 Units) by mouth daily    Vitamin D deficiency       conjugated estrogens cream    PREMARIN    180 g    Place 2 g vaginally daily    Essential  hypertension       diclofenac 1 % Gel topical gel    VOLTAREN    100 g    Apply 4 grams to knees BID prn pain    Primary osteoarthritis of left knee       fluticasone 50 MCG/ACT spray    FLONASE    1 Bottle    Spray 2 sprays into both nostrils daily    Nasal congestion       Gel Heel Cushions Womens Pads     1 Device    1 Device daily    Plantar fasciitis       hydrochlorothiazide 25 MG tablet    HYDRODIURIL    100 tablet    Take 1 tablet (25 mg) by mouth daily    Essential hypertension       levothyroxine 50 MCG tablet    SYNTHROID/LEVOTHROID    90 tablet    Take 1 tablet (50 mcg) by mouth daily    Hypothyroidism, unspecified type       lisinopril 30 MG tablet    PRINIVIL,ZESTRIL    100 tablet    Take 1 tablet (30 mg) by mouth At Bedtime    Benign essential hypertension       Lysine 1000 MG Tabs      Take by mouth 2 times daily    Other chronic pain, Mixed cryoglobulinemia (H), History of hepatitis C, Secondary hypertension, hypertension with unspecified goal       order for DME     1 Device    1 Device by Device route daily as needed Air filtration system    Chronic bronchitis, unspecified chronic bronchitis type (H)       order for DME     1 each    Equipment being ordered: Humidifier    Nasal dryness       traMADol 50 MG tablet    ULTRAM    120 tablet    Take 1-2 tablets ( mg) by mouth every 6 hours as needed    Other chronic pain, Mixed cryoglobulinemia (H), History of hepatitis C       traZODone 50 MG tablet    DESYREL     Take 1 mg by mouth At Bedtime

## 2018-07-02 ENCOUNTER — MYC MEDICAL ADVICE (OUTPATIENT)
Dept: INTERNAL MEDICINE | Facility: CLINIC | Age: 63
End: 2018-07-02

## 2018-07-02 DIAGNOSIS — I10 BENIGN ESSENTIAL HYPERTENSION: ICD-10-CM

## 2018-07-06 PROBLEM — M25.562 LEFT KNEE PAIN: Status: RESOLVED | Noted: 2018-04-15 | Resolved: 2018-07-06

## 2018-07-06 PROBLEM — M17.12 OSTEOARTHRITIS OF LEFT KNEE, UNSPECIFIED OSTEOARTHRITIS TYPE: Status: RESOLVED | Noted: 2017-05-11 | Resolved: 2018-07-06

## 2018-07-12 ENCOUNTER — TELEPHONE (OUTPATIENT)
Dept: INTERNAL MEDICINE | Facility: CLINIC | Age: 63
End: 2018-07-12

## 2018-07-12 NOTE — TELEPHONE ENCOUNTER
[facility] Call Center    Phone Message    May a detailed message be left on voicemail: yes    Reason for Call: Medication Refill Request    Has the patient contacted the pharmacy for the refill? Yes   Name of medication being requested: lisinopril  Provider who prescribed the medication: [facility]  Pharmacy: See my chart message  Date medication is needed: 7/12/18         Action Taken: Message routed to:  [facility] ([facility] Pt sent a my chart message on 7/2/18 for this refill and there has been no response. She has two pills left so please call the Pt when it has been sent to the pharmacy to confirm

## 2018-07-12 NOTE — TELEPHONE ENCOUNTER
[facility] Call Center    Phone Message    May a detailed message be left on voicemail: no    Reason for Call: Other: PT is following up on mychart request for a refill on lisinopril.  She put in the request for the refill on 7/2/18 and will be out tomorrow.  The PT is very upset and stated she will come to the clinic tomorrow and not leave until her RX is filled.  Please follow up with the PT     Action Taken: Message routed to:  [facility] ([facility]): NICOLE

## 2018-07-13 RX ORDER — LISINOPRIL 30 MG/1
30 TABLET ORAL AT BEDTIME
Qty: 100 TABLET | Refills: 1 | Status: SHIPPED | OUTPATIENT
Start: 2018-07-13 | End: 2018-12-26

## 2018-07-13 NOTE — TELEPHONE ENCOUNTER
[facility] Call Center    Phone Message    May a detailed message be left on voicemail: yes    Reason for Call: Other: Per call from PT is requesting a call back to get update on request.  Per PT she sent a [employer]thace message on 7/2 for refill but nothing has been done and she is out of her meds today.  Please call PT back at the above #.     Action Taken: Message routed to:  [facility]): Primary Care

## 2018-07-13 NOTE — TELEPHONE ENCOUNTER
Spoke with Ayde and she will send in a refill for patient.  Patient notified.    JN NATION at 10:50 AM on 7/13/2018.

## 2018-07-13 NOTE — TELEPHONE ENCOUNTER
[facility] Call Center    Phone Message    May a detailed message be left on voicemail: yes    Reason for Call: Other: Patient is still looking for her refill of RX Lisinopril.  She requested the refill on 7/2 and it was sent to our med refill team.  The patient will be OUT TODAY.  Please call with an update.       Action Taken: Message routed to:  [facility] [place]

## 2018-07-23 ENCOUNTER — OFFICE VISIT (OUTPATIENT)
Dept: OBGYN | Facility: CLINIC | Age: 63
End: 2018-07-23
Attending: OBSTETRICS & GYNECOLOGY
Payer: MEDICARE

## 2018-07-23 VITALS
DIASTOLIC BLOOD PRESSURE: 80 MMHG | HEIGHT: 65 IN | BODY MASS INDEX: 32.14 KG/M2 | HEART RATE: 67 BPM | WEIGHT: 192.9 LBS | SYSTOLIC BLOOD PRESSURE: 117 MMHG

## 2018-07-23 DIAGNOSIS — N95.2 VAGINAL ATROPHY: ICD-10-CM

## 2018-07-23 DIAGNOSIS — I10 ESSENTIAL HYPERTENSION: ICD-10-CM

## 2018-07-23 DIAGNOSIS — Z01.419 ENCOUNTER FOR GYNECOLOGICAL EXAMINATION WITHOUT ABNORMAL FINDING: Primary | ICD-10-CM

## 2018-07-23 DIAGNOSIS — Z12.39 BREAST CANCER SCREENING: ICD-10-CM
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
## 2018-07-23 NOTE — MR AVS SNAPSHOT
After Visit Summary   7/23/2018    Sarah Sanford    MRN: [id number]           Patient Information     Date Of Birth          1955        Visit Information        Provider Department      7/23/2018 1:00 PM Apoorva Ayon MD [facility]        Today's Diagnoses     Encounter for gynecological examination without abnormal finding    -  1    Vaginal atrophy        Essential hypertension        Breast cancer screening           Follow-ups after your visit        Your next 10 appointments already scheduled     Aug 15, 2018  1:00 PM CDT   RETURN RETINA with Steph Cintron MD   Eye Clinic ([facility])    [address]  [address] [address]  [facility] [address]   [phone]            Aug 22, 2018  3:25 PM CDT   (Arrive by 3:10 PM)   Return Visit with Vj Centeno MD   [facility] ([facility])    [address]  4th Floor  [facility] [id number] [phone]            Aug 22, 2018  4:15 PM CDT   MA SCREENING DIGITAL BILATERAL with UCBCMA1   [facility] ([facility])    [address], 2nd Floor  [facility] [id number] [phone]           Do not use any powder, lotion or deodorant under your arms or on your breast. If you do, we will ask you to remove it before your exam.  Wear comfortable, two-piece clothing.  If you have any allergies, tell your care team.  Bring any previous mammograms from other facilities or have them mailed to the breast center. Three-dimensional (3D) mammograms are available at [place] locations in [facility], [facility], [facility], and [place]. [facility] locations include [place] and Clinic [facility] Center in [place]. Benefits of 3D mammograms include: - Improved rate of cancer detection - Decreases your chance of having to go back for  "more tests, which means fewer: - \"False-positive\" results (This means that there is an abnormal area but it isn't cancer.) - Invasive testing procedures, such as a biopsy or surgery - Can provide clearer images of the breast if you have dense breast tissue. 3D mammography is an optional exam that anyone can have with a 2D mammogram. It doesn't replace or take the place of a 2D mammogram. 2D mammograms remain an effective screening test for all women.  Not all insurance companies cover the cost of a 3D mammogram. Check with your insurance.            Nov 05, 2018  2:00 PM CST   RETURN GENERAL with Sylvia Grider MD   Eye Clinic ([facility])    [address] [id number] [phone]              Future tests that were ordered for you today     Open Future Orders        Priority Expected Expires Ordered    Mammo Screening digital (bilateral) Routine  7/23/2019 7/23/2018            Who to contact     Please call your clinic at [phone] to:    Ask questions about your health    Make or cancel appointments    Discuss your medicines    Learn about your test results    Speak to your doctor            Additional Information About Your Visit        [facility] Information     [facility] gives you secure access to your electronic health record. If you see a primary care provider, you can also send messages to your care team and make appointments. If you have questions, please call your primary care clinic.  If you do not have a primary care provider, please call [phone] and they will assist you.      [facility] is an electronic gateway that provides easy, online access to your medical records. With [facility], you can request a clinic appointment, read your test results, renew a prescription or communicate with your care team.     To access your existing account, please contact your [facility] or call [phone] for " "assistance.        Care EveryWhere ID     This is your Care EveryWhere ID. This could be used by other organizations to access your [place] medical records  [id number]        Your Vitals Were     Pulse Height BMI (Body Mass Index)             67 1.645 m (5' 4.76\") 32.34 kg/m2          Blood Pressure from Last 3 Encounters:   07/23/18 117/80   05/16/18 120/78   03/30/18 (!) 146/91    Weight from Last 3 Encounters:   07/23/18 87.5 kg (192 lb 14.4 oz)   05/30/18 88 kg (194 lb 1.6 oz)   05/16/18 88 kg (194 lb)                 Where to get your medicines      These medications were sent to [id number] - [place], [place] - [address]  [address], [place] [address]     Phone:  [phone]     conjugated estrogens cream          Primary Care Provider Office Phone # Fax #    Vj Centeno [phone]       [address] [place] [address]  [facility] [address]        Equal Access to Services     [facility] : Hadii aad ku hadasho Soomaali, waaxda luqadaha, qaybta kaalmada adeegyada, naif cornelius haynavn bonnie wilson . So [facility] [phone].    ATENCIÓN: Si habla español, tiene a vila disposición servicios gratuitos de asistencia lingüística. LlMercy Health St. [facility] [phone].    We comply with applicable federal civil rights laws and [place] laws. We do not discriminate on the basis of race, color, national origin, age, disability, sex, sexual orientation, or gender identity.            Thank you!     Thank you for choosing [facility]  for your care. Our goal is always to provide you with excellent care. Hearing back from our patients is one way we can continue to improve our services. Please take a few minutes to complete the written survey that you may receive in the mail after your visit with us. Thank you!             Your Updated Medication List - Protect others around you: Learn how to safely use, store and throw away your " medicines at www.disposemymeds.org.          This list is accurate as of 7/23/18 11:59 PM.  Always use your most recent med list.                   Brand Name Dispense Instructions for use Diagnosis    albuterol 108 (90 Base) MCG/ACT Inhaler    PROAIR HFA/PROVENTIL HFA/VENTOLIN HFA    1 Inhaler    Inhale 2 puffs into the lungs every 6 hours    Reactive airway disease, mild persistent, uncomplicated       ATIVAN 2 MG tablet   Generic drug:  LORazepam      Take 2 mg by mouth At Bedtime 2 tablets at night    Other chronic pain       budesonide-formoterol 80-4.5 MCG/ACT Inhaler    SYMBICORT    1 Inhaler    Inhale 2 puffs into the lungs 2 times daily    Reactive airway disease, mild persistent, uncomplicated       cetirizine 10 MG tablet    zyrTEC     Take 10 mg by mouth daily.        cholecalciferol 1000 UNIT tablet    vitamin D3    90 tablet    Take 1 tablet (1,000 Units) by mouth daily    Vitamin D deficiency       conjugated estrogens cream    PREMARIN    180 g    Place 2 g vaginally daily    Essential hypertension       diclofenac 1 % Gel topical gel    VOLTAREN    100 g    Apply 4 grams to knees BID prn pain    Primary osteoarthritis of left knee       fluticasone 50 MCG/ACT spray    FLONASE    1 Bottle    Spray 2 sprays into both nostrils daily    Nasal congestion       Gel Heel Cushions Womens Pads     1 Device    1 Device daily    Plantar fasciitis       hydrochlorothiazide 25 MG tablet    HYDRODIURIL    100 tablet    Take 1 tablet (25 mg) by mouth daily    Essential hypertension       levothyroxine 50 MCG tablet    SYNTHROID/LEVOTHROID    90 tablet    Take 1 tablet (50 mcg) by mouth daily    Hypothyroidism, unspecified type       lisinopril 30 MG tablet    PRINIVIL,ZESTRIL    100 tablet    Take 1 tablet (30 mg) by mouth At Bedtime    Benign essential hypertension       Lysine 1000 MG Tabs      Take by mouth 2 times daily    Other chronic pain, Mixed cryoglobulinemia (H), History of hepatitis C, Secondary  hypertension, hypertension with unspecified goal       order for DME     1 Device    1 Device by Device route daily as needed Air filtration system    Chronic bronchitis, unspecified chronic bronchitis type (H)       order for DME     1 each    Equipment being ordered: Humidifier    Nasal dryness       traMADol 50 MG tablet    ULTRAM    120 tablet    Take 1-2 tablets ( mg) by mouth every 6 hours as needed    Other chronic pain, Mixed cryoglobulinemia (H), History of hepatitis C       traZODone 50 MG tablet    DESYREL     Take 1 mg by mouth At Bedtime

## 2018-07-25 ENCOUNTER — TELEPHONE (OUTPATIENT)
Dept: OBGYN | Facility: CLINIC | Age: 63
End: 2018-07-25

## 2018-07-25 NOTE — TELEPHONE ENCOUNTER
Received call from Sarah stating that her premarin vaginal cream was ordered wrong as she 'always gets 6 boxes for a 90 day supply' and was only given 3 boxes at the pharmacy yesterday and was told it was for a 45 day supply. The sig is using 2g per day vaginally.    Called pharmacy and was told that pt was given only 3 boxes as this is all insurance will allow. The insurance limit is using 1g per day so what she was given would be a 90 day supply.    Spoke again with Sarah and gave her above information from pharmacy. She reports she usually uses the 2g every other day but likes to have the extra. Advised that she continue to use 2g every other day (this is also noted in Dr. Ayon note) or use 1g every day and what she was given will last her 90 days. If this is not controlling her symptoms, call clinic and let us know. She indicated understanding and agreed with plan.

## 2018-08-15 ENCOUNTER — OFFICE VISIT (OUTPATIENT)
Dept: OPHTHALMOLOGY | Facility: CLINIC | Age: 63
End: 2018-08-15
Attending: OPHTHALMOLOGY
Payer: MEDICARE

## 2018-08-15 DIAGNOSIS — H34.8392 BRVO (BRANCH RETINAL VEIN OCCLUSION) (H): Primary | ICD-10-CM

## 2018-08-15 PROCEDURE — G0463 HOSPITAL OUTPT CLINIC VISIT: HCPCS | Mod: ZF

## 2018-08-15 PROCEDURE — 92134 CPTRZ OPH DX IMG PST SGM RTA: CPT | Mod: ZF | Performed by: OPHTHALMOLOGY

## 2018-08-15 PROCEDURE — 92235 FLUORESCEIN ANGRPH MLTIFRAME: CPT | Mod: ZF | Performed by: OPHTHALMOLOGY

## 2018-08-15 ASSESSMENT — REFRACTION_WEARINGRX
OS_CYLINDER: +0.25
OD_ADD: +2.50
OS_AXIS: 085
OD_CYLINDER: +0.50
OD_AXIS: 133
OS_ADD: +2.50
OD_SPHERE: +0.00
OS_SPHERE: +0.25

## 2018-08-15 ASSESSMENT — EXTERNAL EXAM - RIGHT EYE: OD_EXAM: NORMAL

## 2018-08-15 ASSESSMENT — CONF VISUAL FIELD
OD_NORMAL: 1
OS_NORMAL: 1

## 2018-08-15 ASSESSMENT — TONOMETRY
OD_IOP_MMHG: 09
OS_IOP_MMHG: 10
IOP_METHOD: TONOPEN

## 2018-08-15 ASSESSMENT — VISUAL ACUITY
METHOD: SNELLEN - LINEAR
CORRECTION_TYPE: GLASSES
OS_CC: 20/25
OD_CC: 20/25

## 2018-08-15 ASSESSMENT — EXTERNAL EXAM - LEFT EYE: OS_EXAM: NORMAL

## 2018-08-15 ASSESSMENT — CUP TO DISC RATIO
OD_RATIO: 0.2
OS_RATIO: 0.2

## 2018-08-15 ASSESSMENT — SLIT LAMP EXAM - LIDS
COMMENTS: NORMAL
COMMENTS: NORMAL

## 2018-08-15 NOTE — PROGRESS NOTES
"CC: status post PPV/EL/FAX OD (2/14/17) for vitreous hemorrhage possibly secondary to neovascularization after BRVO.  Status post Panretinal laser photocoagulation (PRP) filling last eye exam    Sarah Sanford is a 62 year old female here for new floaters who presented over the weekend and was diagnosed with recurrent vitreous hemorrhage right eye. Reports that on 1/12/18 she noticed a bunch of new floaters, no flashes, with burning irritation went away. She awoke over the weekend 1/20/18 and noticed new floaters everywhere, hundreds, persistent, vision is foggy, like a curtain, no flashes. Denies eye pain, epiphora.  Patient continues to have these symptoms in the right eye with fog/generalized blur and floaters.     Interim: reports improvement of the floaters. VA stable    OCT  08/15/18   RE: inferotemporal thinning (stable), otherwise normal contour  LE: normal    fluorescein angiography 08/15/18   Right eye: inferior temporal macula area of capillary non perfusion and inferior temporal peripheral laser scars; no neovascularization elsewhere   Left eye: Normal AV and choroidal filling     Fundus photos and Autofluorescence: consistent with exam     Assessment and plan:    1. status post PPV/EL/FAX OD (2/14/17) for vitreous hemorrhage possibly secondary to history of  BRVO.  - retina attached, patient doing well  No cystoid macular edema     2. posterior chamber intraocular lens (PCIOL) with  Posterior capsular opacity (PCO) right eye   posterior capsular opacity (PCO) likely contributing to \"fog\" symptoms and visual obscuration. posterior capsular opacity (PCO) over visual axis.  Status post yag  Open capsule - doing well     3. resolved vitreous hemorrhage right eye    symptoms improving   -No signs of retinal tear, hole, or detachment.   - Status post  laser filling right eye 3.1.18  Doing well  Continue to observe    4. Cataract left eye   Becoming visually significant  Observe    Follow up in 6 months with " Optical Coherence Tomography       ~~~~~~~~~~~~~~~~~~~~~~~~~~~~~~~~~~   Complete documentation of historical and exam elements from today's encounter can be found in the full encounter summary report (not reduplicated in this progress note).  I personally obtained the chief complaint(s) and history of present illness.  I confirmed and edited as necessary the review of systems, past medical/surgical history, family history, social history, and examination findings as documented by others; and I examined the patient myself.  I personally reviewed the relevant tests, images, and reports as documented above.  I personally reviewed the ophthalmic test(s) associated with this encounter, agree with the interpretation(s) as documented by the resident/fellow, and have edited the corresponding report(s) as necessary.   I formulated and edited as necessary the assessment and plan and discussed the findings and management plan with the patient and family    Steph Cintron MD  .  Retina Service   Department of Ophthalmology and Visual Neurosciences   [facility]  Phone: [phone]   Fax: [phone]

## 2018-08-15 NOTE — NURSING NOTE
Chief Complaints and History of Present Illnesses   Patient presents with     Follow Up For     BRVO (branch retinal vein occlusion)      HPI    Affected eye(s):  Both   Symptoms:        Duration:  3 months   Frequency:  Constant       Do you have eye pain now?:  No      Comments:  Pt. States that she is extremely anxious today about FA.  VA is still blurry.  No c/o comfort BE.  Seeing black check sy in vision RE.  Swetha Rabia COT 12:29 PM August 15, 2018

## 2018-08-15 NOTE — MR AVS SNAPSHOT
"              After Visit Summary   8/15/2018    Sarah Sanford    MRN: [id number]           Patient Information     Date Of Birth          1955        Visit Information        Provider Department      8/15/2018 1:00 PM Steph Cintron MD [facility]        Today's Diagnoses     BRVO (branch retinal vein occlusion)    -  1       Follow-ups after your visit        Follow-up notes from your care team     Return in about 6 months (around 2/15/2019) for OCT.      Your next 10 appointments already scheduled     Aug 22, 2018  3:25 PM CDT   (Arrive by 3:10 PM)   Return Visit with Vj Centeno MD   [facility] ([facility])    [address] [place] [address]  4th Floor  [facility] [address] [phone]            Aug 22, 2018  4:15 PM CDT   MA SCREENING DIGITAL BILATERAL with [id number]   [facility] ([facility])    [address] [place] [address]  2nd [facility] [id number] [phone]           Do not use any powder, lotion or deodorant under your arms or on your breast. If you do, we will ask you to remove it before your exam.  Wear comfortable, two-piece clothing.  If you have any allergies, tell your care team.  Bring any previous mammograms from other facilities or have them mailed to the breast center. Three-dimensional (3D) mammograms are available at [place] locations in [facility], [facility], [facility], [place], [place], and [place]. [facility] locations include [place] and [facility] in [facility]. Benefits of 3D mammograms include: - Improved rate of cancer detection - Decreases your chance of having to go back for more tests, which means fewer: - \"False-positive\" results (This means that there is an abnormal area but it isn't cancer.) - Invasive testing procedures, such as a biopsy or surgery - Can provide clearer images of the breast if you have dense breast tissue. " 3D mammography is an optional exam that anyone can have with a 2D mammogram. It doesn't replace or take the place of a 2D mammogram. 2D mammograms remain an effective screening test for all women.  Not all insurance companies cover the cost of a 3D mammogram. Check with your insurance.            Nov 05, 2018  2:00 PM CST   RETURN GENERAL with Sylvia Grider MD   Eye Clinic ([facility])    [address] [place] [address]   [phone]            Feb 20, 2019  1:00 PM CST   RETURN RETINA with Steph Cintron MD   Eye Clinic ([facility])    [address] [address] [phone]              Who to contact     Please call your clinic at [phone] to:    Ask questions about your health    Make or cancel appointments    Discuss your medicines    Learn about your test results    Speak to your doctor            Additional Information About Your Visit        [facility] Information     [facility] gives you secure access to your electronic health record. If you see a primary care provider, you can also send messages to your care team and make appointments. If you have questions, please call your primary care clinic.  If you do not have a primary care provider, please call [phone] and they will assist you.      [facility] is an electronic gateway that provides easy, online access to your medical records. With [facility], you can request a clinic appointment, read your test results, renew a prescription or communicate with your care team.     To access your existing account, please contact your [facility] or call [phone] for assistance.        Care EveryWhere ID     This is your Care EveryWhere ID. This could be used by other organizations to access your [place] medical records  [id number]         Blood Pressure from Last 3 Encounters:    07/23/18 117/80   05/16/18 120/78   03/30/18 (!) 146/91    Weight from Last 3 Encounters:   07/23/18 87.5 kg (192 lb 14.4 oz)   05/30/18 88 kg (194 lb 1.6 oz)   05/16/18 88 kg (194 lb)              We Performed the Following     Fluorescein Angiography OU (both eyes)     OCT Retina Spectralis OU (both eyes)        Primary Care Provider Office Phone # Fax #    Vj Tin Centeno [phone]       [address] [place] [address]        Equal Access to Services     [facility]: Hadii nasir sandoval hadasho Solorenza, waaxda luqadaha, qaybta kaalmada adebatshevayazak, naif wilson . So [facility] [phone].    ATENCIÓN: Si habla español, tiene a vila disposición servicios gratuitos de asistencia lingüística. LlSCCI Hospital Lima [phone].    We comply with applicable federal civil rights laws and [place] laws. We do not discriminate on the basis of race, color, national origin, age, disability, sex, sexual orientation, or gender identity.            Thank you!     Thank you for choosing EYE CLINIC  for your care. Our goal is always to provide you with excellent care. Hearing back from our patients is one way we can continue to improve our services. Please take a few minutes to complete the written survey that you may receive in the mail after your visit with us. Thank you!             Your Updated Medication List - Protect others around you: Learn how to safely use, store and throw away your medicines at www.disposemymeds.org.          This list is accurate as of 8/15/18  1:36 PM.  Always use your most recent med list.                   Brand Name Dispense Instructions for use Diagnosis    albuterol 108 (90 Base) MCG/ACT inhaler    PROAIR HFA/PROVENTIL HFA/VENTOLIN HFA    1 Inhaler    Inhale 2 puffs into the lungs every 6 hours    Reactive airway disease, mild persistent, uncomplicated       ATIVAN 2 MG tablet   Generic drug:  LORazepam      Take 2 mg by mouth At Bedtime 2  tablets at night    Other chronic pain       budesonide-formoterol 80-4.5 MCG/ACT Inhaler    SYMBICORT    1 Inhaler    Inhale 2 puffs into the lungs 2 times daily    Reactive airway disease, mild persistent, uncomplicated       cetirizine 10 MG tablet    zyrTEC     Take 10 mg by mouth daily.        cholecalciferol 1000 UNIT tablet    vitamin D3    90 tablet    Take 1 tablet (1,000 Units) by mouth daily    Vitamin D deficiency       conjugated estrogens cream    PREMARIN    180 g    Place 2 g vaginally daily    Essential hypertension       diclofenac 1 % Gel topical gel    VOLTAREN    100 g    Apply 4 grams to knees BID prn pain    Primary osteoarthritis of left knee       fluticasone 50 MCG/ACT spray    FLONASE    1 Bottle    Spray 2 sprays into both nostrils daily    Nasal congestion       Gel Heel Cushions Womens Pads     1 Device    1 Device daily    Plantar fasciitis       hydrochlorothiazide 25 MG tablet    HYDRODIURIL    100 tablet    Take 1 tablet (25 mg) by mouth daily    Essential hypertension       levothyroxine 50 MCG tablet    SYNTHROID/LEVOTHROID    90 tablet    Take 1 tablet (50 mcg) by mouth daily    Hypothyroidism, unspecified type       lisinopril 30 MG tablet    PRINIVIL,ZESTRIL    100 tablet    Take 1 tablet (30 mg) by mouth At Bedtime    Benign essential hypertension       Lysine 1000 MG Tabs      Take by mouth 2 times daily    Other chronic pain, Mixed cryoglobulinemia (H), History of hepatitis C, Secondary hypertension, hypertension with unspecified goal       order for DME     1 Device    1 Device by Device route daily as needed Air filtration system    Chronic bronchitis, unspecified chronic bronchitis type (H)       order for DME     1 each    Equipment being ordered: Humidifier    Nasal dryness       traMADol 50 MG tablet    ULTRAM    120 tablet    Take 1-2 tablets ( mg) by mouth every 6 hours as needed    Other chronic pain, Mixed cryoglobulinemia (H), History of hepatitis C        traZODone 50 MG tablet    DESYREL     Take 1 mg by mouth At Bedtime

## 2018-08-22 ENCOUNTER — OFFICE VISIT (OUTPATIENT)
Dept: INTERNAL MEDICINE | Facility: CLINIC | Age: 63
End: 2018-08-22
Payer: MEDICARE

## 2018-08-22 ENCOUNTER — RADIANT APPOINTMENT (OUTPATIENT)
Dept: MAMMOGRAPHY | Facility: CLINIC | Age: 63
End: 2018-08-22
Payer: MEDICARE

## 2018-08-22 VITALS
HEART RATE: 62 BPM | SYSTOLIC BLOOD PRESSURE: 115 MMHG | DIASTOLIC BLOOD PRESSURE: 75 MMHG | BODY MASS INDEX: 32.19 KG/M2 | RESPIRATION RATE: 18 BRPM | WEIGHT: 192 LBS

## 2018-08-22 DIAGNOSIS — D89.1 MIXED CRYOGLOBULINEMIA (H): ICD-10-CM

## 2018-08-22 DIAGNOSIS — E03.9 HYPOTHYROIDISM, UNSPECIFIED TYPE: ICD-10-CM

## 2018-08-22 DIAGNOSIS — I10 ESSENTIAL HYPERTENSION: ICD-10-CM

## 2018-08-22 DIAGNOSIS — Z86.19 HISTORY OF HEPATITIS C: ICD-10-CM

## 2018-08-22 DIAGNOSIS — G89.29 OTHER CHRONIC PAIN: ICD-10-CM

## 2018-08-22 DIAGNOSIS — Z12.12 ENCOUNTER FOR SCREENING FOR MALIGNANT NEOPLASM OF RECTUM: ICD-10-CM

## 2018-08-22 DIAGNOSIS — Z00.00 ROUTINE HEALTH MAINTENANCE: Primary | ICD-10-CM

## 2018-08-22 DIAGNOSIS — Z12.39 BREAST CANCER SCREENING: ICD-10-CM

## 2018-08-22 RX ORDER — TRAMADOL HYDROCHLORIDE 50 MG/1
50-100 TABLET ORAL EVERY 6 HOURS PRN
Qty: 120 TABLET | Refills: 0 | Status: SHIPPED | OUTPATIENT
Start: 2018-08-22 | End: 2018-08-22

## 2018-08-22 RX ORDER — TRAMADOL HYDROCHLORIDE 50 MG/1
50-100 TABLET ORAL EVERY 6 HOURS PRN
Qty: 120 TABLET | Refills: 0 | Status: SHIPPED | OUTPATIENT
Start: 2018-08-22 | End: 2018-11-26

## 2018-08-22 ASSESSMENT — PAIN SCALES - GENERAL: PAINLEVEL: NO PAIN (0)

## 2018-08-22 NOTE — MR AVS SNAPSHOT
"              After Visit Summary   8/22/2018    Sarah Sanford    MRN: [id number]           Patient Information     Date Of Birth          1955        Visit Information        Provider Department      8/22/2018 3:25 PM Vj Centeno MD [facility]        Today's Diagnoses     Routine health maintenance    -  1    Other chronic pain        Mixed cryoglobulinemia (H)        History of hepatitis C        Hypothyroidism, unspecified type        Essential hypertension        Encounter for screening for malignant neoplasm of rectum            Follow-ups after your visit        Follow-up notes from your care team     Return in about 3 months (around 11/22/2018).      Your next 10 appointments already scheduled     Aug 22, 2018  4:15 PM CDT   MA SCREENING DIGITAL BILATERAL with [id number]   [facility] ([facility])    [address] [facility] [address]  [address] [facility] [address] [phone]           Do not use any powder, lotion or deodorant under your arms or on your breast. If you do, we will ask you to remove it before your exam.  Wear comfortable, two-piece clothing.  If you have any allergies, tell your care team.  Bring any previous mammograms from other facilities or have them mailed to the breast center. Three-dimensional (3D) mammograms are available at [facility] locations in [facility], [facility], [place], [place], and [place]. [facility] locations include [place] and [facility] in [place]. Benefits of 3D mammograms include: - Improved rate of cancer detection - Decreases your chance of having to go back for more tests, which means fewer: - \"False-positive\" results (This means that there is an abnormal area but it isn't cancer.) - Invasive testing procedures, such as a biopsy or surgery - Can provide clearer images of the breast if you have dense breast tissue. 3D mammography is an " optional exam that anyone can have with a 2D mammogram. It doesn't replace or take the place of a 2D mammogram. 2D mammograms remain an effective screening test for all women.  Not all insurance companies cover the cost of a 3D mammogram. Check with your insurance.            Nov 05, 2018  2:00 PM CST   RETURN GENERAL with Sylvia Grider MD   Eye Clinic ([facility])    [address] [facility] [address] [place] [address]   [phone]            Feb 20, 2019  1:00 PM CST   RETURN RETINA with Steph Cintron MD   [facility] Clinic ([facility])    [address] [address]   [phone]              Future tests that were ordered for you today     Open Future Orders        Priority Expected Expires Ordered    Basic metabolic panel Routine  8/22/2019 8/22/2018    Lipid Profile Routine  8/22/2019 8/22/2018    CBC with platelets differential Routine  8/22/2019 8/22/2018    Fecal colorectal cancer screen FIT Routine 9/12/2018 11/14/2018 8/22/2018            Who to contact     Please call your clinic at [phone] to:    Ask questions about your health    Make or cancel appointments    Discuss your medicines    Learn about your test results    Speak to your doctor            Additional Information About Your Visit        [employer] Information     [employer] gives you secure access to your electronic health record. If you see a primary care provider, you can also send messages to your care team and make appointments. If you have questions, please call your primary care clinic.  If you do not have a primary care provider, please call [phone] and they will assist you.      [employer] is an electronic gateway that provides easy, online access to your medical records. With [employer], you can request a clinic appointment, read your test results, renew a prescription or communicate with your care team.     To  access your existing account, please contact your [facility] or call [phone] for assistance.        Care EveryWhere ID     This is your Care EveryWhere ID. This could be used by other organizations to access your [facility] medical records  [id number]        Your Vitals Were     Pulse Respirations Breastfeeding? BMI (Body Mass Index)          62 18 No 32.19 kg/m2         Blood Pressure from Last 3 Encounters:   08/22/18 115/75   07/23/18 117/80   05/16/18 120/78    Weight from Last 3 Encounters:   08/22/18 87.1 kg (192 lb)   07/23/18 87.5 kg (192 lb 14.4 oz)   05/30/18 88 kg (194 lb 1.6 oz)                 Today's Medication Changes          These changes are accurate as of 8/22/18  3:27 PM.  If you have any questions, ask your nurse or doctor.               Start taking these medicines.        Dose/Directions    traMADol 50 MG tablet   Commonly known as:  ULTRAM   Used for:  Other chronic pain, Mixed cryoglobulinemia (H), History of hepatitis C   Started by:  Vj Centeno MD        Dose:   mg   Take 1-2 tablets ( mg) by mouth every 6 hours as needed   Quantity:  120 tablet   Refills:  0         Stop taking these medicines if you haven't already. Please contact your care team if you have questions.     albuterol 108 (90 Base) MCG/ACT inhaler   Commonly known as:  PROAIR HFA/PROVENTIL HFA/VENTOLIN HFA   Stopped by:  Vj Centeno MD           budesonide-formoterol 80-4.5 MCG/ACT Inhaler   Commonly known as:  SYMBICORT   Stopped by:  Vj Centeno MD           fluticasone 50 MCG/ACT spray   Commonly known as:  FLONASE   Stopped by:  Vj Centeon MD                Where to get your medicines      Some of these will need a paper prescription and others can be bought over the counter.  Ask your nurse if you have questions.     Bring a paper prescription for each of these medications     traMADol 50 MG tablet                Information about OPIOIDS     PRESCRIPTION OPIOIDS: WHAT YOU NEED TO KNOW   We gave you an opioid (narcotic) pain medicine. It is important to manage your pain, but opioids are not always the best choice. You should first try all the other options your care team gave you. Take this medicine for as short a time (and as few doses) as possible.    Some activities can increase your pain, such as bandage changes or therapy sessions. It may help to take your pain medicine 30 to 60 minutes before these activities. Reduce your stress by getting enough sleep, working on hobbies you enjoy and practicing relaxation or meditation. Talk to your care team about ways to manage your pain beyond prescription opioids.    These medicines have risks:    DO NOT drive when on new or higher doses of pain medicine. These medicines can affect your alertness and reaction times, and you could be arrested for driving under the influence (DUI). If you need to use opioids long-term, talk to your care team about driving.    DO NOT operate heavy machinery    DO NOT do any other dangerous activities while taking these medicines.    DO NOT drink any alcohol while taking these medicines.     If the opioid prescribed includes acetaminophen, DO NOT take with any other medicines that contain acetaminophen. Read all labels carefully. Look for the word  acetaminophen  or  Tylenol.  Ask your pharmacist if you have questions or are unsure.    You can get addicted to pain medicines, especially if you have a history of addiction (chemical, alcohol or substance dependence). Talk to your care team about ways to reduce this risk.    All opioids tend to cause constipation. Drink plenty of water and eat foods that have a lot of fiber, such as fruits, vegetables, prune juice, apple juice and high-fiber cereal. Take a laxative (Miralax, milk of magnesia, Colace, Senna) if you don t move your bowels at least every other day. Other side effects include upset  stomach, sleepiness, dizziness, throwing up, tolerance (needing more of the medicine to have the same effect), physical dependence and slowed breathing.    Store your pills in a secure place, locked if possible. We will not replace any lost or stolen medicine. If you don t finish your medicine, please throw away (dispose) as directed by your pharmacist. The [place] Pollution Control Agency has more information about safe disposal: [email or web address]://[email or web address].[email or web address].mn.us/living-green/[email or web address]-unwanted-medications         Primary Care Provider Office Phone # Fax #    Vj Centeno [phone]       [address] [place] [address]  [facility] [address]        Equal Access to Services     KAYLA BELL : Hossein Valdez, lashonda perez, jared smith, naif fowler. So [facility] [phone].    ATENCIÓN: Si habla español, tiene a vila disposición servicios gratuitos de asistencia lingüística. Llame al [phone].    We comply with applicable federal civil rights laws and [place] laws. We do not discriminate on the basis of race, color, national origin, age, disability, sex, sexual orientation, or gender identity.            Thank you!     Thank you for choosing [facility]  for your care. Our goal is always to provide you with excellent care. Hearing back from our patients is one way we can continue to improve our services. Please take a few minutes to complete the written survey that you may receive in the mail after your visit with us. Thank you!             Your Updated Medication List - Protect others around you: Learn how to safely use, store and throw away your medicines at www.disposemymeds.org.          This list is accurate as of 8/22/18  3:27 PM.  Always use your most recent med list.                   Brand Name Dispense Instructions for use Diagnosis    ATIVAN 2 MG tablet   Generic drug:  LORazepam      Take 2 mg by mouth At  Bedtime 2 tablets at night    Other chronic pain       cetirizine 10 MG tablet    zyrTEC     Take 10 mg by mouth daily.        cholecalciferol 1000 UNIT tablet    vitamin D3    90 tablet    Take 1 tablet (1,000 Units) by mouth daily    Vitamin D deficiency       conjugated estrogens cream    PREMARIN    180 g    Place 2 g vaginally daily    Essential hypertension       diclofenac 1 % Gel topical gel    VOLTAREN    100 g    Apply 4 grams to knees BID prn pain    Primary osteoarthritis of left knee       Gel Heel Cushions Womens Pads     1 Device    1 Device daily    Plantar fasciitis       hydrochlorothiazide 25 MG tablet    HYDRODIURIL    100 tablet    Take 1 tablet (25 mg) by mouth daily    Essential hypertension       levothyroxine 50 MCG tablet    SYNTHROID/LEVOTHROID    90 tablet    Take 1 tablet (50 mcg) by mouth daily    Hypothyroidism, unspecified type       lisinopril 30 MG tablet    PRINIVIL,ZESTRIL    100 tablet    Take 1 tablet (30 mg) by mouth At Bedtime    Benign essential hypertension       Lysine 1000 MG Tabs      Take by mouth 2 times daily    Other chronic pain, Mixed cryoglobulinemia (H), History of hepatitis C, Secondary hypertension, hypertension with unspecified goal       order for DME     1 Device    1 Device by Device route daily as needed Air filtration system    Chronic bronchitis, unspecified chronic bronchitis type (H)       order for DME     1 each    Equipment being ordered: Humidifier    Nasal dryness       traMADol 50 MG tablet    ULTRAM    120 tablet    Take 1-2 tablets ( mg) by mouth every 6 hours as needed    Other chronic pain, Mixed cryoglobulinemia (H), History of hepatitis C       traZODone 50 MG tablet    DESYREL     Take 1 mg by mouth At Bedtime

## 2018-08-22 NOTE — NURSING NOTE
Chief Complaint   Patient presents with     Refill Request     Patient is here for follow up and medication refills       Joseph Shen CMA (AAMA) at 3:08 PM on 8/22/2018
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
## 2018-08-23 ENCOUNTER — HOSPITAL ENCOUNTER (OUTPATIENT)
Facility: CLINIC | Age: 63
Setting detail: SPECIMEN
Discharge: HOME | End: 2018-08-23
Admitting: INTERNAL MEDICINE
Payer: MEDICARE

## 2018-08-23 PROCEDURE — 82274 ASSAY TEST FOR BLOOD FECAL: CPT | Performed by: INTERNAL MEDICINE

## 2018-08-26 DIAGNOSIS — Z12.12 ENCOUNTER FOR SCREENING FOR MALIGNANT NEOPLASM OF RECTUM: ICD-10-CM

## 2018-08-26 DIAGNOSIS — Z00.00 ROUTINE HEALTH MAINTENANCE: ICD-10-CM

## 2018-08-26 LAB — HEMOCCULT STL QL IA: NEGATIVE

## 2018-10-01 ENCOUNTER — OFFICE VISIT (OUTPATIENT)
Dept: OBGYN | Facility: CLINIC | Age: 63
End: 2018-10-01
Attending: OBSTETRICS & GYNECOLOGY
Payer: MEDICARE

## 2018-10-01 VITALS
BODY MASS INDEX: 31.16 KG/M2 | WEIGHT: 187 LBS | SYSTOLIC BLOOD PRESSURE: 115 MMHG | HEIGHT: 65 IN | HEART RATE: 62 BPM | DIASTOLIC BLOOD PRESSURE: 75 MMHG

## 2018-10-01 DIAGNOSIS — N95.2 VAGINAL ATROPHY: Primary | ICD-10-CM

## 2018-10-01 NOTE — LETTER
"10/1/2018       RE: Sarah Sanford  [address] [place] [address]  [address]  [place] [address]     Dear Colleague,    Thank you for referring your patient, Sarah Sanford, to the [facility] at [facility]. Please see a copy of my visit note below.    Gynecology Visit Note  10/1/18    Reason for visit: Concern for medication changes    S: Patient is a 63 yo female well-known to me who comes in today concerned because she received a notification in the mail from her insurance company saying that her premarin may not be covered any more.  Karin has had good relief of her symptoms and say it is working well for her using it every other day and it helps her feel like her vagina is not \"dead\".  She is especially concerned about this potential change as she recently started a new relationship and it is long distance but they do have sex play over the phone and use toys, and she feels like if she doesn't have the premarin cream it may cause her to not be able to participate in this activity which she enjoys and is having fun with, so she is asking if anything can be done to ensure she can get the medication she needs.  She states she was supposed to get 6 boxes of the cream but was only given 3 at the pharmacy the last time and she is wondering if this is due to her insurance limitations.  Basically she is concerned because she feels she will run our of her medication if not given the amount she is supposed to get and is concerned about how it will impact her sexual activity.    O:  /75  Pulse 62  Ht 1.645 m (5' 4.76\")  Wt 84.8 kg (187 lb)  BMI 31.35 kg/m2     General: NAD, A&Ox3  Resp: Non-labored breathing    A/P: 63 yo female comes in to discuss medication concerns  1) Vaginal atrophy: Well controlled on Premarin daily to every other day.  Recently not given as much supply as used to getting with refill despite being ordered the same way as previously ordered.  " Will contact pharmacy and insurance company to discuss if a change needs to be made for insurance coverage purposes.  Patient understands and appreciates us looking into this for her.  Will call her once we get answers about next steps for proceeding with treatment.    ADDENDUM: On 10/2/18 called pharmacy and was able to get 3 month supply (6 boxes) for patient to  tomorrow afternoon. Pharmacy did not that there was possible supply issues last time which is why she was given the amount she was given at that time.  Also discussed that it is possible [employer] may change formulary in the future, but no way to know when that may happen, but at this time can continue with current therapy plan.  Can also try PA if premarin ends up not being covered for patient in future.  Patient called to communicate this.    Apoorva Ayon MD

## 2018-10-01 NOTE — MR AVS SNAPSHOT
After Visit Summary   10/1/2018    Sarah Sanford    MRN: [id number]           Patient Information     Date Of Birth          1955        Visit Information        Provider Department      10/1/2018 2:45 PM Apoorva Ayon MD [facility]        Today's Diagnoses     Vaginal atrophy    -  1       Follow-ups after your visit        Your next 10 appointments already scheduled     Nov 05, 2018  2:00 PM CST   RETURN GENERAL with Sylvia Grider MD   Eye Clinic ([facility])    [address] [address]   [phone]            Nov 26, 2018  9:00 AM CST   LAB with  LAB   [facility] ([facility])    [address] [place] [id number] [phone]           Please do not eat 10-12 hours before your appointment if you are coming in fasting for labs on lipids, cholesterol, or glucose (sugar). This does not apply to pregnant women. Water, hot tea and black coffee (with nothing added) are okay. Do not drink other fluids, diet soda or chew gum.            Nov 26, 2018 10:00 AM CST   (Arrive by 9:45 AM)   Return Visit with Vj Centeno MD   [facility] ([facility])    [address] [place] [id number] [phone]            Feb 20, 2019  1:00 PM CST   RETURN RETINA with Steph Cintron MD   Eye Clinic ([facility])    [address] [address] [phone]              Who to contact     Please call your clinic at [phone] to:    Ask questions about your health    Make or cancel appointments    Discuss your medicines    Learn about your test results    Speak to your doctor            Additional Information About Your Visit        MyChart Information     MyChart gives you secure access to your  "electronic health record. If you see a primary care provider, you can also send messages to your care team and make appointments. If you have questions, please call your primary care clinic.  If you do not have a primary care provider, please call [phone] and they will assist you.      [employer] is an electronic gateway that provides easy, online access to your medical records. With [employer]), you can request a clinic appointment, read your test results, renew a prescription or communicate with your care team.     To access your existing account, please contact your [facility] or call [phone] for assistance.        Care EveryWhere ID     This is your Care EveryWhere ID. This could be used by other organizations to access your [place] medical records  [id number]        Your Vitals Were     Pulse Height BMI (Body Mass Index)             62 1.645 m (5' 4.76\") 31.35 kg/m2          Blood Pressure from Last 3 Encounters:   10/01/18 115/75   08/22/18 115/75   07/23/18 117/80    Weight from Last 3 Encounters:   10/01/18 84.8 kg (187 lb)   08/22/18 87.1 kg (192 lb)   07/23/18 87.5 kg (192 lb 14.4 oz)              Today, you had the following     No orders found for display       Primary Care Provider Office Phone # Fax #    Vj Centeno [phone]       [address] [place] [address]        Equal Access to Services     KAYLA BELL AH: Hadii nasir sheriffo Solorenza, waaxda luqadaha, qaybta kaalmada bonnieyada, naif fowler. So [facility] [phone].    ATENCIÓN: Si habla español, tiene a vila disposición servicios gratuitos de asistencia lingüística. Llame al [phone].    We comply with applicable federal civil rights laws and [place] laws. We do not discriminate on the basis of race, color, national origin, age, disability, sex, sexual orientation, or gender identity.            Thank you!     Thank you for " choosing [facility]  for your care. Our goal is always to provide you with excellent care. Hearing back from our patients is one way we can continue to improve our services. Please take a few minutes to complete the written survey that you may receive in the mail after your visit with us. Thank you!             Your Updated Medication List - Protect others around you: Learn how to safely use, store and throw away your medicines at www.disposemymeds.org.          This list is accurate as of 10/1/18 11:59 PM.  Always use your most recent med list.                   Brand Name Dispense Instructions for use Diagnosis    ATIVAN 2 MG tablet   Generic drug:  LORazepam      Take 2 mg by mouth At Bedtime 2 tablets at night    Other chronic pain       cetirizine 10 MG tablet    zyrTEC     Take 10 mg by mouth daily.        cholecalciferol 1000 UNIT tablet    vitamin D3    90 tablet    Take 1 tablet (1,000 Units) by mouth daily    Vitamin D deficiency       conjugated estrogens cream    PREMARIN    180 g    Place 2 g vaginally daily    Essential hypertension       diclofenac 1 % Gel topical gel    VOLTAREN    100 g    Apply 4 grams to knees BID prn pain    Primary osteoarthritis of left knee       Gel Heel Cushions Womens Pads     1 Device    1 Device daily    Plantar fasciitis       hydrochlorothiazide 25 MG tablet    HYDRODIURIL    100 tablet    Take 1 tablet (25 mg) by mouth daily    Essential hypertension       levothyroxine 50 MCG tablet    SYNTHROID/LEVOTHROID    90 tablet    Take 1 tablet (50 mcg) by mouth daily    Hypothyroidism, unspecified type       lisinopril 30 MG tablet    PRINIVIL,ZESTRIL    100 tablet    Take 1 tablet (30 mg) by mouth At Bedtime    Benign essential hypertension       Lysine 1000 MG Tabs      Take by mouth 2 times daily    Other chronic pain, Mixed cryoglobulinemia (H), History of hepatitis C, Secondary hypertension, hypertension with unspecified goal       order for DME      1 Device    1 Device by Device route daily as needed Air filtration system    Chronic bronchitis, unspecified chronic bronchitis type (H)       order for DME     1 each    Equipment being ordered: Humidifier    Nasal dryness       traMADol 50 MG tablet    ULTRAM    120 tablet    Take 1-2 tablets ( mg) by mouth every 6 hours as needed    Other chronic pain, Mixed cryoglobulinemia (H), History of hepatitis C       traZODone 50 MG tablet    DESYREL     Take 1 mg by mouth At Bedtime

## 2018-10-02 NOTE — PROGRESS NOTES
"Gynecology Visit Note  10/1/18    Reason for visit: Concern for medication changes    S: Patient is a 61 yo female well-known to me who comes in today concerned because she received a notification in the mail from her insurance company saying that her premarin may not be covered any more.  Karin has had good relief of her symptoms and say it is working well for her using it every other day and it helps her feel like her vagina is not \"dead\".  She is especially concerned about this potential change as she recently started a new relationship and it is long distance but they do have sex play over the phone and use toys, and she feels like if she doesn't have the premarin cream it may cause her to not be able to participate in this activity which she enjoys and is having fun with, so she is asking if anything can be done to ensure she can get the medication she needs.  She states she was supposed to get 6 boxes of the cream but was only given 3 at the pharmacy the last time and she is wondering if this is due to her insurance limitations.  Basically she is concerned because she feels she will run our of her medication if not given the amount she is supposed to get and is concerned about how it will impact her sexual activity.    O:  /75  Pulse 62  Ht 1.645 m (5' 4.76\")  Wt 84.8 kg (187 lb)  BMI 31.35 kg/m2     General: NAD, A&Ox3  Resp: Non-labored breathing    A/P: 61 yo female comes in to discuss medication concerns  1) Vaginal atrophy: Well controlled on Premarin daily to every other day.  Recently not given as much supply as used to getting with refill despite being ordered the same way as previously ordered.  Will contact pharmacy and insurance company to discuss if a change needs to be made for insurance coverage purposes.  Patient understands and appreciates us looking into this for her.  Will call her once we get answers about next steps for proceeding with treatment.    ADDENDUM: On 10/2/18 called " pharmacy and was able to get 3 month supply (6 boxes) for patient to  tomorrow afternoon. Pharmacy did not that there was possible supply issues last time which is why she was given the amount she was given at that time.  Also discussed that it is possible [employer] may change formulary in the future, but no way to know when that may happen, but at this time can continue with current therapy plan.  Can also try PA if premarin ends up not being covered for patient in future.  Patient called to communicate this.    Apoorva Ayon MD

## 2018-11-05 ENCOUNTER — OFFICE VISIT (OUTPATIENT)
Dept: INTERNAL MEDICINE | Facility: CLINIC | Age: 63
End: 2018-11-05
Payer: MEDICARE

## 2018-11-05 VITALS
SYSTOLIC BLOOD PRESSURE: 149 MMHG | DIASTOLIC BLOOD PRESSURE: 93 MMHG | BODY MASS INDEX: 31.89 KG/M2 | HEART RATE: 65 BPM | WEIGHT: 190.2 LBS

## 2018-11-05 DIAGNOSIS — T36.95XA ANTIBIOTIC-INDUCED YEAST INFECTION: ICD-10-CM

## 2018-11-05 DIAGNOSIS — J01.90 ACUTE SINUSITIS WITH SYMPTOMS > 10 DAYS: Primary | ICD-10-CM

## 2018-11-05 DIAGNOSIS — B37.9 ANTIBIOTIC-INDUCED YEAST INFECTION: ICD-10-CM

## 2018-11-05 RX ORDER — FLUCONAZOLE 150 MG/1
150 TABLET ORAL ONCE
Qty: 1 TABLET | Refills: 0 | Status: SHIPPED | OUTPATIENT
Start: 2018-11-05 | End: 2019-02-21

## 2018-11-05 RX ORDER — FLUTICASONE PROPIONATE 50 MCG
2 SPRAY, SUSPENSION (ML) NASAL DAILY
Qty: 1 BOTTLE | Refills: 1 | Status: SHIPPED | OUTPATIENT
Start: 2018-11-05 | End: 2018-11-19

## 2018-11-05 RX ORDER — LEVOFLOXACIN 500 MG/1
500 TABLET, FILM COATED ORAL DAILY
Qty: 7 TABLET | Refills: 0 | Status: SHIPPED | OUTPATIENT
Start: 2018-11-05 | End: 2018-11-19

## 2018-11-05 ASSESSMENT — PAIN SCALES - GENERAL: PAINLEVEL: MILD PAIN (2)

## 2018-11-05 NOTE — NURSING NOTE
Chief Complaint   Patient presents with     Sick     congested sinus, neck lymph nodes swollen, ears hurt, dizzy, x1 week       Colin Coleman, EMT 2:54 PM on 11/5/2018.

## 2018-11-05 NOTE — MR AVS SNAPSHOT
After Visit Summary   11/5/2018    Sarah Sanford    MRN: [id number]           Patient Information     Date Of Birth          1955        Visit Information        Provider Department      11/5/2018 3:00 PM Yamile Vega MD [facility]        Today's Diagnoses     Acute sinusitis with symptoms > 10 days    -  1    Antibiotic-induced yeast infection          Care Instructions    Primary Care Center Medication Refill Request Information:  * Please contact your pharmacy regarding ANY request for medication refills.  ** [facility] Prescription Fax = [phone]  * Please allow 3 business days for routine medication refills.  * Please allow 5 business days for controlled substance medication refills.     Primary Care Center Test Result notification information:  *You will be notified with in 7-10 days of your appointment day regarding the results of your test.  If you are on MyChart you will be notified as soon as the provider has reviewed the results and signed off on them.    [facility]: [phone]             Follow-ups after your visit        Your next 10 appointments already scheduled     Nov 19, 2018  2:15 PM CST   RETURN GENERAL with Sylvia Grider MD   Eye Clinic ([facility])    [address]  [address] [id number] [phone]            Nov 26, 2018  9:00 AM CST   LAB with  LAB    Health Lab ([facility])    [address] [id number] [phone]           Please do not eat 10-12 hours before your appointment if you are coming in fasting for labs on lipids, cholesterol, or glucose (sugar). This does not apply to pregnant women. Water, hot tea and black coffee (with nothing added) are okay. Do not drink other fluids, diet soda or chew gum.            Nov 26, 2018 10:00 AM CST   (Arrive by 9:45 AM)   Return Visit with Vj Castle  MD Jacobo   [facility] ([facility])    [address]  4th Floor  [facility] [address] [phone]            Feb 20, 2019  1:00 PM CST   RETURN RETINA with Steph Cintron MD   Eye Clinic ([facility])    [address]  [address]  [facility] [id number] [phone]              Who to contact     Please call your clinic at [phone] to:    Ask questions about your health    Make or cancel appointments    Discuss your medicines    Learn about your test results    Speak to your doctor            Additional Information About Your Visit        [employer] Information     [employer] gives you secure access to your electronic health record. If you see a primary care provider, you can also send messages to your care team and make appointments. If you have questions, please call your primary care clinic.  If you do not have a primary care provider, please call [phone] and they will assist you.      [employer] is an electronic gateway that provides easy, online access to your medical records. With [employer], you can request a clinic appointment, read your test results, renew a prescription or communicate with your care team.     To access your existing account, please contact your [facility] or call [phone] for assistance.        Care EveryWhere ID     This is your Care EveryWhere ID. This could be used by other organizations to access your [place] medical records  [id number]        Your Vitals Were     Pulse BMI (Body Mass Index)                65 31.89 kg/m2           Blood Pressure from Last 3 Encounters:   11/05/18 (!) 149/93   10/01/18 115/75   08/22/18 115/75    Weight from Last 3 Encounters:   11/05/18 86.3 kg (190 lb 3.2 oz)   10/01/18 84.8 kg (187 lb)   08/22/18 87.1 kg (192 lb)              Today, you had the following     No orders found for display          Today's Medication Changes          These changes are accurate as of 11/5/18  3:21 PM.  If you have any questions, ask your nurse or doctor.               Start taking these medicines.        Dose/Directions    fluconazole 150 MG tablet   Commonly known as:  DIFLUCAN   Used for:  Antibiotic-induced yeast infection   Started by:  Yamile Vega MD        Dose:  150 mg   Take 1 tablet (150 mg) by mouth once for 1 dose   Quantity:  1 tablet   Refills:  0       fluticasone 50 MCG/ACT spray   Commonly known as:  FLONASE   Used for:  Acute sinusitis with symptoms > 10 days   Started by:  Yamile Vega MD        Dose:  2 spray   Spray 2 sprays into both nostrils daily   Quantity:  1 Bottle   Refills:  1       levofloxacin 500 MG tablet   Commonly known as:  LEVAQUIN   Used for:  Acute sinusitis with symptoms > 10 days   Started by:  Yamile Vega MD        Dose:  500 mg   Take 1 tablet (500 mg) by mouth daily   Quantity:  7 tablet   Refills:  0            Where to get your medicines      These medications were sent to [facility] [address]  [address], [facility] [address]    Hours:  TRANSPLANT PHONE NUMBER [phone] Phone:  [phone]     fluconazole 150 MG tablet    fluticasone 50 MCG/ACT spray    levofloxacin 500 MG tablet                Primary Care Provider Office Phone # Fax #    Vj Tin Centeno [phone]       [address] [place] [address] [address] [facility] [address]  [facility] [address]        Equal Access to Services     KAYLA BELL AH: Hadii nasir sandoval hadasho Soomaali, waaxda luqadaha, qaybta kaalmada adeegyada, waxay ashli fowler. So [facility] [phone].    ATENCIÓN: Si habla español, tiene a vila disposición servicios gratuitos de asistencia lingüística. Llame al [phone].    We comply with applicable federal civil rights laws and [place] laws. We do not discriminate on  the basis of race, color, national origin, age, disability, sex, sexual orientation, or gender identity.            Thank you!     Thank you for choosing [facility]  for your care. Our goal is always to provide you with excellent care. Hearing back from our patients is one way we can continue to improve our services. Please take a few minutes to complete the written survey that you may receive in the mail after your visit with us. Thank you!             Your Updated Medication List - Protect others around you: Learn how to safely use, store and throw away your medicines at www.disposemymeds.org.          This list is accurate as of 11/5/18  3:21 PM.  Always use your most recent med list.                   Brand Name Dispense Instructions for use Diagnosis    ATIVAN 2 MG tablet   Generic drug:  LORazepam      Take 2 mg by mouth At Bedtime 2 tablets at night    Other chronic pain       cetirizine 10 MG tablet    zyrTEC     Take 10 mg by mouth daily.        cholecalciferol 1000 UNIT tablet    vitamin D3    90 tablet    Take 1 tablet (1,000 Units) by mouth daily    Vitamin D deficiency       conjugated estrogens cream    PREMARIN    180 g    Place 2 g vaginally daily    Essential hypertension       diclofenac 1 % Gel topical gel    VOLTAREN    100 g    Apply 4 grams to knees BID prn pain    Primary osteoarthritis of left knee       fluconazole 150 MG tablet    DIFLUCAN    1 tablet    Take 1 tablet (150 mg) by mouth once for 1 dose    Antibiotic-induced yeast infection       fluticasone 50 MCG/ACT spray    FLONASE    1 Bottle    Spray 2 sprays into both nostrils daily    Acute sinusitis with symptoms > 10 days       Gel Heel Cushions Womens Pads     1 Device    1 Device daily    Plantar fasciitis       hydrochlorothiazide 25 MG tablet    HYDRODIURIL    100 tablet    Take 1 tablet (25 mg) by mouth daily    Essential hypertension       levofloxacin 500 MG tablet    LEVAQUIN    7 tablet    Take 1 tablet  (500 mg) by mouth daily    Acute sinusitis with symptoms > 10 days       levothyroxine 50 MCG tablet    SYNTHROID/LEVOTHROID    90 tablet    Take 1 tablet (50 mcg) by mouth daily    Hypothyroidism, unspecified type       lisinopril 30 MG tablet    PRINIVIL,ZESTRIL    100 tablet    Take 1 tablet (30 mg) by mouth At Bedtime    Benign essential hypertension       Lysine 1000 MG Tabs      Take by mouth 2 times daily    Other chronic pain, Mixed cryoglobulinemia (H), History of hepatitis C, Secondary hypertension, hypertension with unspecified goal       order for DME     1 Device    1 Device by Device route daily as needed Air filtration system    Chronic bronchitis, unspecified chronic bronchitis type (H)       order for DME     1 each    Equipment being ordered: Humidifier    Nasal dryness       traMADol 50 MG tablet    ULTRAM    120 tablet    Take 1-2 tablets ( mg) by mouth every 6 hours as needed    Other chronic pain, Mixed cryoglobulinemia (H), History of hepatitis C       traZODone 50 MG tablet    DESYREL     Take 1 mg by mouth At Bedtime

## 2018-11-05 NOTE — PATIENT INSTRUCTIONS
[facility] Medication Refill Request Information:  * Please contact your pharmacy regarding ANY request for medication refills.  ** [facility] Prescription Fax = [phone]  * Please allow 3 business days for routine medication refills.  * Please allow 5 business days for controlled substance medication refills.     [facility] Test Result notification information:  *You will be notified with in 7-10 days of your appointment day regarding the results of your test.  If you are on MyChart you will be notified as soon as the provider has reviewed the results and signed off on them.    [facility]: [phone]

## 2018-11-05 NOTE — PROGRESS NOTES
PRIMARY CARE CENTER       SUBJECTIVE:  Sarah Sanford is a 63 year old female who comes in for sinus congestion. Started with dizziness, and then became very congested. Maybe had a sore throat at first. Both ears hurt too. Also sore underneath her neck. No fevers/chills. Wanted to go to ED yesterday b/c head hurt so bad.     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     Nephrolithiasis     Hx of stones     Obesity      Uncomplicated asthma     from second smoke in building       Medications and allergies reviewed by me today.     ROS:   Constitutional, HEENT, cardiovascular, pulmonary, gi and gu systems are negative, except as otherwise noted.    OBJECTIVE:    BP (!) 149/93 (BP Location: Right arm, Patient Position: Sitting, Cuff Size: Adult Large)  Pulse 65  Wt 86.3 kg (190 lb 3.2 oz)  BMI 31.89 kg/m2   Wt Readings from Last 1 Encounters:   11/05/18 86.3 kg (190 lb 3.2 oz)     Gen: Pleasant female, in NAD  ENT: TMs with serous effusions bilaterally, frontal sinuses TTP; nasal mucosa edematous, oropharynx clear, MMM  Neck: no LAD  Resp: lungs CAB  CV: Heart RRR, no MRG       ASSESSMENT/PLAN:    Sarah was seen today for sick.    Diagnoses and all orders for this visit:    Acute sinusitis with symptoms > 10 days  -     levofloxacin (LEVAQUIN) 500 MG tablet; Take 1 tablet (500 mg) by mouth daily  -     fluticasone (FLONASE) 50 MCG/ACT spray; Spray 2 sprays into both nostrils daily    Antibiotic-induced yeast infection  -     fluconazole (DIFLUCAN) 150 MG tablet; Take 1  tablet (150 mg) by mouth once for 1 dose       Pt should return to clinic for f/u with me in PRN     Yamile Hernandez MD  11/05/18

## 2018-11-19 ENCOUNTER — OFFICE VISIT (OUTPATIENT)
Dept: OPHTHALMOLOGY | Facility: CLINIC | Age: 63
End: 2018-11-19
Attending: OPHTHALMOLOGY
Payer: MEDICARE

## 2018-11-19 DIAGNOSIS — H25.12 AGE-RELATED NUCLEAR CATARACT, LEFT: Primary | ICD-10-CM

## 2018-11-19 DIAGNOSIS — H52.7 REFRACTIVE ERROR: ICD-10-CM

## 2018-11-19 DIAGNOSIS — H52.4 PRESBYOPIA: ICD-10-CM

## 2018-11-19 DIAGNOSIS — Z96.1 PSEUDOPHAKIA, RIGHT EYE: ICD-10-CM

## 2018-11-19 PROCEDURE — G0463 HOSPITAL OUTPT CLINIC VISIT: HCPCS | Mod: ZF

## 2018-11-19 PROCEDURE — 92015 DETERMINE REFRACTIVE STATE: CPT | Mod: GY,ZF

## 2018-11-19 ASSESSMENT — VISUAL ACUITY
OD_CC: 20/25
OS_CC+: +1
OD_CC+: +2
METHOD: SNELLEN - LINEAR
OS_CC: 20/25
CORRECTION_TYPE: GLASSES
METHOD_MR: OPPOSITE SIGNS CORRECT.

## 2018-11-19 ASSESSMENT — CONF VISUAL FIELD
OS_NORMAL: 1
OD_NORMAL: 1
METHOD: COUNTING FINGERS

## 2018-11-19 ASSESSMENT — REFRACTION_WEARINGRX
OD_CYLINDER: SPHERE
OS_SPHERE: +0.25
OS_CYLINDER: +0.25
OS_AXIS: 089
OS_CYLINDER: +0.25
SPECS_TYPE: NEAR
OS_AXIS: 090
OD_CYLINDER: SPHERE
OD_SPHERE: -0.25
OS_SPHERE: +2.75
OD_SPHERE: +2.25
SPECS_TYPE: DISTANT

## 2018-11-19 ASSESSMENT — REFRACTION_MANIFEST
OD_AXIS: 160
OS_ADD: +2.50
OD_ADD: +2.50
OD_CYLINDER: +0.50
OS_CYLINDER: SPHERE
OD_SPHERE: -0.75
OS_SPHERE: +0.75

## 2018-11-19 ASSESSMENT — SLIT LAMP EXAM - LIDS
COMMENTS: NORMAL
COMMENTS: NORMAL

## 2018-11-19 ASSESSMENT — EXTERNAL EXAM - LEFT EYE: OS_EXAM: NORMAL

## 2018-11-19 ASSESSMENT — TONOMETRY
IOP_METHOD: TONOPEN
OD_IOP_MMHG: 11
OS_IOP_MMHG: 15

## 2018-11-19 ASSESSMENT — EXTERNAL EXAM - RIGHT EYE: OD_EXAM: NORMAL

## 2018-11-19 ASSESSMENT — CUP TO DISC RATIO
OS_RATIO: 0.2
OD_RATIO: 0.2

## 2018-11-19 NOTE — MR AVS SNAPSHOT
After Visit Summary   11/19/2018    Sarah Sanford    MRN: [id number]           Patient Information     Date Of Birth          1955        Visit Information        Provider Department      11/19/2018 2:15 PM Sylvia Grider MD [facility]        Today's Diagnoses     Age-related nuclear cataract, left    -  1    Pseudophakia, right eye        Refractive error        Presbyopia           Follow-ups after your visit        Follow-up notes from your care team     Return in about 1 year (around 11/19/2019).      Your next 10 appointments already scheduled     Nov 26, 2018  9:00 AM CST   LAB with  LAB   [facility] ([facility])    [address]  [address] [facility] [id number] [phone]           Please do not eat 10-12 hours before your appointment if you are coming in fasting for labs on lipids, cholesterol, or glucose (sugar). This does not apply to pregnant women. Water, hot tea and black coffee (with nothing added) are okay. Do not drink other fluids, diet soda or chew gum.            Nov 26, 2018 10:00 AM CST   (Arrive by 9:45 AM)   Return Visit with Vj Centeno MD   [facility] ([facility])    [address]  [address] [facility] [address] [phone]            Feb 20, 2019  1:00 PM CST   RETURN RETINA with Steph Cintron MD   [facility] ([facility])    [address]  [facility] [id number] [phone]              Who to contact     Please call your clinic at [phone] to:    Ask questions about your health    Make or cancel appointments    Discuss your medicines    Learn about your test results    Speak to your doctor            Additional Information About Your Visit        MyChart Information     MyChart gives you secure access to your electronic health record. If you see a primary care provider, you  can also send messages to your care team and make appointments. If you have questions, please call your primary care clinic.  If you do not have a primary care provider, please call [phone] and they will assist you.      [employer] is an electronic gateway that provides easy, online access to your medical records. With [employer], you can request a clinic appointment, read your test results, renew a prescription or communicate with your care team.     To access your existing account, please contact your [facility] or call [phone] for assistance.        Care EveryWhere ID     This is your Care EveryWhere ID. This could be used by other organizations to access your [facility] medical records  [id number]         Blood Pressure from Last 3 Encounters:   11/05/18 (!) 149/93   10/01/18 115/75   08/22/18 115/75    Weight from Last 3 Encounters:   11/05/18 86.3 kg (190 lb 3.2 oz)   10/01/18 84.8 kg (187 lb)   08/22/18 87.1 kg (192 lb)              Today, you had the following     No orders found for display       Primary Care Provider Office Phone # Fax #    Vj Centeno [phone]       [address] [address]        Equal Access to Services     KAYLA BELL : Hadii nasir ku hadasho Soomaali, waaxda luqadaha, qaybta kaalmada adeegyada, waxay ashli fowler. So [facility] [phone].    ATENCIÓN: Si habla español, tiene a vila disposición servicios gratuitos de asistencia lingüística. Llame al [phone].    We comply with applicable federal civil rights laws and [place] laws. We do not discriminate on the basis of race, color, national origin, age, disability, sex, sexual orientation, or gender identity.            Thank you!     Thank you for choosing [facility]  for your care. Our goal is always to provide you with excellent care. Hearing back from our patients is one way we can continue to improve our services. Please  take a few minutes to complete the written survey that you may receive in the mail after your visit with us. Thank you!             Your Updated Medication List - Protect others around you: Learn how to safely use, store and throw away your medicines at www.disposemymeds.org.          This list is accurate as of 11/19/18  5:50 PM.  Always use your most recent med list.                   Brand Name Dispense Instructions for use Diagnosis    ATIVAN 2 MG tablet   Generic drug:  LORazepam      Take 2 mg by mouth At Bedtime 2 tablets at night    Other chronic pain       cetirizine 10 MG tablet    zyrTEC     Take 10 mg by mouth daily.        cholecalciferol 1000 UNIT tablet    vitamin D3    90 tablet    Take 1 tablet (1,000 Units) by mouth daily    Vitamin D deficiency       conjugated estrogens cream    PREMARIN    180 g    Place 2 g vaginally daily    Essential hypertension       diclofenac 1 % Gel topical gel    VOLTAREN    100 g    Apply 4 grams to knees BID prn pain    Primary osteoarthritis of left knee       Gel Heel Cushions Womens Pads     1 Device    1 Device daily    Plantar fasciitis       hydrochlorothiazide 25 MG tablet    HYDRODIURIL    100 tablet    Take 1 tablet (25 mg) by mouth daily    Essential hypertension       levothyroxine 50 MCG tablet    SYNTHROID/LEVOTHROID    90 tablet    Take 1 tablet (50 mcg) by mouth daily    Hypothyroidism, unspecified type       lisinopril 30 MG tablet    PRINIVIL/ZESTRIL    100 tablet    Take 1 tablet (30 mg) by mouth At Bedtime    Benign essential hypertension       Lysine 1000 MG Tabs      Take by mouth 2 times daily    Other chronic pain, Mixed cryoglobulinemia (H), History of hepatitis C, Secondary hypertension, hypertension with unspecified goal       order for DME     1 Device    1 Device by Device route daily as needed Air filtration system    Chronic bronchitis, unspecified chronic bronchitis type (H)       order for DME     1 each    Equipment being ordered:  Humidifier    Nasal dryness       traMADol 50 MG tablet    ULTRAM    120 tablet    Take 1-2 tablets ( mg) by mouth every 6 hours as needed    Other chronic pain, Mixed cryoglobulinemia (H), History of hepatitis C       traZODone 50 MG tablet    DESYREL     Take 1 mg by mouth At Bedtime

## 2018-11-19 NOTE — PROGRESS NOTES
HPI: Karin (preferred name) Juvenal is a 63 y.o F w/ hx of BRVO in RE w/ Vit heme s/p PPV/EL/FAX OD on 2/14/17 here for full eye exam. She feels her vision is stable in both eyes. She saw a new cobweb-like floater the other day, but she can't remember which eye, and it has now resolved. No flashes.     POHx:  Vitreous Hemorrhage RE s/p PPV/ EL/FAX OD 2/14/17  Cataract RE    Assessment & Plan   (H25.13) Nuclear cataract of left eye (primary encounter diagnosis)  Comment: Not visually significant  Plan: Observe    (Z96.1) Pseudophakia of right eye   Comment: Open posterior capsule  Plan: Observe.    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  Plan: Followed by Dr. Cintron     (H52.7) Refractive error  (H52.4) Presbyopia  Comment: Good vision with refraction  Plan: Given updated glasses Rx, separate distance and reading.     RTC 1 year or sooner as needed.    Teaching statement:  Complete documentation of historical and exam elements from today's encounter can be found in the full encounter summary report (not reduplicated in this progress note). I personally obtained the chief complaint(s) and history of present illness.  I confirmed and edited as necessary the review of systems, past medical/surgical history, family history, social history, and examination findings as documented by others; and I examined the patient myself. I personally reviewed the relevant tests, images, and reports as documented above.     I formulated and edited as necessary the assessment and plan and discussed the findings and management plan with the patient and family.    Sylvia Grider MD  Comprehensive Ophthalmology & Ocular Pathology  Department of Ophthalmology and Visual Neurosciences  [email or web address]  Pager [phone]

## 2018-11-19 NOTE — NURSING NOTE
Chief Complaints and History of Present Illnesses   Patient presents with     Follow Up For     1 year follow up Pseudophakia of right eye, cataract LE       HPI    Affected eye(s):  Both   Symptoms:     Floaters (Comment: Floaters in BE, more in LE since last visit.)   No flashes   No redness   No tearing   No Dryness         Do you have eye pain now?:  No      Comments:  Pt states that her vision appears more blurry in both eyes since last visit. Pt thinks her glasses are about 2 years old. Pt notes that she does not use her distant glasses. Pt only uses her near glasses but is having more difficulty reading medication bottles.    Leeann CHU November 19, 2018 2:48 PM

## 2018-11-26 ENCOUNTER — OFFICE VISIT (OUTPATIENT)
Dept: INTERNAL MEDICINE | Facility: CLINIC | Age: 63
End: 2018-11-26
Payer: MEDICARE

## 2018-11-26 VITALS
WEIGHT: 192.8 LBS | OXYGEN SATURATION: 96 % | DIASTOLIC BLOOD PRESSURE: 86 MMHG | SYSTOLIC BLOOD PRESSURE: 138 MMHG | HEART RATE: 65 BPM | BODY MASS INDEX: 32.32 KG/M2

## 2018-11-26 DIAGNOSIS — Z00.00 ROUTINE HEALTH MAINTENANCE: ICD-10-CM

## 2018-11-26 DIAGNOSIS — D89.1 MIXED CRYOGLOBULINEMIA (H): ICD-10-CM

## 2018-11-26 DIAGNOSIS — G89.29 OTHER CHRONIC PAIN: ICD-10-CM

## 2018-11-26 DIAGNOSIS — I10 ESSENTIAL HYPERTENSION: ICD-10-CM

## 2018-11-26 DIAGNOSIS — Z86.19 HISTORY OF HEPATITIS C: ICD-10-CM

## 2018-11-26 LAB
ANION GAP SERPL CALCULATED.3IONS-SCNC: 4 MMOL/L (ref 3–14)
BASOPHILS # BLD AUTO: 0 10E9/L (ref 0–0.2)
BASOPHILS NFR BLD AUTO: 0.4 %
BUN SERPL-MCNC: 14 MG/DL (ref 7–30)
CALCIUM SERPL-MCNC: 8.9 MG/DL (ref 8.5–10.1)
CHLORIDE SERPL-SCNC: 100 MMOL/L (ref 94–109)
CHOLEST SERPL-MCNC: 202 MG/DL
CO2 SERPL-SCNC: 34 MMOL/L (ref 20–32)
CREAT SERPL-MCNC: 0.68 MG/DL (ref 0.52–1.04)
DIFFERENTIAL METHOD BLD: NORMAL
EOSINOPHIL # BLD AUTO: 0.1 10E9/L (ref 0–0.7)
EOSINOPHIL NFR BLD AUTO: 2.8 %
ERYTHROCYTE [DISTWIDTH] IN BLOOD BY AUTOMATED COUNT: 12.4 % (ref 10–15)
GFR SERPL CREATININE-BSD FRML MDRD: 89 ML/MIN/1.7M2
GLUCOSE SERPL-MCNC: 89 MG/DL (ref 70–99)
HCT VFR BLD AUTO: 46.2 % (ref 35–47)
HDLC SERPL-MCNC: 60 MG/DL
HGB BLD-MCNC: 15.2 G/DL (ref 11.7–15.7)
IMM GRANULOCYTES # BLD: 0 10E9/L (ref 0–0.4)
IMM GRANULOCYTES NFR BLD: 0.2 %
LDLC SERPL CALC-MCNC: 111 MG/DL
LYMPHOCYTES # BLD AUTO: 1.4 10E9/L (ref 0.8–5.3)
LYMPHOCYTES NFR BLD AUTO: 28.4 %
MCH RBC QN AUTO: 29.7 PG (ref 26.5–33)
MCHC RBC AUTO-ENTMCNC: 32.9 G/DL (ref 31.5–36.5)
MCV RBC AUTO: 90 FL (ref 78–100)
MONOCYTES # BLD AUTO: 0.4 10E9/L (ref 0–1.3)
MONOCYTES NFR BLD AUTO: 7.9 %
NEUTROPHILS # BLD AUTO: 3 10E9/L (ref 1.6–8.3)
NEUTROPHILS NFR BLD AUTO: 60.3 %
NONHDLC SERPL-MCNC: 142 MG/DL
NRBC # BLD AUTO: 0 10*3/UL
NRBC BLD AUTO-RTO: 0 /100
PLATELET # BLD AUTO: 185 10E9/L (ref 150–450)
POTASSIUM SERPL-SCNC: 4.2 MMOL/L (ref 3.4–5.3)
RBC # BLD AUTO: 5.12 10E12/L (ref 3.8–5.2)
SODIUM SERPL-SCNC: 137 MMOL/L (ref 133–144)
TRIGL SERPL-MCNC: 153 MG/DL
WBC # BLD AUTO: 5 10E9/L (ref 4–11)

## 2018-11-26 RX ORDER — TRAMADOL HYDROCHLORIDE 50 MG/1
50-100 TABLET ORAL EVERY 6 HOURS PRN
Qty: 120 TABLET | Refills: 0 | Status: SHIPPED | OUTPATIENT
Start: 2018-11-26 | End: 2019-02-20

## 2018-11-26 RX ORDER — TRAMADOL HYDROCHLORIDE 50 MG/1
50-100 TABLET ORAL EVERY 6 HOURS PRN
Qty: 120 TABLET | Refills: 0 | Status: SHIPPED | OUTPATIENT
Start: 2018-11-26 | End: 2018-11-26

## 2018-11-26 NOTE — NURSING NOTE
Chief Complaint   Patient presents with     Refill Request     Pt is here for recheck medication and to get medication refill      Results     Pt would like to discuss lab work        Kathy Nixon, Clinic EMT at 10:33 AM on 11/26/2018
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
## 2018-11-26 NOTE — MR AVS SNAPSHOT
After Visit Summary   11/26/2018    Sarah Sanford    MRN: [id number]           Patient Information     Date Of Birth          1955        Visit Information        Provider Department      11/26/2018 10:00 AM Vj Centeno MD [facility]        Today's Diagnoses     Other chronic pain        Mixed cryoglobulinemia (H)        History of hepatitis C           Follow-ups after your visit        Follow-up notes from your care team     Return in about 3 months (around 2/26/2019).      Your next 10 appointments already scheduled     Feb 20, 2019  1:00 PM CST   RETURN RETINA with Steph Cintron MD   Eye Clinic ([facility])    [address]  [address]  [facility] [address] [phone]              Who to contact     Please call your clinic at [phone] to:    Ask questions about your health    Make or cancel appointments    Discuss your medicines    Learn about your test results    Speak to your doctor            Additional Information About Your Visit        [employer] Information     [employer] gives you secure access to your electronic health record. If you see a primary care provider, you can also send messages to your care team and make appointments. If you have questions, please call your primary care clinic.  If you do not have a primary care provider, please call [phone] and they will assist you.      [employer] is an electronic gateway that provides easy, online access to your medical records. With [employer], you can request a clinic appointment, read your test results, renew a prescription or communicate with your care team.     To access your existing account, please contact your [facility] or call [phone] for assistance.        Care EveryWhere ID     This is your Care EveryWhere ID. This could be used by other organizations to access your [facility]  records  [id number]        Your Vitals Were     Pulse Pulse Oximetry BMI (Body Mass Index)             65 96% 32.32 kg/m2          Blood Pressure from Last 3 Encounters:   11/26/18 138/86   11/05/18 (!) 149/93   10/01/18 115/75    Weight from Last 3 Encounters:   11/26/18 87.5 kg (192 lb 12.8 oz)   11/05/18 86.3 kg (190 lb 3.2 oz)   10/01/18 84.8 kg (187 lb)              Today, you had the following     No orders found for display         Today's Medication Changes          These changes are accurate as of 11/26/18 10:56 AM.  If you have any questions, ask your nurse or doctor.               Start taking these medicines.        Dose/Directions    traMADol 50 MG tablet   Commonly known as:  ULTRAM   Used for:  Other chronic pain, Mixed cryoglobulinemia (H), History of hepatitis C   Started by:  Vj Centeno MD        Dose:   mg   Take 1-2 tablets ( mg) by mouth every 6 hours as needed   Quantity:  120 tablet   Refills:  0            Where to get your medicines      Some of these will need a paper prescription and others can be bought over the counter.  Ask your nurse if you have questions.     Bring a paper prescription for each of these medications     traMADol 50 MG tablet               Information about OPIOIDS     PRESCRIPTION OPIOIDS: WHAT YOU NEED TO KNOW   We gave you an opioid (narcotic) pain medicine. It is important to manage your pain, but opioids are not always the best choice. You should first try all the other options your care team gave you. Take this medicine for as short a time (and as few doses) as possible.    Some activities can increase your pain, such as bandage changes or therapy sessions. It may help to take your pain medicine 30 to 60 minutes before these activities. Reduce your stress by getting enough sleep, working on hobbies you enjoy and practicing relaxation or meditation. Talk to your care team about ways to manage your pain beyond prescription  opioids.    These medicines have risks:    DO NOT drive when on new or higher doses of pain medicine. These medicines can affect your alertness and reaction times, and you could be arrested for driving under the influence (DUI). If you need to use opioids long-term, talk to your care team about driving.    DO NOT operate heavy machinery    DO NOT do any other dangerous activities while taking these medicines.    DO NOT drink any alcohol while taking these medicines.     If the opioid prescribed includes acetaminophen, DO NOT take with any other medicines that contain acetaminophen. Read all labels carefully. Look for the word  acetaminophen  or  Tylenol.  Ask your pharmacist if you have questions or are unsure.    You can get addicted to pain medicines, especially if you have a history of addiction (chemical, alcohol or substance dependence). Talk to your care team about ways to reduce this risk.    All opioids tend to cause constipation. Drink plenty of water and eat foods that have a lot of fiber, such as fruits, vegetables, prune juice, apple juice and high-fiber cereal. Take a laxative (Miralax, milk of magnesia, Colace, Senna) if you don t move your bowels at least every other day. Other side effects include upset stomach, sleepiness, dizziness, throwing up, tolerance (needing more of the medicine to have the same effect), physical dependence and slowed breathing.    Store your pills in a secure place, locked if possible. We will not replace any lost or stolen medicine. If you don t finish your medicine, please throw away (dispose) as directed by your pharmacist. The [place] Pollution Control Agency has more information about safe disposal: [email or web address]://[email or web address]/living-green/[email or web address]-unwanted-medications         Primary Care Provider Office Phone # Fax #    Vj Centeno [phone]       [address] [place] [address]  [facility] [address]        Equal Access to Services     KAYLA  GAAR : Hadii aad ku yaima Valdez, waaxda luqadaha, qaybta kapaulda sarah, naif maryin hayaasweetie nicole tamara katie . So [facility] [phone].    ATENCIÓN: Si habla español, tiene a vila disposición servicios gratuitos de asistencia lingüística. Llame al [phone].    We comply with applicable federal civil rights laws and [place] laws. We do not discriminate on the basis of race, color, national origin, age, disability, sex, sexual orientation, or gender identity.            Thank you!     Thank you for choosing [facility]  for your care. Our goal is always to provide you with excellent care. Hearing back from our patients is one way we can continue to improve our services. Please take a few minutes to complete the written survey that you may receive in the mail after your visit with us. Thank you!             Your Updated Medication List - Protect others around you: Learn how to safely use, store and throw away your medicines at www.disposemymeds.org.          This list is accurate as of 11/26/18 10:56 AM.  Always use your most recent med list.                   Brand Name Dispense Instructions for use Diagnosis    ATIVAN 2 MG tablet   Generic drug:  LORazepam      Take 2 mg by mouth At Bedtime 2 tablets at night    Other chronic pain       cetirizine 10 MG tablet    zyrTEC     Take 10 mg by mouth daily.        conjugated estrogens 0.625 MG/GM vaginal cream    PREMARIN    180 g    Place 2 g vaginally daily    Essential hypertension       diclofenac 1 % topical gel    VOLTAREN    100 g    Apply 4 grams to knees BID prn pain    Primary osteoarthritis of left knee       Gel Heel Cushions Womens Pads     1 Device    1 Device daily    Plantar fasciitis       hydrochlorothiazide 25 MG tablet    HYDRODIURIL    100 tablet    Take 1 tablet (25 mg) by mouth daily    Essential hypertension       levothyroxine 50 MCG tablet    SYNTHROID/LEVOTHROID    90 tablet    Take 1 tablet (50 mcg) by mouth  daily    Hypothyroidism, unspecified type       lisinopril 30 MG tablet    PRINIVIL/ZESTRIL    100 tablet    Take 1 tablet (30 mg) by mouth At Bedtime    Benign essential hypertension       Lysine 1000 MG Tabs      Take by mouth 2 times daily    Other chronic pain, Mixed cryoglobulinemia (H), History of hepatitis C, Secondary hypertension, hypertension with unspecified goal       order for DME     1 Device    1 Device by Device route daily as needed Air filtration system    Chronic bronchitis, unspecified chronic bronchitis type (H)       order for DME     1 each    Equipment being ordered: Humidifier    Nasal dryness       traMADol 50 MG tablet    ULTRAM    120 tablet    Take 1-2 tablets ( mg) by mouth every 6 hours as needed    Other chronic pain, Mixed cryoglobulinemia (H), History of hepatitis C       traZODone 50 MG tablet    DESYREL     Take 1 mg by mouth At Bedtime        vitamin D3 1000 UNIT tablet    CHOLECALCIFEROL    90 tablet    Take 1 tablet (1,000 Units) by mouth daily    Vitamin D deficiency

## 2018-11-28 ENCOUNTER — TELEPHONE (OUTPATIENT)
Dept: OPHTHALMOLOGY | Facility: CLINIC | Age: 63
End: 2018-11-28

## 2018-11-28 NOTE — TELEPHONE ENCOUNTER
[facility] Call Center    Phone Message    May a detailed message be left on voicemail: yes    Reason for Call: Other: Marisol, from [facility] called requesting to know the date that pt had cataract surgery.      Action Taken: Message routed to:  [facility] Eye

## 2018-11-28 NOTE — TELEPHONE ENCOUNTER
Reviewed with pt/clinic 9-29-17 for right eye cataract surgery  No left eye cataract surgery per pt/notes  Bo Good RN 3:41 PM 11/28/18

## 2018-11-30 ENCOUNTER — MYC MEDICAL ADVICE (OUTPATIENT)
Dept: ORTHOPEDICS | Facility: CLINIC | Age: 63
End: 2018-11-30

## 2018-12-04 NOTE — TELEPHONE ENCOUNTER
[facility] Call Center    Phone Message    May a detailed message be left on voicemail: yes    Reason for Call: Other: Pt is in a lot of pain, falling down, having to use a walker to get around. Was able to move appt to 12/19/18. Would still like to get in sooner if possible. Please call pt to discuss.     Action Taken: Message routed to:  [facility] Orthopedics
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
## 2018-12-05 DIAGNOSIS — M25.562 LEFT KNEE PAIN: Primary | ICD-10-CM

## 2018-12-07 ENCOUNTER — OFFICE VISIT (OUTPATIENT)
Dept: INTERNAL MEDICINE | Facility: CLINIC | Age: 63
End: 2018-12-07
Payer: MEDICARE

## 2018-12-07 VITALS
HEART RATE: 64 BPM | TEMPERATURE: 98 F | RESPIRATION RATE: 20 BRPM | WEIGHT: 190.8 LBS | DIASTOLIC BLOOD PRESSURE: 84 MMHG | BODY MASS INDEX: 31.99 KG/M2 | OXYGEN SATURATION: 96 % | SYSTOLIC BLOOD PRESSURE: 129 MMHG

## 2018-12-07 DIAGNOSIS — H69.91 DYSFUNCTION OF RIGHT EUSTACHIAN TUBE: ICD-10-CM

## 2018-12-07 DIAGNOSIS — H81.10 BENIGN PAROXYSMAL POSITIONAL VERTIGO, UNSPECIFIED LATERALITY: Primary | ICD-10-CM

## 2018-12-07 RX ORDER — MECLIZINE HYDROCHLORIDE 25 MG/1
25 TABLET ORAL 3 TIMES DAILY PRN
Qty: 90 TABLET | Refills: 0 | Status: SHIPPED | OUTPATIENT
Start: 2018-12-07 | End: 2019-01-16

## 2018-12-07 ASSESSMENT — PAIN SCALES - GENERAL: PAINLEVEL: NO PAIN (0)

## 2018-12-07 NOTE — PROGRESS NOTES
"[facility] [place]   Dori ECKERTDorothy Blancokrunal, APRN CNP  12/07/2018      Chief Complaint: Dizziness     History of Present Illness:   Sarah Sanford is a 63 year old female with a history of chronic pain, asthma, hypertension, and hypothyroidism who presents for evaluation of dizziness. The patient reports that she is having the same symptoms she had at a clinic visit 11/5/18. These symptoms include sinus congestion, headache, slight nausea, sore throat, dizziness, unsteady gait, ear pain, and liquid in her ears, right worse than left. She was treated for sinusitis last time and her symptoms resolved completely. The symptoms came back 2 days ago, primarily with ear pain, feeling \"fluid rushing\" through the ears, and room-spinning dizziness.  The dizziness occurs when she lays down, bends over, stands up, or rides in a car. She denies sinus pain or significant nasal drainage. She is not taking over the counter medication because she tried Emergen-C and Nyquil last time without relief. She does not like Sudafed or nasal sprays. She takes Zyrtec daily for allergies. She is sleeping next to a humidifier at night and sitting next to it during the day. She drinks 3-4 bottles of water daily. She is concerned that these symptoms will impact her hearing test on Monday. She is not interested in physical therapy.     Review of Systems:   Pertinent items are noted in HPI, remainder of complete ROS is negative.      Active Medications:     cetirizine (ZYRTEC) 10 MG tablet, Take 10 mg by mouth daily., Disp: , Rfl:      cholecalciferol (VITAMIN D3) 1000 UNIT tablet, Take 1 tablet (1,000 Units) by mouth daily, Disp: 90 tablet, Rfl: 3     conjugated estrogens (PREMARIN) cream, Place 2 g vaginally daily, Disp: 180 g, Rfl: 3     diclofenac (VOLTAREN) 1 % GEL topical gel, Apply 4 grams to knees BID prn pain, Disp: 100 g, Rfl: 1     hydrochlorothiazide (HYDRODIURIL) 25 MG tablet, Take 1 tablet (25 mg) by mouth daily, Disp: 100 " tablet, Rfl: 3     levothyroxine (SYNTHROID/LEVOTHROID) 50 MCG tablet, Take 1 tablet (50 mcg) by mouth daily, Disp: 90 tablet, Rfl: 3     lisinopril (PRINIVIL,ZESTRIL) 30 MG tablet, Take 1 tablet (30 mg) by mouth At Bedtime, Disp: 100 tablet, Rfl: 1     LORazepam (ATIVAN) 2 MG tablet, Take 2 mg by mouth At Bedtime 2 tablets at night, Disp: , Rfl:      Lysine 1000 MG TABS, Take by mouth 2 times daily , Disp: , Rfl:      traMADol (ULTRAM) 50 MG tablet, Take 1-2 tablets ( mg) by mouth every 6 hours as needed, Disp: 120 tablet, Rfl: 0     traZODone (DESYREL) 50 MG tablet, Take 1 mg by mouth At Bedtime , Disp: , Rfl:       Allergies:   No clinical screening - see comments; Erythromycin; Keflex [cephalexin hcl]; Penicillins; Sulfa drugs; and Tetracycline      Past Medical History:  Anemia  Uncomplicated asthma  Anxiety  Left knee osteoarthritis  Borderline personality disorder  Cervical cancer  Chronic pain  Depression  Hepatitis C  Hypertension  Hypothyroidism  Mixed cryoglobulinemia  Nephrolithiasis  Obesity  Meralgia paresthetica of left side  Internal derangement of left knee  Valsalva retinopathy, right eye  Posterior vitreous detachment, bilateral  Senile nuclear sclerosis, bilateral  Herpes simplex virus infection     Past Surgical History:  Liver biopsy  Cataract IOL, bilateral  Cholecystectomy  ESWL  Hysterectomy  Phacoemulsification clear cornea with standard intraocular lens implant, right  Vitrectomy parsplana with 25 gauge system, right    Family History:   Daughter - asthma  Maternal grandmother - cancer  Mother - alcohol/drug abuse, hyperlipidemia, hypertension, psychotic disorder  Maternal grandfather - alcohol/drug abuse     Social History:   Presents to clinic alone.   Tobacco Use: No previous or current tobacco use.   Alcohol Use: Occasional alcohol use.   PCP: Vj Centeno      Physical Exam:   /84 (BP Location: Right arm, Patient Position: Sitting, Cuff Size: Adult Large)   Pulse 64  Temp 98  F (36.7  C) (Oral)  Resp 20  Wt 86.5 kg (190 lb 12.8 oz)  SpO2 96%  BMI 31.99 kg/m2   Constitutional: Alert, oriented, pleasant, no acute distress  Head: Normocephalic, atraumatic  Eyes: Extra-ocular movements intact, pupils equally round and reactive bilaterally, no scleral icterus  Ears: tympanic membranes pearly gray with positive light reflex. Mildly distended with serous fluid on the right. EAC's clear bilaterally. Positive [place] Hallpike maneuver on the right.  ENT: Oropharynx clear, moist mucus membranes, good dentition  Neck: Supple, no lymphadenopathy, no thyromegaly  Cardiovascular: Regular rate and rhythm, no murmurs, rubs or gallops  Respiratory: Good air movement bilaterally, lungs clear, no wheezes/rales/rhonchi  Neurologic: Alert and oriented, cranial nerves 2-12 grossly intact, speech is clear, no tremor  Psychiatric: normal mentation, affect and mood     Assessment and Plan:  Benign paroxysmal positional vertigo, unspecified laterality - Symptoms and exam consistent with BPPV. Given Meclizine and a referral to physical therapy. Sent home with a handout of Epley maneuver and inverted somersault exercises as well.   - meclizine (ANTIVERT) 25 MG tablet  Dispense: 90 tablet; Refill: 0  - PHYSICAL THERAPY REFERRAL    Dysfunction of right eustachian tube - Advised to take an over the counter decongestant and/or nasal spray to help promote drainage, stay well-hydrated. Warm pack and massage around the ear may be helpful. Reviewed no sign of ear infection today on exam.        Scribe Disclosure:   I, Luna Recio, am serving as a scribe to document services personally performed by MEDINA Le CNP at this visit, based upon the provider's statements to me. All documentation has been reviewed by the aforementioned provider prior to being entered into the official medical record.     Portions of this medical record were completed by a scribe. UPON MY REVIEW AND  AUTHENTICATION BY ELECTRONIC SIGNATURE, this confirms (a) I performed the applicable clinical services, and (b) the record is accurate.     MEDINA Le CNP

## 2018-12-07 NOTE — NURSING NOTE
"Chief Complaint   Patient presents with     Sinus Problem     \"  I have fluid in my ears, and I am dizzy.Both ears hurt.\"   Deisi Brito LPN 12:42 PM on 12/7/2018      "

## 2018-12-07 NOTE — PATIENT INSTRUCTIONS
[facility] Medication Refill Request Information:  * Please contact your pharmacy regarding ANY request for medication refills.  ** [facility] Prescription Fax = [phone]  * Please allow 3 business days for routine medication refills.  * Please allow 5 business days for controlled substance medication refills.     [facility] Test Result notification information:  *You will be notified with in 7-10 days of your appointment day regarding the results of your test.  If you are on MyChart you will be notified as soon as the provider has reviewed the results and signed off on them.    [facility]: [phone]

## 2018-12-07 NOTE — MR AVS SNAPSHOT
After Visit Summary   12/7/2018    Sarah Sanford    MRN: [id number]           Patient Information     Date Of Birth          1955        Visit Information        Provider Department      12/7/2018 12:15 PM Dori Oviedo APRN [facility]        Today's Diagnoses     Benign paroxysmal positional vertigo, unspecified laterality    -  1    Dysfunction of right eustachian tube          Care Instructions    Primary Care Center Medication Refill Request Information:  * Please contact your pharmacy regarding ANY request for medication refills.  ** [facility] Prescription Fax = [phone]  * Please allow 3 business days for routine medication refills.  * Please allow 5 business days for controlled substance medication refills.     Primary Care Center Test Result notification information:  *You will be notified with in 7-10 days of your appointment day regarding the results of your test.  If you are on MyChart you will be notified as soon as the provider has reviewed the results and signed off on them.    [facility]: [phone]                       Follow-ups after your visit        Additional Services     PHYSICAL THERAPY REFERRAL       If you have not heard from the scheduling office within 2 business days, please call [phone] for all locations, with the exception of [place], please call [phone] and [place], please call [phone].    Please be aware that coverage of these services is subject to the terms and limitations of your health insurance plan.  Call member services at your health plan with any benefit or coverage questions.                  Your next 10 appointments already scheduled     Dec 10, 2018  3:00 PM CST   (Arrive by 2:45 PM)   Hearing Aid Evaluation with Luisa Randolph [facility] ([facility] [place])    [address] [place] [address] [phone]           Please see  your medical professional for ear cleaning prior to this appointment if you believe wax buildup may be an issue. All patients are required to have a physician's order stating the medical reason for the hearing test. Your doctor can send an electronic order, use their own form or we have provided a form (called Physician's Order for Audiology Services). It states that there is a medical reason for your exam. Without an order you may need to be rescheduled until the order can be obtained.            Dec 19, 2018  4:25 PM CST   XR SIX FOOT STANDING EXTREMITIES with [id number]   [facility] XRay ([facility])    [address] [place] [address] [phone]           How do I prepare for my exam? (Food and drink instructions) No Food and Drink Restrictions.  How do I prepare for my exam? (Other instructions) You do not need to do anything special for this exam.  What should I wear: Wear comfortable clothes.  How long does the exam take: Most scans take less than 5 minutes.  What should I bring: Bring a list of your medicines, including vitamins, minerals and over-the-counter drugs. It is safest to leave personal items at home.  Do I need a :  No  is needed.  What do I need to tell my doctor: Tell your doctor if there s any chance you are pregnant.  What should I do after the exam: No restrictions, You may resume normal activities.  What is this test: An image of a specific body part shown in shades of black and white.  Who should I call with questions: If you have any questions, please call the Imaging Department where you will have your exam. Directions, parking instructions, and other information is available on our website, [email or web address].            Dec 19, 2018  4:40 PM CST   XR KNEE LEFT 3 VIEWS with [id number]   [facility] ([facility])    [address] [address]    [phone]           How do I prepare for my exam? (Food and drink instructions) No Food and Drink Restrictions.  How do I prepare for my exam? (Other instructions) You do not need to do anything special for this exam.  What should I wear: Wear comfortable clothes.  How long does the exam take: Most scans take less than 5 minutes.  What should I bring: Bring a list of your medicines, including vitamins, minerals and over-the-counter drugs. It is safest to leave personal items at home.  Do I need a :  No  is needed.  What do I need to tell my doctor: Tell your doctor if there s any chance you are pregnant.  What should I do after the exam: No restrictions, You may resume normal activities.  What is this test: An image of a specific body part shown in shades of black and white.  Who should I call with questions: If you have any questions, please call the Imaging Department where you will have your exam. Directions, parking instructions, and other information is available on our website, [email or web address].            Dec 19, 2018  5:00 PM CST   (Arrive by 4:45 PM)   RETURN KNEE with Uche Au MD   [facility] ([facility])    [address] [place] [address]   [phone]            Dec 31, 2018 10:00 AM CST   Hearing Aid Fitting with Luisa Randolph [facility] ([facility])    [address] [place] [address]   [phone]            Jan 21, 2019  2:30 PM CST   (Arrive by 2:15 PM)   Initial Review Program with Luisa Randolph [facility] ([facility])    [address] [place] [address]   [phone]            Feb 20, 2019  1:00 PM CST   RETURN RETINA with Steph Cintron MD   [facility] ([facility])    [address] Mal[address]ra [address]  [facility] [address]   [phone]            Feb 20, 2019  3:05 PM CST   (Arrive by 2:50 PM)   Return Visit with Vj Centeno MD   [facility] ([facility] [place])    [address]  [address]  [facility] [id number] [phone]              Future tests that were ordered for you today     Open Future Orders        Priority Expected Expires Ordered    PHYSICAL THERAPY REFERRAL Routine  12/7/2019 12/7/2018            Who to contact     Please call your clinic at [phone] to:    Ask questions about your health    Make or cancel appointments    Discuss your medicines    Learn about your test results    Speak to your doctor            Additional Information About Your Visit        [employer] Information     [employer] gives you secure access to your electronic health record. If you see a primary care provider, you can also send messages to your care team and make appointments. If you have questions, please call your primary care clinic.  If you do not have a primary care provider, please call [phone] and they will assist you.      [employer] is an electronic gateway that provides easy, online access to your medical records. With [employer], you can request a clinic appointment, read your test results, renew a prescription or communicate with your care team.     To access your existing account, please contact your [facility] or call [phone] for assistance.        Care EveryWhere ID     This is your Care EveryWhere ID. This could be used by other organizations to access your [facility] medical records  [id number]        Your Vitals Were     Pulse Temperature Respirations Pulse Oximetry BMI (Body Mass Index)       64 98  F (36.7  C) (Oral) 20 96% 31.99 kg/m2        Blood Pressure from Last 3 Encounters:   12/07/18 129/84   11/26/18 138/86   11/05/18 (!) 149/93    Weight from Last 3 Encounters:   12/07/18 86.5 kg (190 lb  12.8 oz)   11/26/18 87.5 kg (192 lb 12.8 oz)   11/05/18 86.3 kg (190 lb 3.2 oz)                 Today's Medication Changes          These changes are accurate as of 12/7/18  1:02 PM.  If you have any questions, ask your nurse or doctor.               Start taking these medicines.        Dose/Directions    meclizine 25 MG tablet   Commonly known as:  ANTIVERT   Used for:  Benign paroxysmal positional vertigo, unspecified laterality   Started by:  Dori Oviedo APRN CNP        Dose:  25 mg   Take 1 tablet (25 mg) by mouth 3 times daily as needed for dizziness   Quantity:  90 tablet   Refills:  0            Where to get your medicines      These medications were sent to [address] [place] [address] [address] [place], [place] [id number]    Hours:  TRANSPLANT PHONE NUMBER [phone] Phone:  [phone]     meclizine 25 MG tablet                Primary Care Provider Office Phone # Fax #    Vj Centeno [phone]       [address] [facility] [address]        Equal Access to Services     KAYLA BELL AH: Hadii nasir sandoval hadasho Soomaali, waaxda luqadaha, qaybta kaalmada adeegyada, naif fowler. So [facility] [phone].    ATENCIÓN: Si habla español, tiene a vila disposición servicios gratuitos de asistencia lingüística. Llame al [phone].    We comply with applicable federal civil rights laws and [place] laws. We do not discriminate on the basis of race, color, national origin, age, disability, sex, sexual orientation, or gender identity.            Thank you!     Thank you for choosing [facility]  for your care. Our goal is always to provide you with excellent care. Hearing back from our patients is one way we can continue to improve our services. Please take a few minutes to complete the written survey that you may receive in the mail after your visit with us. Thank  you!             Your Updated Medication List - Protect others around you: Learn how to safely use, store and throw away your medicines at www.disposemymeds.org.          This list is accurate as of 12/7/18  1:02 PM.  Always use your most recent med list.                   Brand Name Dispense Instructions for use Diagnosis    ATIVAN 2 MG tablet   Generic drug:  LORazepam      Take 2 mg by mouth At Bedtime 2 tablets at night    Other chronic pain       cetirizine 10 MG tablet    zyrTEC     Take 10 mg by mouth daily.        conjugated estrogens 0.625 MG/GM vaginal cream    PREMARIN    180 g    Place 2 g vaginally daily    Essential hypertension       diclofenac 1 % topical gel    VOLTAREN    100 g    Apply 4 grams to knees BID prn pain    Primary osteoarthritis of left knee       Gel Heel Cushions Womens Pads     1 Device    1 Device daily    Plantar fasciitis       hydrochlorothiazide 25 MG tablet    HYDRODIURIL    100 tablet    Take 1 tablet (25 mg) by mouth daily    Essential hypertension       levothyroxine 50 MCG tablet    SYNTHROID/LEVOTHROID    90 tablet    Take 1 tablet (50 mcg) by mouth daily    Hypothyroidism, unspecified type       lisinopril 30 MG tablet    PRINIVIL/ZESTRIL    100 tablet    Take 1 tablet (30 mg) by mouth At Bedtime    Benign essential hypertension       Lysine 1000 MG Tabs      Take by mouth 2 times daily    Other chronic pain, Mixed cryoglobulinemia (H), History of hepatitis C, Secondary hypertension, hypertension with unspecified goal       meclizine 25 MG tablet    ANTIVERT    90 tablet    Take 1 tablet (25 mg) by mouth 3 times daily as needed for dizziness    Benign paroxysmal positional vertigo, unspecified laterality       order for DME     1 Device    1 Device by Device route daily as needed Air filtration system    Chronic bronchitis, unspecified chronic bronchitis type (H)       order for DME     1 each    Equipment being ordered: Humidifier    Nasal dryness       traMADol 50 MG  tablet    ULTRAM    120 tablet    Take 1-2 tablets ( mg) by mouth every 6 hours as needed    Other chronic pain, Mixed cryoglobulinemia (H), History of hepatitis C       traZODone 50 MG tablet    DESYREL     Take 1 mg by mouth At Bedtime        vitamin D3 1000 units (25 mcg) tablet    CHOLECALCIFEROL    90 tablet    Take 1 tablet (1,000 Units) by mouth daily    Vitamin D deficiency

## 2018-12-10 ENCOUNTER — OFFICE VISIT (OUTPATIENT)
Dept: AUDIOLOGY | Facility: CLINIC | Age: 63
End: 2018-12-10
Payer: MEDICARE

## 2018-12-10 DIAGNOSIS — H90.3 SENSORY HEARING LOSS, BILATERAL: Primary | ICD-10-CM

## 2018-12-10 NOTE — Clinical Note
Dr. Centeno.  Patient wants hearing aids, and her current insurance will pay.  She needs medical approval for them.  Please indicate in the chart if there are no contraindications for hearing aid use.

## 2018-12-10 NOTE — PROGRESS NOTES
AUDIOLOGY REPORT    SUBJECTIVE:  Sarah Sanford is a 63 year old female who was seen in Audiology at the [facility] [place] on 12/10/2018 for audiologic evaluation, referred by Vj Centeno.  The patient reports a gradually progressing hearing loss for many years.  Recently she had dizziness and ear pain, with a diagnosis of fluid and took antibiotics.. The patient denies bilateral tinnitus, current bilateral otalgia and bilateral aural fullness.  She has had recent dizziness and is being managed medically. The patient notes difficulty with communication in a variety of listening situations.  OBJECTIVE:  Fall Risk Screen:  1. Have you fallen two or more times in the past year? No  2. Have you fallen and had an injury in the past year? No    Timed Up and Go Score (in seconds): not tested  Is patient a fall risk? No  Referral initiated: No  Fall Risk Assessment Completed by Audiology    Otoscopic exam indicates ears are clear of cerumen bilaterally     Pure Tone Thresholds assessed using conventional audiometry with good  reliability from 250-8000 Hz bilaterally using insert earphones and circumaural headphones     RIGHT:  mild largely sensorineural hearing loss    LEFT:    mild largely sensorineural hearing loss    Tympanogram:    RIGHT: normal eardrum mobility    LEFT:   normal eardrum mobility    Reflexes (reported by stimulus ear):  RIGHT: Ipsilateral is present at normal levels  RIGHT: Contralateral is absent at frequencies tested  LEFT:   Ipsilateral is absent at frequencies tested  LEFT:   Contralateral is elevated      Speech Reception Threshold:    RIGHT: 25 dB HL    LEFT:   20 dB HL  Word Recognition Score:     RIGHT: 96% at 70 dB HL using NU-6 recorded word list.    LEFT:   100% at 70 dB HL using NU-6 recorded word list.    Patient is a hearing aid candidate. Patient would like to move forward with a hearing aid evaluation today. Therefore, the patient was  presented with different options for amplification to help aid in communication. Discussed styles, levels of technology and monaural vs. binaural fitting. She wants it simple, and currently has a flip phone.    The hearing aids mutually chosen were:  Binaural: Seimens Pure Charge and Go 3 NX  COLOR: Ibrahim Blond  BATTERY SIZE: NA  EARMOLD/TIPS: Semi-open  CANAL/ LENGTH: 1S    ASSESSMENT:   Today s results were discussed with the patient in detail. Reviewed purchase information and warranty information with patient. The 45 day trial period was explained to patient. The patient was given a copy of the [place] Department of Health consumer brochure on purchasing hearing instruments. Patient risk factors have been provided to the patient in writing prior to the sale of the hearing aid per FDA regulation. The risk factors are also available in the User Instructional Booklet to be presented on the day of the hearing aid fitting. Hearing aids ordered. Hearing aid evaluation completed.    PLAN:  Patient was counseled regarding hearing loss and impact on communication.  Patient is a good candidate for amplification at this time.  Sarah is scheduled to return in 2-3 weeks for a hearing aid fitting and programming. Purchase agreement will be completed on that date.  Dr. Centeno will be contacted for hearing aid approval. Please call this clinic with questions regarding these results or recommendations.      Ashley Ye, CCC-A  Licensed Audiologist  [place] #[id number]    CC:  Dr. Vj Centeno

## 2018-12-19 ENCOUNTER — OFFICE VISIT (OUTPATIENT)
Dept: ORTHOPEDICS | Facility: CLINIC | Age: 63
End: 2018-12-19
Payer: MEDICARE

## 2018-12-19 ENCOUNTER — ANCILLARY PROCEDURE (OUTPATIENT)
Dept: GENERAL RADIOLOGY | Facility: CLINIC | Age: 63
End: 2018-12-19
Attending: ORTHOPAEDIC SURGERY
Payer: MEDICARE
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
## 2018-12-19 DIAGNOSIS — M25.571 PAIN IN RIGHT ANKLE AND JOINTS OF RIGHT FOOT: Primary | ICD-10-CM

## 2018-12-19 DIAGNOSIS — M25.562 LEFT KNEE PAIN: ICD-10-CM

## 2018-12-19 DIAGNOSIS — M79.671 RIGHT FOOT PAIN: ICD-10-CM

## 2018-12-19 NOTE — NURSING NOTE
Reason For Visit:   Chief Complaint   Patient presents with     RECHECK     Continued left knee pain.        Primary MD: Vj Centeno      Pain Assessment  Patient Currently in Pain: Denies    Current Outpatient Medications   Medication     cetirizine (ZYRTEC) 10 MG tablet     cholecalciferol (VITAMIN D3) 1000 UNIT tablet     conjugated estrogens (PREMARIN) cream     diclofenac (VOLTAREN) 1 % GEL topical gel     Foot Care Products (GEL HEEL CUSHIONS WOMENS) PADS     hydrochlorothiazide (HYDRODIURIL) 25 MG tablet     levothyroxine (SYNTHROID/LEVOTHROID) 50 MCG tablet     lisinopril (PRINIVIL,ZESTRIL) 30 MG tablet     LORazepam (ATIVAN) 2 MG tablet     Lysine 1000 MG TABS     meclizine (ANTIVERT) 25 MG tablet     order for DME     order for DME     traMADol (ULTRAM) 50 MG tablet     traZODone (DESYREL) 50 MG tablet     No current facility-administered medications for this visit.           Allergies   Allergen Reactions     No Clinical Screening - See Comments      Pt states she is allergic to all antibiotics which cause a raised red rash that is hot to touch, Pt states can take Levaquin and cefaclor.      Erythromycin Rash     Keflex [Cephalexin Hcl] Rash     Penicillins Rash     Sulfa Drugs Rash     Tetracycline Rash           Fatou Glez, SAKINA
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
## 2018-12-26 ENCOUNTER — TELEPHONE (OUTPATIENT)
Dept: INTERNAL MEDICINE | Facility: CLINIC | Age: 63
End: 2018-12-26

## 2018-12-26 DIAGNOSIS — I10 BENIGN ESSENTIAL HYPERTENSION: ICD-10-CM

## 2018-12-26 RX ORDER — LISINOPRIL 30 MG/1
30 TABLET ORAL AT BEDTIME
Qty: 90 TABLET | Refills: 1 | Status: SHIPPED | OUTPATIENT
Start: 2018-12-26 | End: 2019-06-24

## 2018-12-26 NOTE — TELEPHONE ENCOUNTER
[facility] Call Center    Phone Message    May a detailed message be left on voicemail: yes    Reason for Call: Other: PT is requesting a refill on lisinopril (PRINIVIL,ZESTRIL) 30 MG tablet and would like it for 90 days.  The PT also states that she has an appointment at the [facility] on Monday and would like to pick it up at the pharmacy there.  Please follow up with the PT for further questions at the number above.     Action Taken: Message routed to:  [facility] ([facility]): NICOLE

## 2018-12-26 NOTE — TELEPHONE ENCOUNTER
Last Clinic Visit:  12/7/18     BP Readings from Last 3 Encounters:   12/07/18 129/84   11/26/18 138/86   11/05/18 (!) 149/93

## 2018-12-31 ENCOUNTER — OFFICE VISIT (OUTPATIENT)
Dept: AUDIOLOGY | Facility: CLINIC | Age: 63
End: 2018-12-31
Payer: MEDICARE

## 2018-12-31 DIAGNOSIS — H90.3 SENSORY HEARING LOSS, BILATERAL: Primary | ICD-10-CM

## 2018-12-31 NOTE — PROGRESS NOTES
AUDIOLOGY REPORT    SUBJECTIVE: Sarah Sanford is a 63 year old female who was seen in the Audiology Clinic at the [facility] 12/31/2018for a fitting of binaural Signia Pure charge & Go 3Nx DAISY. Previous results have revealed a bilateral mild sensorineural hearing loss. The patient was given medical clearance to pursue amplification by  Dr. JEN Centeno MD.    OBJECTIVE: The hearing aid conformity evaluation was completed.The hearing aids were placed and they provided a good fit. Real-ear-probe-microphone measurements were completed on the [employer] system and were a good match to NAL-NL1 target with soft sounds audible, moderate sounds comfortable, and loud sounds below discomfort. UCLs are verified through maximum power output measures and demonstrate appropriate limiting of loud inputs. Sarah was oriented to proper hearing aid use, care, cleaning (no water, dry brush), batteries (rechargeable, insertion/removal, toxicity, low-battery signal), aid insertion/removal, user booklet, warranty information, storage cases, and other hearing aid details. The patient confirmed understanding of hearing aid use and care, and showed proper insertion of hearing aid and batteries while in the office today.Sarah reported good volume and sound quality today.   Hearing aids were programmed as follows:  Program 1:Universal  Program 2: Noise (down 3 dB    EARS FIT: Bilateral  HEARING AID MODEL NAME: Signia Pure charge & Go 3Nx   HEARING AID STYLE: -in-the-ear behind-the-ear  EARMOLDS/TIP: SemiOpen; 2S  SERIAL NUMBERS: Right: [id number] Left: [id number]  WARRANTY END DATE: 1/10/2021    ASSESSMENT: Binaural  Signia Pure charge & Go 3Nx hearing aid(s) were fit today. Verification measures were performed. Sarah signed the Hearing Aid Purchase Agreement and was given a copy, as well as details on her hearing aids. Patient was counseled that exact out of pocket amounts cannot be determined for hearing aid  claims being sent to insurance. Any insurance coverage information presented to the patient is an estimate only, and is not a guarantee of payment. Patient has been advised to check with their own insurance.    PLAN:Sarah will return for follow-up in 2-3 weeks for a hearing aid review appointment. Please call this clinic with questions regarding today s appointment.      Kd Ye., CCC-A  Licensed Audiologist  [place] #[id number]

## 2019-01-05 NOTE — TELEPHONE ENCOUNTER
FUTURE VISIT INFORMATION      FUTURE VISIT INFORMATION:    Date: 1/9/19    Time: 1300    Location: [facility]  REFERRAL INFORMATION:    Referring provider:  DR KENNEY    Referring providers clinic:  [facility]    Reason for visit/diagnosis  R FOOT PAIN     RECORDS REQUESTED FROM:       Clinic name Comments Records Status Imaging Status                                         RECORDS IN CHART

## 2019-01-09 ENCOUNTER — PRE VISIT (OUTPATIENT)
Dept: ORTHOPEDICS | Facility: CLINIC | Age: 64
End: 2019-01-09

## 2019-01-16 ENCOUNTER — OFFICE VISIT (OUTPATIENT)
Dept: ORTHOPEDICS | Facility: CLINIC | Age: 64
End: 2019-01-16
Payer: MEDICARE

## 2019-01-16 DIAGNOSIS — M62.469 GASTROCNEMIUS EQUINUS, UNSPECIFIED LATERALITY: ICD-10-CM

## 2019-01-16 DIAGNOSIS — M21.42 PES PLANUS OF LEFT FOOT: ICD-10-CM

## 2019-01-16 DIAGNOSIS — M72.2 PLANTAR FASCIITIS: Primary | ICD-10-CM

## 2019-01-16 NOTE — PROGRESS NOTES
Chief Complaint:   Chief Complaint   Patient presents with     Consult     Pain, right foot. Referring:  TRINITY KENNEY     Subjective: Sarah is a 63 year old female who presents to the clinic today for evaluation of right foot pain. History of lateral compartment left knee arthritis, osteoarthritis of multiple sites, chronic pain, borderline personality disorder, anxiety and depression.     Nature: Aching, stabbing  Location: right plantar heel into achilles  Duration: November 2018  Onset: No trauma  Course: intermittent. Doesn't matter time of day   Aggravating factors: walking barefot   Alleviating factors: wearing birkenstocks  Previous Treatments: None    ROS: Denies left foot pain, rashes, open lesions, increased edema.    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     Nephrolithiasis     Hx of stones     Obesity      Uncomplicated asthma     from second smoke in building     Past Surgical History:   Procedure Laterality Date     BIOPSY OF SKIN LESION      liver biopsy in 2011     CATARACT IOL, RT/LT       CHOLECYSTECTOMY       EXTRACORPOREAL SHOCK WAVE LITHOTRIPSY (ESWL)       GENITOURINARY SURGERY      litho     GYN SURGERY      hyst     HYSTERECTOMY      age 31 with cervical removal     PHACOEMULSIFICATION CLEAR CORNEA WITH STANDARD INTRAOCULAR LENS IMPLANT Right 9/29/2017    Procedure: PHACOEMULSIFICATION CLEAR CORNEA WITH STANDARD INTRAOCULAR LENS IMPLANT;  RIGHT EYE PHACOEMULSIFICATION CLEAR CORNEA WITH STANDARD INTRAOCULAR LENS IMPLANT ;  Surgeon: Sylvia Grider MD;   [facility]     VITRECTOMY PARSPLANA WITH 25 GAUGE SYSTEM Right 2/14/2017    Procedure: VITRECTOMY PARSPLANA WITH 25 GAUGE SYSTEM;  Surgeon: Steph Cintorn MD;  Location:  [facility]     Family History   Problem Relation Age of Onset     Asthma Daughter      Cancer Maternal Grandmother         female     Alcohol/Drug Mother      Lipids Mother      Hypertension Mother      Psychotic Disorder Mother      Alcohol/Drug Maternal Grandfather      Glaucoma No family hx of      Macular Degeneration No family hx of      Melanoma No family hx of      Skin Cancer No family hx of      Social History     Tobacco Use     Smoking status: Never Smoker     Smokeless tobacco: Never Used   Substance Use Topics     Alcohol use: Yes     Comment: occ.     Allergies   Allergen Reactions     No Clinical Screening - See Comments      Pt states she is allergic to all antibiotics which cause a raised red rash that is hot to touch, Pt states can take Levaquin and cefaclor.      Erythromycin Rash     Keflex [Cephalexin Hcl] Rash     Penicillins Rash     Sulfa Drugs Rash     Tetracycline Rash     Current Outpatient Rx   Medication Sig Dispense Refill     cetirizine (ZYRTEC) 10 MG tablet Take 10 mg by mouth daily.       cholecalciferol (VITAMIN D3) 1000 UNIT tablet Take 1 tablet (1,000 Units) by mouth daily 90 tablet 3     conjugated estrogens (PREMARIN) cream Place 2 g vaginally daily 180 g 3     Foot Care Products (GEL HEEL CUSHIONS WOMENS) PADS 1 Device daily 1 Device 0     hydrochlorothiazide (HYDRODIURIL) 25 MG tablet Take 1 tablet (25 mg) by mouth daily 100 tablet 3     levothyroxine (SYNTHROID/LEVOTHROID) 50 MCG tablet Take 1 tablet (50 mcg) by mouth daily 90 tablet 3     lisinopril (PRINIVIL/ZESTRIL) 30 MG tablet Take 1 tablet (30 mg) by mouth At Bedtime 90 tablet 1     LORazepam (ATIVAN) 2 MG tablet Take 2 mg by mouth At Bedtime 2 tablets at night       Lysine 1000 MG TABS Take by mouth 2 times daily        order for DME  Equipment being ordered: Humidifier 1 each 0     order for DME 1 Device by Device route daily as needed Air filtration system 1 Device 0     traMADol (ULTRAM) 50 MG tablet Take 1-2 tablets ( mg) by mouth every 6 hours as needed 120 tablet 0     traZODone (DESYREL) 50 MG tablet Take 1 mg by mouth At Bedtime          Objective:   There were no vitals taken for this visit.    General: Patient is well groomed, appears stated age, is alert, cooperative and in no acute distress.  Vascular: DP and PT pulses are 2/4 bilaterally. LE capillary refill time is <3 seconds. Absent pedal hair. No LE edema.  MSK: No pain with active or passive ROM of the ankle, MTJ, 1st ray or halluces bilaterally. Equinus absent bilaterally. No tenderness to palpation of plantar fascia, achilles tendon or achilles tendon insertion.  Neurologic: Gross sensation intact to bilateral feet  Skin: LE skin color, texture and turgor are normal. Toenails are normal bilaterally.     Previous Laboratory Studies:   Lab Results   Component Value Date    A1C 5.2 11/12/2015       Assessment:   - Plantar fasciitis of right foot  - Pes planus  - Osteoarthritis of multiple sites    Plan:   - Pt seen and evaluated. Diagnosis and treatment options were discussed.   - Molded for custom orthotics today. She says they will need to be almost completely covered by insurance if they are to be made.   - Continue wearing supportive shoes and gel heel cups until you get the orthotics.   - Educated patient on stretching, ice, NSAIDs. She admits that her level of motivation is low and she will likely not follow through with these suggestions.   - Pt to return to clinic in 2 1/2 - 3 months

## 2019-01-16 NOTE — NURSING NOTE
Reason For Visit:   Chief Complaint   Patient presents with     Consult     Pain, right foot. Referring:  TRINITY KENNEY       Pain Assessment  Patient Currently in Pain: Denies            Current Outpatient Medications   Medication Sig Dispense Refill     cetirizine (ZYRTEC) 10 MG tablet Take 10 mg by mouth daily.       cholecalciferol (VITAMIN D3) 1000 UNIT tablet Take 1 tablet (1,000 Units) by mouth daily 90 tablet 3     conjugated estrogens (PREMARIN) cream Place 2 g vaginally daily 180 g 3     Foot Care Products (GEL HEEL CUSHIONS WOMENS) PADS 1 Device daily 1 Device 0     hydrochlorothiazide (HYDRODIURIL) 25 MG tablet Take 1 tablet (25 mg) by mouth daily 100 tablet 3     levothyroxine (SYNTHROID/LEVOTHROID) 50 MCG tablet Take 1 tablet (50 mcg) by mouth daily 90 tablet 3     lisinopril (PRINIVIL/ZESTRIL) 30 MG tablet Take 1 tablet (30 mg) by mouth At Bedtime 90 tablet 1     LORazepam (ATIVAN) 2 MG tablet Take 2 mg by mouth At Bedtime 2 tablets at night       Lysine 1000 MG TABS Take by mouth 2 times daily        order for DME Equipment being ordered: Humidifier 1 each 0     order for DME 1 Device by Device route daily as needed Air filtration system 1 Device 0     traMADol (ULTRAM) 50 MG tablet Take 1-2 tablets ( mg) by mouth every 6 hours as needed 120 tablet 0     traZODone (DESYREL) 50 MG tablet Take 1 mg by mouth At Bedtime             Allergies   Allergen Reactions     No Clinical Screening - See Comments      Pt states she is allergic to all antibiotics which cause a raised red rash that is hot to touch, Pt states can take Levaquin and cefaclor.      Erythromycin Rash     Keflex [Cephalexin Hcl] Rash     Penicillins Rash     Sulfa Drugs Rash     Tetracycline Rash

## 2019-01-16 NOTE — LETTER
1/16/2019       RE: Sarah Sanford  [address] [place] [address]  [address] [place] [address]     Dear Colleague,    Thank you for referring your patient, Sarah Sanford, to [facility]. Please see a copy of my visit note below.    Chief Complaint:   Chief Complaint   Patient presents with     Consult     Pain, right foot. Referring:  TRINITY KENNEY     Subjective: Sarah is a 63 year old female who presents to the clinic today for evaluation of right foot pain. History of lateral compartment left knee arthritis, osteoarthritis of multiple sites, chronic pain, borderline personality disorder, anxiety and depression.     Nature: Aching, stabbing  Location: right plantar heel into achilles  Duration: November 2018  Onset: No trauma  Course: intermittent. Doesn't matter time of day   Aggravating factors: walking barefot   Alleviating factors: wearing birkenstocks  Previous Treatments: None    ROS: Denies left foot pain, rashes, open lesions, increased edema.    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     Nephrolithiasis     Hx of stones     Obesity      Uncomplicated asthma     from second smoke in building     Past Surgical History:   Procedure Laterality Date     BIOPSY OF SKIN LESION      liver biopsy in 2011     CATARACT IOL, RT/LT       CHOLECYSTECTOMY       EXTRACORPOREAL SHOCK WAVE LITHOTRIPSY (ESWL)       GENITOURINARY SURGERY      litho     GYN SURGERY      hyst     HYSTERECTOMY      age 31 with cervical removal      PHACOEMULSIFICATION CLEAR CORNEA WITH STANDARD INTRAOCULAR LENS IMPLANT Right 9/29/2017    Procedure: PHACOEMULSIFICATION CLEAR CORNEA WITH STANDARD INTRAOCULAR LENS IMPLANT;  RIGHT EYE PHACOEMULSIFICATION CLEAR CORNEA WITH STANDARD INTRAOCULAR LENS IMPLANT ;  Surgeon: Sylvia Grider MD;  Location: [facility]     VITRECTOMY PARSPLANA WITH 25 GAUGE SYSTEM Right 2/14/2017    Procedure: VITRECTOMY PARSPLANA WITH 25 GAUGE SYSTEM;  Surgeon: Steph Cintron MD;  Location: [facility]     Family History   Problem Relation Age of Onset     Asthma Daughter      Cancer Maternal Grandmother         female     Alcohol/Drug Mother      Lipids Mother      Hypertension Mother      Psychotic Disorder Mother      Alcohol/Drug Maternal Grandfather      Glaucoma No family hx of      Macular Degeneration No family hx of      Melanoma No family hx of      Skin Cancer No family hx of      Social History     Tobacco Use     Smoking status: Never Smoker     Smokeless tobacco: Never Used   Substance Use Topics     Alcohol use: Yes     Comment: occ.     Allergies   Allergen Reactions     No Clinical Screening - See Comments      Pt states she is allergic to all antibiotics which cause a raised red rash that is hot to touch, Pt states can take Levaquin and cefaclor.      Erythromycin Rash     Keflex [Cephalexin Hcl] Rash     Penicillins Rash     Sulfa Drugs Rash     Tetracycline Rash     Current Outpatient Rx   Medication Sig Dispense Refill     cetirizine (ZYRTEC) 10 MG tablet Take 10 mg by mouth daily.       cholecalciferol (VITAMIN D3) 1000 UNIT tablet Take 1 tablet (1,000 Units) by mouth daily 90 tablet 3     conjugated estrogens (PREMARIN) cream Place 2 g vaginally daily 180 g 3     Foot Care Products (GEL HEEL CUSHIONS WOMENS) PADS 1 Device daily 1 Device 0     hydrochlorothiazide (HYDRODIURIL) 25 MG tablet Take 1 tablet (25 mg) by mouth daily 100 tablet 3     levothyroxine (SYNTHROID/LEVOTHROID) 50 MCG tablet Take 1 tablet (50 mcg) by  mouth daily 90 tablet 3     lisinopril (PRINIVIL/ZESTRIL) 30 MG tablet Take 1 tablet (30 mg) by mouth At Bedtime 90 tablet 1     LORazepam (ATIVAN) 2 MG tablet Take 2 mg by mouth At Bedtime 2 tablets at night       Lysine 1000 MG TABS Take by mouth 2 times daily        order for DME Equipment being ordered: Humidifier 1 each 0     order for DME 1 Device by Device route daily as needed Air filtration system 1 Device 0     traMADol (ULTRAM) 50 MG tablet Take 1-2 tablets ( mg) by mouth every 6 hours as needed 120 tablet 0     traZODone (DESYREL) 50 MG tablet Take 1 mg by mouth At Bedtime          Objective:   There were no vitals taken for this visit.    General: Patient is well groomed, appears stated age, is alert, cooperative and in no acute distress.  Vascular: DP and PT pulses are 2/4 bilaterally. LE capillary refill time is <3 seconds. Absent pedal hair. No LE edema.  MSK: No pain with active or passive ROM of the ankle, MTJ, 1st ray or halluces bilaterally. Equinus absent bilaterally. No tenderness to palpation of plantar fascia, achilles tendon or achilles tendon insertion.  Neurologic: Gross sensation intact to bilateral feet  Skin: LE skin color, texture and turgor are normal. Toenails are normal bilaterally.     Previous Laboratory Studies:   Lab Results   Component Value Date    A1C 5.2 11/12/2015       Assessment:   - Plantar fasciitis of right foot  - Pes planus  - Osteoarthritis of multiple sites    Plan:   - Pt seen and evaluated. Diagnosis and treatment options were discussed.   - Molded for custom orthotics today. She says they will need to be almost completely covered by insurance if they are to be made.   - Continue wearing supportive shoes and gel heel cups until you get the orthotics.   - Educated patient on stretching, ice, NSAIDs. She admits that her level of motivation is low and she will likely not follow through with these suggestions.   - Pt to return to clinic in 2 1/2 - 3  months      Maude Ibrahim, PADelfinaC

## 2019-01-16 NOTE — PATIENT INSTRUCTIONS
Patient Education     Plantar Fasciitis  Plantar fasciitis is a painful swelling of the plantar fascia. The plantar fascia is a thick, fibrous layer of tissue that covers the bones on the bottom of your foot. It supports the foot bones in an arched position.  Plantar fasciitis can happen gradually or suddenly. It usually affects one foot at a time. Heel pain can be sharp, like a knife sticking into the bottom of your foot. You may feel pain after exercising, long-distance jogging, stair climbing, long periods of standing, or after standing up.  Risk factors include: non-active lifestyle, arthritis, diabetes, obesity or recent weight gain, flat foot, high arch. Wearing high heels, loose shoes, or shoes with poor arch support for long periods of time adds to the risk. This problem is commonly found in runners and dancers. It also found in people who stand on hard surfaces for long periods of time.  Foot pain from this condition is usually worse in the morning. But it often improves with walking. By the end of the day there may be a dull aching. Treatment requires short-term rest and controlling swelling. It may take up to 9 months before all symptoms go away. Rarely, a steroid injection into the foot, or surgery, may be needed.  Home care    If you are overweight, lose weight to help healing.    Choose supportive shoes with good arch support and shock absorbency. Replace athletic shoes when they become worn out. Don t walk or run barefoot.    Premade or custom-fitted shoe inserts may be helpful. Inserts made of silicone seem to be the most effective. Custom-made inserts can be provided by foot specialist, physical therapist, or orthopedist.    Premade or custom-made night splints keep the heel stretched out while you sleep. They may prevent morning pain.    Limit activities that stress the feet: jogging, prolonged standing or walking, contact sports, etc.    First thing in the morning and before sports, stretch the  bottom of your feet. Gently flex your ankle so the toes move toward your knee.    Icing may help control heel pain. Apply an ice pack to the heel for 10 to 20 minutes as a preventive. Or ice your heel after a severe flare-up of symptoms. You may repeat this every 1 to 2 hours as needed.    You may use over-the-counter pain medicine to control pain, unless another medicine was prescribed. Anti-inflammatory pain medicines, such as ibuprofen or naproxen, may work better than acetaminophen. If you have chronic liver or kidney disease or ever had a stomach ulcer or gastrointestinal bleeding, talk with your healthcare provider before using these medicines.  Follow-up care  Follow up with your healthcare provider, or as advised.  Call for an appointment if pain worsens or there is no relief after a few weeks of home treatment. Shoe inserts, a night splint, or a special boot may be required.  If X-rays were taken, you will be told of any new findings that may affect your care.  When to seek medical advice  Call your healthcare provider right away if any of these occur:    Foot swelling    Redness or warmth with increasing pain   Date Last Reviewed: 5/1/2018 2000-2018 [employer]. [address] [place] [address], [place] [address]. All rights reserved. This information is not intended as a substitute for professional medical care. Always follow your healthcare professional's instructions.

## 2019-01-21 ENCOUNTER — OFFICE VISIT (OUTPATIENT)
Dept: AUDIOLOGY | Facility: CLINIC | Age: 64
End: 2019-01-21
Payer: MEDICARE

## 2019-01-21 DIAGNOSIS — H90.3 SENSORY HEARING LOSS, BILATERAL: Primary | ICD-10-CM

## 2019-01-21 NOTE — PROGRESS NOTES
AUDIOLOGY REPORT    SUBJECTIVE:Sarah Sanford is a 63 year old female who was seen in the Audiology Clinic at the [facility] on 1/21/2019  for a follow-up check regarding the fitting of new hearing aids. Previous results 12/10/2018 have revealed a bilateral mild sensorineural hearing loss. .  The patient has been seen previously in this clinic and was fit with binaural Signia 3NX charge and go on 12/31/2019.  Sarah reports she does not like and need the hearing aids and wants to return them.  She lives alone and did not wear the hearing aids much.  One of the few times she wore it, she could not hear her granddaughter who was not facing her.     OBJECTIVE:   The International Outcome Inventory-Hearing Aids (IOI-HA) was not administered today, as patient refused to do it.     ASSESSMENT: A follow-up appointment for hearing aid fitting was completed today.  Hearing aids are being returned.  This is a no charge warranty visit.  PLAN:Sarah will return for follow-up as needed, or if she wants to pursue hearing aids in the future. Please call this clinic with any questions regarding today s appointment.    Ashley Ye, CCC-A  Licensed Audiologist  [place] #[id number]

## 2019-02-07 DIAGNOSIS — I10 ESSENTIAL HYPERTENSION: ICD-10-CM

## 2019-02-09 RX ORDER — HYDROCHLOROTHIAZIDE 25 MG/1
25 TABLET ORAL DAILY
Qty: 100 TABLET | Refills: 3 | Status: SHIPPED | OUTPATIENT
Start: 2019-02-09 | End: 2020-01-20

## 2019-02-18 DIAGNOSIS — H34.8312 BRANCH RETINAL VEIN OCCLUSION OF RIGHT EYE, UNSPECIFIED COMPLICATION STATUS (H): Primary | ICD-10-CM

## 2019-02-20 ENCOUNTER — OFFICE VISIT (OUTPATIENT)
Dept: INTERNAL MEDICINE | Facility: CLINIC | Age: 64
End: 2019-02-20
Payer: MEDICARE

## 2019-02-20 ENCOUNTER — OFFICE VISIT (OUTPATIENT)
Dept: ORTHOPEDICS | Facility: CLINIC | Age: 64
End: 2019-02-20
Payer: MEDICARE

## 2019-02-20 VITALS
HEART RATE: 61 BPM | WEIGHT: 188.8 LBS | DIASTOLIC BLOOD PRESSURE: 78 MMHG | BODY MASS INDEX: 31.65 KG/M2 | RESPIRATION RATE: 18 BRPM | SYSTOLIC BLOOD PRESSURE: 126 MMHG

## 2019-02-20 VITALS — BODY MASS INDEX: 31.52 KG/M2 | WEIGHT: 188 LBS

## 2019-02-20 DIAGNOSIS — M17.12 PRIMARY OSTEOARTHRITIS OF LEFT KNEE: Primary | ICD-10-CM

## 2019-02-20 DIAGNOSIS — G89.29 OTHER CHRONIC PAIN: ICD-10-CM

## 2019-02-20 DIAGNOSIS — D89.1 MIXED CRYOGLOBULINEMIA (H): ICD-10-CM

## 2019-02-20 DIAGNOSIS — Z86.19 HISTORY OF HEPATITIS C: ICD-10-CM

## 2019-02-20 DIAGNOSIS — E78.5 HYPERLIPIDEMIA, UNSPECIFIED HYPERLIPIDEMIA TYPE: ICD-10-CM

## 2019-02-20 DIAGNOSIS — R21 MALAR RASH: ICD-10-CM

## 2019-02-20 DIAGNOSIS — E03.9 HYPOTHYROIDISM, UNSPECIFIED TYPE: Primary | ICD-10-CM

## 2019-02-20 RX ORDER — TRAMADOL HYDROCHLORIDE 50 MG/1
50-100 TABLET ORAL EVERY 6 HOURS PRN
Qty: 120 TABLET | Refills: 0 | Status: SHIPPED | OUTPATIENT
Start: 2019-02-20 | End: 2019-02-20

## 2019-02-20 RX ORDER — TRAMADOL HYDROCHLORIDE 50 MG/1
50-100 TABLET ORAL EVERY 6 HOURS PRN
Qty: 110 TABLET | Refills: 0 | Status: SHIPPED | OUTPATIENT
Start: 2019-02-20 | End: 2019-06-24

## 2019-02-20 RX ORDER — BENZOCAINE/MENTHOL 6 MG-10 MG
LOZENGE MUCOUS MEMBRANE 2 TIMES DAILY
Qty: 30 G | Refills: 1 | Status: SHIPPED | OUTPATIENT
Start: 2019-02-20 | End: 2020-02-20

## 2019-02-20 NOTE — NURSING NOTE
Reason For Visit:   Chief Complaint   Patient presents with     RECHECK     Left knee pain and discuss surgery       Primary MD: Vj Centeno      Wt 85.3 kg (188 lb)   BMI 31.52 kg/m      Pain Assessment  Patient Currently in Pain: Denies    Current Outpatient Medications   Medication     cetirizine (ZYRTEC) 10 MG tablet     cholecalciferol (VITAMIN D3) 1000 UNIT tablet     conjugated estrogens (PREMARIN) cream     Foot Care Products (GEL HEEL CUSHIONS WOMENS) PADS     hydrochlorothiazide (HYDRODIURIL) 25 MG tablet     hydrocortisone (CORTAID) 1 % external cream     levothyroxine (SYNTHROID/LEVOTHROID) 50 MCG tablet     lisinopril (PRINIVIL/ZESTRIL) 30 MG tablet     LORazepam (ATIVAN) 2 MG tablet     Lysine 1000 MG TABS     order for DME     order for DME     traMADol (ULTRAM) 50 MG tablet     traZODone (DESYREL) 50 MG tablet     No current facility-administered medications for this visit.           Allergies   Allergen Reactions     No Clinical Screening - See Comments      Pt states she is allergic to all antibiotics which cause a raised red rash that is hot to touch, Pt states can take Levaquin and cefaclor.      Erythromycin Rash     Keflex [Cephalexin Hcl] Rash     Penicillins Rash     Sulfa Drugs Rash     Tetracycline Rash           Fatou Glez LPN

## 2019-02-20 NOTE — NURSING NOTE
Chief Complaint   Patient presents with     Refill Request     Patient is here for medication refill     Dizziness     Also here for dizziness       Joseph Shen CMA ([facility]) at 2:57 PM on 2/20/2019
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
## 2019-02-21 ENCOUNTER — OFFICE VISIT (OUTPATIENT)
Dept: OPHTHALMOLOGY | Facility: CLINIC | Age: 64
End: 2019-02-21
Attending: OPHTHALMOLOGY
Payer: MEDICARE

## 2019-02-21 DIAGNOSIS — H34.8312 BRANCH RETINAL VEIN OCCLUSION OF RIGHT EYE, UNSPECIFIED COMPLICATION STATUS (H): ICD-10-CM

## 2019-02-21 PROCEDURE — G0463 HOSPITAL OUTPT CLINIC VISIT: HCPCS | Mod: ZF

## 2019-02-21 PROCEDURE — 92134 CPTRZ OPH DX IMG PST SGM RTA: CPT | Mod: ZF | Performed by: OPHTHALMOLOGY

## 2019-02-21 ASSESSMENT — CUP TO DISC RATIO
OD_RATIO: 0.2
OS_RATIO: 0.2

## 2019-02-21 ASSESSMENT — VISUAL ACUITY
OS_CC+: +1
METHOD: SNELLEN - LINEAR
CORRECTION_TYPE: GLASSES
OD_CC: 20/20
OS_CC: 20/25

## 2019-02-21 ASSESSMENT — SLIT LAMP EXAM - LIDS
COMMENTS: NORMAL
COMMENTS: NORMAL

## 2019-02-21 ASSESSMENT — REFRACTION_WEARINGRX
OD_AXIS: 160
OS_CYLINDER: SPHERE
OS_SPHERE: +0.50
OD_CYLINDER: +0.50
OD_SPHERE: -0.75

## 2019-02-21 ASSESSMENT — EXTERNAL EXAM - LEFT EYE: OS_EXAM: NORMAL

## 2019-02-21 ASSESSMENT — EXTERNAL EXAM - RIGHT EYE: OD_EXAM: NORMAL

## 2019-02-21 ASSESSMENT — CONF VISUAL FIELD
OD_NORMAL: 1
OS_NORMAL: 1
METHOD: COUNTING FINGERS

## 2019-02-21 ASSESSMENT — TONOMETRY
OD_IOP_MMHG: 17
IOP_METHOD: TONOPEN
OS_IOP_MMHG: 23

## 2019-02-21 NOTE — PROGRESS NOTES
"CC: status post PPV/EL/FAX OD (2/14/17) for vitreous hemorrhage possibly secondary to neovascularization after BRVO.  Status post Panretinal laser photocoagulation (PRP) filling last eye exam    Sarah Sanford is a 63 year old female here for new floaters who presented over the weekend and was diagnosed with recurrent vitreous hemorrhage right eye. Reports that on 1/12/18 she noticed a bunch of new floaters, no flashes, with burning irritation went away. She awoke over the weekend 1/20/18 and noticed new floaters everywhere, hundreds, persistent, vision is foggy, like a curtain, no flashes. Denies eye pain, epiphora.  Patient continues to have these symptoms in the right eye with fog/generalized blur and floaters.     Interim: reports improvement of the floaters. VA stable    OCT  2-21-19  RE: Noisy exam, inferotemporal thinning (stable), otherwise normal contour  LE: Normal    fluorescein angiography 08/15/18   Right eye: inferior temporal macula area of capillary non perfusion and inferior temporal peripheral laser scars; no neovascularization elsewhere   Left eye: Normal AV and choroidal filling     Fundus photos and Autofluorescence: consistent with exam     Assessment and plan:    1. status post PPV/EL/FAX OD (2/14/17) for vitreous hemorrhage possibly secondary to history of  BRVO.  - retina attached, patient doing well  No cystoid macular edema     2. posterior chamber intraocular lens (PCIOL) with  Posterior capsular opacity (PCO) right eye   posterior capsular opacity (PCO) likely contributing to \"fog\" symptoms and visual obscuration. posterior capsular opacity (PCO) over visual axis.  Status post yag  Open capsule - doing well     3. resolved vitreous hemorrhage right eye    symptoms improving   -No signs of retinal tear, hole, or detachment.   - Status post  laser filling right eye 3.1.18  Doing well  Continue to observe    4. Cataract left eye   Becoming visually significant  Observe    5. Dry eye - right " especially   - continue artificial tears   - warm compresses     Follow up in 9 months with Optical Coherence Tomography     Daniela Moctezuma MD  PGY-3 Ophthalmology      ~~~~~~~~~~~~~~~~~~~~~~~~~~~~~~~~~~   Complete documentation of historical and exam elements from today's encounter can be found in the full encounter summary report (not reduplicated in this progress note).  I personally obtained the chief complaint(s) and history of present illness.  I confirmed and edited as necessary the review of systems, past medical/surgical history, family history, social history, and examination findings as documented by others; and I examined the patient myself.  I personally reviewed the relevant tests, images, and reports as documented above.  I personally reviewed the ophthalmic test(s) associated with this encounter, agree with the interpretation(s) as documented by the resident/fellow, and have edited the corresponding report(s) as necessary.   I formulated and edited as necessary the assessment and plan and discussed the findings and management plan with the patient and family    Steph Cintron MD  .  Retina Service   Department of Ophthalmology and Visual Neurosciences   [facility]  Phone: [phone]   Fax: [phone]

## 2019-02-21 NOTE — NURSING NOTE
Chief Complaints and History of Present Illnesses   Patient presents with     Branch Retinal Vein Occlusion Follow Up     Chief Complaint(s) and History of Present Illness(es)     Branch Retinal Vein Occlusion Follow Up     Laterality: both eyes    Onset: gradual    Onset: months ago    Quality: States va is the same since last visit      Frequency: constantly    Associated symptoms: Negative for floaters and flashes    Pain scale: 0/10              Comments     Danielle Tolentino COT 1:56 PM February 21, 2019
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
## 2019-03-02 ENCOUNTER — NURSE TRIAGE (OUTPATIENT)
Dept: NURSING | Facility: CLINIC | Age: 64
End: 2019-03-02

## 2019-03-02 NOTE — TELEPHONE ENCOUNTER
"RN called patient back regarding patient leaving a message stating \"I am really sick\". Patient reports she has a fever of 101.8 F. Complaining of a headache as well. Denies stiff neck. Advised patient to be seen within 4 hours. Refused disposition and states she does not have someone to drive her.     Ollie Dunn RN  [facility]     Reason for Disposition    [1] Fever > 101 F (38.3 C) AND [2] age > 60    Additional Information    Negative: Shock suspected (e.g., cold/pale/clammy skin, too weak to stand, low BP, rapid pulse)    Negative: Difficult to awaken or acting confused  (e.g., disoriented, slurred speech)    Negative: [1] Difficulty breathing AND [2] bluish lips, tongue or face    Negative: New onset rash with multiple purple (or blood-colored) spots or dots    Negative: Sounds like a life-threatening emergency to the triager    Negative: [1] Headache AND [2] stiff neck (can't touch chin to chest)    Negative: Difficulty breathing    Negative: IV drug abuse    Negative: [1] Drinking very little AND [2] dehydration suspected (e.g., no urine > 12 hours, very dry mouth, very lightheaded)    Negative: Patient sounds very sick or weak to the triager  (Exception: mild weakness and hasn't taken fever medicine)    Negative: Fever > 104 F (40 C)    Protocols used: FEVER-ADULT-      "

## 2019-03-03 ENCOUNTER — OFFICE VISIT (OUTPATIENT)
Dept: URGENT CARE | Facility: URGENT CARE | Age: 64
End: 2019-03-03
Payer: MEDICARE

## 2019-03-03 VITALS
SYSTOLIC BLOOD PRESSURE: 110 MMHG | HEART RATE: 82 BPM | TEMPERATURE: 99.2 F | BODY MASS INDEX: 31.52 KG/M2 | WEIGHT: 188 LBS | DIASTOLIC BLOOD PRESSURE: 77 MMHG | OXYGEN SATURATION: 98 %

## 2019-03-03 DIAGNOSIS — J11.1 INFLUENZA-LIKE ILLNESS: Primary | ICD-10-CM

## 2019-03-03 PROCEDURE — 99213 OFFICE O/P EST LOW 20 MIN: CPT | Performed by: FAMILY MEDICINE

## 2019-03-03 NOTE — PATIENT INSTRUCTIONS
Okay to take tylenol for fever and discomfort (do not take more than 3000 mg in 24 hours)  Take showers and baths to help decrease fever.  Drink lots of water to prevent dehydration.      Patient Education     Influenza (Adult)    Influenza is also called the flu. It is a viral illness that affects the air passages of your lungs. It is different from the common cold. The flu can easily be passed from one to person to another. It may be spread through the air by coughing and sneezing. Or it can be spread by touching the sick person and then touching your own eyes, nose, or mouth.  The flu starts 1 to 3 days after you are exposed to the flu virus. It may last for 1 to 2 weeks but many people feel tired or fatigued for many weeks afterward. You usually don t need to take antibiotics unless you have a complication. This might be an ear or sinus infection or pneumonia.  Symptoms of the flu may be mild or severe. They can include extreme tiredness (wanting to stay in bed all day), chills, fevers, muscle aches, soreness with eye movement, headache, and a dry, hacking cough.  Home care  Follow these guidelines when caring for yourself at home:    Avoid being around cigarette smoke, whether yours or other people s.    Acetaminophen or ibuprofen will help ease your fever, muscle aches, and headache. Don t give aspirin to anyone younger than 18 who has the flu. Aspirin can harm the liver.    Nausea and loss of appetite are common with the flu. Eat light meals. Drink 6 to 8 glasses of liquids every day. Good choices are water, sport drinks, soft drinks without caffeine, juices, tea, and soup. Extra fluids will also help loosen secretions in your nose and lungs.    Over-the-counter cold medicines will not make the flu go away faster. But the medicines may help with coughing, sore throat, and congestion in your nose and sinuses. Don t use a decongestant if you have high blood pressure.    Stay home until your fever has been gone  for at least 24 hours without using medicine to reduce fever.  Follow-up care  Follow up with your healthcare provider, or as advised, if you are not getting better over the next week.  If you are age 65 or older, talk with your provider about getting a pneumococcal vaccine every 5 years. You should also get this vaccine if you have chronic asthma or COPD. All adults should get a flu vaccine every fall. Ask your provider about this.  When to seek medical advice  Call your healthcare provider right away if any of these occur:    Cough with lots of colored mucus (sputum) or blood in your mucus    Chest pain, shortness of breath, wheezing, or trouble breathing    Severe headache, or face, neck, or ear pain    New rash with fever    Fever of 100.4 F (38 C) or higher, or as directed by your healthcare provider    Confusion, behavior change, or seizure    Severe weakness or dizziness    You get a new fever or cough after getting better for a few days  Date Last Reviewed: 1/1/2017 2000-2018 [employer]. [address] [place] [address], [place] [address]. All rights reserved. This information is not intended as a substitute for professional medical care. Always follow your healthcare professional's instructions.

## 2019-03-03 NOTE — PROGRESS NOTES
"SUBJECTIVE:   Sarah Sanford is a 63 year old female presenting with a chief complaint of fever, cough, HA.  Onset of symptoms was 4 day(s) ago.  Course of illness is same.    Severity moderate  Current and Associated symptoms: fever, cough, fatigue  Treatment measures tried include Fluids, Rest and tylenol, shower.  Predisposing factors include, HTN, chronic pain, hypothyroid.    Patient did obtain flu vaccination this year.  Denies sore throat.  Symptoms started off with cough, developed fever up to 101.5 on Thursday.  Has taken tylenol but started to have \"liver pain\", had history of hepatitis C.  Patient is unable to tolerate NSAIDs, gets bleeding.  Patient states that she does not have asthma, was due to housing as she did not have any breathing problems after moved out.    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     Nephrolithiasis     Hx of stones     Obesity      Uncomplicated asthma     from second smoke in building     Current Outpatient Medications   Medication Sig Dispense Refill     cetirizine (ZYRTEC) 10 MG tablet Take 10 mg by mouth daily.       cholecalciferol (VITAMIN D3) 1000 UNIT tablet Take 1 tablet (1,000 Units) by mouth daily 90 tablet 3     conjugated estrogens (PREMARIN) cream Place 2 g vaginally daily 180 g 3     Foot Care Products (GEL HEEL CUSHIONS WOMENS) PADS 1 Device daily 1 Device 0     hydrochlorothiazide (HYDRODIURIL) 25 MG tablet Take 1 tablet (25 mg) by mouth daily 100 tablet 3     hydrocortisone (CORTAID) 1 % external cream Apply topically 2 times daily " 30 g 1     levothyroxine (SYNTHROID/LEVOTHROID) 50 MCG tablet Take 1 tablet (50 mcg) by mouth daily 90 tablet 3     lisinopril (PRINIVIL/ZESTRIL) 30 MG tablet Take 1 tablet (30 mg) by mouth At Bedtime 90 tablet 1     LORazepam (ATIVAN) 2 MG tablet Take 2 mg by mouth At Bedtime 2 tablets at night       Lysine 1000 MG TABS Take by mouth 2 times daily        order for DME 1 Device by Device route daily as needed Air filtration system 1 Device 0     traMADol (ULTRAM) 50 MG tablet Take 1-2 tablets ( mg) by mouth every 6 hours as needed 110 tablet 0     traZODone (DESYREL) 50 MG tablet Take 1 mg by mouth At Bedtime        order for DME Equipment being ordered: Humidifier (Patient not taking: Reported on 3/3/2019) 1 each 0     Social History     Tobacco Use     Smoking status: Never Smoker     Smokeless tobacco: Never Used   Substance Use Topics     Alcohol use: Yes     Comment: occ.       ROS:  Review of systems negative except as stated above.    OBJECTIVE:  /77   Pulse 82   Temp 99.2  F (37.3  C) (Tympanic)   Wt 85.3 kg (188 lb)   SpO2 98%   BMI 31.52 kg/m    GENERAL APPEARANCE: healthy, alert and no distress, fatigue  EYES: EOMI,  PERRL, conjunctiva clear  HENT: ear canals and TM's normal.  Nose and mouth without ulcers, erythema or lesions  NECK: supple, nontender, no lymphadenopathy  RESP: lungs clear to auscultation - no rales, rhonchi or wheezes  CV: regular rates and rhythm, normal S1 S2, no murmur noted  Extremities: no peripheral edema or tenderness, peripheral pulses normal  SKIN: no suspicious lesions or rashes  PSYCH: mentation appears normal, affect flat and concentration poor    ASSESSMENT/PLAN:  (R69) Influenza-like illness  (primary encounter diagnosis)  Plan: symptomatic treatment, fluids, rest      Reassurance given, reviewed that symptom presentation is most likely due to influenza.  Patient is outside effective 48 hour window for treatment, did decline flu screening.  Discussed cough  medication but did decline tessalon perles and lynn AC - states that had remote history of illicit drug use so does not want anything.  Encourage to keep up with fluids and rest, discussed appropriate dosing for tylenol use and okay for showers or baths to help with fever.  Discussed potential complications with influenza.    Follow up with primary provider if no improvement of symptoms in 1 week.    Kenny Barros MD  March 3, 2019 1:12 PM

## 2019-03-15 ENCOUNTER — TELEPHONE (OUTPATIENT)
Dept: INTERNAL MEDICINE | Facility: CLINIC | Age: 64
End: 2019-03-15

## 2019-03-15 NOTE — TELEPHONE ENCOUNTER
[facility] Call Center    Phone Message    May a detailed message be left on voicemail: yes    Reason for Call: Other: Needs an appt with Dr. Centeno. Pt was told by Dr. Centeno herself that Dr. Centeno will see her whenever she needs to be seen. April 3rd is too far out for her..     Action Taken: Message routed to:  [facility] ([facility]

## 2019-03-18 ENCOUNTER — OFFICE VISIT (OUTPATIENT)
Dept: INTERNAL MEDICINE | Facility: CLINIC | Age: 64
End: 2019-03-18
Payer: MEDICARE

## 2019-03-18 VITALS — DIASTOLIC BLOOD PRESSURE: 94 MMHG | SYSTOLIC BLOOD PRESSURE: 159 MMHG | HEART RATE: 62 BPM | OXYGEN SATURATION: 96 %

## 2019-03-18 DIAGNOSIS — M62.81 GENERALIZED MUSCLE WEAKNESS: ICD-10-CM

## 2019-03-18 DIAGNOSIS — R05.9 COUGH: ICD-10-CM

## 2019-03-18 DIAGNOSIS — R05.9 COUGH: Primary | ICD-10-CM

## 2019-03-18 LAB
ANION GAP SERPL CALCULATED.3IONS-SCNC: 3 MMOL/L (ref 3–14)
BASOPHILS # BLD AUTO: 0 10E9/L (ref 0–0.2)
BASOPHILS NFR BLD AUTO: 0.5 %
BUN SERPL-MCNC: 11 MG/DL (ref 7–30)
CALCIUM SERPL-MCNC: 9.5 MG/DL (ref 8.5–10.1)
CHLORIDE SERPL-SCNC: 97 MMOL/L (ref 94–109)
CO2 SERPL-SCNC: 34 MMOL/L (ref 20–32)
CREAT SERPL-MCNC: 0.65 MG/DL (ref 0.52–1.04)
DIFFERENTIAL METHOD BLD: NORMAL
EOSINOPHIL # BLD AUTO: 0.1 10E9/L (ref 0–0.7)
EOSINOPHIL NFR BLD AUTO: 1.2 %
ERYTHROCYTE [DISTWIDTH] IN BLOOD BY AUTOMATED COUNT: 12.3 % (ref 10–15)
ERYTHROCYTE [SEDIMENTATION RATE] IN BLOOD BY WESTERGREN METHOD: 8 MM/H (ref 0–30)
GFR SERPL CREATININE-BSD FRML MDRD: >90 ML/MIN/{1.73_M2}
GLUCOSE SERPL-MCNC: 92 MG/DL (ref 70–99)
HCT VFR BLD AUTO: 46.6 % (ref 35–47)
HGB BLD-MCNC: 15.1 G/DL (ref 11.7–15.7)
IMM GRANULOCYTES # BLD: 0 10E9/L (ref 0–0.4)
IMM GRANULOCYTES NFR BLD: 0.2 %
LYMPHOCYTES # BLD AUTO: 2.5 10E9/L (ref 0.8–5.3)
LYMPHOCYTES NFR BLD AUTO: 38.9 %
MCH RBC QN AUTO: 29.4 PG (ref 26.5–33)
MCHC RBC AUTO-ENTMCNC: 32.4 G/DL (ref 31.5–36.5)
MCV RBC AUTO: 91 FL (ref 78–100)
MONOCYTES # BLD AUTO: 0.4 10E9/L (ref 0–1.3)
MONOCYTES NFR BLD AUTO: 5.6 %
NEUTROPHILS # BLD AUTO: 3.5 10E9/L (ref 1.6–8.3)
NEUTROPHILS NFR BLD AUTO: 53.6 %
NRBC # BLD AUTO: 0 10*3/UL
NRBC BLD AUTO-RTO: 0 /100
PLATELET # BLD AUTO: 231 10E9/L (ref 150–450)
POTASSIUM SERPL-SCNC: 4.9 MMOL/L (ref 3.4–5.3)
RBC # BLD AUTO: 5.13 10E12/L (ref 3.8–5.2)
SODIUM SERPL-SCNC: 134 MMOL/L (ref 133–144)
WBC # BLD AUTO: 6.5 10E9/L (ref 4–11)

## 2019-03-18 RX ORDER — MONTELUKAST SODIUM 10 MG/1
10 TABLET ORAL AT BEDTIME
Qty: 30 TABLET | Refills: 3 | Status: SHIPPED | OUTPATIENT
Start: 2019-03-18 | End: 2019-06-24

## 2019-03-18 ASSESSMENT — PAIN SCALES - GENERAL: PAINLEVEL: NO PAIN (0)
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
## 2019-03-18 NOTE — PATIENT INSTRUCTIONS
[facility] Medication Refill Request Information:  * Please contact your pharmacy regarding ANY request for medication refills.  ** [facility] Prescription Fax = [phone]  * Please allow 3 business days for routine medication refills.  * Please allow 5 business days for controlled substance medication refills.     [facility] Test Result notification information:  *You will be notified with in 7-10 days of your appointment day regarding the results of your test.  If you are on MyChart you will be notified as soon as the provider has reviewed the results and signed off on them.    [facility]: [phone]

## 2019-03-18 NOTE — NURSING NOTE
Chief Complaint   Patient presents with     Flu Symptoms     Pt is here to discuss flu like sx. Pt is also here to follow up on a recent urgent care visit.      Cough     Pt is here to discuss a non productive cough. Duration about 1 month.       Erica Alvarado LPN at 6:29 PM on 3/18/2019.

## 2019-03-21 ENCOUNTER — TELEPHONE (OUTPATIENT)
Dept: INTERNAL MEDICINE | Facility: CLINIC | Age: 64
End: 2019-03-21

## 2019-03-21 DIAGNOSIS — R05.9 COUGH: Primary | ICD-10-CM

## 2019-03-21 RX ORDER — BUDESONIDE AND FORMOTEROL FUMARATE DIHYDRATE 80; 4.5 UG/1; UG/1
2 AEROSOL RESPIRATORY (INHALATION) 2 TIMES DAILY
Qty: 1 INHALER | Refills: 1 | COMMUNITY
Start: 2019-03-21 | End: 2019-03-21

## 2019-03-21 RX ORDER — BUDESONIDE AND FORMOTEROL FUMARATE DIHYDRATE 80; 4.5 UG/1; UG/1
2 AEROSOL RESPIRATORY (INHALATION) 2 TIMES DAILY
Qty: 1 INHALER | Refills: 1 | Status: SHIPPED | OUTPATIENT
Start: 2019-03-21 | End: 2019-06-24

## 2019-03-21 NOTE — TELEPHONE ENCOUNTER
Reviewed records and clinic note from Dr. Centeno 3/18/2019.    She was also seen by Dori 5/1/207 for cough and was given Singlular, Albuterol, and Symbicort    She is on Lisinopril (which can cause cough or exacerbate cough). Noted elevated BP 3/18/2019 visit    She ha not had PFT's.     (1) Placed historical order for Symbicort this encounter    (2) If worse she needs to be seen in clinic or go or ED    MD Peggy

## 2019-03-21 NOTE — TELEPHONE ENCOUNTER
Health Call Center    Phone Message    May a detailed message be left on voicemail: yes    Reason for Call: Symptoms or Concerns     Current symptom or concern: Pt had a terrible coughing fit this week after she saw Dr. Centeno on 3/18/19.  Pt is going on day 23 with the flu/cough, Dr. Centeno gave her asthma pills for the cough, however, she had a coughing fit that lasted two minutes.  Pt has been given an inhaler in the past.  Pt is wondering if she could get an inhaler for the cough. [facility] [address].      Symptoms have been present for:  3 week(s)    Has patient previously been seen for this? Yes    By : Dr. Vj Centeno    Date: 3/18/19    Are there any new or worsening symptoms? Yes      Action Taken: Message routed to:  [facility] JAYCE RIVERA
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
## 2019-04-03 ENCOUNTER — OFFICE VISIT (OUTPATIENT)
Dept: INTERNAL MEDICINE | Facility: CLINIC | Age: 64
End: 2019-04-03
Payer: MEDICARE

## 2019-04-03 ENCOUNTER — MYC MEDICAL ADVICE (OUTPATIENT)
Dept: INTERNAL MEDICINE | Facility: CLINIC | Age: 64
End: 2019-04-03

## 2019-04-03 VITALS
TEMPERATURE: 99 F | HEART RATE: 65 BPM | OXYGEN SATURATION: 99 % | BODY MASS INDEX: 31.42 KG/M2 | WEIGHT: 187.4 LBS | SYSTOLIC BLOOD PRESSURE: 126 MMHG | DIASTOLIC BLOOD PRESSURE: 83 MMHG

## 2019-04-03 DIAGNOSIS — J45.30 REACTIVE AIRWAY DISEASE, MILD PERSISTENT, UNCOMPLICATED: ICD-10-CM

## 2019-04-03 DIAGNOSIS — R05.9 COUGH: ICD-10-CM

## 2019-04-03 DIAGNOSIS — R09.82 POST-NASAL DRIP: Primary | ICD-10-CM

## 2019-04-03 RX ORDER — GUAIFENESIN 400 MG/1
400 TABLET ORAL EVERY 6 HOURS PRN
Qty: 30 TABLET | Refills: 1 | Status: SHIPPED | OUTPATIENT
Start: 2019-04-03 | End: 2019-06-24

## 2019-04-03 RX ORDER — FLUTICASONE PROPIONATE 50 MCG
1 SPRAY, SUSPENSION (ML) NASAL DAILY
Qty: 15.8 ML | Refills: 1 | Status: SHIPPED | OUTPATIENT
Start: 2019-04-03 | End: 2019-06-24

## 2019-04-03 RX ORDER — ALBUTEROL SULFATE 90 UG/1
2 AEROSOL, METERED RESPIRATORY (INHALATION) EVERY 6 HOURS
Qty: 6.7 G | Refills: 1 | Status: SHIPPED | OUTPATIENT
Start: 2019-04-03 | End: 2019-06-24

## 2019-04-03 ASSESSMENT — PAIN SCALES - GENERAL: PAINLEVEL: NO PAIN (0)
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
## 2019-04-03 NOTE — NURSING NOTE
Chief Complaint   Patient presents with     Cough     pt here for a cough     Sinus Problem     pt states she has nasal congestion       Gisella Nugent CMA at 11:21 AM on 4/3/2019.

## 2019-04-03 NOTE — PATIENT INSTRUCTIONS
[facility] Medication Refill Request Information:  * Please contact your pharmacy regarding ANY request for medication refills.  ** [facility] Fax = [phone]  * Please allow 3 business days for routine medication refills.  * Please allow 5 business days for controlled substance medication refills.     [facility] Test Result notification information:  *You will be notified with in 7-10 days of your appointment day regarding the results of your test.  If you are on MyChart you will be notified as soon as the provider has reviewed the results and signed off on them.    [facility]: [phone]

## 2019-04-17 ENCOUNTER — OFFICE VISIT (OUTPATIENT)
Dept: INTERNAL MEDICINE | Facility: CLINIC | Age: 64
End: 2019-04-17
Payer: MEDICARE

## 2019-04-17 VITALS
SYSTOLIC BLOOD PRESSURE: 130 MMHG | WEIGHT: 188.5 LBS | HEART RATE: 59 BPM | DIASTOLIC BLOOD PRESSURE: 83 MMHG | BODY MASS INDEX: 31.6 KG/M2

## 2019-04-17 DIAGNOSIS — R21 RASH AND NONSPECIFIC SKIN ERUPTION: Primary | ICD-10-CM

## 2019-04-17 DIAGNOSIS — R05.9 COUGH: ICD-10-CM

## 2019-04-17 ASSESSMENT — PAIN SCALES - GENERAL: PAINLEVEL: NO PAIN (0)

## 2019-04-17 NOTE — NURSING NOTE
Chief Complaint   Patient presents with     Cough     pt here to follow up on a cough     Ear Problem     pt states she is here to follow up on fluid in ears       Gisella Nugent CMA at 2:05 PM on 4/17/2019.

## 2019-04-17 NOTE — PROGRESS NOTES
[facility]   Dori Oviedo, MEDINA CNP  04/17/2019      Chief Complaint:   Cough and Ear Problem       History of Present Illness:   Sarah Sanford is a 63 year old female with a history of cervical s/p hysterectomy, hepatitis C, depression, and hypothyroidism who presents for follow up on cough. Cough has improved. She felt like Mucinex was too drying and was drinking a lot of water. She is still taking Singulair for the cough but asks if she can stop using this. To relieve ear pressure she plugged her nose and blew which made her ears pop and she felt much better. After winter ended she stopped her humidifier and wondered if this was appropriate. [place] in [place] air will help her immensely.    Has rash on legs that has been present for 2 years and would like cryoglobulins checked. She has a history of Cryoglobulinemia prior to her hepatitis C treatment. Her last hepatology appointment was at the end of the Hepatitis C treatment in 2016. The rash is not painful or irritating to her. Dermatology did not check this when she saw them for a cyst removal. There is also a rash on her face that she treats with a cortisone cream, she does not consistently apply this.     Review of Systems:   Pertinent items are noted in HPI, remainder of complete ROS is negative.      Active Medications:     Current Outpatient Medications:      cetirizine (ZYRTEC) 10 MG tablet, Take 10 mg by mouth daily., Disp: , Rfl:      cholecalciferol (VITAMIN D3) 1000 UNIT tablet, Take 1 tablet (1,000 Units) by mouth daily, Disp: 90 tablet, Rfl: 3     conjugated estrogens (PREMARIN) cream, Place 2 g vaginally daily, Disp: 180 g, Rfl: 3     Foot Care Products (GEL HEEL CUSHIONS WOMENS) PADS, 1 Device daily, Disp: 1 Device, Rfl: 0     hydrochlorothiazide (HYDRODIURIL) 25 MG tablet, Take 1 tablet (25 mg) by mouth daily, Disp: 100 tablet, Rfl: 3     hydrocortisone  (CORTAID) 1 % external cream, Apply topically 2 times daily, Disp: 30 g, Rfl: 1     levothyroxine (SYNTHROID/LEVOTHROID) 50 MCG tablet, Take 1 tablet (50 mcg) by mouth daily, Disp: 90 tablet, Rfl: 3     lisinopril (PRINIVIL/ZESTRIL) 30 MG tablet, Take 1 tablet (30 mg) by mouth At Bedtime, Disp: 90 tablet, Rfl: 1     LORazepam (ATIVAN) 2 MG tablet, Take 2 mg by mouth At Bedtime 2 tablets at night, Disp: , Rfl:      Lysine 1000 MG TABS, Take by mouth 2 times daily , Disp: , Rfl:      montelukast (SINGULAIR) 10 MG tablet, Take 1 tablet (10 mg) by mouth At Bedtime, Disp: 30 tablet, Rfl: 3     order for DME, 1 Device by Device route daily as needed Air filtration system, Disp: 1 Device, Rfl: 0     traMADol (ULTRAM) 50 MG tablet, Take 1-2 tablets ( mg) by mouth every 6 hours as needed, Disp: 110 tablet, Rfl: 0     traZODone (DESYREL) 50 MG tablet, Take 1 mg by mouth At Bedtime , Disp: , Rfl:      albuterol (PROAIR HFA/PROVENTIL HFA/VENTOLIN HFA) 108 (90 Base) MCG/ACT inhaler, Inhale 2 puffs into the lungs every 6 hours (Patient not taking: Reported on 4/17/2019), Disp: 6.7 g, Rfl: 1     budesonide-formoterol (SYMBICORT) 80-4.5 MCG/ACT Inhaler, Inhale 2 puffs into the lungs 2 times daily (Patient not taking: Reported on 4/3/2019), Disp: 1 Inhaler, Rfl: 1     fluticasone (FLONASE) 50 MCG/ACT nasal spray, Spray 1 spray into both nostrils daily (Patient not taking: Reported on 4/17/2019), Disp: 15.8 mL, Rfl: 1     guaiFENesin 400 MG TABS, Take 1 tablet (400 mg) by mouth every 6 hours as needed (for cough) (Patient not taking: Reported on 4/17/2019), Disp: 30 tablet, Rfl: 1     order for DME, Equipment being ordered: Humidifier (Patient not taking: Reported on 4/17/2019), Disp: 1 each, Rfl: 0      Allergies:   No clinical screening - see comments; Erythromycin; Keflex [cephalexin hcl]; Penicillins; Sulfa drugs; and Tetracycline      Past Medical History:  Anemia  Anxiety  Borderline personality disorder  Cervical  cancer  Depression  Hepatitis C  Hypertension  Hypothyroidism  Mixed cryoglobulinemia  Nephrolithiasis  Asthma  Posterior vitreous detachment bilateral eyes  Meralgia paresthetica of left side     Past Surgical History:  Skin biopsy  Cataracts  Cholecystectomy  Genitourinary surgery  Hysterectomy  Vitrectomy     Family History:   Daughter: asthma  Maternal grandmother: cancer  Mother; alcohol.drug, hyperlipidemia, hypertension, psychotic disorder  Maternal grandfather: alcohol/drug     Social History:   Unmarried  Non smoker  History of IV drug use  Visiting retirement pen pal in [place]    Physical Exam:   /83 (BP Location: Right arm, Patient Position: Sitting, Cuff Size: Adult Large)   Pulse 59   Wt 85.5 kg (188 lb 8 oz)   BMI 31.60 kg/m     Constitutional: Alert, oriented, pleasant, no acute distress  Head: Normocephalic, atraumatic  Eyes: Extra-ocular movements intact, pupils equally round and reactive bilaterally, no scleral icterus  Ears: tympanic membranes pearly gray with positive light reflex  ENT: Oropharynx clear, moist mucus membranes, good dentition  Neck: Supple, no lymphadenopathy, no thyromegaly  Cardiovascular: Regular rate and rhythm, no murmurs, rubs or gallops  Respiratory: Good air movement bilaterally, lungs clear, no wheezes/rales/rhonchi  Skin: No lesions. scattered blotchy erythematous rash over bilateral lower extremities  Psychiatric: normal mentation, affect and mood     Assessment and Plan:  Rash and nonspecific skin eruption  Patient has a history of cryoglobulinemia, however this appeared resolved on 2015 labs. She is concerned about a leg rash being present still and would like to see dermatology for this and for the skin on her face.  Encouraged sunscreen use, particularly when in [place] next week.   - DERMATOLOGY REFERRAL    Cough   Resolved. Recommended she continue Singulair through her vacation, stay well-hydrated, avoid any allergens or irritants.     Follow-up: as  needed     Scribe Disclosure:  I, Taz Mcgarry, am serving as a scribe to document services personally performed by MEDINA Le CNP at this visit, based upon the provider's statements to me. All documentation has been reviewed by the aforementioned provider prior to being entered into the official medical record.    Portions of this medical record were completed by a scribe. UPON MY REVIEW AND AUTHENTICATION BY ELECTRONIC SIGNATURE, this confirms (a) I performed the applicable clinical services, and (b) the record is accurate.     MEDINA Le CNP

## 2019-04-17 NOTE — PATIENT INSTRUCTIONS
[facility] Medication Refill Request Information:  * Please contact your pharmacy regarding ANY request for medication refills.  ** [facility] Prescription Fax = [phone]  * Please allow 3 business days for routine medication refills.  * Please allow 5 business days for controlled substance medication refills.     [facility] Test Result notification information:  *You will be notified with in 7-10 days of your appointment day regarding the results of your test.  If you are on MyChart you will be notified as soon as the provider has reviewed the results and signed off on them.    [facility]: [phone]

## 2019-05-06 DIAGNOSIS — E03.9 HYPOTHYROIDISM, UNSPECIFIED TYPE: ICD-10-CM

## 2019-05-07 RX ORDER — LEVOTHYROXINE SODIUM 50 UG/1
50 TABLET ORAL DAILY
Qty: 90 TABLET | Refills: 3 | Status: SHIPPED | OUTPATIENT
Start: 2019-05-07 | End: 2020-01-20

## 2019-05-10 ENCOUNTER — TELEPHONE (OUTPATIENT)
Dept: INTERNAL MEDICINE | Facility: CLINIC | Age: 64
End: 2019-05-10

## 2019-05-10 NOTE — TELEPHONE ENCOUNTER
Health Call Center    Phone Message    May a detailed message be left on voicemail: yes    Reason for Call: Other: Pt wanted to send an FYI to clinic regarding some incoming paperwork from her [employer] insurance. She states Dori had prescribed an albuterol inhaler, which turned out to not be covered by her insurance. Pt received a bill for the medication, and was unable to pay it. The insurance company is sending forms to ask provider why this medication was used rather than something that was covered by pt's insurance. Please advise.    Action Taken: Message routed to:  [facility] PCC

## 2019-05-13 ENCOUNTER — TELEPHONE (OUTPATIENT)
Dept: INTERNAL MEDICINE | Facility: CLINIC | Age: 64
End: 2019-05-13

## 2019-05-14 NOTE — TELEPHONE ENCOUNTER
Central Prior Authorization Team   Phone: [phone]      PA Initiation via fax    Medication: albuterol (PROAIR HFA) 108 (90 Base) MCG/ACT inhaler-PA Pending  Insurance Company: [email or web address] - Phone [phone] Fax [phone]  Pharmacy Filling the Rx: [facility] [place], [place] - [address] [place] [address] [phone]  Filling Pharmacy Phone: [phone]  Filling Pharmacy Fax: [phone]  Start Date: 5/14/2019

## 2019-05-15 NOTE — TELEPHONE ENCOUNTER
PRIOR AUTHORIZATION DENIED    Medication: albuterol (PROAIR HFA) 108 (90 Base) MCG/ACT inhaler-DENIED    Denial Date: 5/14/2019    Denial Rational: Preferred alternative: Ventolin HFA          Appeal Information:

## 2019-05-16 NOTE — TELEPHONE ENCOUNTER
Order currently in chart for albuterol (Proair, Proventil, Ventolin). No need for an alternative rx.    Ayde Govea RN

## 2019-05-20 NOTE — TELEPHONE ENCOUNTER
Patient called and stated that she wants the denial for the Proair appealed since the patient received the medication from the pharmacy on 04/03/19 and was not informed at that time by the pharmacy that the medication would need a PA and she is now receiving a bill from the pharmacy for the cost of the medication. Patient would like a phone call from the clinic regarding what to do about this situation.

## 2019-05-21 NOTE — TELEPHONE ENCOUNTER
I discussed with Carlos (pharmacy) who will reach out to patient to further discuss dispense of non-formulary medication.    Ayde Govea RN

## 2019-06-04 ENCOUNTER — DOCUMENTATION ONLY (OUTPATIENT)
Dept: CARE COORDINATION | Facility: CLINIC | Age: 64
End: 2019-06-04

## 2019-06-24 ENCOUNTER — OFFICE VISIT (OUTPATIENT)
Dept: INTERNAL MEDICINE | Facility: CLINIC | Age: 64
End: 2019-06-24
Payer: MEDICARE

## 2019-06-24 ENCOUNTER — TRANSFERRED RECORDS (OUTPATIENT)
Dept: HEALTH INFORMATION MANAGEMENT | Facility: CLINIC | Age: 64
End: 2019-06-24

## 2019-06-24 VITALS
HEART RATE: 58 BPM | DIASTOLIC BLOOD PRESSURE: 83 MMHG | BODY MASS INDEX: 31.67 KG/M2 | WEIGHT: 188.9 LBS | SYSTOLIC BLOOD PRESSURE: 125 MMHG

## 2019-06-24 DIAGNOSIS — I10 BENIGN ESSENTIAL HYPERTENSION: ICD-10-CM

## 2019-06-24 DIAGNOSIS — D89.1 MIXED CRYOGLOBULINEMIA (H): ICD-10-CM

## 2019-06-24 DIAGNOSIS — E78.5 HYPERLIPIDEMIA, UNSPECIFIED HYPERLIPIDEMIA TYPE: ICD-10-CM

## 2019-06-24 DIAGNOSIS — E03.9 HYPOTHYROIDISM, UNSPECIFIED TYPE: ICD-10-CM

## 2019-06-24 DIAGNOSIS — D89.1 MIXED CRYOGLOBULINEMIA (H): Primary | ICD-10-CM

## 2019-06-24 LAB
CHOLEST SERPL-MCNC: 169 MG/DL
HDLC SERPL-MCNC: 58 MG/DL
LDLC SERPL CALC-MCNC: 100 MG/DL
NONHDLC SERPL-MCNC: 111 MG/DL
TRIGL SERPL-MCNC: 58 MG/DL
TSH SERPL DL<=0.005 MIU/L-ACNC: 1.98 MU/L (ref 0.4–4)

## 2019-06-24 PROCEDURE — 82595 ASSAY OF CRYOGLOBULIN: CPT | Performed by: INTERNAL MEDICINE

## 2019-06-24 RX ORDER — LISINOPRIL 30 MG/1
30 TABLET ORAL AT BEDTIME
Qty: 90 TABLET | Refills: 3 | Status: SHIPPED | OUTPATIENT
Start: 2019-06-24 | End: 2020-01-20

## 2019-06-24 ASSESSMENT — PAIN SCALES - GENERAL: PAINLEVEL: NO PAIN (0)
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
## 2019-06-24 NOTE — PATIENT INSTRUCTIONS
Please go to the lab on the first floor before you leave today.     [facility] [phone] ([facility], 4th Floor N)

## 2019-07-01 ENCOUNTER — OFFICE VISIT (OUTPATIENT)
Dept: DERMATOLOGY | Facility: CLINIC | Age: 64
End: 2019-07-01
Attending: NURSE PRACTITIONER
Payer: MEDICAID

## 2019-07-01 DIAGNOSIS — D18.01 CHERRY ANGIOMA: Primary | ICD-10-CM

## 2019-07-01 DIAGNOSIS — D22.5 MELANOCYTIC NEVUS OF TRUNK: ICD-10-CM

## 2019-07-01 DIAGNOSIS — R23.1 LIVEDO RETICULARIS: ICD-10-CM

## 2019-07-01 LAB — CRYOGLOB SER QL: NEGATIVE %

## 2019-07-01 ASSESSMENT — PAIN SCALES - GENERAL: PAINLEVEL: NO PAIN (0)

## 2019-07-01 NOTE — PROGRESS NOTES
[facility] Dermatology Note    Dermatology Clinic  [facility]  [address] [place] [id number]    Dermatology Problem List:  1. Cherry angioma - right medial calf  -s/p Electrocautery 9/21/16  2. EIC - right breast  -s/p excision 1/31/17  3. Seborrheic keratosis  -s/p cryotherapy 5/5/17  4. Livedo reticularis  5. Folliculitis of the neck     Encounter Date: Jul 1, 2019    CC:  Chief Complaint   Patient presents with     Derm Problem     Sarah is here for rash on legs cryoglobulinemia.     History of Present Illness:  Ms. Sarah Sanford is a 63 year old female who presents as a referral from Dori Oviedo, APRN CNP, for a rash on her legs. This is her first visit with us in over 2 years. Today the pt notes her providers thought this rash was thought to be cryoglobulinemia. In 2015, she was diagnosed with Hep C, which was resolved by her liver clinic. Her legs are Asymptomatic, but darken in extreme temps. In addition, she notes some papules along the neck and right armpit. No other concerns at this time.      Past Medical History:   Patient Active Problem List   Diagnosis     Other chronic pain     Borderline personality disorder (H)     Meralgia paresthetica of left side     Internal derangement of left knee     High blood pressure     Mixed cryoglobulinemia (H)     Anxiety     Depression     History of hepatitis C     Valsalva retinopathy - Right Eye     PVD (posterior vitreous detachment), right     PVD (posterior vitreous detachment), left eye     Senile nuclear sclerosis, bilateral     HSV (herpes simplex virus) infection     Hypothyroidism, unspecified type     Past Medical History:   Diagnosis Date     Anemia     as a cjhild     Anxiety      Arthritis     L knee     Borderline personality disorder (H)      Cervical cancer (H)      Chronic pain     related to hepatitis C     Depression      Hepatitis C     genotype 1, treatment  naive, with Stage 1 fibrosis, acquired via IVDU     Hypertension     treated nonpharmacologiacally     Hypothyroidism, unspecified type 6/7/2017     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     Nephrolithiasis     Hx of stones     Obesity      Uncomplicated asthma     from second smoke in building     Past Surgical History:   Procedure Laterality Date     BIOPSY OF SKIN LESION      liver biopsy in 2011     CATARACT IOL, RT/LT       CHOLECYSTECTOMY       EXTRACORPOREAL SHOCK WAVE LITHOTRIPSY (ESWL)       GENITOURINARY SURGERY      litho     GYN SURGERY      hyst     HYSTERECTOMY      age 31 with cervical removal     PHACOEMULSIFICATION CLEAR CORNEA WITH STANDARD INTRAOCULAR LENS IMPLANT Right 9/29/2017    Procedure: PHACOEMULSIFICATION CLEAR CORNEA WITH STANDARD INTRAOCULAR LENS IMPLANT;  RIGHT EYE PHACOEMULSIFICATION CLEAR CORNEA WITH STANDARD INTRAOCULAR LENS IMPLANT ;  Surgeon: Sylvia Grider MD;  Location: [facility]     VITRECTOMY PARSPLANA WITH 25 GAUGE SYSTEM Right 2/14/2017    Procedure: VITRECTOMY PARSPLANA WITH 25 GAUGE SYSTEM;  Surgeon: Steph Cintron MD;  Location: [facility]       Social History:  Patient reports that she has never smoked. She has never used smokeless tobacco. She reports that she drinks alcohol. She reports that she has current or past drug history. Drug: Marijuana.    Family History:  Family History   Problem Relation Age of Onset     Asthma Daughter      Cancer Maternal Grandmother         female     Alcohol/Drug Mother      Lipids Mother      Hypertension Mother      Psychotic Disorder Mother      Alcohol/Drug Maternal Grandfather      Glaucoma No family hx of      Macular Degeneration No family hx of      Melanoma No family hx of      Skin Cancer No family hx of        Medications:  Current Outpatient Medications   Medication Sig Dispense Refill     cetirizine (ZYRTEC) 10 MG tablet Take 10 mg by mouth daily.       cholecalciferol (VITAMIN D3) 1000 UNIT tablet Take 1  tablet (1,000 Units) by mouth daily 90 tablet 3     conjugated estrogens (PREMARIN) cream Place 2 g vaginally daily 180 g 3     Foot Care Products (GEL HEEL CUSHIONS WOMENS) PADS 1 Device daily 1 Device 0     hydrochlorothiazide (HYDRODIURIL) 25 MG tablet Take 1 tablet (25 mg) by mouth daily 100 tablet 3     hydrocortisone (CORTAID) 1 % external cream Apply topically 2 times daily 30 g 1     levothyroxine (SYNTHROID/LEVOTHROID) 50 MCG tablet Take 1 tablet (50 mcg) by mouth daily 90 tablet 3     lisinopril (PRINIVIL/ZESTRIL) 30 MG tablet Take 1 tablet (30 mg) by mouth At Bedtime 90 tablet 3     LORazepam (ATIVAN) 2 MG tablet Take 2 mg by mouth At Bedtime 2 tablets at night       Lysine 1000 MG TABS Take by mouth 2 times daily        order for DME 1 Device by Device route daily as needed Air filtration system 1 Device 0     traZODone (DESYREL) 50 MG tablet Take 1 mg by mouth At Bedtime        order for DME Equipment being ordered: Humidifier (Patient not taking: Reported on 4/17/2019) 1 each 0       Allergies   Allergen Reactions     No Clinical Screening - See Comments      Pt states she is allergic to all antibiotics which cause a raised red rash that is hot to touch, Pt states can take Levaquin and cefaclor.      Erythromycin Rash     Keflex [Cephalexin Hcl] Rash     Penicillins Rash     Sulfa Drugs Rash     Tetracycline Rash       Review of Systems:  -As per HPI  -Otherwise nml state of health. No other skin concerns.     Physical exam:  Vitals: There were no vitals taken for this visit.  GEN: This is a well developed, well-nourished female in no acute distress, in a pleasant mood.    SKIN: Focused examination of the legs was performed.  - No infiltration mostly vascular appearance of the legs, bilaterally  - Follicular papules of the neck  - Cherry angiomas on the L forehead and neck  - No other lesions of concern on areas examined.     Impression/Plan:  1. Livedo reticularis    No further intervention required.  Patient to report changes.     Discussed the of referral cosmetic     2. Folliculitis of the neck    No further intervention required. Patient to report changes.       3. Benign skin tags and Cherry angioma(s).    No further intervention required. Patient to report changes.       CC Dori Oviedo, APRN CNP  [address] [place] [address] on close of this encounter.      Follow-up PRN.      Staff Involved:    Scribe Disclosure  I, Mann Argueta, am serving as a scribe to document services personally performed by Dr. Patrick Coello, based on data collection and the provider's statements to me.     Start Time: 5:44 PM   End Time: 5:55 PM     I spent a total of 11 min face to face with Sarah Sanford during today's office visit. About 50% of the time was spent counseling the patient and/or coordinating care regarding their allergy.

## 2019-07-01 NOTE — LETTER
7/1/2019       RE: Sarah Sanford  [address]  [address]  [place] [address]     Dear Colleague,    Thank you for referring your patient, Sarah Sanford, to the [facility] DERMATOLOGY at [facility]. Please see a copy of my visit note below.    [facility] Dermatology Note    Dermatology Clinic  [facility]  [address] [place], [place] [id number]    Dermatology Problem List:  1. Cherry angioma - right medial calf  -s/p Electrocautery 9/21/16  2. EIC - right breast  -s/p excision 1/31/17  3. Seborrheic keratosis  -s/p cryotherapy 5/5/17  4. Livedo reticularis  5. Folliculitis of the neck     Encounter Date: Jul 1, 2019    CC:  Chief Complaint   Patient presents with     Derm Problem     Sarah is here for rash on legs cryoglobulinemia.     History of Present Illness:  Ms. Sarah Sanford is a 63 year old female who presents as a referral from Dori Oviedo, APRN CNP, for a rash on her legs. This is her first visit with us in over 2 years. Today the pt notes her providers thought this rash was thought to be cryoglobulinemia. In 2015, she was diagnosed with Hep C, which was resolved by her liver clinic. Her legs are Asymptomatic, but darken in extreme temps. In addition, she notes some papules along the neck and right armpit. No other concerns at this time.      Past Medical History:   Patient Active Problem List   Diagnosis     Other chronic pain     Borderline personality disorder (H)     Meralgia paresthetica of left side     Internal derangement of left knee     High blood pressure     Mixed cryoglobulinemia (H)     Anxiety     Depression     History of hepatitis C     Valsalva retinopathy - Right Eye     PVD (posterior vitreous detachment), right     PVD (posterior vitreous detachment), left eye     Senile nuclear sclerosis, bilateral     HSV (herpes simplex virus) infection     Hypothyroidism,  unspecified type     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     Nephrolithiasis     Hx of stones     Obesity      Uncomplicated asthma     from second smoke in building     Past Surgical History:   Procedure Laterality Date     BIOPSY OF SKIN LESION      liver biopsy in 2011     CATARACT IOL, RT/LT       CHOLECYSTECTOMY       EXTRACORPOREAL SHOCK WAVE LITHOTRIPSY (ESWL)       GENITOURINARY SURGERY      litho     GYN SURGERY      hyst     HYSTERECTOMY      age 31 with cervical removal     PHACOEMULSIFICATION CLEAR CORNEA WITH STANDARD INTRAOCULAR LENS IMPLANT Right 9/29/2017    Procedure: PHACOEMULSIFICATION CLEAR CORNEA WITH STANDARD INTRAOCULAR LENS IMPLANT;  RIGHT EYE PHACOEMULSIFICATION CLEAR CORNEA WITH STANDARD INTRAOCULAR LENS IMPLANT ;  Surgeon: Sylvia Grider MD;  Location: [facility]     VITRECTOMY PARSPLANA WITH 25 GAUGE SYSTEM Right 2/14/2017    Procedure: VITRECTOMY PARSPLANA WITH 25 GAUGE SYSTEM;  Surgeon: Steph Cintron MD;  Location: [facility]       Social History:  Patient reports that she has never smoked. She has never used smokeless tobacco. She reports that she drinks alcohol. She reports that she has current or past drug history. Drug: Marijuana.    Family History:  Family History   Problem Relation Age of Onset     Asthma Daughter      Cancer Maternal Grandmother         female     Alcohol/Drug Mother      Lipids Mother      Hypertension Mother      Psychotic Disorder Mother      Alcohol/Drug Maternal Grandfather      Glaucoma No family hx of      Macular  Degeneration No family hx of      Melanoma No family hx of      Skin Cancer No family hx of        Medications:  Current Outpatient Medications   Medication Sig Dispense Refill     cetirizine (ZYRTEC) 10 MG tablet Take 10 mg by mouth daily.       cholecalciferol (VITAMIN D3) 1000 UNIT tablet Take 1 tablet (1,000 Units) by mouth daily 90 tablet 3     conjugated estrogens (PREMARIN) cream Place 2 g vaginally daily 180 g 3     Foot Care Products (GEL HEEL CUSHIONS WOMENS) PADS 1 Device daily 1 Device 0     hydrochlorothiazide (HYDRODIURIL) 25 MG tablet Take 1 tablet (25 mg) by mouth daily 100 tablet 3     hydrocortisone (CORTAID) 1 % external cream Apply topically 2 times daily 30 g 1     levothyroxine (SYNTHROID/LEVOTHROID) 50 MCG tablet Take 1 tablet (50 mcg) by mouth daily 90 tablet 3     lisinopril (PRINIVIL/ZESTRIL) 30 MG tablet Take 1 tablet (30 mg) by mouth At Bedtime 90 tablet 3     LORazepam (ATIVAN) 2 MG tablet Take 2 mg by mouth At Bedtime 2 tablets at night       Lysine 1000 MG TABS Take by mouth 2 times daily        order for DME 1 Device by Device route daily as needed Air filtration system 1 Device 0     traZODone (DESYREL) 50 MG tablet Take 1 mg by mouth At Bedtime        order for DME Equipment being ordered: Humidifier (Patient not taking: Reported on 4/17/2019) 1 each 0       Allergies   Allergen Reactions     No Clinical Screening - See Comments      Pt states she is allergic to all antibiotics which cause a raised red rash that is hot to touch, Pt states can take Levaquin and cefaclor.      Erythromycin Rash     Keflex [Cephalexin Hcl] Rash     Penicillins Rash     Sulfa Drugs Rash     Tetracycline Rash       Review of Systems:  -As per HPI  -Otherwise nml state of health. No other skin concerns.     Physical exam:  Vitals: There were no vitals taken for this visit.  GEN: This is a well developed, well-nourished female in no acute distress, in a pleasant mood.    SKIN: Focused examination of the  legs was performed.  - No infiltration mostly vascular appearance of the legs, bilaterally  - Follicular papules of the neck  - Cherry angiomas on the L forehead and neck  - No other lesions of concern on areas examined.     Impression/Plan:  1. Livedo reticularis    No further intervention required. Patient to report changes.     Discussed the of referral cosmetic     2. Folliculitis of the neck    No further intervention required. Patient to report changes.       3. Benign skin tags and Cherry angioma(s).    No further intervention required. Patient to report changes.       CC Dori Oviedo, APRN CNP  [address] [place] [address] on close of this encounter.      Follow-up PRN.      Staff Involved:    Scribe Disclosure  I, Mann Argueta, am serving as a scribe to document services personally performed by Dr. Patrick Coello, based on data collection and the provider's statements to me.     Start Time: 5:44 PM   End Time: 5:55 PM     I spent a total of 11 min face to face with Sarah Sanford during today's office visit. About 50% of the time was spent counseling the patient and/or coordinating care regarding their allergy.        Again, thank you for allowing me to participate in the care of your patient.      Sincerely,    Patrick Coello MD

## 2019-07-01 NOTE — NURSING NOTE
Dermatology Rooming Note    Sarah Sanford's goals for this visit include:   Chief Complaint   Patient presents with     Derm Problem     Sarah is here for rash on legs cryoglobulinemia.     Edyta Akbar, CMA

## 2019-07-09 ENCOUNTER — MEDICAL CORRESPONDENCE (OUTPATIENT)
Dept: HEALTH INFORMATION MANAGEMENT | Facility: CLINIC | Age: 64
End: 2019-07-09

## 2019-08-05 ENCOUNTER — OFFICE VISIT (OUTPATIENT)
Dept: OBGYN | Facility: CLINIC | Age: 64
End: 2019-08-05
Attending: OBSTETRICS & GYNECOLOGY
Payer: MEDICARE

## 2019-08-05 VITALS
SYSTOLIC BLOOD PRESSURE: 100 MMHG | DIASTOLIC BLOOD PRESSURE: 66 MMHG | WEIGHT: 192 LBS | HEIGHT: 65 IN | BODY MASS INDEX: 31.99 KG/M2 | HEART RATE: 66 BPM

## 2019-08-05 DIAGNOSIS — I10 ESSENTIAL HYPERTENSION: ICD-10-CM

## 2019-08-05 DIAGNOSIS — N95.2 VAGINAL ATROPHY: ICD-10-CM

## 2019-08-05 DIAGNOSIS — Z12.72 ENCOUNTER FOR FOLLOW-UP VAGINAL PAPANICOLAOU SMEAR: ICD-10-CM

## 2019-08-05 DIAGNOSIS — Z12.39 SCREENING FOR BREAST CANCER: ICD-10-CM

## 2019-08-05 DIAGNOSIS — Z01.411 ENCOUNTER FOR GYNECOLOGICAL EXAMINATION WITH ABNORMAL FINDING: Primary | ICD-10-CM

## 2019-08-05 PROCEDURE — G0476 HPV COMBO ASSAY CA SCREEN: HCPCS | Performed by: OBSTETRICS & GYNECOLOGY

## 2019-08-05 PROCEDURE — G0463 HOSPITAL OUTPT CLINIC VISIT: HCPCS

## 2019-08-05 PROCEDURE — G0145 SCR C/V CYTO,THINLAYER,RESCR: HCPCS | Performed by: OBSTETRICS & GYNECOLOGY

## 2019-08-05 PROCEDURE — 87624 HPV HI-RISK TYP POOLED RSLT: CPT | Performed by: OBSTETRICS & GYNECOLOGY

## 2019-08-05 ASSESSMENT — MIFFLIN-ST. JEOR: SCORE: 1432.98

## 2019-08-05 NOTE — PROGRESS NOTES
"Gynecology Visit Note  8/5/19    Reason for visit: Annual exam, breast and pelvic exam    HPI: Patient is a 62 yo  postmenopausal female with long-standing issues with vaginal dryness assisted with Premarin cream who presents today for annual breast and pelvic exam. She is overall doing well and denies any new concerns.    Obstetric History:  : 3 NSVDs, 2 SAB     Gynecology History:  Menses: s/p hysterectomy  Pap: Had hysterectomy for precancerous changes.  In 6/2014 had NILM, HPV 16 positive pap, had colposcopy which was normal.  Patient had repeat pap smear and HPV testing in 7/20/15 which was NILM and HPV negative.  In 7/2016 she had her 24 month cotesting which was NILM, HPV negative, she is now on routine screening, Pap of vaginal cuff due today  Not sexually active  Vaginal dryness as above treated with Premarin  History of Hepatitis C s/p treatment  STI history: h/o Herpes Simplex Virus, gets oral and occasional judith-anal outbreaks, none recently, Lysine supplement daily helps  Contraception: N/A  Menopause History: Had menopause symptoms after the hysterectomy. No current symptoms.  Incontinence: No significant problems, does wear panty liners, has 1-2 small leaks q6 months     Health Maintenance:  Mammogram history:  Last mammogram 8/2/018 was negative.  Prior mammogram on 7/24/17, no significant change with prior imaging in [id number]. Previously had an epidermal inclusion cyst in the medial inframammary crease of right breast, had 2 surgeries in [place] pre-2013 to remove, removed completely here by Dermatology in 1/2017.     Colonoscopy history:   Last was prior to 2013 in [place], thinks there was a biopsy with abnormal results. Had a bad experience, remembers being awake and \"screaming\" during procedure. Today, reports she had a negative FOBT with her PCP and is not interested in pursuing a colonoscopy at this time.    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     Nephrolithiasis     Hx of stones     Obesity      Uncomplicated asthma     from second smoke in building     Past Surgical History:   Procedure Laterality Date     BIOPSY OF SKIN LESION      liver biopsy in      CATARACT IOL, RT/LT       CHOLECYSTECTOMY       EXTRACORPOREAL SHOCK WAVE LITHOTRIPSY (ESWL)       GENITOURINARY SURGERY      litho     GYN SURGERY      hyst     HYSTERECTOMY      age 31 with cervical removal     PHACOEMULSIFICATION CLEAR CORNEA WITH STANDARD INTRAOCULAR LENS IMPLANT Right 2017    Procedure: PHACOEMULSIFICATION CLEAR CORNEA WITH STANDARD INTRAOCULAR LENS IMPLANT;  RIGHT EYE PHACOEMULSIFICATION CLEAR CORNEA WITH STANDARD INTRAOCULAR LENS IMPLANT ;  Surgeon: Sylvia Grider MD;  Location: [facility]     VITRECTOMY PARSPLANA WITH 25 GAUGE SYSTEM Right 2017    Procedure: VITRECTOMY PARSPLANA WITH 25 GAUGE SYSTEM;  Surgeon: Steph Cintron MD;  Location: [facility]       Current Outpatient Medications on File Prior to Visit:  cetirizine (ZYRTEC) 10 MG tablet Take 10 mg by mouth daily.   cholecalciferol (VITAMIN D3) 1000 UNIT tablet Take 1 tablet (1,000 Units) by mouth daily   [] fluconazole (DIFLUCAN) 150 MG tablet Take 1 tablet (150 mg) by mouth once for 1 dose   Foot Care Products (GEL HEEL CUSHIONS WOMENS) PADS 1 Device daily   hydrochlorothiazide (HYDRODIURIL) 25 MG tablet Take 1 tablet (25 mg) by mouth daily   hydrocortisone (CORTAID) 1 % external cream Apply topically 2 times daily   [] Hylan 48 MG/6ML SOSY injection 1 Syringe by INTRA-ARTICULAR route once for 1 dose  "  levothyroxine (SYNTHROID/LEVOTHROID) 50 MCG tablet Take 1 tablet (50 mcg) by mouth daily   lisinopril (PRINIVIL/ZESTRIL) 30 MG tablet Take 1 tablet (30 mg) by mouth At Bedtime   LORazepam (ATIVAN) 2 MG tablet Take 2 mg by mouth At Bedtime 2 tablets at night   Lysine 1000 MG TABS Take by mouth 2 times daily    order for DME Equipment being ordered: Humidifier (Patient not taking: Reported on 4/17/2019)   order for DME 1 Device by Device route daily as needed Air filtration system   traZODone (DESYREL) 50 MG tablet Take 1 mg by mouth At Bedtime      No current facility-administered medications on file prior to visit.         Allergies   Allergen Reactions     No Clinical Screening - See Comments      Pt states she is allergic to all antibiotics which cause a raised red rash that is hot to touch, Pt states can take Levaquin and cefaclor.      Erythromycin Rash     Keflex [Cephalexin Hcl] Rash     Penicillins Rash     Sulfa Drugs Rash     Tetracycline Rash     Social History     Tobacco Use     Smoking status: Never Smoker     Smokeless tobacco: Never Used   Substance Use Topics     Alcohol use: Yes     Comment: occ.     Drug use: Yes     Types: Marijuana     Comment: IV drug use, heroin, cocaine 30 yrs ago     Family History   Problem Relation Age of Onset     Asthma Daughter      Cancer Maternal Grandmother         female     Alcohol/Drug Mother      Lipids Mother      Hypertension Mother      Psychotic Disorder Mother      Alcohol/Drug Maternal Grandfather      Glaucoma No family hx of      Macular Degeneration No family hx of      Melanoma No family hx of      Skin Cancer No family hx of      ROS:    O:  /66   Pulse 66   Ht 1.645 m (5' 4.76\")   Wt 87.1 kg (192 lb)   BMI 32.19 kg/m     General: NAD, A&Ox3  Lungs: NLB  Breasts: Symmetrical, No lymphadenopathy, skin changes, nipple discharge or nodules appreciated bilaterally  Abdomen: Soft, NT, ND  Genitourinary:   External Genitalia:  General appearance; " normal, Hair distribution; normal, Lesions absent  Urethral Meatus:  Size normal, Location normal  Urethra:  Fullness absent, Masses absent  Bladder:  Fullness absent, Masses absent, Tenderness absent, Cystocele absent  Vagina:  Atrophy in the vestibule and near the urethral metatus, Discharge absent, Lesions absent, Pelvic support normal  Cervix: Surgically removed  Uterus:  History of hysterectomy  Adenexa:  Exam limited due to body habitus     A/P: 64 yo  postmenopausal female presents for annual breast and pelvic exam  1) Normal breast exam and pelvic c/w vaginal atrophy as previously known  2) History of cervical dysplasia s/p hysterectomy: Has had serial pap smears and due for repeat pap of vaginal cuff with cotesting today, if normal and HPV negative, next in 5 years  3) Breast cancer screening: Mammogram ordered today  4) Colon cancer screening: Deferred colonoscopy, reports negative FOBT with PCP recently  5) Vaginal atrophy: Refill today for six boxes of Premarin cream    Lilia Lau, MS3    Pt seen and discussed with Dr. Ayon.     Physician Attestation     I, Apoorva Ayon, was present with the medical student who participated in the service and in the documentation of the note.  I have verified the history and personally performed the physical exam and medical decision making.  I agree with the assessment and plan of care as documented in the note.       Apoorva Ayon MD

## 2019-08-05 NOTE — LETTER
8/5/2019       RE: Sarah Sanford  [address] [place] [address]  [address]  [place] [address]     Dear Colleague,    Thank you for referring your patient, Sarah Sanford, to the [facility] at [facility]. Please see a copy of my visit note below.    Gynecology Visit Note  8/5/19    Reason for visit: Annual exam, breast and pelvic exam    HPI: Patient is a 64 yo  postmenopausal female with long-standing issues with vaginal dryness assisted with Premarin cream who presents today for annual breast and pelvic exam. She is overall doing well and denies any new concerns.    Obstetric History:  : 3 NSVDs, 2 SAB     Gynecology History:  Menses: s/p hysterectomy  Pap: Had hysterectomy for precancerous changes.  In 6/2014 had NILM, HPV 16 positive pap, had colposcopy which was normal.  Patient had repeat pap smear and HPV testing in 7/20/15 which was NILM and HPV negative.  In 7/2016 she had her 24 month cotesting which was NILM, HPV negative, she is now on routine screening, Pap of vaginal cuff due today  Not sexually active  Vaginal dryness as above treated with Premarin  History of Hepatitis C s/p treatment  STI history: h/o Herpes Simplex Virus, gets oral and occasional judith-anal outbreaks, none recently, Lysine supplement daily helps  Contraception: N/A  Menopause History: Had menopause symptoms after the hysterectomy. No current symptoms.  Incontinence: No significant problems, does wear panty liners, has 1-2 small leaks q6 months     Health Maintenance:  Mammogram history:  Last mammogram 8/2/018 was negative.  Prior mammogram on 7/24/17, no significant change with prior imaging in [id number]. Previously had an epidermal inclusion cyst in the medial inframammary crease of right breast, had 2 surgeries in [place] pre-[place] to remove, removed completely here by Dermatology in 1/2017.     Colonoscopy history:   Last was prior to 2013 in [place], thinks there was a biopsy with  "abnormal results. Had a bad experience, remembers being awake and \"screaming\" during procedure. Today, reports she had a negative FOBT with her PCP and is not interested in pursuing a colonoscopy at this time.    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     Nephrolithiasis     Hx of stones     Obesity      Uncomplicated asthma     from second smoke in building     Past Surgical History:   Procedure Laterality Date     BIOPSY OF SKIN LESION      liver biopsy in      CATARACT IOL, RT/LT       CHOLECYSTECTOMY       EXTRACORPOREAL SHOCK WAVE LITHOTRIPSY (ESWL)       GENITOURINARY SURGERY      litho     GYN SURGERY      hyst     HYSTERECTOMY      age 31 with cervical removal     PHACOEMULSIFICATION CLEAR CORNEA WITH STANDARD INTRAOCULAR LENS IMPLANT Right 2017    Procedure: PHACOEMULSIFICATION CLEAR CORNEA WITH STANDARD INTRAOCULAR LENS IMPLANT;  RIGHT EYE PHACOEMULSIFICATION CLEAR CORNEA WITH STANDARD INTRAOCULAR LENS IMPLANT ;  Surgeon: Sylvia Grider MD;  Location: [facility]     VITRECTOMY PARSPLANA WITH 25 GAUGE SYSTEM Right 2017    Procedure: VITRECTOMY PARSPLANA WITH 25 GAUGE SYSTEM;  Surgeon: Steph Cintron MD;  Location: [facility]       Current Outpatient Medications on File Prior to Visit:  cetirizine (ZYRTEC) 10 MG tablet Take 10 mg by mouth daily.   cholecalciferol (VITAMIN D3) 1000 UNIT tablet Take 1 tablet (1,000 Units) by mouth daily   [] fluconazole (DIFLUCAN) 150 MG tablet Take 1 tablet (150 mg) by mouth once for 1 dose   Foot Care Products (GEL HEEL " CUSHIONS WOMENS) PADS 1 Device daily   hydrochlorothiazide (HYDRODIURIL) 25 MG tablet Take 1 tablet (25 mg) by mouth daily   hydrocortisone (CORTAID) 1 % external cream Apply topically 2 times daily   [] Hylan 48 MG/6ML SOSY injection 1 Syringe by INTRA-ARTICULAR route once for 1 dose   levothyroxine (SYNTHROID/LEVOTHROID) 50 MCG tablet Take 1 tablet (50 mcg) by mouth daily   lisinopril (PRINIVIL/ZESTRIL) 30 MG tablet Take 1 tablet (30 mg) by mouth At Bedtime   LORazepam (ATIVAN) 2 MG tablet Take 2 mg by mouth At Bedtime 2 tablets at night   Lysine 1000 MG TABS Take by mouth 2 times daily    order for DME Equipment being ordered: Humidifier (Patient not taking: Reported on 2019)   order for DME 1 Device by Device route daily as needed Air filtration system   traZODone (DESYREL) 50 MG tablet Take 1 mg by mouth At Bedtime      No current facility-administered medications on file prior to visit.         Allergies   Allergen Reactions     No Clinical Screening - See Comments      Pt states she is allergic to all antibiotics which cause a raised red rash that is hot to touch, Pt states can take Levaquin and cefaclor.      Erythromycin Rash     Keflex [Cephalexin Hcl] Rash     Penicillins Rash     Sulfa Drugs Rash     Tetracycline Rash     Social History     Tobacco Use     Smoking status: Never Smoker     Smokeless tobacco: Never Used   Substance Use Topics     Alcohol use: Yes     Comment: occ.     Drug use: Yes     Types: Marijuana     Comment: IV drug use, heroin, cocaine 30 yrs ago     Family History   Problem Relation Age of Onset     Asthma Daughter      Cancer Maternal Grandmother         female     Alcohol/Drug Mother      Lipids Mother      Hypertension Mother      Psychotic Disorder Mother      Alcohol/Drug Maternal Grandfather      Glaucoma No family hx of      Macular Degeneration No family hx of      Melanoma No family hx of      Skin Cancer No family hx of      ROS:    O:  /66   Pulse 66   " Ht 1.645 m (5' 4.76\")   Wt 87.1 kg (192 lb)   BMI 32.19 kg/m      General: NAD, A&Ox3  Lungs: NLB  Breasts: Symmetrical, No lymphadenopathy, skin changes, nipple discharge or nodules appreciated bilaterally  Abdomen: Soft, NT, ND  Genitourinary:   External Genitalia:  General appearance; normal, Hair distribution; normal, Lesions absent  Urethral Meatus:  Size normal, Location normal  Urethra:  Fullness absent, Masses absent  Bladder:  Fullness absent, Masses absent, Tenderness absent, Cystocele absent  Vagina:  Atrophy in the vestibule and near the urethral metatus, Discharge absent, Lesions absent, Pelvic support normal  Cervix: Surgically removed  Uterus:  History of hysterectomy  Adenexa:  Exam limited due to body habitus     A/P: 62 yo  postmenopausal female presents for annual breast and pelvic exam  1) Normal breast exam and pelvic c/w vaginal atrophy as previously known  2) History of cervical dysplasia s/p hysterectomy: Has had serial pap smears and due for repeat pap of vaginal cuff with cotesting today, if normal and HPV negative, next in 5 years  3) Breast cancer screening: Mammogram ordered today  4) Colon cancer screening: Deferred colonoscopy, reports negative FOBT with PCP recently  5) Vaginal atrophy: Refill today for six boxes of Premarin cream    Lilia Lau, MS3    Pt seen and discussed with Dr. Ayon.     Physician Attestation     I, Apoorva Ayon, was present with the medical student who participated in the service and in the documentation of the note.  I have verified the history and personally performed the physical exam and medical decision making.  I agree with the assessment and plan of care as documented in the note.       Apoorva Ayno MD            "

## 2019-08-08 LAB
COPATH REPORT: NORMAL
PAP: NORMAL

## 2019-08-12 LAB
FINAL DIAGNOSIS: NORMAL
HPV HR 12 DNA CVX QL NAA+PROBE: NEGATIVE
HPV16 DNA SPEC QL NAA+PROBE: NEGATIVE
HPV18 DNA SPEC QL NAA+PROBE: NEGATIVE
SPECIMEN DESCRIPTION: NORMAL
SPECIMEN SOURCE CVX/VAG CYTO: NORMAL

## 2019-08-12 NOTE — TELEPHONE ENCOUNTER
"Received request from provider to contact pt regarding concerns about resources for an air filtration system.  Chart reviewed.  Pt received prescription for air filtration system in January but has been denied coverage by her insurance.    Contacted pt by phone to offer support and resources.  Introduced role and inquired about pt's current need for support and services.  Pt identified \"trouble breathing\" due to poor air quality in home as primary concern.    Discussed resources including referral to community agencies.  Offered to contact agencies to support pt's access to financial support for device.  Currently, pt does not have a , [employer] worker, or waiver services, per her report.  She reported that she had applied for waiver services in [facility] earlier this year but was denied.  Denies other needs for case management services.  Has therapy services at [facility] in [place].      Pt responsive to resources offered.  Indicated plan to follow up.    Social work provided pt with contact information and will follow up with pt as appropriate.          " Detail Level: Simple Otc Regimen: Niacinamide 500mg BID\\nOC8 or Sebasorb QD for oil control Initiate Treatment: MCN 135mg QD (ASPN)\\nClindamycin/ BPO QHS (ASPN) Plan: Encouraged patient to think about Accutane in the future. Discussed R/B/SE briefly with patient. Info brochure given to patient. Consider patient to see office in [place] for subsequent visits since she goes to school at [employer]

## 2019-08-26 ENCOUNTER — ANCILLARY PROCEDURE (OUTPATIENT)
Dept: MAMMOGRAPHY | Facility: CLINIC | Age: 64
End: 2019-08-26
Attending: OBSTETRICS & GYNECOLOGY
Payer: MEDICARE

## 2019-08-26 ENCOUNTER — OFFICE VISIT (OUTPATIENT)
Dept: INTERNAL MEDICINE | Facility: CLINIC | Age: 64
End: 2019-08-26
Payer: MEDICARE

## 2019-08-26 VITALS
HEART RATE: 66 BPM | WEIGHT: 193 LBS | DIASTOLIC BLOOD PRESSURE: 85 MMHG | OXYGEN SATURATION: 97 % | SYSTOLIC BLOOD PRESSURE: 137 MMHG | BODY MASS INDEX: 32.36 KG/M2

## 2019-08-26 DIAGNOSIS — R53.83 OTHER FATIGUE: ICD-10-CM

## 2019-08-26 DIAGNOSIS — Z12.39 SCREENING FOR BREAST CANCER: ICD-10-CM

## 2019-08-26 DIAGNOSIS — L73.9 FOLLICULITIS: ICD-10-CM

## 2019-08-26 DIAGNOSIS — L82.0 INFLAMED SEBORRHEIC KERATOSIS: Primary | ICD-10-CM

## 2019-08-26 LAB
ALBUMIN SERPL-MCNC: 4.1 G/DL (ref 3.4–5)
ALP SERPL-CCNC: 65 U/L (ref 40–150)
ALT SERPL W P-5'-P-CCNC: 22 U/L (ref 0–50)
AST SERPL W P-5'-P-CCNC: 12 U/L (ref 0–45)
BILIRUB DIRECT SERPL-MCNC: 0.1 MG/DL (ref 0–0.2)
BILIRUB SERPL-MCNC: 0.4 MG/DL (ref 0.2–1.3)
CK SERPL-CCNC: 60 U/L (ref 30–225)
CORTIS SERPL-MCNC: 9.5 UG/DL (ref 4–22)
MAGNESIUM SERPL-MCNC: 2.3 MG/DL (ref 1.6–2.3)
PROT SERPL-MCNC: 8.1 G/DL (ref 6.8–8.8)

## 2019-08-26 PROCEDURE — 82533 TOTAL CORTISOL: CPT | Performed by: INTERNAL MEDICINE

## 2019-08-26 ASSESSMENT — PAIN SCALES - GENERAL: PAINLEVEL: WORST PAIN (10)

## 2019-08-26 NOTE — NURSING NOTE
Chief Complaint   Patient presents with     Derm Problem     Pt has a spot on face she would like looked at.      Fatigue     Pt is tired all the time.      Erica Alvarado LPN at 4:00 PM on 8/26/2019.
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
## 2019-08-26 NOTE — PATIENT INSTRUCTIONS
[facility] Medication Refill Request Information:  * Please contact your pharmacy regarding ANY request for medication refills.  ** [facility] Prescription Fax = [phone]  * Please allow 3 business days for routine medication refills.  * Please allow 5 business days for controlled substance medication refills.     [facility] Test Result notification information:  *You will be notified with in 7-10 days of your appointment day regarding the results of your test.  If you are on MyChart you will be notified as soon as the provider has reviewed the results and signed off on them.    [facility]: [phone]

## 2019-08-28 ENCOUNTER — OFFICE VISIT (OUTPATIENT)
Dept: DERMATOLOGY | Facility: CLINIC | Age: 64
End: 2019-08-28
Attending: INTERNAL MEDICINE
Payer: MEDICARE

## 2019-08-28 DIAGNOSIS — L30.9 DERMATITIS: ICD-10-CM

## 2019-08-28 DIAGNOSIS — L82.0 INFLAMED SEBORRHEIC KERATOSIS: Primary | ICD-10-CM

## 2019-08-28 RX ORDER — TRIAMCINOLONE ACETONIDE 1 MG/G
CREAM TOPICAL 2 TIMES DAILY
Qty: 80 G | Refills: 0 | Status: SHIPPED | OUTPATIENT
Start: 2019-08-28 | End: 2019-10-17

## 2019-08-28 ASSESSMENT — PAIN SCALES - GENERAL: PAINLEVEL: NO PAIN (0)

## 2019-08-28 NOTE — NURSING NOTE
"Dermatology Rooming Note    Sarah Sanford's goals for this visit include:   Chief Complaint   Patient presents with     Derm Problem     Seborrheic keratosis near right eye.     Derm Problem     Karin notes her back is very itchy. She notes that her back has felt \"burning\"     Aiyana Montilla, DEL  "

## 2019-08-28 NOTE — LETTER
"8/28/2019       RE: Sarah Sanford  [address] [place] [address]  [address]  [place] [address]     Dear Colleague,    Thank you for referring your patient, Sarah Sanford, to the [facility] DERMATOLOG[facility] at [facility]. Please see a copy of my visit note below.    [facility] Dermatology Note      Dermatology Problem List:  1. Cherry angioma - right medial calf  -s/p Electrocautery 9/21/16  2. Epidermoid cyst - right breast  -s/p excision 1/31/17  3. Livedo reticularis  4. Folliculitis of the neck and back  5. Inflamed seborrheic keratosis lateral to the R lateral canthus   -s/p cryo on 8/28/19       Encounter Date: Aug 28, 2019    CC:  Chief Complaint   Patient presents with     Derm Problem     Seborrheic keratosis near right eye.     Derm Problem     Karin notes her back is very itchy. She notes that her back has felt \"burning\"         History of Present Illness:  Ms. Sarah Sanford is a 63 year old female who presents as a referral from Vj Centeno MD, for evaluation of a an inflamed seborrheic keratosis near her right eye and folliculitis on her back. The patient reports that she has developed a crusty brown lesion near her right eye earlier this year. She scratched at it, and it flaked off. After she scratched at it, it bled a fair amount. She notes that she has had red lesions on her back which have been present for months which she would like evaluated. She states that for the past two weeks, she states that she was experiencing a burning pain on her back described as \"like fire ants were living in holes on my back.\" This past weekend, the burning pain resolved slightly, and she has since continued to have residual itching. Of note, she shares the clothing washing facilities. She states that she has sensitive skin, and she typically applies OTC Aveno moisturizer to her skin. She has tried applying topical OTC hydrocortisone, but this did not provide any relief, and " it made her symptoms worse. She notes that she has gained approximately 30 lbs in the past two years. The patient voices no other concerns.       Past Medical History:   Patient Active Problem List   Diagnosis     Other chronic pain     Borderline personality disorder (H)     Meralgia paresthetica of left side     Internal derangement of left knee     High blood pressure     Mixed cryoglobulinemia (H)     Anxiety     Depression     History of hepatitis C     Valsalva retinopathy - Right Eye     PVD (posterior vitreous detachment), right     PVD (posterior vitreous detachment), left eye     Senile nuclear sclerosis, bilateral     HSV (herpes simplex virus) infection     Hypothyroidism, unspecified type     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     Nephrolithiasis     Hx of stones     Obesity      Uncomplicated asthma     from second smoke in building     Past Surgical History:   Procedure Laterality Date     BIOPSY OF SKIN LESION      liver biopsy in 2011     CATARACT IOL, RT/LT       CHOLECYSTECTOMY       EXTRACORPOREAL SHOCK WAVE LITHOTRIPSY (ESWL)       GENITOURINARY SURGERY      litho     GYN SURGERY      hyst     HYSTERECTOMY      age 31 with cervical removal     PHACOEMULSIFICATION CLEAR CORNEA WITH STANDARD INTRAOCULAR LENS IMPLANT Right 9/29/2017    Procedure: PHACOEMULSIFICATION CLEAR CORNEA WITH STANDARD INTRAOCULAR LENS IMPLANT;  RIGHT EYE PHACOEMULSIFICATION CLEAR CORNEA WITH STANDARD INTRAOCULAR LENS IMPLANT ;  Surgeon: Sylvia Grider MD;  Location: [facility]  PARSPLANA WITH 25 GAUGE SYSTEM Right 2/14/2017    Procedure: VITRECTOMY PARSPLANA WITH 25 GAUGE SYSTEM;  Surgeon: Steph Cintron MD;  Location: [facility]       Social History:  Patient reports that she has never smoked. She has never used smokeless tobacco. She reports that she drinks alcohol. She reports that she has current or past drug history. Drug: Marijuana.    Family History:  Family History   Problem Relation Age of Onset     Asthma Daughter      Cancer Maternal Grandmother         female     Alcohol/Drug Mother      Lipids Mother      Hypertension Mother      Psychotic Disorder Mother      Alcohol/Drug Maternal Grandfather      Glaucoma No family hx of      Macular Degeneration No family hx of      Melanoma No family hx of      Skin Cancer No family hx of        Medications:  Current Outpatient Medications   Medication Sig Dispense Refill     cetirizine (ZYRTEC) 10 MG tablet Take 10 mg by mouth daily.       cholecalciferol (VITAMIN D3) 1000 UNIT tablet Take 1 tablet (1,000 Units) by mouth daily 90 tablet 3     conjugated estrogens (PREMARIN) 0.625 MG/GM vaginal cream Place 2 g vaginally daily 180 g 3     Foot Care Products (GEL HEEL CUSHIONS WOMENS) PADS 1 Device daily 1 Device 0     hydrochlorothiazide (HYDRODIURIL) 25 MG tablet Take 1 tablet (25 mg) by mouth daily 100 tablet 3     hydrocortisone (CORTAID) 1 % external cream Apply topically 2 times daily 30 g 1     levothyroxine (SYNTHROID/LEVOTHROID) 50 MCG tablet Take 1 tablet (50 mcg) by mouth daily 90 tablet 3     lisinopril (PRINIVIL/ZESTRIL) 30 MG tablet Take 1 tablet (30 mg) by mouth At Bedtime 90 tablet 3     LORazepam (ATIVAN) 2 MG tablet Take 2 mg by mouth At Bedtime 2 tablets at night       Lysine 1000 MG TABS Take by mouth 2 times daily        order for DME 1 Device by Device route daily as needed Air filtration system 1 Device 0     traZODone (DESYREL) 50 MG tablet Take 1 mg by mouth At Bedtime        triamcinolone (KENALOG) 0.1 %  external cream Apply topically 2 times daily 80 g 0     order for DME Equipment being ordered: Humidifier (Patient not taking: Reported on 8/26/2019) 1 each 0       Allergies   Allergen Reactions     No Clinical Screening - See Comments      Pt states she is allergic to all antibiotics which cause a raised red rash that is hot to touch, Pt states can take Levaquin and cefaclor.      Erythromycin Rash     Keflex [Cephalexin Hcl] Rash     Penicillins Rash     Sulfa Drugs Rash     Tetracycline Rash       Review of Systems:  -Constitutional: Otherwise feeling well today, in usual state of health.  -HEENT: Patient denies nonhealing oral sores.  -Skin: As above in HPI. No additional skin concerns.    Physical exam:  Vitals: There were no vitals taken for this visit.  GEN: This is a well developed, well-nourished female in no acute distress, in a pleasant mood.    SKIN: Focused examination of the face and back was performed.  -Sheridan skin type: II  -2 erythematous folliculocentric pustules scattered on the L mid back on background erythematous plaque   -There is an excoriated brown waxy papule lateral to the R lateral canthus.    -No other lesions of concern on areas examined.       Impression/Plan:  1. Seborrheic keratosis, inflamed, lateral to the R lateral canthus    Discussed the natural etiology and provided reassurances about the benign nature of the lesion.    Cryotherapy procedure note: After verbal consent and discussion of risks and benefits including but no limited to dyspigmentation/scar, blister, and pain, 1 was(were) treated with 1-2mm freeze border for 2 cycles with liquid nitrogen. Post cryotherapy instructions were provided.      2. Dermatitis on the back    We discussed the natural etiology of the condition as well as the risks, benefits, and efficacy of treatment with a topical anti-inflammatory.     Recommended the use of OTC topical moisturizers, gentle soaps, as well as free and clear  detergents    Start: triamcinolone 0.1% cream up to BID      CC Vj Centeno MD  [address] [place] [address] on close of this encounter.  Follow-up prn for new or changing lesions.       Staff Involved:  Resident/Scribe/Staff    Scribe Disclosure  I, Dominic Najjar, am serving as a scribe to document services personally performed by Dr. Abran Lehman MD, based on data collection and the provider's statements to me.    Staff Physician Comments:   I saw and evaluated the patient with the resident and I edited the assessment and plan as documented in the note. I was present for the entire minor procedure and examination.    Provider Disclosure:   The documentation recorded by the scribe accurately reflects the services I personally performed and the decisions made by me.    Abran Lehman MD   of Dermatology  Department of Dermatology  [facility]

## 2019-08-28 NOTE — PATIENT INSTRUCTIONS
We will freeze the spot next to the right eye today. This is a seborrheic keratosis which are common and harmless. This may require multiple rounds of freezing and you can return if the spot persists. The spots on the back have some background folliculitis (mild irritation of the hair follicle), but there appears to be some irritation in the area. We will target the itch with a topical steroid medication; you could also use a free and clear detergent to decrease the irritation in the area. You could use a spatula or a back applicator (purchase at any pharmacy).    Cryotherapy    What is it?    Use of a very cold liquid, such as liquid nitrogen, to freeze and destroy abnormal skin cells that need to be removed    What should I expect?    Tenderness and redness    A small blister that might grow and fill with dark purple blood. There may be crusting.    More than one treatment may be needed if the lesions do not go away.    How do I care for the treated area?    Gently wash the area with your hands when bathing.    Use a thin layer of Vaseline to help with healing. You may use a Band-Aid.     The area should heal within 7-10 days and may leave behind a pink or lighter color.     Do not use an antibiotic or Neosporin ointment.     You may take acetaminophen (Tylenol) for pain.     Call your Doctor if you have:    Severe pain    Signs of infection (warmth, redness, cloudy yellow drainage, and or a bad smell)    Questions or concerns    Who should I call with questions?       [facility]: [phone]       [facility]: [phone]       For urgent needs outside of business hours call the [facility] at [phone]        and ask for the dermatology resident on call      Recommendations for dry skin and dermatitis   1. Bathe or shower daily in lukewarm water  2. Use a gentle non-soap detergent cleanser  - Soaps are alkaline (which can irritate  sensitive skin) and remove natural moisturizing factors   - Recommended products, in no particular order, include:   - Bars:    - Aveeno Moisturizing Bar    - Cetaphil Gentle Cleansing Bar    - Dove Sensitive Skin Unscented Beauty Bar    - Olay Ultra Moisture Bar   - Liquid Cleansers:    - Aquanil Cleanser    - CeraVe Hydrating Cleanser    - Cetaphil Gentle Skin Cleanser  - Avoid scented soaps or bath additives unless your doctor tells you otherwise  - Focus on washing the face, underarms, and underwear areas; other sites usually do not need frequent washing  3. Rinse off thoroughly, then pat dry until skin is slightly damp  4. Apply moisturizer to damp skin within 3-5 minutes of exiting the bath/shower  - Recommended products, in no particular order, include:   - Creams (thicker, likely the best balance of effectiveness and feel)    - Vanicream    - AmLactin Ultra Hydrating Body Cream    - Cetaphil    - Eucerin    - CeraVe Itch Relief Moisturizing Cream   - Ointments (thickest)    - Vaseline  5. If prescribed a topical steroid medication, this may be applied before or after the moisturizer (whichever order you prefer)  6. Reapply moisturizer one or two additional times throughout the day when dry skin is present; once this improves, reduce to daily or every other day as needed to prevent recurrence  7. If dry skin or dermatitis is present on the hands, keep moisturizer near the sink and apply after washing and drying your hands  8. A humidifier may be helpful during the winter months (when ambient humidity is very low)

## 2019-08-28 NOTE — PROGRESS NOTES
"[facility] Dermatology Note      Dermatology Problem List:  1. Cherry angioma - right medial calf  -s/p Electrocautery 9/21/16  2. Epidermoid cyst - right breast  -s/p excision 1/31/17  3. Livedo reticularis  4. Folliculitis of the neck and back  5. Inflamed seborrheic keratosis lateral to the R lateral canthus   -s/p cryo on 8/28/19       Encounter Date: Aug 28, 2019    CC:  Chief Complaint   Patient presents with     Derm Problem     Seborrheic keratosis near right eye.     Derm Problem     Karin notes her back is very itchy. She notes that her back has felt \"burning\"         History of Present Illness:  Ms. Sarah Sanford is a 63 year old female who presents as a referral from Vj Centeno MD, for evaluation of a an inflamed seborrheic keratosis near her right eye and folliculitis on her back. The patient reports that she has developed a crusty brown lesion near her right eye earlier this year. She scratched at it, and it flaked off. After she scratched at it, it bled a fair amount. She notes that she has had red lesions on her back which have been present for months which she would like evaluated. She states that for the past two weeks, she states that she was experiencing a burning pain on her back described as \"like fire ants were living in holes on my back.\" This past weekend, the burning pain resolved slightly, and she has since continued to have residual itching. Of note, she shares the clothing washing facilities. She states that she has sensitive skin, and she typically applies OTC Aveno moisturizer to her skin. She has tried applying topical OTC hydrocortisone, but this did not provide any relief, and it made her symptoms worse. She notes that she has gained approximately 30 lbs in the past two years. The patient voices no other concerns.       Past Medical History:   Patient Active Problem List   Diagnosis     Other chronic pain     Borderline personality disorder (H)     Meralgia " paresthetica of left side     Internal derangement of left knee     High blood pressure     Mixed cryoglobulinemia (H)     Anxiety     Depression     History of hepatitis C     Valsalva retinopathy - Right Eye     PVD (posterior vitreous detachment), right     PVD (posterior vitreous detachment), left eye     Senile nuclear sclerosis, bilateral     HSV (herpes simplex virus) infection     Hypothyroidism, unspecified type     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     Nephrolithiasis     Hx of stones     Obesity      Uncomplicated asthma     from second smoke in building     Past Surgical History:   Procedure Laterality Date     BIOPSY OF SKIN LESION      liver biopsy in 2011     CATARACT IOL, RT/LT       CHOLECYSTECTOMY       EXTRACORPOREAL SHOCK WAVE LITHOTRIPSY (ESWL)       GENITOURINARY SURGERY      litho     GYN SURGERY      hyst     HYSTERECTOMY      age 31 with cervical removal     PHACOEMULSIFICATION CLEAR CORNEA WITH STANDARD INTRAOCULAR LENS IMPLANT Right 9/29/2017    Procedure: PHACOEMULSIFICATION CLEAR CORNEA WITH STANDARD INTRAOCULAR LENS IMPLANT;  RIGHT EYE PHACOEMULSIFICATION CLEAR CORNEA WITH STANDARD INTRAOCULAR LENS IMPLANT ;  Surgeon: Sylvia Grider MD;  Location: [facility]     VITRECTOMY PARSPLANA WITH 25 GAUGE SYSTEM Right 2/14/2017    Procedure: VITRECTOMY PARSPLANA WITH 25 GAUGE SYSTEM;  Surgeon: Steph Cintron MD;  Location: [facility]       Social History:  Patient reports that she has never smoked. She has never used smokeless tobacco. She reports that she  drinks alcohol. She reports that she has current or past drug history. Drug: Marijuana.    Family History:  Family History   Problem Relation Age of Onset     Asthma Daughter      Cancer Maternal Grandmother         female     Alcohol/Drug Mother      Lipids Mother      Hypertension Mother      Psychotic Disorder Mother      Alcohol/Drug Maternal Grandfather      Glaucoma No family hx of      Macular Degeneration No family hx of      Melanoma No family hx of      Skin Cancer No family hx of        Medications:  Current Outpatient Medications   Medication Sig Dispense Refill     cetirizine (ZYRTEC) 10 MG tablet Take 10 mg by mouth daily.       cholecalciferol (VITAMIN D3) 1000 UNIT tablet Take 1 tablet (1,000 Units) by mouth daily 90 tablet 3     conjugated estrogens (PREMARIN) 0.625 MG/GM vaginal cream Place 2 g vaginally daily 180 g 3     Foot Care Products (GEL HEEL CUSHIONS WOMENS) PADS 1 Device daily 1 Device 0     hydrochlorothiazide (HYDRODIURIL) 25 MG tablet Take 1 tablet (25 mg) by mouth daily 100 tablet 3     hydrocortisone (CORTAID) 1 % external cream Apply topically 2 times daily 30 g 1     levothyroxine (SYNTHROID/LEVOTHROID) 50 MCG tablet Take 1 tablet (50 mcg) by mouth daily 90 tablet 3     lisinopril (PRINIVIL/ZESTRIL) 30 MG tablet Take 1 tablet (30 mg) by mouth At Bedtime 90 tablet 3     LORazepam (ATIVAN) 2 MG tablet Take 2 mg by mouth At Bedtime 2 tablets at night       Lysine 1000 MG TABS Take by mouth 2 times daily        order for DME 1 Device by Device route daily as needed Air filtration system 1 Device 0     traZODone (DESYREL) 50 MG tablet Take 1 mg by mouth At Bedtime        triamcinolone (KENALOG) 0.1 % external cream Apply topically 2 times daily 80 g 0     order for DME Equipment being ordered: Humidifier (Patient not taking: Reported on 8/26/2019) 1 each 0       Allergies   Allergen Reactions     No Clinical Screening - See Comments      Pt states she is allergic to all antibiotics  which cause a raised red rash that is hot to touch, Pt states can take Levaquin and cefaclor.      Erythromycin Rash     Keflex [Cephalexin Hcl] Rash     Penicillins Rash     Sulfa Drugs Rash     Tetracycline Rash       Review of Systems:  -Constitutional: Otherwise feeling well today, in usual state of health.  -HEENT: Patient denies nonhealing oral sores.  -Skin: As above in HPI. No additional skin concerns.    Physical exam:  Vitals: There were no vitals taken for this visit.  GEN: This is a well developed, well-nourished female in no acute distress, in a pleasant mood.    SKIN: Focused examination of the face and back was performed.  -Sheridan skin type: II  -2 erythematous folliculocentric pustules scattered on the L mid back on background erythematous plaque   -There is an excoriated brown waxy papule lateral to the R lateral canthus.    -No other lesions of concern on areas examined.       Impression/Plan:  1. Seborrheic keratosis, inflamed, lateral to the R lateral canthus    Discussed the natural etiology and provided reassurances about the benign nature of the lesion.    Cryotherapy procedure note: After verbal consent and discussion of risks and benefits including but no limited to dyspigmentation/scar, blister, and pain, 1 was(were) treated with 1-2mm freeze border for 2 cycles with liquid nitrogen. Post cryotherapy instructions were provided.      2. Dermatitis on the back    We discussed the natural etiology of the condition as well as the risks, benefits, and efficacy of treatment with a topical anti-inflammatory.     Recommended the use of OTC topical moisturizers, gentle soaps, as well as free and clear detergents    Start: triamcinolone 0.1% cream up to BID      CC Vj Centeno MD  [address] [place] [address] on close of this encounter.  Follow-up prn for new or changing lesions.       Staff Involved:  Resident/Scribe/Staff    Scribe Disclosure  I, Dominic Najjar, am serving  as a scribe to document services personally performed by Dr. Abran Lehman MD, based on data collection and the provider's statements to me.    Staff Physician Comments:   I saw and evaluated the patient with the resident and I edited the assessment and plan as documented in the note. I was present for the entire minor procedure and examination.    Provider Disclosure:   The documentation recorded by the scribe accurately reflects the services I personally performed and the decisions made by me.    Abran Lehman MD   of Dermatology  Department of Dermatology  [facility]

## 2019-10-17 ENCOUNTER — TELEPHONE (OUTPATIENT)
Dept: DERMATOLOGY | Facility: CLINIC | Age: 64
End: 2019-10-17

## 2019-10-17 ENCOUNTER — OFFICE VISIT (OUTPATIENT)
Dept: DERMATOLOGY | Facility: CLINIC | Age: 64
End: 2019-10-17
Payer: MEDICARE

## 2019-10-17 DIAGNOSIS — R20.2 NOTALGIA PARESTHETICA: Primary | ICD-10-CM

## 2019-10-17 RX ORDER — LIDOCAINE 40 MG/G
CREAM TOPICAL
Qty: 45 G | Refills: 3 | Status: SHIPPED | OUTPATIENT
Start: 2019-10-17

## 2019-10-17 ASSESSMENT — PAIN SCALES - GENERAL: PAINLEVEL: NO PAIN (0)

## 2019-10-17 NOTE — PROGRESS NOTES
"[facility] Dermatology Note    Dermatology Clinic  [facility]  Clinics and Surgery Center  [address] [place] [id number]    Dermatology Problem List:  1. Cherry angioma - right medial calf  -s/p Electrocautery 9/21/16  2. Epidermoid cyst - right breast  -s/p excision 1/31/17  3. Livedo reticularis  4. Inflamed seborrheic keratosis lateral to the R lateral canthus              -s/p cryo on 8/28/19  5. Notalgia paraesthetica   - recommended: stretching exercises, Sarna, lidocaine (patient to see if any coverd by insurance); failed TMC 0.1% cream and hydrocortisone 1%     Encounter Date: Oct 17, 2019    CC:  Chief Complaint   Patient presents with     Derm Problem     Folliculitis of the neck and back.       History of Present Illness:  Ms. Sarah Sanford is a 63 year old female who presents as a follow-up for itchy red lesions on the back. The patient was last seen in clinic by Dr. Lehman on 8/28/19. At the time she had inflamed SK on the R lateral canthus that was treated with cryotherapy. She also complained of red lesions on the back, with pruritis and a burning sensation. She tried OTC hydrocortisone without relief and noted her symptoms worsened. OTC topical moisturizers were recommended, as well as gentle soaps and Free and Clear detergents. The pt was also started on triamcinolone 0.1% cream BID.     Today the pt reports that she has the itchy rash on the back is ongoing and she also has a tender \"spot.\" She reports that applying the triamcinolone to the back is difficult since she does it by herself, and it is not helping. She has also changed to Free and Clear detergents without relief.  Pt reports stopping the triamcinolone in the interim. This has been ongoing for months and it is very bothersome.     The patient is otherwise feeling well. There are no other skin concerns at this time.      Past Medical History:   Patient Active Problem List   Diagnosis     " Other chronic pain     Borderline personality disorder (H)     Meralgia paresthetica of left side     Internal derangement of left knee     High blood pressure     Mixed cryoglobulinemia (H)     Anxiety     Depression     History of hepatitis C     Valsalva retinopathy - Right Eye     PVD (posterior vitreous detachment), right     PVD (posterior vitreous detachment), left eye     Senile nuclear sclerosis, bilateral     HSV (herpes simplex virus) infection     Hypothyroidism, unspecified type     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     Nephrolithiasis     Hx of stones     Obesity      Uncomplicated asthma     from second smoke in building     Past Surgical History:   Procedure Laterality Date     BIOPSY OF SKIN LESION      liver biopsy in 2011     CATARACT IOL, RT/LT       CHOLECYSTECTOMY       EXTRACORPOREAL SHOCK WAVE LITHOTRIPSY (ESWL)       GENITOURINARY SURGERY      litho     GYN SURGERY      hyst     HYSTERECTOMY      age 31 with cervical removal     PHACOEMULSIFICATION CLEAR CORNEA WITH STANDARD INTRAOCULAR LENS IMPLANT Right 9/29/2017    Procedure: PHACOEMULSIFICATION CLEAR CORNEA WITH STANDARD INTRAOCULAR LENS IMPLANT;  RIGHT EYE PHACOEMULSIFICATION CLEAR CORNEA WITH STANDARD INTRAOCULAR LENS IMPLANT ;  Surgeon: Sylvia Grider MD;  Location: [facility]     VITRECTOMY PARSPLANA WITH 25 GAUGE SYSTEM Right 2/14/2017    Procedure: VITRECTOMY PARSPLANA WITH 25 GAUGE SYSTEM;  Surgeon: Steph Cintron MD;  Location: [facility]       Social History:  Patient reports that she has  never smoked. She has never used smokeless tobacco. She reports current alcohol use. She reports current drug use. Drug: Marijuana.    Family History:  Family History   Problem Relation Age of Onset     Asthma Daughter      Cancer Maternal Grandmother         female     Alcohol/Drug Mother      Lipids Mother      Hypertension Mother      Psychotic Disorder Mother      Alcohol/Drug Maternal Grandfather      Glaucoma No family hx of      Macular Degeneration No family hx of      Melanoma No family hx of      Skin Cancer No family hx of        Medications:  Current Outpatient Medications   Medication Sig Dispense Refill     cetirizine (ZYRTEC) 10 MG tablet Take 10 mg by mouth daily.       cholecalciferol (VITAMIN D3) 1000 UNIT tablet Take 1 tablet (1,000 Units) by mouth daily 90 tablet 3     conjugated estrogens (PREMARIN) 0.625 MG/GM vaginal cream Place 2 g vaginally daily 180 g 3     Foot Care Products (GEL HEEL CUSHIONS WOMENS) PADS 1 Device daily 1 Device 0     hydrochlorothiazide (HYDRODIURIL) 25 MG tablet Take 1 tablet (25 mg) by mouth daily 100 tablet 3     hydrocortisone (CORTAID) 1 % external cream Apply topically 2 times daily 30 g 1     levothyroxine (SYNTHROID/LEVOTHROID) 50 MCG tablet Take 1 tablet (50 mcg) by mouth daily 90 tablet 3     lisinopril (PRINIVIL/ZESTRIL) 30 MG tablet Take 1 tablet (30 mg) by mouth At Bedtime 90 tablet 3     LORazepam (ATIVAN) 2 MG tablet Take 2 mg by mouth At Bedtime 2 tablets at night       Lysine 1000 MG TABS Take by mouth 2 times daily        order for DME 1 Device by Device route daily as needed Air filtration system 1 Device 0     order for DME Equipment being ordered: Humidifier (Patient not taking: Reported on 8/26/2019) 1 each 0     traZODone (DESYREL) 50 MG tablet Take 1 mg by mouth At Bedtime        triamcinolone (KENALOG) 0.1 % external cream Apply topically 2 times daily (Patient not taking: Reported on 10/17/2019) 80 g 0       Allergies   Allergen Reactions     No  Clinical Screening - See Comments      Pt states she is allergic to all antibiotics which cause a raised red rash that is hot to touch, Pt states can take Levaquin and cefaclor.      Erythromycin Rash     Keflex [Cephalexin Hcl] Rash     Penicillins Rash     Sulfa Drugs Rash     Tetracycline Rash       Review of Systems:  -As per HPI  -Constitutional: Otherwise feeling well today, in usual state of health.  -Skin: As above in HPI. No additional skin concerns.    Physical exam:  GEN: This is a well developed, well-nourished female in no acute distress, in a pleasant mood.    SKIN: Focused examination of the face, neck, back was performed.  -[place] skin type: II  - Left mid-back with light brown patch composed of follicular-based light brown macules  -No other lesions of concern on areas examined.       Impression/Plan:  1. 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 Discussed at length with patient today the natural history and etiology of this condition as well as its recalcitrance to treatment. Offered options of stretching exercises, topicals such as capsaicin, Sarna, and lidocaine, as well as gabapentin. The majority of these options are not desirable as it is difficult for her to apply creams and she had a previous bad experience with gabapentin. She did not appreciate being told to use a spatula to apply and did not want to purchase L'applique. Additionally cost is an issue for her and as many of the topicals are over the counter, they are not affordable. Today we provided the stretching exercises and will try to see if Sarna is covered by insurance. If it is not, then we will try lidocaine. If neither is covered, then we unfortunately do not have great options as she cannot afford to buy them over the counter. She will call when she would like to be re-evaluated.     Recommended stretching exercises. Printout provided.    Prescription sent for Sarna to see if covered by insurance.    If not  covered, then will send lidocaine cream.    Declined topical capsaicin and gabapentin.    CC Dr. Lehman on close of encounter.    Follow-up as needed. The patient will give us a call if/when she would like to be re-evaluated.    Over 20 minutes was spent during this visit, including >50% of face-to-face time with the patient counseling and coordinating care including the discussion of diagnosis, natural history, treatment, and prognosis as documented above.      Staff Involved:    Scribe Disclosure  I, Apoorva Shen, am serving as a scribe to document services personally performed by Dr. Vika Carpenter, based on data collection and the provider's statements to me.     Provider Disclosure:   The documentation recorded by the scribe accurately reflects the services I personally performed and the decisions made by me.    Vika Carpenter MD    Department of Dermatology  [facility]: Phone: [phone], Fax: [phone]

## 2019-10-17 NOTE — LETTER
"10/17/2019       RE: Sarah Sanford  [address]  [address]  [place] [address]     Dear Colleague,    Thank you for referring your patient, Sarah Sanford, to the [facility] DERMATOLOGY at [facility]. Please see a copy of my visit note below.    [facility] Dermatology Note    Dermatology Clinic  [facility]  Clinics and Surgery Center  [address] [place] [id number]    Dermatology Problem List:  1. Cherry angioma - right medial calf  -s/p Electrocautery 9/21/16  2. Epidermoid cyst - right breast  -s/p excision 1/31/17  3. Livedo reticularis  4. Inflamed seborrheic keratosis lateral to the R lateral canthus              -s/p cryo on 8/28/19  5. Notalgia paraesthetica   - recommended: stretching exercises, Sarna, lidocaine (patient to see if any coverd by insurance); failed TMC 0.1% cream and hydrocortisone 1%     Encounter Date: Oct 17, 2019    CC:  Chief Complaint   Patient presents with     Derm Problem     Folliculitis of the neck and back.       History of Present Illness:  Ms. Sarah Sanford is a 63 year old female who presents as a follow-up for itchy red lesions on the back. The patient was last seen in clinic by Dr. Lehman on 8/28/19. At the time she had inflamed SK on the R lateral canthus that was treated with cryotherapy. She also complained of red lesions on the back, with pruritis and a burning sensation. She tried OTC hydrocortisone without relief and noted her symptoms worsened. OTC topical moisturizers were recommended, as well as gentle soaps and Free and Clear detergents. The pt was also started on triamcinolone 0.1% cream BID.     Today the pt reports that she has the itchy rash on the back is ongoing and she also has a tender \"spot.\" She reports that applying the triamcinolone to the back is difficult since she does it by herself, and it is not helping. She has also changed to Free and Clear detergents without " relief.  Pt reports stopping the triamcinolone in the interim. This has been ongoing for months and it is very bothersome.     The patient is otherwise feeling well. There are no other skin concerns at this time.      Past Medical History:   Patient Active Problem List   Diagnosis     Other chronic pain     Borderline personality disorder (H)     Meralgia paresthetica of left side     Internal derangement of left knee     High blood pressure     Mixed cryoglobulinemia (H)     Anxiety     Depression     History of hepatitis C     Valsalva retinopathy - Right Eye     PVD (posterior vitreous detachment), right     PVD (posterior vitreous detachment), left eye     Senile nuclear sclerosis, bilateral     HSV (herpes simplex virus) infection     Hypothyroidism, unspecified type     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     Nephrolithiasis     Hx of stones     Obesity      Uncomplicated asthma     from second smoke in building     Past Surgical History:   Procedure Laterality Date     BIOPSY OF SKIN LESION      liver biopsy in 2011     CATARACT IOL, RT/LT       CHOLECYSTECTOMY       EXTRACORPOREAL SHOCK WAVE LITHOTRIPSY (ESWL)       GENITOURINARY SURGERY      litho     GYN SURGERY      hyst     HYSTERECTOMY      age 31 with cervical removal     PHACOEMULSIFICATION CLEAR CORNEA WITH STANDARD INTRAOCULAR LENS IMPLANT Right 9/29/2017    Procedure: PHACOEMULSIFICATION CLEAR CORNEA WITH STANDARD INTRAOCULAR LENS IMPLANT;  RIGHT EYE PHACOEMULSIFICATION CLEAR CORNEA WITH STANDARD INTRAOCULAR LENS  IMPLANT ;  Surgeon: Sylvia Grider MD;  Location: [facility]     VITRECTOMY PARSPLANA WITH 25 GAUGE SYSTEM Right 2/14/2017    Procedure: VITRECTOMY PARSPLANA WITH 25 GAUGE SYSTEM;  Surgeon: Steph Cintron MD;  Location: [facility]       Social History:  Patient reports that she has never smoked. She has never used smokeless tobacco. She reports current alcohol use. She reports current drug use. Drug: Marijuana.    Family History:  Family History   Problem Relation Age of Onset     Asthma Daughter      Cancer Maternal Grandmother         female     Alcohol/Drug Mother      Lipids Mother      Hypertension Mother      Psychotic Disorder Mother      Alcohol/Drug Maternal Grandfather      Glaucoma No family hx of      Macular Degeneration No family hx of      Melanoma No family hx of      Skin Cancer No family hx of        Medications:  Current Outpatient Medications   Medication Sig Dispense Refill     cetirizine (ZYRTEC) 10 MG tablet Take 10 mg by mouth daily.       cholecalciferol (VITAMIN D3) 1000 UNIT tablet Take 1 tablet (1,000 Units) by mouth daily 90 tablet 3     conjugated estrogens (PREMARIN) 0.625 MG/GM vaginal cream Place 2 g vaginally daily 180 g 3     Foot Care Products (GEL HEEL CUSHIONS WOMENS) PADS 1 Device daily 1 Device 0     hydrochlorothiazide (HYDRODIURIL) 25 MG tablet Take 1 tablet (25 mg) by mouth daily 100 tablet 3     hydrocortisone (CORTAID) 1 % external cream Apply topically 2 times daily 30 g 1     levothyroxine (SYNTHROID/LEVOTHROID) 50 MCG tablet Take 1 tablet (50 mcg) by mouth daily 90 tablet 3     lisinopril (PRINIVIL/ZESTRIL) 30 MG tablet Take 1 tablet (30 mg) by mouth At Bedtime 90 tablet 3     LORazepam (ATIVAN) 2 MG tablet Take 2 mg by mouth At Bedtime 2 tablets at night       Lysine 1000 MG TABS Take by mouth 2 times daily        order for DME 1 Device by Device route daily as needed Air filtration system 1 Device 0     order for DME Equipment being ordered: Humidifier (Patient  not taking: Reported on 8/26/2019) 1 each 0     traZODone (DESYREL) 50 MG tablet Take 1 mg by mouth At Bedtime        triamcinolone (KENALOG) 0.1 % external cream Apply topically 2 times daily (Patient not taking: Reported on 10/17/2019) 80 g 0       Allergies   Allergen Reactions     No Clinical Screening - See Comments      Pt states she is allergic to all antibiotics which cause a raised red rash that is hot to touch, Pt states can take Levaquin and cefaclor.      Erythromycin Rash     Keflex [Cephalexin Hcl] Rash     Penicillins Rash     Sulfa Drugs Rash     Tetracycline Rash       Review of Systems:  -As per HPI  -Constitutional: Otherwise feeling well today, in usual state of health.  -Skin: As above in HPI. No additional skin concerns.    Physical exam:  GEN: This is a well developed, well-nourished female in no acute distress, in a pleasant mood.    SKIN: Focused examination of the face, neck, back was performed.  -[place] skin type: II  - Left mid-back with light brown patch composed of follicular-based light brown macules  -No other lesions of concern on areas examined.       Impression/Plan:  1. 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 Discussed at length with patient today the natural history and etiology of this condition as well as its recalcitrance to treatment. Offered options of stretching exercises, topicals such as capsaicin, Sarna, and lidocaine, as well as gabapentin. The majority of these options are not desirable as it is difficult for her to apply creams and she had a previous bad experience with gabapentin. She did not appreciate being told to use a spatula to apply and did not want to purchase L'applique. Additionally cost is an issue for her and as many of the topicals are over the counter, they are not affordable. Today we provided the stretching exercises and will try to see if Sarna is covered by insurance. If it is not, then we will try lidocaine. If neither is  covered, then we unfortunately do not have great options as she cannot afford to buy them over the counter. She will call when she would like to be re-evaluated.     Recommended stretching exercises. Printout provided.    Prescription sent for Fred to see if covered by insurance.    If not covered, then will send lidocaine cream.    Declined topical capsaicin and gabapentin.    CC Dr. Lehman on close of encounter.    Follow-up as needed. The patient will give us a call if/when she would like to be re-evaluated.    Over 20 minutes was spent during this visit, including >50% of face-to-face time with the patient counseling and coordinating care including the discussion of diagnosis, natural history, treatment, and prognosis as documented above.      Staff Involved:    Scribe Disclosure  I, Apoorva Ac, am serving as a scribe to document services personally performed by Dr. Vika Carpenter, based on data collection and the provider's statements to me.     Provider Disclosure:   The documentation recorded by the scribe accurately reflects the services I personally performed and the decisions made by me.    Vika Carpenter MD    Department of Dermatology  [facility]: Phone: [phone], Fax: [phone]

## 2019-10-17 NOTE — PATIENT INSTRUCTIONS
"I think this is something called \"notalgia paresthetica\" which can lead to \"macular amyloidosis\".  This is a tough condition to treat.  We don't know what causes it.  Some things that can help target anti-itch/anti-nerve.  Options - stretching exercises, topicals (Sarna, lidocaine), or pills (gabapentin).      " PHYSICAL EXAM/HISTORY OF PRESENT ILLNESS/RESULTS/INTAKE ASSESSMENT/SCREENINGS/REVIEW OF SYSTEMS/PROGRESS NOTE/HIV/PAST MEDICAL/SURGICAL/SOCIAL HISTORY/DISPOSITION

## 2019-10-17 NOTE — NURSING NOTE
Chief Complaint   Patient presents with     Derm Problem     Folliculitis of the neck and back.     Do White CMA

## 2019-10-17 NOTE — TELEPHONE ENCOUNTER
[facility] Call Center    Phone Message    May a detailed message be left on voicemail: yes    Reason for Call: Other: PT called in today and stated that the Rx sent over for camphor-menthol (DERMASARRA) 0.5-0.5 % external lotion is not covered by pt insurance. Pt is hoping that a different Rx be sent instead to the [facility] on file. Please advise    Action Taken: Message routed to:  [facility] ([facility]): Derm

## 2019-10-18 NOTE — TELEPHONE ENCOUNTER
Please let patient know I sent the lidocaine cream to the pharmacy.  If that is not covered either, her options would be to buy the Sarna or lidocaine cream over the counter as we discussed at her visit.

## 2019-10-18 NOTE — TELEPHONE ENCOUNTER
I called the patient and I left a message asking that she call back. She needs to be given the information below.   Jenna Anton, [facility]

## 2019-11-15 ENCOUNTER — OFFICE VISIT (OUTPATIENT)
Dept: INTERNAL MEDICINE | Facility: CLINIC | Age: 64
End: 2019-11-15
Payer: MEDICARE

## 2019-11-15 VITALS
SYSTOLIC BLOOD PRESSURE: 123 MMHG | HEART RATE: 65 BPM | DIASTOLIC BLOOD PRESSURE: 83 MMHG | BODY MASS INDEX: 32.86 KG/M2 | OXYGEN SATURATION: 96 % | WEIGHT: 196 LBS

## 2019-11-15 DIAGNOSIS — K62.5 RECTAL BLEEDING: ICD-10-CM

## 2019-11-15 DIAGNOSIS — K64.4 EXTERNAL HEMORRHOIDS: Primary | ICD-10-CM

## 2019-11-15 DIAGNOSIS — Z12.11 SPECIAL SCREENING FOR MALIGNANT NEOPLASMS, COLON: ICD-10-CM

## 2019-11-15 ASSESSMENT — PAIN SCALES - GENERAL: PAINLEVEL: WORST PAIN (10)

## 2019-11-15 NOTE — PATIENT INSTRUCTIONS
[facility] Medication Refill Request Information:  * Please contact your pharmacy regarding ANY request for medication refills.  ** [facility] Prescription Fax = [phone]  * Please allow 3 business days for routine medication refills.  * Please allow 5 business days for controlled substance medication refills.     [facility] Test Result notification information:  *You will be notified with in 7-10 days of your appointment day regarding the results of your test.  If you are on MyChart you will be notified as soon as the provider has reviewed the results and signed off on them.    [facility]: [phone]   Patient Education     Treating Hemorrhoids: Self-Care    Follow your healthcare provider s advice about caring for your hemorrhoids at home. Some treatments help relieve symptoms right away. Others involve making changes in your diet and exercise habits. These can help ease constipation and prevent hemorrhoid symptoms from coming back.  Relieving symptoms  Your healthcare provider may prescribe anti-inflammatory medicine to help ease your symptoms. The following tips will also help relieve pain and swelling.    Take sitz baths. Taking a sitz bath means sitting in a few inches of warm bath water. Soaking for 10 minutes twice a day can provide welcome relief from painful hemorrhoids. It can also help the area stay clean.    Develop good bowel habits. Use the bathroom when you need to. Don t ignore the urge to move your bowels. This can lead to constipation, hard stools, and straining. Also, don t read while on the toilet. Sit only as long as needed. Wipe gently with soft, unscented toilet tissue or baby wipes.    Use ice packs. Placing an ice pack on a thrombosed external hemorrhoid can help relieve pain right away. It will also help reduce the blood clot. Use the ice for 15 to 20 minutes at a time. Keep a cloth between the ice and your skin to prevent skin damage.    Use other  measures. Laxatives and enemas can help ease constipation. But use them only on your healthcare provider s advice. For symptom relief, try using cotton pads soaked in witch hazel. These are available at most drugstores. Over-the-counter hemorrhoid ointments and petroleum jelly can also provide relief.  Add fiber to your diet  Adding fiber to your diet can help relieve constipation by making stools softer and easier to pass. To increase your fiber intake, your healthcare provider may recommend a bulking agent, such as psyllium. This is a high-fiber supplement available at most grocery stores and drugstores. Eating more fiber-rich foods will also help. There are two types of fiber:    Insoluble fiber is the main ingredient in bulking agents. It s also found in foods such as wheat bran, whole-grain breads, fresh fruits, and vegetables.    Soluble fiber is found in foods such as oat bran. Although soluble fiber is good for you, it may not ease constipation as much as foods high in insoluble fiber.  Drink more water  Along with a high-fiber diet, drinking more water can help ease constipation. This is because insoluble fiber absorbs water, making stools soft and bulky. Be sure to drink plenty of water throughout the day. Drinking fruit juices, such as prune juice or apple juice, can also help prevent constipation.  Get more exercise  Regular exercise aids digestion and helps prevent constipation. It s also great for your health. So talk with your healthcare provider about starting an exercise program. Low-impact activities, such as swimming or walking, are good places to start. Take it easy at first. And remember to drink plenty of water when you exercise.  High-fiber foods  High-fiber foods offer many benefits. By making your stools softer, they help heal and prevent swollen hemorrhoids. They may also help reduce the risk of colon and rectal cancer. Best of all, they re usually low in calories and taste great. Here are  some examples of fiber-rich foods.    Whole grains, such as wheat bran, corn bran, and brown rice.    Vegetables, especially carrots, broccoli, cabbage, and peas.    Fruits, such as apples, bananas, raisins, peaches, and pears.    Nuts and legumes, especially peanuts, lentils, and kidney beans.  Easy ways to add fiber  The tips below offer some simple ways to add more high-fiber foods to your meals.    Start your day with a high-fiber breakfast. Eat a wheat bran cereal along with a sliced banana. Or, try peanut butter on whole-wheat toast.    Eat carrot sticks for snacks. They re easy to prepare, taste great, and are low in calories.    Use whole-grain breads instead of white bread for sandwiches.    Eat fruits for treats. Try an apple and some raisins instead of a candy bar.   Date Last Reviewed: 7/1/2016 2000-2018 [employer]. [address] [place] [address], [place] [address]. All rights reserved. This information is not intended as a substitute for professional medical care. Always follow your healthcare professional's instructions.

## 2019-11-15 NOTE — NURSING NOTE
Chief Complaint   Patient presents with     Rectal Problem     pt here for rectal bleeding since monday and stomach problems       Talat Butts CMA, EMT at 3:17 PM on 11/15/2019.

## 2019-11-15 NOTE — PROGRESS NOTES
"[facility] [place]   Dori ECKERTDorothy Oviedo, MEDINA CNP  11/15/2019      Chief Complaint:   Rectal Problem       History of Present Illness:   Sarah Sanford is a 64 year old female with a history of anemia, borderline personality disorder, and chronic pain who presents for evaluation of blood in the stool.    Blood in the stool: She has been having bright red rectal bleeding with bowel movements for 4 days. It was dripping into the toilet bowl, but was not coloring the stool. She called an RN friend, who advised her that it was likely hemorrhoids or a small tear at her rectum. She has had hemorrhoids in the past. She uses washcloths when wiping after her bowel movements at home, and thinks she may have scrubbed too hard. She denies seeing black or tarry stools. She denies pain with bowel movements, but she has burning stomach pain with very noisy digestion every time she eats. She had a colonoscopy done in [place] in 2010 (brings report with her today), with a recommended 3 year follow up for a \"1 cm anal mass 1-2 cm from anal verge, non-circumfirential and located at posterior bowel wall\"; she admits that she was \"hiding\" this information from her healthcare team here in [place] because she wanted to avoid another colonoscopy. However, now she is concerned with her recent rectal bleeding. She had normal FIT test in Aug 2018. She is hesitant to have another colonoscopy, because the last one was very painful and she was \"screaming\" during the procedure.     Mental health: She has had increased stress and anxiety since new [facility] neighbors have moved in. She lives in a senior home, but the upstairs neighbor moved in with a large family with children. She has emailed and spoken with management. She does not want to call the police, because the management told her it would get worse if she did. She wants to move out of [place], potentially to [place], [place], [place], or [place]. She moved to [place] " initially to see her granddaughter, but she has been having some issues with her granddaughter's mother. She has a therapist to deal with this stress and her mental health, which is helpful. She says she has no desire to harm herself or anyone else, but she sometimes wishes she wouldn't wake up. She wants someone to care for her.    Left knee: Her left knee has been swollen and painful since it has gotten cold. Orthopedics recommended a knee replacement, but she does not have any social support to help her with the aftercare of the procedure. The orthopedist said she could stay in a facility, but she is worried because she will not be able to see her therapist while she is there. She thinks if she went through with the procedure, she might be in a dark enough place due to her lack of social support that she might harm herself.     Waiver: She cannot keep up with some of her daily tasks, so she is completing [place] Waiver assessment to see if she can get some help. She has gone through this before, but was ultimately denied. She plans to bring her medical records to the meeting to support her claims.    Review of Systems:   Pertinent items are noted in HPI or as in patient entered ROS below, remainder of complete ROS is negative.     Active Medications:     Current Outpatient Medications:      cetirizine (ZYRTEC) 10 MG tablet, Take 10 mg by mouth daily., Disp: , Rfl:      cholecalciferol (VITAMIN D3) 1000 UNIT tablet, Take 1 tablet (1,000 Units) by mouth daily, Disp: 90 tablet, Rfl: 3     conjugated estrogens (PREMARIN) 0.625 MG/GM vaginal cream, Place 2 g vaginally daily, Disp: 180 g, Rfl: 3     Foot Care Products (GEL HEEL CUSHIONS WOMENS) PADS, 1 Device daily, Disp: 1 Device, Rfl: 0     hydrochlorothiazide (HYDRODIURIL) 25 MG tablet, Take 1 tablet (25 mg) by mouth daily, Disp: 100 tablet, Rfl: 3     hydrocortisone (PROCTO-JULIANNE) 1 % CREA cream, Place rectally 2 times daily as needed for itching or other (hemorrhoid),  Disp: 28.4 g, Rfl: 1     levothyroxine (SYNTHROID/LEVOTHROID) 50 MCG tablet, Take 1 tablet (50 mcg) by mouth daily, Disp: 90 tablet, Rfl: 3     lisinopril (PRINIVIL/ZESTRIL) 30 MG tablet, Take 1 tablet (30 mg) by mouth At Bedtime, Disp: 90 tablet, Rfl: 3     LORazepam (ATIVAN) 2 MG tablet, Take 2 mg by mouth At Bedtime 2 tablets at night, Disp: , Rfl:      Lysine 1000 MG TABS, Take by mouth 2 times daily , Disp: , Rfl:      order for DME, Equipment being ordered: Humidifier, Disp: 1 each, Rfl: 0     order for DME, 1 Device by Device route daily as needed Air filtration system, Disp: 1 Device, Rfl: 0     traZODone (DESYREL) 50 MG tablet, Take 1 mg by mouth At Bedtime , Disp: , Rfl:      camphor-menthol (DERMASARRA) 0.5-0.5 % external lotion, Apply every 6 hours as needed for itch. (Patient not taking: Reported on 11/15/2019), Disp: 222 mL, Rfl: 11     hydrocortisone (CORTAID) 1 % external cream, Apply topically 2 times daily (Patient not taking: Reported on 11/15/2019), Disp: 30 g, Rfl: 1     lidocaine (LMX4) 4 % external cream, Apply small amount to back twice daily as needed. (Patient not taking: Reported on 11/15/2019), Disp: 45 g, Rfl: 3      Allergies:   No clinical screening - see comments; Erythromycin; Keflex [cephalexin hcl]; Penicillins; Sulfa drugs; and Tetracycline      Past Medical History:  Anemia   Anxiety   Arthritis  Borderline personality disorder  Cervical cancer  Chronic pain  Depression  Hepatitis C  Hypertension   Hypothyroidism   Mixed cryoglobulinemia   Nephrolithiasis   Obesity   Chronic pain  Meralgia paresthetic of left side  Internal derangement of left knee   Depression   Valsalva retinopathy, right eye  Posterior vitreous attachment, bilateral   Senile nuclear sclerosis, bilateral  HSV infection      Past Surgical History:  Biopsy of skin lesion  Cataract IOL  Cholecystectomy   Extracorporeal shock wave lithotripsy  Hysterectomy   Phacoemulsification clear cornea with standard IOL:  9/2017  Vitrectomy parsplana with 25 gauge system: 2/2017    Family History:   Asthma: daughter  Cancer: maternal grandmother   Alcohol/drug: mother, maternal grandfather   Lipids: mother  Hypertension: mother  Psychotic disorder: mother      Social History:   Smoking status: never smoker  Alcohol use: occasional   Drug use: marijuana, history of IV drug use, heroin, cocaine      Physical Exam:   /83   Pulse 65   Wt 88.9 kg (196 lb)   SpO2 96%   BMI 32.86 kg/m     Constitutional: Alert, oriented, pleasant, no acute distress  Head: Normocephalic, atraumatic  Eyes: Extra-ocular movements intact, pupils equally round and reactive bilaterally, no scleral icterus  ENT: Oropharynx clear, moist mucus membranes, good dentition  Cardiovascular: Regular rate and rhythm, no murmurs, rubs or gallops  Respiratory: Good air movement bilaterally, lungs clear, no wheezes/rales/rhonchi  GI: Abdomen soft, bowel sounds present, nondistended, nontender, no organomegaly or masses, no rebound/guarding  Rectal: multiple external non-thrombosed hemorrhoids with superficial abrasion over hemorrhoid at 11 o' clock position, KWADWO deferred to avoid further trauma.   Psychiatric: normal mentation, affect and mood     Assessment and Plan:  External hemorrhoids, Rectal bleeding  She has been experiencing rectal bleeding for the past 4 days. On exam, hemorrhoids and a superficial abrasion were present, suspect bleeding related to scrubbing with washcloth. Gave hydrocortisone cream to reduce inflammation. Deferred digital rectal exam to avoid further trauma to the area.   - hydrocortisone (PROCTO-JULIANNE) 1 % CREA cream  Dispense: 28.4 g; Refill: 1    Special screening for malignant neoplasms, colon  She had a colonoscopy in 2010 with a recommended 3-year follow-up, but has not yet had another. She has had a normal FIT test in August 2018. She is amenable to a colonoscopy if she is properly sedated, as she was awake during the last procedure  and was in significant pain. She has a hx PTSD and reports a remote hx of IV drug use >30 years ago.  - GASTROENTEROLOGY ADULT REF PROCEDURE ONLY    Follow-up:after colonoscopy, sooner as needed     Scribe Disclosure:  I, Raina Jackson, am serving as a scribe to document services personally performed by MEDINA Le CNP at this visit, based upon the provider's statements to me. All documentation has been reviewed by the aforementioned provider prior to being entered into the official medical record.     Portions of this medical record were completed by a scribe. UPON MY REVIEW AND AUTHENTICATION BY ELECTRONIC SIGNATURE, this confirms (a) I performed the applicable clinical services, and (b) the record is accurate.     MEDINA Le CNP

## 2019-11-18 ENCOUNTER — TELEPHONE (OUTPATIENT)
Dept: INTERNAL MEDICINE | Facility: CLINIC | Age: 64
End: 2019-11-18

## 2019-11-18 ENCOUNTER — HOSPITAL ENCOUNTER (OUTPATIENT)
Facility: CLINIC | Age: 64
End: 2019-11-18
Attending: INTERNAL MEDICINE | Admitting: INTERNAL MEDICINE
Payer: MEDICARE

## 2019-11-18 NOTE — TELEPHONE ENCOUNTER
Prior Authorization Retail Medication Request    Medication/Dose: hydrocortisone (PROCTO-JULIANNE) 1 % CREA cream  Rectally BID PRN  ICD code (if different than what is on RX):  External hemorrhoids [K64.4]  - Primary   Previously Tried and Failed:    Rationale:      Insurance Name:    Insurance ID:        Pharmacy Information (if different than what is on RX)  Name:    Phone:

## 2019-11-20 NOTE — TELEPHONE ENCOUNTER
Prior Authorization Not Needed per Insurance. Left a voicemail for pharmacy with this info and my direct contact info if needing anything further.         Medication: hydrocortisone (PROCTO-JULIANNE) 1 % CREA cream  Insurance Company: [employer] - Phone [phone] Fax [phone]  Expected CoPay:      Pharmacy Filling the Rx: [id number] - [place], [place] - [address] [place] [address] AT [facility] [address]  Pharmacy Notified: Yes - via voicemail  Patient Notified:

## 2019-11-21 ENCOUNTER — OFFICE VISIT (OUTPATIENT)
Dept: OPHTHALMOLOGY | Facility: CLINIC | Age: 64
End: 2019-11-21
Attending: OPHTHALMOLOGY
Payer: MEDICARE

## 2019-11-21 DIAGNOSIS — H35.89 RETINAL MACULAR ATROPHY: Primary | ICD-10-CM

## 2019-11-21 PROCEDURE — 92134 CPTRZ OPH DX IMG PST SGM RTA: CPT | Mod: ZF | Performed by: OPHTHALMOLOGY

## 2019-11-21 PROCEDURE — G0463 HOSPITAL OUTPT CLINIC VISIT: HCPCS | Mod: ZF

## 2019-11-21 ASSESSMENT — VISUAL ACUITY
OS_CC: 20/30
OS_CC+: +2
METHOD: SNELLEN - LINEAR
OD_CC: 20/20
CORRECTION_TYPE: GLASSES

## 2019-11-21 ASSESSMENT — REFRACTION_WEARINGRX
OS_CYLINDER: SPHERE
OS_SPHERE: +0.50
OD_CYLINDER: +0.50
OD_SPHERE: -0.75
OD_AXIS: 160

## 2019-11-21 ASSESSMENT — CUP TO DISC RATIO
OS_RATIO: 0.2
OD_RATIO: 0.2

## 2019-11-21 ASSESSMENT — EXTERNAL EXAM - RIGHT EYE: OD_EXAM: NORMAL

## 2019-11-21 ASSESSMENT — CONF VISUAL FIELD
OD_NORMAL: 1
OS_NORMAL: 1

## 2019-11-21 ASSESSMENT — EXTERNAL EXAM - LEFT EYE: OS_EXAM: NORMAL

## 2019-11-21 ASSESSMENT — SLIT LAMP EXAM - LIDS
COMMENTS: NORMAL
COMMENTS: NORMAL

## 2019-11-21 ASSESSMENT — TONOMETRY
OD_IOP_MMHG: 10
IOP_METHOD: TONOPEN
OS_IOP_MMHG: 11

## 2019-11-21 NOTE — PROGRESS NOTES
"CC: status post PPV/EL/FAX OD (2/14/17) for vitreous hemorrhage possibly secondary to neovascularization after BRVO.  Status post Panretinal laser photocoagulation (PRP) filling last eye exam    Sarah Sanford is a 63 year old female here for new floaters who presented over the weekend and was diagnosed with recurrent vitreous hemorrhage right eye. Reports that on 1/12/18 she noticed a bunch of new floaters, no flashes, with burning irritation went away. She awoke over the weekend 1/20/18 and noticed new floaters everywhere, hundreds, persistent, vision is foggy, like a curtain, no flashes. Denies eye pain, epiphora.  Patient continues to have these symptoms in the right eye with fog/generalized blur and floaters.     Interim: reports persistent floater right eye \"like a black olive\". VA stable    OCT  11/21/19   RE: Noisy exam, inferotemporal thinning- retina atrophic changes  (stable), otherwise normal contour  LE: Normal    fluorescein angiography 08/15/18   Right eye: inferior temporal macula area of capillary non perfusion and inferior temporal peripheral laser scars; no neovascularization elsewhere   Left eye: Normal AV and choroidal filling     Fundus photos and Autofluorescence: consistent with exam     Assessment and plan:    1. status post PPV/EL/FAX OD (2/14/17) for vitreous hemorrhage possibly secondary to history of  BRVO.  - retina attached, patient doing well  No cystoid macular edema     2. posterior chamber intraocular lens (PCIOL) with  Posterior capsular opacity (PCO) right eye   posterior capsular opacity (PCO) likely contributing to \"fog\" symptoms and visual obscuration. posterior capsular opacity (PCO) over visual axis.  Status post yag  Open capsule - doing well     3. resolved vitreous hemorrhage right eye    symptoms improving   -No signs of retinal tear, hole, or detachment.   - Status post  laser filling right eye 3.1.18  Doing well  Continue to observe    4. Cataract left eye   Becoming " visually significant  Observe    5. Dry eye - right especially   - continue artificial tears   - warm compresses     Follow up in 6-9 months with Optical Coherence Tomography     ~~~~~~~~~~~~~~~~~~~~~~~~~~~~~~~~~~   Complete documentation of historical and exam elements from today's encounter can be found in the full encounter summary report (not reduplicated in this progress note).  I personally obtained the chief complaint(s) and history of present illness.  I confirmed and edited as necessary the review of systems, past medical/surgical history, family history, social history, and examination findings as documented by others; and I examined the patient myself.  I personally reviewed the relevant tests, images, and reports as documented above.  I personally reviewed the ophthalmic test(s) associated with this encounter, agree with the interpretation(s) as documented by the resident/fellow, and have edited the corresponding report(s) as necessary.   I formulated and edited as necessary the assessment and plan and discussed the findings and management plan with the patient and family    Steph Cintron MD  .  Retina Service   Department of Ophthalmology and Visual Neurosciences   [facility]  Phone: [phone]   Fax: [phone]

## 2019-11-21 NOTE — NURSING NOTE
"Chief Complaints and History of Present Illnesses   Patient presents with     Branch Retinal Vein Occlusion Follow Up     Chief Complaint(s) and History of Present Illness(es)     Branch Retinal Vein Occlusion Follow Up     Laterality: right eye    Onset: 9 months ago    Associated symptoms: floaters (right eye).  Negative for eye pain and flashes    Pain scale: 0/10              Comments     9 month f/u; s/p PPV/EL/FAX OD (2/14/17) for vitreous hemorrhage possibly secondary to neovascularization after BRVO  Pt reports she had noted a large floater that \"looked like a spider\" in the right eye the night after her last exam here - this resolved quickly  Pt states she has been noting floaters now in the right eye again more recently (not sure how long), but they look like sliced black olives  Pt also reports lots of stress lately with apartment/living situation that is causing her to lose sleep and feeling very tired overall  Pt using AT's 2-3x/day in both eyes - feels like she needs to use more frequently but can't afford    IDALIA Ma 2:06 PM November 21, 2019                 "

## 2019-11-25 ENCOUNTER — OFFICE VISIT (OUTPATIENT)
Dept: INTERNAL MEDICINE | Facility: CLINIC | Age: 64
End: 2019-11-25
Payer: MEDICARE

## 2019-11-25 VITALS
SYSTOLIC BLOOD PRESSURE: 127 MMHG | HEART RATE: 76 BPM | OXYGEN SATURATION: 93 % | DIASTOLIC BLOOD PRESSURE: 85 MMHG | WEIGHT: 192 LBS | BODY MASS INDEX: 32.19 KG/M2

## 2019-11-25 DIAGNOSIS — R09.81 NASAL CONGESTION: ICD-10-CM

## 2019-11-25 DIAGNOSIS — E03.9 HYPOTHYROIDISM, UNSPECIFIED TYPE: ICD-10-CM

## 2019-11-25 DIAGNOSIS — K62.5 RECTAL BLEEDING: ICD-10-CM

## 2019-11-25 DIAGNOSIS — E03.9 HYPOTHYROIDISM, UNSPECIFIED TYPE: Primary | ICD-10-CM

## 2019-11-25 LAB
BASOPHILS # BLD AUTO: 0 10E9/L (ref 0–0.2)
BASOPHILS NFR BLD AUTO: 0.4 %
DIFFERENTIAL METHOD BLD: NORMAL
EOSINOPHIL # BLD AUTO: 0 10E9/L (ref 0–0.7)
EOSINOPHIL NFR BLD AUTO: 0.4 %
ERYTHROCYTE [DISTWIDTH] IN BLOOD BY AUTOMATED COUNT: 12 % (ref 10–15)
HCT VFR BLD AUTO: 45.7 % (ref 35–47)
HGB BLD-MCNC: 15.5 G/DL (ref 11.7–15.7)
IMM GRANULOCYTES # BLD: 0 10E9/L (ref 0–0.4)
IMM GRANULOCYTES NFR BLD: 0.3 %
LYMPHOCYTES # BLD AUTO: 1.7 10E9/L (ref 0.8–5.3)
LYMPHOCYTES NFR BLD AUTO: 23.8 %
MCH RBC QN AUTO: 30.4 PG (ref 26.5–33)
MCHC RBC AUTO-ENTMCNC: 33.9 G/DL (ref 31.5–36.5)
MCV RBC AUTO: 90 FL (ref 78–100)
MONOCYTES # BLD AUTO: 0.5 10E9/L (ref 0–1.3)
MONOCYTES NFR BLD AUTO: 7 %
NEUTROPHILS # BLD AUTO: 4.9 10E9/L (ref 1.6–8.3)
NEUTROPHILS NFR BLD AUTO: 68.1 %
NRBC # BLD AUTO: 0 10*3/UL
NRBC BLD AUTO-RTO: 0 /100
PLATELET # BLD AUTO: 200 10E9/L (ref 150–450)
RBC # BLD AUTO: 5.1 10E12/L (ref 3.8–5.2)
TSH SERPL DL<=0.005 MIU/L-ACNC: 1.88 MU/L (ref 0.4–4)
WBC # BLD AUTO: 7.2 10E9/L (ref 4–11)

## 2019-11-25 RX ORDER — FLUTICASONE PROPIONATE 50 MCG
2 SPRAY, SUSPENSION (ML) NASAL DAILY
Qty: 16 G | Refills: 11 | Status: SHIPPED | OUTPATIENT
Start: 2019-11-25 | End: 2019-11-25

## 2019-11-25 ASSESSMENT — PAIN SCALES - GENERAL: PAINLEVEL: NO PAIN (0)

## 2019-11-25 NOTE — PATIENT INSTRUCTIONS
[facility] Medication Refill Request Information:  * Please contact your pharmacy regarding ANY request for medication refills.  ** [facility] Prescription Fax = [phone]  * Please allow 3 business days for routine medication refills.  * Please allow 5 business days for controlled substance medication refills.     [facility] Test Result notification information:  *You will be notified with in 7-10 days of your appointment day regarding the results of your test.  If you are on MyChart you will be notified as soon as the provider has reviewed the results and signed off on them.    [facility]: [phone]
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
## 2019-11-25 NOTE — NURSING NOTE
Chief Complaint   Patient presents with     Medication Follow-up     Pt is here to follow up on medications.      Erica Alvarado LPN at 2:48 PM on 11/25/2019.

## 2019-11-26 RX ORDER — FLUTICASONE PROPIONATE 50 MCG
2 SPRAY, SUSPENSION (ML) NASAL DAILY
Qty: 48 G | Refills: 3 | Status: SHIPPED | OUTPATIENT
Start: 2019-11-26

## 2019-12-16 ENCOUNTER — OFFICE VISIT (OUTPATIENT)
Dept: OBGYN | Facility: CLINIC | Age: 64
End: 2019-12-16
Attending: OBSTETRICS & GYNECOLOGY
Payer: MEDICARE

## 2019-12-16 VITALS
HEIGHT: 65 IN | HEART RATE: 62 BPM | SYSTOLIC BLOOD PRESSURE: 164 MMHG | WEIGHT: 196.7 LBS | DIASTOLIC BLOOD PRESSURE: 93 MMHG | BODY MASS INDEX: 32.77 KG/M2

## 2019-12-16 DIAGNOSIS — N89.8 VAGINAL DRYNESS: Primary | ICD-10-CM

## 2019-12-16 PROCEDURE — G0463 HOSPITAL OUTPT CLINIC VISIT: HCPCS | Mod: ZF

## 2019-12-16 ASSESSMENT — PAIN SCALES - GENERAL: PAINLEVEL: NO PAIN (0)

## 2019-12-16 ASSESSMENT — MIFFLIN-ST. JEOR: SCORE: 1449.3

## 2019-12-16 NOTE — NURSING NOTE
Chief Complaint   Patient presents with     Recheck Medication     PT here for premarin cream follow up.

## 2019-12-16 NOTE — LETTER
12/16/2019       RE: Sarah Sanford  [address] [place] [address]  [address]  [place] [address]     Dear Colleague,    Thank you for referring your patient, Sarah Sanford, to the [facility] at [facility]. Please see a copy of my visit note below.    Gynecology Visit Note  12/16/19    Reason for visit: Medication concern    S: Patient is a 65 yo  postmenopausal female well known to me who comes in today with concerns about medication and health care plan changes.  Patient with significant vaginal dryness that is well controlled on Premarin vaginal cream 2g daily/eod who presents today concerned because she received a letter from her new health plan that her Premarin cream may not be covered under her new plan.  She is concerned about this because she states this is the only thing that has worked for her and she is nervous about changing it.  She has tried calling her insurance company [employer] to get some clarity and has been unable to reach a person to discuss with them.  Of note, the letter does state she could consider Estring or Estrace as alternatives.  Patient is nervous about this potential change.  She states she is moving to [place] on 1/31/20 because she doesn't like the cold and is ready for a change.  She has a significant mental health history and states she has really been trying to get ahead of everything with regards to the move and coordinating her [address] 8 housing which she plans to transfer down there.  She feels proud she is doing this for herself but is nervous/stressed about what the move entails.  She is selling all of her items currently as can not afford to move them to [place].  She is wondering if we can help her find out more about the Premarin as she wants this answered before she moves and has not been successful in accessing this information herself.    O:  Vital signs:      BP: (!) 164/93 Pulse: 62           Height: 164.5 cm (5'  "4.76\") Weight: 89.2 kg (196 lb 11.2 oz)  Estimated body mass index is 32.98 kg/m  as calculated from the following:    Height as of this encounter: 1.645 m (5' 4.76\").    Weight as of this encounter: 89.2 kg (196 lb 11.2 oz).    General: NAD, A&Ox3  Resp: Non-labored breathing    A/P: 65 yo  presents with medication concerns with impending health care plan change  1) Vaginal dryness/atrophy: Patient well controlled on Premarin vaginal cream 2 grams nightly/every other night per her preference as when we have tapered her symptoms are \"awful\".  Today we reviewed the letter together and did discuss we could given her the same dose of Estrace cream which is comparable to her Premarin cream.  We also can call to check on this issue for her if that would help with her anxiety and concerns with her impending move and she would appreciate this.  Will call and get information for her so she knows what to expect and call to let her know.  She appreciates us assisting her with this.    Apoorva Ayon MD              "

## 2019-12-17 NOTE — PROGRESS NOTES
"Gynecology Visit Note  12/16/19    Reason for visit: Medication concern    S: Patient is a 65 yo  postmenopausal female well known to me who comes in today with concerns about medication and health care plan changes.  Patient with significant vaginal dryness that is well controlled on Premarin vaginal cream 2g daily/eod who presents today concerned because she received a letter from her new health plan that her Premarin cream may not be covered under her new plan.  She is concerned about this because she states this is the only thing that has worked for her and she is nervous about changing it.  She has tried calling her insurance company [employer] to get some clarity and has been unable to reach a person to discuss with them.  Of note, the letter does state she could consider Estring or Estrace as alternatives.  Patient is nervous about this potential change.  She states she is moving to [place] on 1/31/20 because she doesn't like the cold and is ready for a change.  She has a significant mental health history and states she has really been trying to get ahead of everything with regards to the move and coordinating her Section 8 housing which she plans to transfer down there.  She feels proud she is doing this for herself but is nervous/stressed about what the move entails.  She is selling all of her items currently as can not afford to move them to [place].  She is wondering if we can help her find out more about the Premarin as she wants this answered before she moves and has not been successful in accessing this information herself.    O:  Vital signs:      BP: (!) 164/93 Pulse: 62           Height: 164.5 cm (5' 4.76\") Weight: 89.2 kg (196 lb 11.2 oz)  Estimated body mass index is 32.98 kg/m  as calculated from the following:    Height as of this encounter: 1.645 m (5' 4.76\").    Weight as of this encounter: 89.2 kg (196 lb 11.2 oz).    General: NAD, A&Ox3  Resp: Non-labored breathing    A/P: 65 yo  " "presents with medication concerns with impending health care plan change  1) Vaginal dryness/atrophy: Patient well controlled on Premarin vaginal cream 2 grams nightly/every other night per her preference as when we have tapered her symptoms are \"awful\".  Today we reviewed the letter together and did discuss we could given her the same dose of Estrace cream which is comparable to her Premarin cream.  We also can call to check on this issue for her if that would help with her anxiety and concerns with her impending move and she would appreciate this.  Will call and get information for her so she knows what to expect and call to let her know.  She appreciates us assisting her with this.    Apoorva Ayon MD        "

## 2020-01-06 ENCOUNTER — TELEPHONE (OUTPATIENT)
Dept: OBGYN | Facility: CLINIC | Age: 65
End: 2020-01-06

## 2020-01-06 DIAGNOSIS — N95.2 VAGINAL ATROPHY: Primary | ICD-10-CM

## 2020-01-06 DIAGNOSIS — I10 ESSENTIAL HYPERTENSION: ICD-10-CM

## 2020-01-06 NOTE — TELEPHONE ENCOUNTER
Central Prior Authorization Team   [phone]    [place] Initiation    Medication: conjugated estrogens (PREMARIN) 0.625 MG/GM vaginal cream  Insurance Company: [employer] - Phone [phone] Fax [phone]  Pharmacy Filling the Rx: [id number] - [place], [place] - [address] [place] [address] AT [facility] & [address]  Filling Pharmacy Phone: [phone]  Filling Pharmacy Fax: [phone]  Start Date: 1/6/2020

## 2020-01-06 NOTE — TELEPHONE ENCOUNTER
Prior Authorization Retail Medication Request    Medication/Dose: conjugated estrogens (PREMARIN) 0.625 MG/GM vaginal cream  ICD code (if different than what is on RX):    Vaginal atrophy [N95.2]   Previously Tried and Failed:  vagifem  Rationale:  Patient has been using successfully for over 6 years. Dr. Ayon is advising that she NOT switch medications as it has taken her a long time to get to a therapeutic place.     Insurance Name:  [employer] [phone]  Insurance ID:        Pharmacy Information (if different than what is on RX)  Name:  Kass[facility]  Phone:  [phone]

## 2020-01-07 NOTE — TELEPHONE ENCOUNTER
Prior Authorization Approval    Authorization Effective Date: 1/6/2020  Authorization Expiration Date: 12/31/2020  Medication: conjugated estrogens (PREMARIN) 0.625 MG/GM vaginal cream-[place] APPROVED   Approved Dose/Quantity:   Reference #:     Insurance Company: [email or web address] - CaseReader [phone] Fax [phone]  Expected CoPay:       CoPay Card Available:      Foundation Assistance Needed:    Which Pharmacy is filling the prescription (Not needed for infusion/clinic administered): [id number] - [place], [place] - [address] [place] [address] AT [facility] [address]  Pharmacy Notified: Yes- **Instructed pharmacy to notify patient when script is ready to /ship.**   Patient Notified: Yes

## 2020-01-20 ENCOUNTER — OFFICE VISIT (OUTPATIENT)
Dept: INTERNAL MEDICINE | Facility: CLINIC | Age: 65
End: 2020-01-20
Payer: MEDICARE

## 2020-01-20 VITALS
SYSTOLIC BLOOD PRESSURE: 143 MMHG | OXYGEN SATURATION: 98 % | HEIGHT: 65 IN | DIASTOLIC BLOOD PRESSURE: 87 MMHG | WEIGHT: 196 LBS | HEART RATE: 72 BPM | BODY MASS INDEX: 32.65 KG/M2

## 2020-01-20 DIAGNOSIS — I10 BENIGN ESSENTIAL HYPERTENSION: ICD-10-CM

## 2020-01-20 DIAGNOSIS — I10 ESSENTIAL HYPERTENSION: ICD-10-CM

## 2020-01-20 DIAGNOSIS — E03.9 HYPOTHYROIDISM, UNSPECIFIED TYPE: ICD-10-CM

## 2020-01-20 RX ORDER — HYDROCHLOROTHIAZIDE 25 MG/1
25 TABLET ORAL DAILY
Qty: 90 TABLET | Refills: 3 | Status: SHIPPED | OUTPATIENT
Start: 2020-01-20

## 2020-01-20 RX ORDER — LEVOTHYROXINE SODIUM 50 UG/1
50 TABLET ORAL DAILY
Qty: 90 TABLET | Refills: 3 | Status: SHIPPED | OUTPATIENT
Start: 2020-01-20

## 2020-01-20 RX ORDER — LISINOPRIL 30 MG/1
30 TABLET ORAL AT BEDTIME
Qty: 90 TABLET | Refills: 3 | Status: SHIPPED | OUTPATIENT
Start: 2020-01-20

## 2020-01-20 ASSESSMENT — PAIN SCALES - GENERAL: PAINLEVEL: NO PAIN (0)

## 2020-01-20 ASSESSMENT — MIFFLIN-ST. JEOR: SCORE: 1446.18

## 2020-01-20 NOTE — NURSING NOTE
Chief Complaint   Patient presents with     Recheck Medication     patient is here for a general follow up - patient will be moving        Charity Chin EMT at 2:52 PM on 1/20/2020.
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
## 2020-01-20 NOTE — PATIENT INSTRUCTIONS
[facility] Medication Refill Request Information:  * Please contact your pharmacy regarding ANY request for medication refills.  ** [facility] Prescription Fax = [phone]  * Please allow 3 business days for routine medication refills.  * Please allow 5 business days for controlled substance medication refills.     [facility] Test Result notification information:  *You will be notified with in 7-10 days of your appointment day regarding the results of your test.  If you are on MyChart you will be notified as soon as the provider has reviewed the results and signed off on them.    [facility]: [phone]

## 2020-03-20 NOTE — Clinical Note
2/6/2017      RE: Sarah Sanford  [address]  [place] [address]       Ms. Sanford is a 61 year old female here  at the request of Dr. Cintron and Dr. Chapin for cardiovascular, pulmonary, and perioperative risk assessment prior to surgery.The intended surgical procedure is R vitreal hemorrhage repair--R/B/A to pars plana vitrectomy, likely endolaser, at [facility] on 2/14/17. A copy of this note will be sent to the surgeon.    History of Present Illness:    Reason for surgery: (must document 4+HPI factors for completion): eye hemorrhages since Fall 2016, started going to eye clinic; thought it was healing but then learned that she needs surgery. Not able to see out of R eye at all x2 weeks--seen in ER, Dr. Chapin recommended surgery for this. Has consult scheduled on 2/8/17, tentatively scheduled for surgery 2/14/17.     Cardiovascular Risk:  This patient does not have chest pain with ambulation or walking up a flight of stairs.  There is not any chest pain with exercise.  There is not a history of heart disease or valve problems.     Pulmonary Risk:  The patient does not have a history of lung problems.     Perioperative Complications:  The patient does not have a history of bleeding or clotting problems in the past. The patient has not had complications from past surgeries.  The patient does not have a family history of any anesthesia or surgical complications.    1. Do you have a history of heart attack, stroke, stent, bypass or surgery on an artery in the head, neck, heart or legs? NO  2. Do you ever have any pain or discomfort in your chest? NO  3. Have you ever had a severe pain across the front of your chest lasting for half an hour or more? NO  4. Do you have a history of Congestive Heart Failure? NO  5. Are you troubled by shortness of breath when: walking on the level/ up a slight hill/ at night? NO  6. Does your chest ever sound wheezy or whistling? NO  7. Do you currently have a cold,  Subjective:      Patient ID:  Jared David is a 78 y.o. female with chief complaint of:  Chief Complaint   Patient presents with    Breast Pain     pt here today c/o pain on right breast for the past couple month and it seems to be getting worse       Patient presents due to concerns about right breast pain that has been worsening for the past few months. patient denies any discharge just achy pain no masses no skin changes. Past Medical History:   Diagnosis Date    Anxiety     Asthma     dx 2019 / has smoked since age 17    CHF (congestive heart failure) (HCC)     Chronic back pain     Bilateral L5 S1 Radic on emg--surprisingly worse on the left than the right--pt's symptoms and her MRI show worse on the right    Chronic obstructive pulmonary disease with acute exacerbation ([facility] [address] 10/12/2019    Depression     Fibromyalgia     Hyperlipidemia     meds > 8 yrs    Hypertension     meds > 45 yrs    Osteoarthritis     Type II diabetes mellitus, uncontrolled ([facility] [address]     hx > 8 yrs    Unspecified sleep apnea      Past Surgical History:   Procedure Laterality Date    BACK SURGERY  2017    lumbar disc    CARDIAC CATHETERIZATION  11/3/14     DR. MIRELES / no stents    COLONOSCOPY  08/29/2016    w/polypectomy     DILATION AND CURETTAGE OF UTERUS N/A 10/7/2019    EUA HYSTEROSCOPY DILATATION AND CURETTAGE performed by Kirill Bartlett DO at [address] [place] [address] ENDOSCOPY, COLON, DIAGNOSTIC      EYE SURGERY      [place] with IOL OU / [facility]  9228    umbilical hernia repair    [place] ESOPHAGOGASTRODUODENOSCOPY TRANSORAL DIAGNOSTIC N/A 3/24/2017    EGD ESOPHAGOGASTRODUODENOSCOPY performed by Amor De La Garza MD at [facility] N/A 2/8/2018    negative findings    [place] REVISE MEDIAN N/CARPAL TUNNEL SURG Left 6/5/2017    LEFT  CARPAL TUNNEL RELEASE performed by Fatoumata Lomax MD at [facility] XPKJ,5+E/O,WLJKZ N/A 2/8/2018    TONSILLECTOMY      as child    UPPER GASTROINTESTINAL ENDOSCOPY  2016    w/bx     UPPER GASTROINTESTINAL ENDOSCOPY N/A 2020    EGD possible biopsy performed by Nando Nguyen MD at [address] History   Problem Relation Age of Onset    Heart Disease Father         cardiac bypass    Arthritis Father     Arthritis Mother     Other Mother          at age 80    Other Sister         [facility]    No Known Problems Daughter     Stroke Son      Current Outpatient Medications on File Prior to Visit   Medication Sig Dispense Refill    hydrALAZINE (APRESOLINE) 100 MG tablet Take 100 mg by mouth 2 times daily      pantoprazole (PROTONIX) 40 MG tablet Take 40mg twice daily (1st dose 30min before breakfast) for 30 days.  After 30 days, you make take 40mg once daily before breakfast. 60 tablet 3    furosemide (LASIX) 40 MG tablet Take 1 tablet by mouth daily 90 tablet 0    rosuvastatin (CRESTOR) 40 MG tablet Take 1 tablet by mouth every evening 90 tablet 0    PARoxetine (PAXIL) 40 MG tablet TAKE 1 TABLET BY MOUTH ONCE DAILY IN THE MORNING 30 tablet 0    dicyclomine (BENTYL) 10 MG capsule Take 1 capsule by mouth 4 times daily as needed (abdominal pain) 120 capsule 3    ondansetron (ZOFRAN) 4 MG tablet Take 1 tablet by mouth every 8 hours as needed for Nausea or Vomiting 30 tablet 2    ACCU-CHEK PHYLLIS PLUS strip Test 3x daily Dx E11.65 100 each 3    insulin 70-30 (NOVOLIN 70/30) (70-30) 100 UNIT per ML injection vial 20 units twice a day if glucose more than 120 1 vial 3    Insulin Syringe-Needle U-100 (INSULIN SYRINGE 1CC/31GX5/16\") 31G X 5/16\" 1 ML MISC 1 each by Does not apply route daily 50 each 3    digoxin (LANOXIN) 125 MCG tablet Take 1 tablet by mouth daily 30 tablet 3    lisinopril (PRINIVIL;ZESTRIL) 10 MG tablet Take 1 tablet by mouth 2 times daily 30 tablet 3    carvedilol (COREG) 25 MG tablet Take 1 tablet by mouth 2 times daily 60 tablet 3    bronchitis or other respiratory infection? NO  8. Have you had a cold, bronchitis or other respiratory infection within the last 2 weeks? NO  9. Do you usually have a cough? NO  10. Do you sometimes get pains in the calves of your legs when you walk? NO  11. Do you or anyone in your family have previous history of blood clots? NO  12. Do you or does anyone in your family have a serious bleeding problem such as prolonged bleeding following surgeries or cuts? NO  13. Have you ever had problems with anemia or been told to take iron pills? NO  14. Have you had any abnormal blood loss such as black, tarry or bloody stools, or abnormal vaginal bleeding? NO  15. Have you ever had a blood transfusion? NO  16. Have you or any of your relatives ever had problems with anesthesia? NO  17. Do you have sleep apnea, excessive snoring or daytime drowsiness? NO  18. Do you have any prosthetic heart valves? NO  19. Do you have prosthetic joints? NO  20. Is there any chance that you may be pregnant? NO    ROS:  Constitutional: no fevers, night sweats or unintentional weight change   Eyes: see HPI  Ears, Nose, Mouth, Throat: no tinnitus or hearing change, no epistaxis or nasal discharge, no oral lesions, throat clear   Cardiovascular: no chest pain, palpitations, or pain with walking, no orthopnea or PND   Respiratory: no dyspnea, cough, shortness of breath or wheezing   GI: no nausea, vomiting, diarrhea or constipation, no abdominal pain   : no change in urine, no dysuria or hematuria  Musculoskeletal: no joint or muscle pain or swelling   Integumentary: no concerning moles; recurrent cyst on R medial breast, recently excised, drained and closed by Dr. Yeager (see note 1/31/17)  Neuro: no loss of strength or sensation, no numbness or tingling, no tremor, no dizziness, no headache   Endo: no polyuria or polydipsia, no temperature intolerance   Heme/Lymph: no concerning bumps, no bleeding problems   Allergy: no environmental allergies    Psych: being treated at [facility] ([facility]) and [facility] (psych--prescribes Trazodone and Lorazapam) for depression with PTSD, anxiety, borderline personality       Past Medical History:  Past Medical History   Diagnosis Date     Arthritis      L knee     Hypertension      treated nonpharmacologiacally     Hepatitis C      genotype 1, treatment naive, with Stage 1 fibrosis, acquired via IVDU     Cervical cancer (H)      Depression      Anxiety      Borderline personality disorder      Nephrolithiasis      Obesity      Chronic pain      related to hepatitis C     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       Past Surgical History:  Past Surgical History   Procedure Laterality Date     Cholecystectomy       Hysterectomy       age 31 with cervical removal     Extracorporeal shock wave lithotripsy (eswl)       Biopsy of skin lesion         Active Meds:  Current Outpatient Prescriptions   Medication     mometasone-formoterol (DULERA) 100-5 MCG/ACT oral inhaler     order for DME     Estrogens, Conjugated (PREMARIN VA)     hydrochlorothiazide (HYDRODIURIL) 25 MG tablet     traMADol (ULTRAM) 50 MG tablet     Lysine 1000 MG TABS     lisinopril (PRINIVIL,ZESTRIL) 30 MG tablet     Foot Care Products (GEL HEEL CUSHIONS WOMENS) PADS     LORazepam (ATIVAN) 2 MG tablet     traZODone (DESYREL) 50 MG tablet     cetirizine (ZYRTEC) 10 MG tablet     No current facility-administered medications for this visit.        Allergies:  No clinical screening - see comments; Erythromycin; Keflex; Penicillins; Sulfa drugs; and Tetracycline    Immunizations:  Immunization History   Administered Date(s) Administered     Influenza (IIV3) 10/08/2013, 09/23/2014, 09/22/2015, 09/27/2016     TD (ADULT, 7+) 01/01/2007     TDAP (BOOSTRIX AGES 10-64) 03/17/2016       Family History:  family history includes Alcohol/Drug in her maternal grandfather and mother; Asthma in her daughter; CANCER in her maternal grandmother;  spironolactone (ALDACTONE) 25 MG tablet Take 1 tablet by mouth daily 30 tablet 3     No current facility-administered medications on file prior to visit. Allergies:  Ibuprofen; Metformin and related; and Darvon [propoxyphene hcl]    Review of Systems   Constitutional: Negative for fatigue, fever and unexpected weight change. Psychiatric/Behavioral: Negative for agitation and dysphoric mood. Objective:   BP (!) 150/80   Ht 4' 11\" (1.499 m)   Wt 138 lb 14.4 oz (63 kg)   LMP 09/30/1995   BMI 28.05 kg/m²      Physical Exam  Constitutional:       Appearance: Normal appearance. Cardiovascular:      Rate and Rhythm: Normal rate and regular rhythm. Pulses: Normal pulses. Heart sounds: Normal heart sounds. Pulmonary:      Effort: Pulmonary effort is normal.      Breath sounds: Normal breath sounds. Chest:      Breasts:         Right: Tenderness present. No inverted nipple, mass, nipple discharge or skin change. Left: No inverted nipple, mass, nipple discharge, skin change or tenderness. Lymphadenopathy:      Upper Body:      Right upper body: No supraclavicular or axillary adenopathy. Left upper body: No supraclavicular or axillary adenopathy. Neurological:      Mental Status: She is alert and oriented to person, place, and time. Psychiatric:         Mood and Affect: Mood normal.         Behavior: Behavior normal.         Assessment:       Diagnosis Orders   1. Breast pain, right  [facility] DIGITAL DIAGNOSTIC W OR WO CAD RIGHT   2. Abnormal screening mammogram  [facility] DIGITAL DIAGNOSTIC W OR WO CAD RIGHT         Plan:      Orders Placed This Encounter   Procedures    DAYANARA DIGITAL DIAGNOSTIC W OR WO CAD RIGHT     Standing Status:   Future     Standing Expiration Date:   3/20/2021     No orders of the defined types were placed in this encounter. No follow-ups on file.      Moncho Pascual DO Hypertension in her mother; Lipids in her mother; Psychotic Disorder in her mother. There is no history of Glaucoma or Macular Degeneration.    Social History:  Social History   Substance Use Topics     Smoking status: Never Smoker      Smokeless tobacco: Never Used     Alcohol Use: No      Comment: previous EtOH use 5 yrs ago       Physical Exam:  Vitals: /84 mmHg  Pulse 59  Wt 76.839 kg (169 lb 6.4 oz)  Breastfeeding? No   Wt Readings from Last 1 Encounters:   02/06/17 76.839 kg (169 lb 6.4 oz)     Constitutional: Alert, oriented, pleasant, no acute distress  Head: Normocephalic, atraumatic  Eyes: Extra-ocular movements intact, no scleral icterus  ENT: Oropharynx clear, moist mucus membranes, good dentition  Neck: Supple, no lymphadenopathy, no thyromegaly, no JVD  Cardiovascular: PMI nondisplaced, regular rate and rhythm, no murmurs, rubs or gallops, peripheral pulses full/symmetric  Respiratory: Good air movement bilaterally, lungs clear, no wheezes/rales/rhonchi  GI: Abdomen soft, bowel sounds present, nondistended, nontender, no organomegaly or masses, no rebound/guarding  Musculoskeletal: No edema, normal muscle tone, normal gait  Neurologic: Alert and oriented, cranial nerves 2-12 intact, strength 5/5 throughout, sensation to light touch intact  Skin: No rashes, ecchymosis over R medial breast, dressing c/d/i over cyst incision site--sutures intact, mild erythema over well-approximated incision; site cleansed and re-dressed  Psychiatric: normal mentation, affect and mood    Recent Labs:  NA      138   12/21/2016   POTASSIUM      4.5   12/21/2016  CHLORIDE      101   12/21/2016  DARIEL      9.4   12/21/2016  CO2       31   12/21/2016  BUN        7   12/21/2016  CR     0.66   12/21/2016  GLC       95   12/21/2016  AST       15   11/9/2016  ALT       17   11/9/2016  BILICONJ      0.0   6/14/2013   BILITOTAL      0.6   11/9/2016  ALBUMIN      4.0   11/9/2016  PROTTOTAL      7.7   11/9/2016   ALKPHOS        69   11/9/2016    WBC      5.1   11/9/2016  HGB     15.4   11/9/2016  HCT     48.3   11/9/2016  MCV       92   11/9/2016  PLT      180   11/9/2016    EKG:  EKG was indicated based on risk assessment.  no acute changes and normal sinus rhythm    Assessment and Plan:    61 year old year old female with a history of HTN, bilateral posterior vitreous detachment, mixed cryoglobulinemia, borderline personality, anxiety, depression, and valsalva retinopathy    presents prior to surgery for assessment of perioperative risk. The patient is at LOW risk for cardiovascular complications and at LOW risk for pulmonary complications of this LOW risk surgery.      Laboratory studies:  BMP and Hb    --Approval given to proceed with proposed procedure, without further diagnostic evaluation  --Patient is taking an Ace inhibitor or Angiotensin Receptor Blocker (ARB) and should HOLD this medication for the 24 hours prior to surgery.    The patient has been told to not take any aspirin or NSAIDs for 10 days prior to surgery.  The patient has been instructed as to what to do with medications prior to surgery.    --The patient has been instructed to notify Dr. Yeager' clinic if new drainage, worsening pain or redness from the incision site prior to her eye surgery. No signs of infection today. F/u scheduled for 2/13/17.    Please contact our office if there are any further questions or information required about this patient.      MEDINA Le CNP              Surgeon (please enter first and last name):  Dr. Cintron/ Dr. Chapin  Fax number for Preop Evaluation:  [phone]  Location of Surgery: [facility]  Date of Surgery:  2/14/17  Procedure:  Eye Surgery  History of reaction to anesthesia?  No        MEDINA Le CNP

## 2020-05-04 ENCOUNTER — TELEPHONE (OUTPATIENT)
Dept: OBGYN | Facility: CLINIC | Age: 65
End: 2020-05-04

## 2020-05-04 DIAGNOSIS — N95.2 VAGINAL ATROPHY: ICD-10-CM

## 2020-05-20 ENCOUNTER — TRANSFERRED RECORDS (OUTPATIENT)
Dept: HEALTH INFORMATION MANAGEMENT | Facility: CLINIC | Age: 65
End: 2020-05-20

## 2020-12-20 ENCOUNTER — HEALTH MAINTENANCE LETTER (OUTPATIENT)
Age: 65
End: 2020-12-20

## 2021-08-25 ENCOUNTER — OFFICE VISIT (OUTPATIENT)
Dept: URBAN - METROPOLITAN AREA CLINIC 7 | Facility: CLINIC | Age: 66
End: 2021-08-25
Payer: MEDICARE

## 2021-08-25 DIAGNOSIS — H43.812 VITREOUS DEGENERATION, LEFT EYE: ICD-10-CM

## 2021-08-25 DIAGNOSIS — Z96.1 PRESENCE OF INTRAOCULAR LENS: ICD-10-CM

## 2021-08-25 DIAGNOSIS — H04.123 DRY EYE SYNDROME OF BILATERAL LACRIMAL GLANDS: ICD-10-CM

## 2021-08-25 PROCEDURE — 92235 FLUORESCEIN ANGRPH MLTIFRAME: CPT | Performed by: OPHTHALMOLOGY

## 2021-08-25 PROCEDURE — 92134 CPTRZ OPH DX IMG PST SGM RTA: CPT | Performed by: OPHTHALMOLOGY

## 2021-08-25 PROCEDURE — 92014 COMPRE OPH EXAM EST PT 1/>: CPT | Performed by: OPHTHALMOLOGY

## 2021-08-25 ASSESSMENT — INTRAOCULAR PRESSURE
OS: 12
OD: 10

## 2021-08-25 NOTE — IMPRESSION/PLAN
Impression: Dry eye syndrome of bilateral lacrimal glands: H04.123. Bilateral. Status: Symptomatic. Plan: Recommend using AT gtts PRN.

## 2021-08-25 NOTE — IMPRESSION/PLAN
Impression: Vitreous degeneration, left eye: H43.812. Left. Status: Asymptomatic. Plan: Patient notes floaters. Exam demonstrates a PVD without any RD or RT on exam.  The floaters are not debilitating to the patient and do not warrant surgery. Reassurance provided. Precautions discussed with the patient.

## 2021-08-25 NOTE — IMPRESSION/PLAN
Impression: Trib rtnl vein occlusion, right eye, with macular edema: H34.8310. Right. Status: Asymptomatic.
- h/o PPV OD
[facility]am, OCT and FA 08/25/21 continue to demonstrate an old BRVO that is resolved  with no CME. FA shows BRVO with some inferotemporal non-perfusion. The diagnosis, natural history, prognosis, and R/B/A of the various treatment options for the BRVO were discussed at length. We discussed that no treatment is needed at this time. Recommend continued observation today.   

RTC 1 year re-eval OCT OU, FA OD>OS

## 2021-08-25 NOTE — IMPRESSION/PLAN
Impression: Presence of intraocular lens: Z96.1. Right. Status: Asymptomatic. Plan: Lens in good position.

## 2021-08-25 NOTE — IMPRESSION/PLAN
Impression: Age-related nuclear cataract, left eye: H25.12. Left. Status: Symptomatic. Plan: Patient notes glare. May be visually significant. Will observe till it affects ADL's.

## 2021-10-03 ENCOUNTER — HEALTH MAINTENANCE LETTER (OUTPATIENT)
Age: 66
End: 2021-10-03

## 2021-11-28 ENCOUNTER — HEALTH MAINTENANCE LETTER (OUTPATIENT)
Age: 66
End: 2021-11-28

## 2022-01-23 ENCOUNTER — HEALTH MAINTENANCE LETTER (OUTPATIENT)
Age: 67
End: 2022-01-23

## 2022-02-04 NOTE — NURSING NOTE
Dermatology Rooming Note    Sarah Sanford's goals for this visit include:   Chief Complaint   Patient presents with     Derm Problem     Karin is here today for an excision of a cyst on her chest.             KOKO Barnhart    
no

## 2022-02-17 ENCOUNTER — OFFICE VISIT (OUTPATIENT)
Dept: URBAN - METROPOLITAN AREA CLINIC 27 | Facility: CLINIC | Age: 67
End: 2022-02-17
Payer: MEDICARE

## 2022-02-17 DIAGNOSIS — H25.12 AGE-RELATED NUCLEAR CATARACT, LEFT EYE: ICD-10-CM

## 2022-02-17 DIAGNOSIS — H34.8310 TRIBUTARY (BRANCH) RETINAL VEIN OCCLUSION, RIGHT EYE, WITH MACULAR EDEMA: ICD-10-CM

## 2022-02-17 DIAGNOSIS — H53.10 UNSPECIFIED SUBJECTIVE VISUAL DISTURBANCES: Primary | ICD-10-CM

## 2022-02-17 PROCEDURE — 99214 OFFICE O/P EST MOD 30 MIN: CPT | Performed by: OPHTHALMOLOGY

## 2022-02-17 PROCEDURE — 92134 CPTRZ OPH DX IMG PST SGM RTA: CPT | Performed by: OPHTHALMOLOGY

## 2022-02-17 ASSESSMENT — INTRAOCULAR PRESSURE
OS: 17
OD: 15

## 2022-02-17 NOTE — IMPRESSION/PLAN
Impression: Unspecified subjective visual disturbances: H53.10. Plan: May be related to PVD vs ocular migraine. RDW reviewed. Reassurance provided.

## 2022-02-17 NOTE — IMPRESSION/PLAN
Impression: Trib rtnl vein occlusion, right eye, with macular edema: H34.8310. Right. Status: Asymptomatic.
- h/o PPV OD
[facility]: Exam, OCT and FA 08/25/21 continue to demonstrate an old BRVO that is resolved  with no CME. FA shows BRVO with some inferotemporal non-perfusion. The diagnosis, natural history, prognosis, and R/B/A of the various treatment options for the BRVO were discussed at length. We discussed that no treatment is needed at this time. Recommend continued observation today. 

RTC 1 yr with OCT OU

## 2022-08-16 ENCOUNTER — APPOINTMENT (RX ONLY)
Dept: URBAN - METROPOLITAN AREA CLINIC 319 | Facility: CLINIC | Age: 67
Setting detail: DERMATOLOGY
End: 2022-08-16

## 2022-08-16 DIAGNOSIS — D22 MELANOCYTIC NEVI: ICD-10-CM

## 2022-08-16 DIAGNOSIS — D18.0 HEMANGIOMA: ICD-10-CM

## 2022-08-16 DIAGNOSIS — L81.4 OTHER MELANIN HYPERPIGMENTATION: ICD-10-CM

## 2022-08-16 DIAGNOSIS — L72.8 OTHER FOLLICULAR CYSTS OF THE SKIN AND SUBCUTANEOUS TISSUE: ICD-10-CM

## 2022-08-16 DIAGNOSIS — L82.1 OTHER SEBORRHEIC KERATOSIS: ICD-10-CM

## 2022-08-16 PROBLEM — D18.01 HEMANGIOMA OF SKIN AND SUBCUTANEOUS TISSUE: Status: ACTIVE | Noted: 2022-08-16

## 2022-08-16 PROBLEM — D22.5 MELANOCYTIC NEVI OF TRUNK: Status: ACTIVE | Noted: 2022-08-16

## 2022-08-16 PROCEDURE — ? FULL BODY SKIN EXAM

## 2022-08-16 PROCEDURE — ? SUNSCREEN TREATMENT REGIMEN

## 2022-08-16 PROCEDURE — ? COUNSELING

## 2022-08-16 PROCEDURE — 99203 OFFICE O/P NEW LOW 30 MIN: CPT

## 2022-08-16 ASSESSMENT — LOCATION SIMPLE DESCRIPTION DERM
LOCATION SIMPLE: LEFT PRETIBIAL REGION
LOCATION SIMPLE: RIGHT CHEEK
LOCATION SIMPLE: RIGHT PRETIBIAL REGION
LOCATION SIMPLE: LEFT FOREARM
LOCATION SIMPLE: CHEST
LOCATION SIMPLE: RIGHT FOREARM
LOCATION SIMPLE: LOWER BACK
LOCATION SIMPLE: LEFT UPPER BACK
LOCATION SIMPLE: RIGHT INNER THIGH
LOCATION SIMPLE: RIGHT UPPER BACK
LOCATION SIMPLE: ABDOMEN

## 2022-08-16 ASSESSMENT — LOCATION DETAILED DESCRIPTION DERM
LOCATION DETAILED: LEFT DISTAL PRETIBIAL REGION
LOCATION DETAILED: LEFT DISTAL RADIAL DORSAL FOREARM
LOCATION DETAILED: RIGHT DISTAL PRETIBIAL REGION
LOCATION DETAILED: UMBILICUS
LOCATION DETAILED: RIGHT MEDIAL POSTERIOR THIGH
LOCATION DETAILED: RIGHT INFERIOR CENTRAL MALAR CHEEK
LOCATION DETAILED: RIGHT MID-UPPER BACK
LOCATION DETAILED: RIGHT DISTAL DORSAL FOREARM
LOCATION DETAILED: SUPERIOR LUMBAR SPINE
LOCATION DETAILED: LEFT DISTAL DORSAL FOREARM
LOCATION DETAILED: LEFT MID-UPPER BACK
LOCATION DETAILED: UPPER STERNUM

## 2022-08-16 ASSESSMENT — LOCATION ZONE DERM
LOCATION ZONE: TRUNK
LOCATION ZONE: ARM
LOCATION ZONE: FACE
LOCATION ZONE: LEG

## 2022-08-24 ENCOUNTER — OFFICE VISIT (OUTPATIENT)
Dept: URBAN - METROPOLITAN AREA CLINIC 7 | Facility: CLINIC | Age: 67
End: 2022-08-24
Payer: MEDICARE

## 2022-08-24 DIAGNOSIS — H25.12 AGE-RELATED NUCLEAR CATARACT, LEFT EYE: ICD-10-CM

## 2022-08-24 DIAGNOSIS — H53.10 UNSPECIFIED SUBJECTIVE VISUAL DISTURBANCES: Primary | ICD-10-CM

## 2022-08-24 DIAGNOSIS — Z96.1 PRESENCE OF INTRAOCULAR LENS: ICD-10-CM

## 2022-08-24 DIAGNOSIS — H34.8310 TRIB RTNL VEIN OCCLUSION, RIGHT EYE, WITH MACULAR EDEMA: ICD-10-CM

## 2022-08-24 DIAGNOSIS — H35.362 DRUSEN (DEGENERATIVE) OF MACULA, LEFT EYE: ICD-10-CM

## 2022-08-24 DIAGNOSIS — H43.812 VITREOUS DEGENERATION, LEFT EYE: ICD-10-CM

## 2022-08-24 DIAGNOSIS — H04.123 DRY EYE SYNDROME OF BILATERAL LACRIMAL GLANDS: ICD-10-CM

## 2022-08-24 PROCEDURE — 92134 CPTRZ OPH DX IMG PST SGM RTA: CPT | Performed by: OPHTHALMOLOGY

## 2022-08-24 PROCEDURE — 92014 COMPRE OPH EXAM EST PT 1/>: CPT | Performed by: OPHTHALMOLOGY

## 2022-08-24 ASSESSMENT — INTRAOCULAR PRESSURE
OS: 10
OD: 15

## 2022-08-24 NOTE — IMPRESSION/PLAN
Impression: Trib rtnl vein occlusion, right eye, with macular edema: H34.8310. Right. Status: Asymptomatic.
- [facility] PPV OD
[facility], OCT and FA 08/25/21 continue to demonstrate an old BRVO that is resolved  with no CME. Last FA showed BRVO with some inferotemporal non-perfusion. The diagnosis, natural history, prognosis, and R/B/A of the various treatment options for the BRVO were discussed at length. We discussed that no treatment is needed at this time. Recommend continued observation today. 

RTC 12 mo with OCT OU

## 2022-08-24 NOTE — IMPRESSION/PLAN
Impression: Drusen (degenerative) of macula, left eye: H35.362. Plan: May have early AMD.  AG testing discussed. May consider AREDS in future.

## 2022-08-24 NOTE — IMPRESSION/PLAN
Impression: Unspecified subjective visual disturbances: H53.10. Plan: Fortunately, she has not have had any new episodes of blurry vision. May be related to PVD vs ocular migraine. RDW reviewed. Reassurance provided.

## 2022-09-04 ENCOUNTER — HEALTH MAINTENANCE LETTER (OUTPATIENT)
Age: 67
End: 2022-09-04

## 2023-01-21 ENCOUNTER — HEALTH MAINTENANCE LETTER (OUTPATIENT)
Age: 68
End: 2023-01-21

## 2023-08-29 ENCOUNTER — OFFICE VISIT (OUTPATIENT)
Dept: URBAN - METROPOLITAN AREA CLINIC 13 | Facility: CLINIC | Age: 68
End: 2023-08-29
Payer: MEDICARE

## 2023-08-29 DIAGNOSIS — H53.10 UNSPECIFIED SUBJECTIVE VISUAL DISTURBANCES: Primary | ICD-10-CM

## 2023-08-29 DIAGNOSIS — H25.12 AGE-RELATED NUCLEAR CATARACT, LEFT EYE: ICD-10-CM

## 2023-08-29 DIAGNOSIS — H34.8310 TRIB RTNL VEIN OCCLUSION, RIGHT EYE, WITH MACULAR EDEMA: ICD-10-CM

## 2023-08-29 DIAGNOSIS — H43.812 VITREOUS DEGENERATION, LEFT EYE: ICD-10-CM

## 2023-08-29 DIAGNOSIS — H35.3122 NEXDTVE AGE-RELATED MCLR DEGN, LEFT EYE, INTERMED DRY STAGE: ICD-10-CM

## 2023-08-29 DIAGNOSIS — Z96.1 PRESENCE OF INTRAOCULAR LENS: ICD-10-CM

## 2023-08-29 DIAGNOSIS — H04.123 DRY EYE SYNDROME OF BILATERAL LACRIMAL GLANDS: ICD-10-CM

## 2023-08-29 PROCEDURE — 99214 OFFICE O/P EST MOD 30 MIN: CPT | Performed by: OPHTHALMOLOGY

## 2023-08-29 PROCEDURE — 92134 CPTRZ OPH DX IMG PST SGM RTA: CPT | Performed by: OPHTHALMOLOGY

## 2023-08-29 ASSESSMENT — INTRAOCULAR PRESSURE
OS: 17
OD: 17

## 2023-10-13 ENCOUNTER — OFFICE VISIT (OUTPATIENT)
Dept: URBAN - METROPOLITAN AREA CLINIC 13 | Facility: CLINIC | Age: 68
End: 2023-10-13
Payer: MEDICARE

## 2023-10-13 DIAGNOSIS — H34.8310 TRIBUTARY (BRANCH) RETINAL VEIN OCCLUSION, RIGHT EYE, WITH MACULAR EDEMA: ICD-10-CM

## 2023-10-13 DIAGNOSIS — H43.812 VITREOUS DEGENERATION, LEFT EYE: ICD-10-CM

## 2023-10-13 DIAGNOSIS — H04.123 DRY EYE SYNDROME OF BILATERAL LACRIMAL GLANDS: ICD-10-CM

## 2023-10-13 DIAGNOSIS — H53.10 UNSPECIFIED SUBJECTIVE VISUAL DISTURBANCES: Primary | ICD-10-CM

## 2023-10-13 DIAGNOSIS — Z96.1 PRESENCE OF INTRAOCULAR LENS: ICD-10-CM

## 2023-10-13 DIAGNOSIS — H25.12 AGE-RELATED NUCLEAR CATARACT, LEFT EYE: ICD-10-CM

## 2023-10-13 PROCEDURE — 92134 CPTRZ OPH DX IMG PST SGM RTA: CPT | Performed by: OPHTHALMOLOGY

## 2023-10-13 PROCEDURE — 99214 OFFICE O/P EST MOD 30 MIN: CPT | Performed by: OPHTHALMOLOGY

## 2023-10-13 ASSESSMENT — INTRAOCULAR PRESSURE
OD: 12
OS: 13

## 2023-12-10 ENCOUNTER — HEALTH MAINTENANCE LETTER (OUTPATIENT)
Age: 68
End: 2023-12-10

## 2024-01-01 NOTE — TELEPHONE ENCOUNTER
Regarding: Pt said is very sick and doesn't know what to do; please advise  ----- Message from Valente Howell sent at 3/2/2019  1:37 PM CST -----  Reason for call:  Other   Patient called regarding (reason for call): Pt said is very sick and doesn't know what to do    Phone number to reach patient:  Home number on file [phone] (home)    Best Time:  asap    Can we leave a detailed message on this number?  NO   Yes

## 2024-02-18 ENCOUNTER — HEALTH MAINTENANCE LETTER (OUTPATIENT)
Age: 69
End: 2024-02-18

## 2024-08-13 ENCOUNTER — OFFICE VISIT (OUTPATIENT)
Dept: URBAN - METROPOLITAN AREA CLINIC 13 | Facility: CLINIC | Age: 69
End: 2024-08-13
Payer: MEDICARE

## 2024-08-13 DIAGNOSIS — H53.10 UNSPECIFIED SUBJECTIVE VISUAL DISTURBANCES: Primary | ICD-10-CM

## 2024-08-13 DIAGNOSIS — H25.12 AGE-RELATED NUCLEAR CATARACT, LEFT EYE: ICD-10-CM

## 2024-08-13 DIAGNOSIS — H04.123 DRY EYE SYNDROME OF BILATERAL LACRIMAL GLANDS: ICD-10-CM

## 2024-08-13 DIAGNOSIS — H34.8310 TRIB RTNL VEIN OCCLUSION, RIGHT EYE, WITH MACULAR EDEMA: ICD-10-CM

## 2024-08-13 DIAGNOSIS — H43.812 VITREOUS DEGENERATION, LEFT EYE: ICD-10-CM

## 2024-08-13 DIAGNOSIS — Z96.1 PRESENCE OF INTRAOCULAR LENS: ICD-10-CM

## 2024-08-13 PROCEDURE — 99214 OFFICE O/P EST MOD 30 MIN: CPT | Performed by: OPHTHALMOLOGY

## 2024-08-13 PROCEDURE — 92134 CPTRZ OPH DX IMG PST SGM RTA: CPT | Performed by: OPHTHALMOLOGY

## 2024-08-13 ASSESSMENT — INTRAOCULAR PRESSURE
OS: 10
OD: 12

## 2025-03-21 NOTE — PATIENT INSTRUCTIONS
[facility] Medication Refill Request Information:  * Please contact your pharmacy regarding ANY request for medication refills.  ** [facility] Prescription Fax = [phone]  * Please allow 3 business days for routine medication refills.  * Please allow 5 business days for controlled substance medication refills.     [facility] Test Result notification information:  *You will be notified with in 7-10 days of your appointment day regarding the results of your test.  If you are on MyChart you will be notified as soon as the provider has reviewed the results and signed off on them.    [facility] [phone]      [employer]

## (undated) DEVICE — EYE PACK CUSTOM ANTERIOR 45DEG TIP CENTURION PPK6925-01

## (undated) DEVICE — EYE SOL BSS 500ML

## (undated) DEVICE — GLOVE PROTEXIS MICRO 7.0  2D73PM70

## (undated) DEVICE — DRAPE MICROSCOPE DRAPE  EYE DI40001

## (undated) DEVICE — EYE PROBE LASER 25GA FLEX RFID 8065751114

## (undated) DEVICE — LINEN TOWEL PACK X5 5464

## (undated) DEVICE — GLOVE PROTEXIS MICRO 6.0  2D73PM60

## (undated) DEVICE — TAPE MICROPORE 1"X1.5YD 1530S-1

## (undated) DEVICE — EYE SHIELD PLASTIC

## (undated) DEVICE — SYR 05ML SLIP TIP W/O NDL

## (undated) DEVICE — EYE NDL RETROBULBAR 25GA 1.5" 581275

## (undated) DEVICE — PACK VITRECTOMY PQ15VI57E

## (undated) DEVICE — TAPE SILICONE KIND REMOVAL 1" 2770S-1

## (undated) DEVICE — TAPE MICROPORE 2"X1.5YD 1530S-2

## (undated) DEVICE — EYE KNIFE SLIT XSTAR VISITEC 2.6MM 45DEG 373726

## (undated) DEVICE — EYE PACK BVI READYPAK KIT #3

## (undated) DEVICE — GLOVE PROTEXIS MICRO 6.5  2D73PM65

## (undated) DEVICE — TAPE SILICONE KIND REMOVAL 2" 2770S-2

## (undated) DEVICE — SYR 05ML SLIP TIP W/O NDL 309647

## (undated) DEVICE — NDL 18GA 1.5" 305196

## (undated) DEVICE — PACK CATARACT CUSTOM SO DALE SEY32CTFCX

## (undated) DEVICE — SU PLAIN 6-0 TG140-8 18" 1735G

## (undated) DEVICE — EYE PACK 25GA PLUS CONSTELLATION PPK4253

## (undated) DEVICE — EYE CANNULA SOFT TIP 25GA 8065149525

## (undated) RX ORDER — DEXAMETHASONE SODIUM PHOSPHATE 4 MG/ML
INJECTION, SOLUTION INTRA-ARTICULAR; INTRALESIONAL; INTRAMUSCULAR; INTRAVENOUS; SOFT TISSUE
Status: DISPENSED
Start: 2017-02-14

## (undated) RX ORDER — ATROPINE SULFATE 10 MG/ML
SOLUTION/ DROPS OPHTHALMIC
Status: DISPENSED
Start: 2017-02-14

## (undated) RX ORDER — FENTANYL CITRATE 50 UG/ML
INJECTION, SOLUTION INTRAMUSCULAR; INTRAVENOUS
Status: DISPENSED
Start: 2017-02-14

## (undated) RX ORDER — KETOROLAC TROMETHAMINE 30 MG/ML
INJECTION, SOLUTION INTRAMUSCULAR; INTRAVENOUS
Status: DISPENSED
Start: 2017-02-14

## (undated) RX ORDER — FENTANYL CITRATE 50 UG/ML
INJECTION, SOLUTION INTRAMUSCULAR; INTRAVENOUS
Status: DISPENSED
Start: 2017-09-29

## (undated) RX ORDER — CEFAZOLIN SODIUM 500 MG/2.2ML
INJECTION, POWDER, FOR SOLUTION INTRAMUSCULAR; INTRAVENOUS
Status: DISPENSED
Start: 2017-02-14

## (undated) RX ORDER — NEOMYCIN SULFATE, POLYMYXIN B SULFATE, AND DEXAMETHASONE 3.5; 10000; 1 MG/G; [USP'U]/G; MG/G
OINTMENT OPHTHALMIC
Status: DISPENSED
Start: 2017-02-14

## (undated) RX ORDER — PROPOFOL 10 MG/ML
INJECTION, EMULSION INTRAVENOUS
Status: DISPENSED
Start: 2017-09-29

## (undated) RX ORDER — ONDANSETRON 2 MG/ML
INJECTION INTRAMUSCULAR; INTRAVENOUS
Status: DISPENSED
Start: 2017-09-29

## (undated) RX ORDER — LIDOCAINE HYDROCHLORIDE 10 MG/ML
INJECTION, SOLUTION EPIDURAL; INFILTRATION; INTRACAUDAL; PERINEURAL
Status: DISPENSED
Start: 2017-09-29

## (undated) RX ORDER — ONDANSETRON 2 MG/ML
INJECTION INTRAMUSCULAR; INTRAVENOUS
Status: DISPENSED
Start: 2017-02-14

## (undated) RX ORDER — DEXAMETHASONE SODIUM PHOSPHATE 4 MG/ML
INJECTION, SOLUTION INTRA-ARTICULAR; INTRALESIONAL; INTRAMUSCULAR; INTRAVENOUS; SOFT TISSUE
Status: DISPENSED
Start: 2017-09-29

## (undated) RX ORDER — PREDNISOLONE ACETATE 10 MG/ML
SUSPENSION/ DROPS OPHTHALMIC
Status: DISPENSED
Start: 2017-09-29

## (undated) RX ORDER — TRIAMCINOLONE ACETONIDE 40 MG/ML
INJECTION, SUSPENSION INTRA-ARTICULAR; INTRAMUSCULAR
Status: DISPENSED
Start: 2017-02-14

## (undated) RX ORDER — PROPOFOL 10 MG/ML
INJECTION, EMULSION INTRAVENOUS
Status: DISPENSED
Start: 2017-02-14

## (undated) RX ORDER — OFLOXACIN 3 MG/ML
SOLUTION/ DROPS OPHTHALMIC
Status: DISPENSED
Start: 2017-09-29

## (undated) RX ORDER — LIDOCAINE HYDROCHLORIDE 20 MG/ML
INJECTION, SOLUTION EPIDURAL; INFILTRATION; INTRACAUDAL; PERINEURAL
Status: DISPENSED
Start: 2017-02-14